# Patient Record
Sex: MALE | Race: WHITE | NOT HISPANIC OR LATINO | Employment: OTHER | URBAN - METROPOLITAN AREA
[De-identification: names, ages, dates, MRNs, and addresses within clinical notes are randomized per-mention and may not be internally consistent; named-entity substitution may affect disease eponyms.]

---

## 2017-01-02 ENCOUNTER — TELEPHONE (OUTPATIENT)
Dept: GASTROENTEROLOGY | Facility: CLINIC | Age: 82
End: 2017-01-02

## 2017-01-02 NOTE — TELEPHONE ENCOUNTER
Free Drug     Medication - Sandostatin  Sponsor - [employer]  Phone # - [phone]  Fax # [phone]  Effective Dates - TBD  Additional Information - TBD    Mailed free drug application to patient, will have Dr. Christopher sign off on MD portion.

## 2017-01-03 NOTE — TELEPHONE ENCOUNTER
Called infusion scheduling at [place] at [phone] - spoke to Benita - I asked her to reach out to patient to get him on the schedule for the Sandostatin injection - she was happy to help.  I will check to see schedule date 1/4/17

## 2017-01-09 ENCOUNTER — MEDICAL CORRESPONDENCE (OUTPATIENT)
Dept: HEALTH INFORMATION MANAGEMENT | Facility: CLINIC | Age: 82
End: 2017-01-09

## 2017-01-09 ENCOUNTER — TELEPHONE (OUTPATIENT)
Dept: GASTROENTEROLOGY | Facility: CLINIC | Age: 82
End: 2017-01-09

## 2017-01-09 NOTE — TELEPHONE ENCOUNTER
Medicare has a strict diagnosis for covering this drug under medical benefits and unfortunately, dumping syndrome is not an approved diagnosis.  Patient will have to try for free drug and I have sent him the application.  I will follow up with patient to ensure that he understands.  I will also go over his other option of getting it thru his pharmacy benefit.  Will update you when a determination is made.  Thanks.

## 2017-01-09 NOTE — TELEPHONE ENCOUNTER
Spoke to patient re: plan for octreotide. Patient received free drug information from Sophy Burt and PA team. Patient plans to fill paperwork out and mail in so we can move forward with medication regimen.     Patient also reports that he has not had loose stools for the past couple weeks but will move forward with paperwork regardless.

## 2017-01-10 ENCOUNTER — OFFICE VISIT (OUTPATIENT)
Dept: FAMILY MEDICINE | Facility: OTHER | Age: 82
End: 2017-01-10
Payer: COMMERCIAL

## 2017-01-10 VITALS
WEIGHT: 163 LBS | OXYGEN SATURATION: 95 % | RESPIRATION RATE: 16 BRPM | DIASTOLIC BLOOD PRESSURE: 70 MMHG | HEART RATE: 64 BPM | BODY MASS INDEX: 21.6 KG/M2 | TEMPERATURE: 97.4 F | HEIGHT: 73 IN | SYSTOLIC BLOOD PRESSURE: 118 MMHG

## 2017-01-10 DIAGNOSIS — Z98.0 S/P TOTAL GASTRECTOMY AND ROUX-EN-Y ESOPHAGOJEJUNAL ANASTOMOSIS: Primary | ICD-10-CM

## 2017-01-10 DIAGNOSIS — Z90.3 S/P TOTAL GASTRECTOMY AND ROUX-EN-Y ESOPHAGOJEJUNAL ANASTOMOSIS: Primary | ICD-10-CM

## 2017-01-10 DIAGNOSIS — K91.1 POSTSURGICAL DUMPING SYNDROME: ICD-10-CM

## 2017-01-10 PROCEDURE — 99214 OFFICE O/P EST MOD 30 MIN: CPT | Performed by: INTERNAL MEDICINE

## 2017-01-10 ASSESSMENT — PAIN SCALES - GENERAL: PAINLEVEL: EXTREME PAIN (8)

## 2017-01-10 NOTE — NURSING NOTE
"Chief Complaint   Patient presents with     Knee Pain     both and he needs to do something about it.       Initial /70 mmHg  Pulse 64  Temp(Src) 97.4  F (36.3  C) (Tympanic)  Resp 16  Ht 6' 0.5\" (1.842 m)  Wt 163 lb (73.936 kg)  BMI 21.79 kg/m2  SpO2 95% Estimated body mass index is 21.79 kg/(m^2) as calculated from the following:    Height as of this encounter: 6' 0.5\" (1.842 m).    Weight as of this encounter: 163 lb (73.936 kg).  BP completed using cuff size: regular    "

## 2017-01-10 NOTE — MR AVS SNAPSHOT
After Visit Summary   1/10/2017    Remigio Holly    MRN: [id number]           Patient Information     Date Of Birth          11/6/1933        Visit Information        Provider Department      1/10/2017 10:20 AM Harrison Tse DO [facility]         Follow-ups after your visit        Your next 10 appointments already scheduled     Apr 24, 2017  1:00 PM   (Arrive by 12:45 PM)   RETURN IRRITABLE BOWEL DISEASE with Jonathan Christopher MD   Gastroenterology and IBD ([facility])    [address] [place] [address] [phone]              Who to contact     If you have questions or need follow up information about today's clinic visit or your schedule please contact [facility] directly at [phone].  Normal or non-critical lab and imaging results will be communicated to you by MyChart, letter or phone within 4 business days after the clinic has received the results. If you do not hear from us within 7 days, please contact the clinic through MyChart or phone. If you have a critical or abnormal lab result, we will notify you by phone as soon as possible.  Submit refill requests through PulseSocks or call your pharmacy and they will forward the refill request to us. Please allow 3 business days for your refill to be completed.          Additional Information About Your Visit        MyChart Information     P[employer]Socks gives you secure access to your electronic health record. If you see a primary care provider, you can also send messages to your care team and make appointments. If you have questions, please call your primary care clinic.  If you do not have a primary care provider, please call [phone] and they will assist you.        Care EveryWhere ID     This is your Care EveryWhere ID. This could be used by other organizations to access your [place] medical records  [id number]        Your Vitals Were      "Pulse Temperature Respirations Height BMI (Body Mass Index) Pulse Oximetry    64 97.4  F (36.3  C) (Tympanic) 16 6' 0.5\" (1.842 m) 21.79 kg/m2 95%       Blood Pressure from Last 3 Encounters:   01/10/17 118/70   12/12/16 138/74   08/23/16 132/74    Weight from Last 3 Encounters:   01/10/17 163 lb (73.936 kg)   12/12/16 164 lb 11.2 oz (74.707 kg)   08/23/16 163 lb (73.936 kg)              Today, you had the following     No orders found for display       Primary Care Provider Office Phone # Fax #    Moy Celis [phone]       [facility] [address] [facility] [address]        Thank you!     Thank you for choosing [facility]  for your care. Our goal is always to provide you with excellent care. Hearing back from our patients is one way we can continue to improve our services. Please take a few minutes to complete the written survey that you may receive in the mail after your visit with us. Thank you!             Your Updated Medication List - Protect others around you: Learn how to safely use, store and throw away your medicines at www.disposemymeds.org.          This list is accurate as of: 1/10/17 10:27 AM.  Always use your most recent med list.                   Brand Name Dispense Instructions for use    ACETAMINOPHEN PO      Take 500 mg by mouth every evening as needed for pain       BENADRYL 25 MG capsule   Generic drug:  diphenhydrAMINE     56 capsule    Take 2 capsules (50 mg) by mouth At Bedtime       blood glucose monitoring lancets     1 Box    Use to test blood sugar 1 time daily or as directed.       blood glucose monitoring test strip    ROBERT CONTOUR NEXT    100 each    Use to test blood sugar 1 times daily or as directed.       cyanocobalamin 1000 MCG/ML injection    VITAMIN B12    1 mL    Inject 1 mL (1,000 mcg) into the muscle every 30 days       etodolac 400 MG tablet    LODINE    30 tablet    Take 1 tablet (400 mg) by mouth daily       * octreotide 10 " MG injection    sandoSTATIN LAR    2 each    Inject 20 mg  intramuscular every 8 weeks.       * octreotide 20 MG injection    sandoSTATIN LAR    1 each    Inject 20 mg into the muscle every 28 days       * Notice:  This list has 2 medication(s) that are the same as other medications prescribed for you. Read the directions carefully, and ask your doctor or other care provider to review them with you.

## 2017-01-10 NOTE — PROGRESS NOTES
SUBJECTIVE:                                                    Remigio Holly is a 83 year old male who presents to clinic today for the following health issues:      Chief Complaint   Patient presents with     Knee Pain     both and he needs to do something about it.           CHIEF COMPLAINT:    The patient is a pleasant 83-year-old gentleman who presents today with 2 primary concerns. His first concern is that he has persistent knee pain. He has had steroid injections in these lasted only about a week or so. He does have significant arthritis and I recommended orthopedic consultation at a time of his convenience. It would seem that possibly Synvisc may be of benefit but certainly surgical intervention would be more beneficial. His bigger and more concerning problem is that he has recently had his 's license revoked. He has a history of short bowel syndrome with dumping syndrome. Subsequently he has frequent hypoglycemia. He notes that he's never had an episode of unconsciousness but does have very little time between the urge to have a low blood sugar and the need to eat. He recently saw a provider in consultation for this who recommended Sandostatin. The patient has not yet obtained her starting the medication. What he wants today is me to fill out the paperwork stating that he can drive. Unfortunately, I completely agree with Dr. Christopher in his assessment that the patient is a potential risk to himself and others. Discussing this with the patient, did not really make him happy. I have explained that he has not even started the therapy and as such, we cannot simply say that the disease no longer exists because it is inconvenient.    He is currently in no acute distress.     Vital Signs:  B/P: 118/70, T: 97.4, P: 64, R: 16, BMI: Body mass index is 21.79 kg/(m^2).   Constitutional: The patient appears to be in no acute distress. The patient appears to be adequately hydrated. No acute  respiratory or hemodynamic distress is noted at this time.   LUNGS: clear bilaterally, airflow is brisk, no intercostal retraction or stridor is noted. No coughing is noted during visit.   HEART:  Irregularly irregular without rubs, clicks, gallops, or murmurs. PMI is nondisplaced. Upstrokes are brisk. S1,S2 are heard.                        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    I have carefully explained the diagnosis and treatment options with the patient. The patient has displayed an understanding of the above, and all subsequent questions were answered.         DO ELAINE Powell    Portions of this note were produced using GRID  Although every attempt at real-time proof reading has been made, occasional grammar/syntax errors may have been missed.

## 2017-01-11 NOTE — TELEPHONE ENCOUNTER
Received a call from [facility] stating that they no longer cover dumping syndrome patients for Sandostatin.  They referred me to a different [facility] program .  I have printed off the application and am awaiting response from nurse to see if the medication in question is still going to be pursued.

## 2017-01-12 NOTE — TELEPHONE ENCOUNTER
Application for [employer] application is not available for [facility].  Only option for patient is to pay his pharmacy copy and then an OP injection copay.  Heydi Martell is connecting with patient about still pursuing medication.

## 2017-01-13 ENCOUNTER — CARE COORDINATION (OUTPATIENT)
Dept: GASTROENTEROLOGY | Facility: CLINIC | Age: 82
End: 2017-01-13

## 2017-01-13 NOTE — TELEPHONE ENCOUNTER
Patient called back and would like to speak to Heydi Martell regarding the next steps. Pt states he filled out some forms that were sent to him.

## 2017-01-13 NOTE — PROGRESS NOTES
Spoke to patient about octreotide. Unable to get medication covered by insurance, will hold off on medication at this time. Pt. Reports that he is not having loose stools any longer.    Pt main concern is that drivers license was revoked. Followed up with his PCP to discuss hypoglycemia and thinks they misunderstood the plan for octreotide. Discussed with Art that he would need to see provider that manages his blood sugars as they will be able to help him regain his license. Patient would like writer to follow up with Dr. Christopher as well.    Will follow up with MD.

## 2017-01-17 ENCOUNTER — ALLIED HEALTH/NURSE VISIT (OUTPATIENT)
Dept: FAMILY MEDICINE | Facility: OTHER | Age: 82
End: 2017-01-17
Payer: COMMERCIAL

## 2017-01-17 DIAGNOSIS — D51.0 PERNICIOUS ANEMIA: Primary | ICD-10-CM

## 2017-01-17 PROCEDURE — 99207 ZZC NO CHARGE NURSE ONLY: CPT

## 2017-01-17 PROCEDURE — 96372 THER/PROPH/DIAG INJ SC/IM: CPT

## 2017-01-17 NOTE — NURSING NOTE
The following medication was given:     MEDICATION: Vitamin B12  1000  See medication note.  Patient instructed to remain in clinic for 20 minutes afterwards, and to report any adverse reaction to me immediately.  Prior to injection verified patient identity using patient's name and date of birth.  Nella Trejo MA     1/17/2017

## 2017-01-31 ENCOUNTER — OFFICE VISIT (OUTPATIENT)
Dept: FAMILY MEDICINE | Facility: OTHER | Age: 82
End: 2017-01-31
Payer: COMMERCIAL

## 2017-01-31 VITALS
DIASTOLIC BLOOD PRESSURE: 76 MMHG | HEIGHT: 77 IN | BODY MASS INDEX: 19.94 KG/M2 | TEMPERATURE: 96.1 F | SYSTOLIC BLOOD PRESSURE: 124 MMHG | RESPIRATION RATE: 14 BRPM | OXYGEN SATURATION: 96 % | WEIGHT: 168.9 LBS | HEART RATE: 64 BPM

## 2017-01-31 DIAGNOSIS — E16.2 HYPOGLYCEMIA: Primary | ICD-10-CM

## 2017-01-31 DIAGNOSIS — K91.1 POSTSURGICAL DUMPING SYNDROME: ICD-10-CM

## 2017-01-31 LAB
FOLATE SERPL-MCNC: 17.4 NG/ML
HBA1C MFR BLD: 6.2 % (ref 4.3–6)

## 2017-01-31 PROCEDURE — 82746 ASSAY OF FOLIC ACID SERUM: CPT | Performed by: FAMILY MEDICINE

## 2017-01-31 PROCEDURE — 36415 COLL VENOUS BLD VENIPUNCTURE: CPT | Performed by: FAMILY MEDICINE

## 2017-01-31 PROCEDURE — 83036 HEMOGLOBIN GLYCOSYLATED A1C: CPT | Performed by: FAMILY MEDICINE

## 2017-01-31 PROCEDURE — 99213 OFFICE O/P EST LOW 20 MIN: CPT | Mod: 25 | Performed by: FAMILY MEDICINE

## 2017-01-31 ASSESSMENT — PAIN SCALES - GENERAL: PAINLEVEL: NO PAIN (0)

## 2017-01-31 NOTE — NURSING NOTE
"Chief Complaint   Patient presents with     Blood Sugar Problem     High readings at home       Initial /76 mmHg  Pulse 64  Temp(Src) 96.1  F (35.6  C) (Oral)  Resp 14  Ht 6' 5\" (1.956 m)  Wt 168 lb 14.4 oz (76.613 kg)  BMI 20.02 kg/m2  SpO2 96% Estimated body mass index is 20.02 kg/(m^2) as calculated from the following:    Height as of this encounter: 6' 5\" (1.956 m).    Weight as of this encounter: 168 lb 14.4 oz (76.613 kg).  BP completed using cuff size: regular  Health Maintenance Due   Topic Date Due     PNEUMOCOCCAL (2 of 2 - PCV13) 05/12/2010     Nella Trejo MA     1/31/2017    "

## 2017-01-31 NOTE — PROGRESS NOTES
"SUBJECTIVE:                                                    Remigio Holly is a 83 year old male who presents to clinic today for the following health issues:    Concern - blood sugars     Onset: 3-4 years    Description:   Home readings    Intensity: NA    Progression of Symptoms:  same    Accompanying Signs & Symptoms:     none       Previous history of similar problem:   Years ago    Precipitating factors:   Worsened by: none    Alleviating factors:  Improved by: none       Therapies Tried and outcome: none      Problem list and histories reviewed & adjusted, as indicated.    C: NEGATIVE for fever, chills, change in weight  E/M: NEGATIVE for ear, mouth and throat problems  R: NEGATIVE for significant cough or SOB  CV: NEGATIVE for chest pain, palpitations or peripheral edema  GI: dumping syndrome  ENDOCRINE: hypoglycemia    OBJECTIVE:                                                    /76 mmHg  Pulse 64  Temp(Src) 96.1  F (35.6  C) (Oral)  Resp 14  Ht 6' 5\" (1.956 m)  Wt 168 lb 14.4 oz (76.613 kg)  BMI 20.02 kg/m2  SpO2 96%  Body mass index is 20.02 kg/(m^2).    See dictated note     ASSESSMENT/PLAN:                                                    Dumping with hypoglycemia    Devan Ayon MD, MD  [facility]          "

## 2017-01-31 NOTE — MR AVS SNAPSHOT
After Visit Summary   1/31/2017    Remigio Holly    MRN: [id number]           Patient Information     Date Of Birth          11/6/1933        Visit Information        Provider Department      1/31/2017 9:00 AM Devan Ayon MD [facility]        Today's Diagnoses     Hypoglycemia    -  1     Postsurgical dumping syndrome            Follow-ups after your visit        Your next 10 appointments already scheduled     Apr 24, 2017  1:00 PM   (Arrive by 12:45 PM)   RETURN IRRITABLE BOWEL DISEASE with Jonathan Christopher MD   [facility] Gastroenterology and IBD ([facility] [place])    [address] [place] [address] [phone]              Who to contact     If you have questions or need follow up information about today's clinic visit or your schedule please contact [facility] directly at [phone].  Normal or non-critical lab and imaging results will be communicated to you by MyChart, letter or phone within 4 business days after the clinic has received the results. If you do not hear from us within 7 days, please contact the clinic through MyChart or phone. If you have a critical or abnormal lab result, we will notify you by phone as soon as possible.  Submit refill requests through PanGenX or call your pharmacy and they will forward the refill request to us. Please allow 3 business days for your refill to be completed.          Additional Information About Your Visit        MyChart Information     [employer] gives you secure access to your electronic health record. If you see a primary care provider, you can also send messages to your care team and make appointments. If you have questions, please call your primary care clinic.  If you do not have a primary care provider, please call [phone] and they will assist you.        Care EveryWhere ID     This is your Care EveryWhere ID. This could be used by other  "organizations to access your [place] medical records  [id number]        Your Vitals Were     Pulse Temperature Respirations Height BMI (Body Mass Index) Pulse Oximetry    64 96.1  F (35.6  C) (Oral) 14 6' 5\" (1.956 m) 20.02 kg/m2 96%       Blood Pressure from Last 3 Encounters:   01/31/17 124/76   01/10/17 118/70   12/12/16 138/74    Weight from Last 3 Encounters:   01/31/17 168 lb 14.4 oz (76.613 kg)   01/10/17 163 lb (73.936 kg)   12/12/16 164 lb 11.2 oz (74.707 kg)              We Performed the Following     Folate     Hemoglobin A1c        Primary Care Provider Office Phone # Fax #    Moy Celis [phone]       [facility] [address] [facility] [address]        Thank you!     Thank you for choosing [facility]  for your care. Our goal is always to provide you with excellent care. Hearing back from our patients is one way we can continue to improve our services. Please take a few minutes to complete the written survey that you may receive in the mail after your visit with us. Thank you!             Your Updated Medication List - Protect others around you: Learn how to safely use, store and throw away your medicines at www.disposemymeds.org.          This list is accurate as of: 1/31/17  9:58 AM.  Always use your most recent med list.                   Brand Name Dispense Instructions for use    ACETAMINOPHEN PO      Take 500 mg by mouth every evening as needed for pain       BENADRYL 25 MG capsule   Generic drug:  diphenhydrAMINE     56 capsule    Take 2 capsules (50 mg) by mouth At Bedtime       blood glucose monitoring lancets     1 Box    Use to test blood sugar 1 time daily or as directed.       blood glucose monitoring test strip    ROBERT CONTOUR NEXT    100 each    Use to test blood sugar 1 times daily or as directed.       cyanocobalamin 1000 MCG/ML injection    VITAMIN B12    1 mL    Inject 1 mL (1,000 mcg) into the muscle every 30 days       etodolac 400 " MG tablet    LODINE    30 tablet    Take 1 tablet (400 mg) by mouth daily       octreotide 10 MG injection    sandoSTATIN LAR    2 each    Inject 20 mg  intramuscular every 8 weeks.

## 2017-02-01 NOTE — PROGRESS NOTES
SUBJECTIVE:  Remigio Kennedy, an 83-year-old man in accompanied by his wife.  I have not seen him in a long while.  He was recently seen by Dr. Tse.  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  Unfortunately, he did develop some dumping syndrome after that, but that seems to be improving.  He is now not having excessive stooling.  In fact will only have some stooling every few days.  The problem is he tends to get hypoglycemic and has been told not to drive an automobile because of this and the hypoglycemia thought to be due to his dumping.  He was advised to try some Sandostatin medication for the dumping, but it was very expensive.  He elected not to do it and indeed if he is not having that much stooling it probably would not have much effect on any hypoglycemia.  On further questioning, he has had episodes after milkshakes or a lot of sugary foods.  I do encourage him to eat more protein and less carbohydrates.  He is a slim man.  I do get an A1c just wondering if he is running chronically low sugars.  I am a bit surprised to find his A1c just slightly over 6.  His hypoglycemia may be preceding eventual diabetic problem and I do not mention that to the patient.  The crux of this seems to be whether he should safely drive an automobile.  He has not had any loss of consciousness.  He always is aware if he is getting low sugar and immediately clears on glucose supplement.  I am going to recommend that he document his blood sugars for the next month or so.  If he has no episodes and the sugars are not that far out of line we will see if we can reinstitute his driving license.         NEYDA HOSKINS M.D.             D: 2017 18:08   T: 2017 09:11   MT: LADY      Name:     REMIGIO KENNEDY   MRN:      [id number]        Account:      [id number]   :      1933           Visit Date:   2017       Document: [id number]

## 2017-02-28 ENCOUNTER — ALLIED HEALTH/NURSE VISIT (OUTPATIENT)
Dept: FAMILY MEDICINE | Facility: OTHER | Age: 82
End: 2017-02-28
Payer: COMMERCIAL

## 2017-02-28 DIAGNOSIS — D51.0 PA (PERNICIOUS ANEMIA): Primary | ICD-10-CM

## 2017-02-28 PROCEDURE — 99207 ZZC NO CHARGE NURSE ONLY: CPT

## 2017-02-28 PROCEDURE — 96372 THER/PROPH/DIAG INJ SC/IM: CPT

## 2017-02-28 NOTE — NURSING NOTE
The following medication was given:     MEDICATION: Vitamin B12  1000mcg  See medication note.  Patient instructed to remain in clinic for 20 minutes afterwards, and to report any adverse reaction to me immediately.  Prior to injection verified patient identity using patient's name and date of birth.  Nella Trejo MA     2/28/2017

## 2017-02-28 NOTE — MR AVS SNAPSHOT
After Visit Summary   2/28/2017    Remigio Holly    MRN: [id number]           Patient Information     Date Of Birth          11/6/1933        Visit Information        Provider Department      2/28/2017 9:00 AM PAULA STEPHENS NURSE, [facility]        Today's Diagnoses     PA (pernicious anemia)    -  1       Follow-ups after your visit        Your next 10 appointments already scheduled     Apr 24, 2017  1:00 PM CDT   (Arrive by 12:45 PM)   RETURN IRRITABLE BOWEL DISEASE with Jonathan Christopher MD   [facility] Gastroenterology and IBD ([facility] [place])    [address] [facility]  [address] [facility] [address] [phone]              Who to contact     If you have questions or need follow up information about today's clinic visit or your schedule please contact [facility] directly at [phone].  Normal or non-critical lab and imaging results will be communicated to you by MyChart, letter or phone within 4 business days after the clinic has received the results. If you do not hear from us within 7 days, please contact the clinic through [employer]hart or phone. If you have a critical or abnormal lab result, we will notify you by phone as soon as possible.  Submit refill requests through LiquidHub or call your pharmacy and they will forward the refill request to us. Please allow 3 business days for your refill to be completed.          Additional Information About Your Visit        MyChart Information     LiquidHub gives you secure access to your electronic health record. If you see a primary care provider, you can also send messages to your care team and make appointments. If you have questions, please call your primary care clinic.  If you do not have a primary care provider, please call [phone] and they will assist you.        Care EveryWhere ID     This is your Care EveryWhere ID. This could be used by other organizations to access your [place]  medical records  [id number]         Blood Pressure from Last 3 Encounters:   01/31/17 124/76   01/10/17 118/70   12/12/16 138/74    Weight from Last 3 Encounters:   01/31/17 168 lb 14.4 oz (76.6 kg)   01/10/17 163 lb (73.9 kg)   12/12/16 164 lb 11.2 oz (74.7 kg)              We Performed the Following     INJECTION INTRAMUSCULAR OR SUB-Q     VITAMIN B12 INJ /1000MCG        Primary Care Provider Office Phone # Fax #    Moy Celis [phone]       [facility] [address] [facility] [address]        Thank you!     Thank you for choosing [facility]  for your care. Our goal is always to provide you with excellent care. Hearing back from our patients is one way we can continue to improve our services. Please take a few minutes to complete the written survey that you may receive in the mail after your visit with us. Thank you!             Your Updated Medication List - Protect others around you: Learn how to safely use, store and throw away your medicines at www.disposemymeds.org.          This list is accurate as of: 2/28/17  9:33 AM.  Always use your most recent med list.                   Brand Name Dispense Instructions for use    ACETAMINOPHEN PO      Take 500 mg by mouth every evening as needed for pain       BENADRYL 25 MG capsule   Generic drug:  diphenhydrAMINE     56 capsule    Take 2 capsules (50 mg) by mouth At Bedtime       blood glucose monitoring lancets     1 Box    Use to test blood sugar 1 time daily or as directed.       blood glucose monitoring test strip    ROBERT CONTOUR NEXT    100 each    Use to test blood sugar 1 times daily or as directed.       cyanocobalamin 1000 MCG/ML injection    VITAMIN B12    1 mL    Inject 1 mL (1,000 mcg) into the muscle every 30 days       etodolac 400 MG tablet    LODINE    30 tablet    Take 1 tablet (400 mg) by mouth daily       octreotide 10 MG injection    sandoSTATIN LAR    2 each    Inject 20 mg  intramuscular every 8  weeks.

## 2017-04-14 ENCOUNTER — TELEPHONE (OUTPATIENT)
Dept: FAMILY MEDICINE | Facility: OTHER | Age: 82
End: 2017-04-14

## 2017-04-14 NOTE — TELEPHONE ENCOUNTER
Reason for Call:  TUES OR WED Day Appointment, Requested Provider:  GARRICK Ayon M.D.    PCP: Moy eClis    Reason for visit: Patient requesting to see dr FRANCISCO to discuss blood sugars on 4.18 or 4.19, Patient is aware that Dr FRANCISCO is not back in clinic until Tues 4.18, please advise    Duration of symptoms:     Have you been treated for this in the past? Yes    Additional comments:     Can we leave a detailed message on this number? YES    Phone number patient can be reached at: Home number on file [phone] (home)    Best Time:     Call taken on 4/14/2017 at 8:51 AM by Shannon Carter

## 2017-04-17 ENCOUNTER — TELEPHONE (OUTPATIENT)
Dept: GASTROENTEROLOGY | Facility: CLINIC | Age: 82
End: 2017-04-17

## 2017-04-19 ENCOUNTER — OFFICE VISIT (OUTPATIENT)
Dept: FAMILY MEDICINE | Facility: OTHER | Age: 82
End: 2017-04-19
Payer: COMMERCIAL

## 2017-04-19 VITALS
TEMPERATURE: 97 F | BODY MASS INDEX: 21.81 KG/M2 | RESPIRATION RATE: 16 BRPM | SYSTOLIC BLOOD PRESSURE: 120 MMHG | HEIGHT: 73 IN | WEIGHT: 164.6 LBS | HEART RATE: 57 BPM | OXYGEN SATURATION: 97 % | DIASTOLIC BLOOD PRESSURE: 71 MMHG

## 2017-04-19 DIAGNOSIS — D51.0 PERNICIOUS ANEMIA: ICD-10-CM

## 2017-04-19 PROCEDURE — 96372 THER/PROPH/DIAG INJ SC/IM: CPT | Performed by: FAMILY MEDICINE

## 2017-04-19 PROCEDURE — 99213 OFFICE O/P EST LOW 20 MIN: CPT | Mod: 25 | Performed by: FAMILY MEDICINE

## 2017-04-19 RX ORDER — CYANOCOBALAMIN 1000 UG/ML
1 INJECTION, SOLUTION INTRAMUSCULAR; SUBCUTANEOUS
Qty: 1 ML | Refills: 11 | COMMUNITY
Start: 2017-04-19 | End: 2018-05-01

## 2017-04-19 RX ORDER — CYANOCOBALAMIN 1000 UG/ML
1 INJECTION, SOLUTION INTRAMUSCULAR; SUBCUTANEOUS
Qty: 1 ML | Refills: 11 | Status: SHIPPED | OUTPATIENT
Start: 2017-04-19 | End: 2017-04-19

## 2017-04-19 ASSESSMENT — PAIN SCALES - GENERAL: PAINLEVEL: NO PAIN (0)

## 2017-04-19 NOTE — MR AVS SNAPSHOT
After Visit Summary   4/19/2017    Remigio Holly    MRN: [id number]           Patient Information     Date Of Birth          11/6/1933        Visit Information        Provider Department      4/19/2017 10:45 AM Devan Ayon MD [facility]        Today's Diagnoses     Pernicious anemia           Follow-ups after your visit        Your next 10 appointments already scheduled     Apr 24, 2017  1:00 PM CDT   (Arrive by 12:45 PM)   RETURN IRRITABLE BOWEL DISEASE with Jonathan Christopher MD   [facility] ([facility])    [address] [place] [address]  4th [facility] [address] [phone]              Who to contact     If you have questions or need follow up information about today's clinic visit or your schedule please contact [facility] directly at [phone].  Normal or non-critical lab and imaging results will be communicated to you by MyChart, letter or phone within 4 business days after the clinic has received the results. If you do not hear from us within 7 days, please contact the clinic through [employer] or phone. If you have a critical or abnormal lab result, we will notify you by phone as soon as possible.  Submit refill requests through [employer] or call your pharmacy and they will forward the refill request to us. Please allow 3 business days for your refill to be completed.          Additional Information About Your Visit        MyChart Information     [employer] gives you secure access to your electronic health record. If you see a primary care provider, you can also send messages to your care team and make appointments. If you have questions, please call your primary care clinic.  If you do not have a primary care provider, please call [phone] and they will assist you.        Care EveryWhere ID     This is your Care EveryWhere ID. This could be used by other organizations to access your [facility]  "medical records  [id number]        Your Vitals Were     Pulse Temperature Respirations Height Pulse Oximetry BMI (Body Mass Index)    57 97  F (36.1  C) (Temporal) 16 6' 0.5\" (1.842 m) 97% 22.02 kg/m2       Blood Pressure from Last 3 Encounters:   04/19/17 120/71   01/31/17 124/76   01/10/17 118/70    Weight from Last 3 Encounters:   04/19/17 164 lb 9.6 oz (74.7 kg)   01/31/17 168 lb 14.4 oz (76.6 kg)   01/10/17 163 lb (73.9 kg)              Today, you had the following     No orders found for display         Today's Medication Changes          These changes are accurate as of: 4/19/17 12:42 PM.  If you have any questions, ask your nurse or doctor.               Start taking these medicines.        Dose/Directions    cyanocobalamin 1000 MCG/ML injection   Commonly known as:  VITAMIN B12   Used for:  Pernicious anemia   Started by:  Devan Ayon MD        Dose:  1 mL   Inject 1 mL (1,000 mcg) into the muscle every 30 days 1 year of injections approved through 4/19/2017 per Dr. Ayon.   Quantity:  1 mL   Refills:  11                Primary Care Provider Office Phone # Fax #    Moy Celis [phone]       [facility] [address] [facility] [address]        Thank you!     Thank you for choosing [facility]  for your care. Our goal is always to provide you with excellent care. Hearing back from our patients is one way we can continue to improve our services. Please take a few minutes to complete the written survey that you may receive in the mail after your visit with us. Thank you!             Your Updated Medication List - Protect others around you: Learn how to safely use, store and throw away your medicines at www.disposemymeds.org.          This list is accurate as of: 4/19/17 12:42 PM.  Always use your most recent med list.                   Brand Name Dispense Instructions for use    ACETAMINOPHEN PO      Take 500 mg by mouth every evening as needed for " pain       BENADRYL 25 MG capsule   Generic drug:  diphenhydrAMINE     56 capsule    Take 2 capsules (50 mg) by mouth At Bedtime       blood glucose monitoring lancets     1 Box    Use to test blood sugar 1 time daily or as directed.       blood glucose monitoring test strip    ROBERT CONTOUR NEXT    100 each    Use to test blood sugar 1 times daily or as directed.       cyanocobalamin 1000 MCG/ML injection    VITAMIN B12    1 mL    Inject 1 mL (1,000 mcg) into the muscle every 30 days 1 year of injections approved through 4/19/2017 per Dr. Ayon.       etodolac 400 MG tablet    LODINE    30 tablet    Take 1 tablet (400 mg) by mouth daily

## 2017-04-19 NOTE — PROGRESS NOTES
SUBJECTIVE:  Mr. Remigio Holly is an 83-year-old man in accompanied by his wife.      Remigio had a past gastric cancer successfully cured with a gastric resection and unfortunately after that he did develop some dumping.  He developed inability to maintain his weight and some intermittent diarrhea problems that seem to have improved over the years, but a while back he was having some loose stools and ended up confused and found to have a low blood sugar.  He was seen in the emergency room and subsequently referred to a nutritionist at the [facility] who decided that he had enough of a confusion and loss of consciousness state that he was not safe to drive and he reported him and revoked his 's license.  The note was that if he was symptom-free for 3 months hopefully would resume the driving.  He is very much interested in regaining his driving privileges.  He brings in the form wondering if I would sign it, but I think it really would be more appropriate to have it signed by the one who had instituted this.  He has an appointment with him next week.  I do not see any reason he would not do so.       Remigio does bring in a long list of all his blood sugars, none of which are low, the lowest I can find in pages is 80.  He has had no further symptoms.  However, both he and his wife say he has paid much more careful attention to his diet and that seems to have improved.  He no longer had any dumping symptoms; he no longer has any diarrhea.  He never did take any specific medications for this and I am in hopes that he can get his driving license back again.  We do discuss diet that he would be better off with a higher protein less carbohydrate diet with his hypoglycemic episodes and he does seem to understand that totally.  He is insistent that he never did have a loss of consciousness and, reviewing the chart, it sounds like he had gotten confused and sweaty and a little at a loss but really did not  totally lose consciousness.  Yet again this certainly would be concerning if he were driving.         NEYDA HOSKINS M.D.             D: 2017 13:46   T: 2017 15:49   MT: [id number]      Name:     SIENNA KENNEDY   MRN:      [id number]        Account:      [id number]   :      1933           Visit Date:   2017      Document: [id number]

## 2017-04-19 NOTE — PROGRESS NOTES
"SUBJECTIVE:                                                    Remigio Holly is a 83 year old male who presents to clinic today for the following health issues:    Chief Complaint   Patient presents with     Results     pt here to discuss blood sugars          Amount of exercise or physical activity: is active    Problems taking medications regularly: No    Medication side effects: none    Diet: regular (no restrictions)      Problem list and histories reviewed & adjusted, as indicated.    C: NEGATIVE for fever, chills, change in weight  E/M: NEGATIVE for ear, mouth and throat problems  R: NEGATIVE for significant cough or SOB  CV: NEGATIVE for chest pain, palpitations or peripheral edema    OBJECTIVE:                                                    /71  Pulse 57  Temp 97  F (36.1  C) (Temporal)  Resp 16  Ht 6' 0.5\" (1.842 m)  Wt 164 lb 9.6 oz (74.7 kg)  SpO2 97%  BMI 22.02 kg/m2  Body mass index is 22.02 kg/(m^2).    See dictated note     ASSESSMENT/PLAN:                                                    No further hypoglycemic episodes.Hopes to resume driving privileges    Devan Ayon MD, MD  [facility]          "

## 2017-04-19 NOTE — NURSING NOTE
The following medication was given:     MEDICATION: Vitamin B12  1000mcg  See medication note.  Patient instructed to remain in clinic for 20 minutes afterwards, and to report any adverse reaction to me immediately.  Prior to injection verified patient identity using patient's name and date of birth.  Nella Trejo MA     4/19/2017

## 2017-04-24 ENCOUNTER — OFFICE VISIT (OUTPATIENT)
Dept: GASTROENTEROLOGY | Facility: CLINIC | Age: 82
End: 2017-04-24

## 2017-04-24 VITALS
HEIGHT: 73 IN | TEMPERATURE: 99.2 F | OXYGEN SATURATION: 96 % | SYSTOLIC BLOOD PRESSURE: 138 MMHG | HEART RATE: 49 BPM | DIASTOLIC BLOOD PRESSURE: 80 MMHG | WEIGHT: 163.8 LBS | BODY MASS INDEX: 21.71 KG/M2

## 2017-04-24 DIAGNOSIS — K91.1 POSTSURGICAL DUMPING SYNDROME: Primary | ICD-10-CM

## 2017-04-24 DIAGNOSIS — Z98.0 S/P TOTAL GASTRECTOMY AND ROUX-EN-Y ESOPHAGOJEJUNAL ANASTOMOSIS: ICD-10-CM

## 2017-04-24 DIAGNOSIS — Z90.3 S/P TOTAL GASTRECTOMY AND ROUX-EN-Y ESOPHAGOJEJUNAL ANASTOMOSIS: ICD-10-CM

## 2017-04-24 ASSESSMENT — PAIN SCALES - GENERAL: PAINLEVEL: NO PAIN (0)

## 2017-04-24 NOTE — PROGRESS NOTES
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 Reports having 2-3 SCBM/wk (new baseline w/ dietary/lifestyle adherence, no further diarrheal episodes). Very disciplined w/ mindful dietary/lifestyle modifications for dumping syndrome; tightly adherent re: avoidance of simple carbohydrate triggers (particularly ice cream and candy) since last visit. Marked reduction in intermittent diarrhea and bloating, no further pre-syncopal/hypoglycemic/LOC episodes since last visit (corroborated explicitly by pt's wife and family in room today). Pt reports that last \"severe episode\" required ED visit was in 6/2016 (though grants less severe episodes may have been occurring, and now have largely disappeared w/ dedication to dietary modifications). Pt is quite interested in re-applying for driving privileges, and has demonstrated good-boris commitment/honesty in the monitoring plan since last visit. Denies any tenesmus or insecurity passing flatus, no fecal incontinence or new perianal/nocturnal sxs noted. Denies any N/V/F/C/HA/NS or other const/syst/cardiopulmonary sxs, no BRBPR/melena/urinary changes, no unintentional wt loss or appetite/satiety changes. No other bowel/bladder habit changes, no dysphagia/odynophagia. No jaundice/icterus/pruritus, no acholic stools/steatorrhea, no new lumps/bumps, no jt pain/oral ulcer/rash/eye sxs noted.    ROS: 10pt ROS performed and otherwise negative.    PERTINENT PAST MEDICAL/SURGICAL " HISTORY:  As noted above.    PERTINENT MEDICATIONS:  Medications reviewed with patient today, see Medication List/Assessment for details.  No other NSAID/anticoagulation reported by patient.  No other OTC/herbal/supplements reported by patient.    SOCIAL HISTORY: Tobacco: none.    PHYSICAL EXAMINATION:  Vitals reviewed, AFVSS  Wt 163.8# today (stable)  Gen: aaox3, cooperative, pleasant, not diaphoretic, nad  HEENT: ncat, neck supple, no clad/sclad, normal op w/o ulcer/exudate, anicteric, mmm  Resp/CV without acute findings, not dyspneic/tachycardic  Abd: +nabs, soft, nt, nd, no peritoneal s/s noted  Ext: no c/c/e  Skin: warm, perfused, no jaundice  Neuro: grossly intact, no asterixis noted    PERTINENT STUDIES:  Reviewed pt's blood FSG record today (taken for ~3 months, different times of day and variable fasting length status while asymptomatic): ranges , no severe symptomatic FSGs <60.    ASSESSMENT/PLAN:    1. Severe dumping syndrome in the complicated total gastrectomy setting with esophagojejunal anastomosis, marked interval clinical improvement in diarrhea and lack of hypoglycemic episodes with more aggressive adherence to dietary/lifestyle modifications (particularly carbohydrate indiscretions) - continue supportive care as ordered for now, will support re-application for driving privileges with adequate Primary Care monitoring plan  1. It was wonderful meeting with you and your family again today to discuss your problematic refractory dumping syndrome in the setting of total gastrectomy + esophagojejunal anastomosis - discussed dietary/lifestyle modifications for dumping syndrome, as well as potential medication (depot octreotide) and monitoring (continuous glucose monitoring) options for management at length today.    - Excellent interval progress since last visit with disciplined mindful avoidance of inciting food/habits, no further severe hypoglycemic episodes per your or your family's report today.  "Keep up the great work!  - Recommend continuing to keep a food/symptom diary to review at next visit, may help identify other dietary/stress/volume triggers for your symptoms.    - Strongly recommend tight adherence to the dietary/lifestyle/nutritional modifications as reviewed again today to help reinforce your current practices for avoiding severe dumping syndrome.   - Smaller-volume but frequent meals spread throughout the day, adjusting consistencies and dietary choices based on if it's a \"good\" or \"bad\" day, avoiding prolonged fasting, etc. Must continue to minimize simple carbohydrate intake (i.e. candy, ice cream, sodas) lifelong, particularly as this may likely have been precipitating factors for prior episodes.  - Urgent glucose checks whenever you have \"an episode\" or a family member is concerned regarding changes in your sensorium/behavior/attention. These need to be reported to your PCP if they recur.  - Tracking when you are eating meals/snacks throughout the day, particularly as this might help your team correlate episodes/glucose changes.     2. Discussed role for daily glucose monitoring (cycling time of day when you check, can be cycled every week) to help continue to provide your Primary Care team with good data regarding your overall glucose trends.  - Discussed options of Continuous Glucose Monitoring (CGM), may want to discuss with your PCP and/or Endocrinology further if symptoms/episodes recur.  - No indication for depot octreotide at this time given that your dumping syndrome does appear to be responsive to dietary modifications when applied consistently. Can readdress role for this if necessary in your ongoing care.     3. Given your remarkable improvement in your dietary control of the dumping syndrome with more consistent mindful dietary modifications, and the ongoing plan for regular follow-up with Primary Care and open/honest self-monitoring with family support, would be willing to " support your application for reinstitution of your driving privileges. This requires:  - A phone call to your PCP to confirm his willingness to provide the monitoring, and to make sure he has our contact information if future questions arise regarding management of your dumping syndrome. Case reviewed with Dr. Ayon via phone this afternoon; in agreement with ongoing monitoring every 6 months and yearly [place] form review/completion through PCP going forward. Discussed options of CGM and depot octreotide today as well.  - Yearly [place] Dept of Public Safety review (requires submitting the approval form every year for now until at least 4 years of stability, can be signed and/or amended by your PCP going forward per his discretion). Will complete form today, but future completions must be completed through PCP as indicated if adherent to monitoring/safety plan.    2. Colorectal Cancer Screening  No known FH CRC, role for CRC screening >80 years of age unclear. Would recommend further discussion with PCP accordingly if desired.    RTC as needed.    Thank you for this consultation. It was a pleasure to participate in the care of this patient; please contact us with any further questions.     Jonathan Christopher MD   of Medicine  [facility] - Department of Medicine  Division of Gastroenterology

## 2017-04-24 NOTE — MR AVS SNAPSHOT
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  Please review this information with your primary care provider.  _______________________________________________________________________      1. It was wonderful meeting with you and your family again today to discuss your problematic refractory dumping syndrome in the setting of total gastrectomy + esophagojejunal anastomosis - discussed dietary/lifestyle modifications for dumping syndrome, as well as potential medication (depot octreotide) and monitoring (continuous glucose monitoring) options for management at length today.    - Excellent interval progress since last visit with disciplined mindful avoidance of inciting food/habits, no further severe hypoglycemic episodes per your or your family's report today. Keep up the great work!  - Recommend continuing to keep a food/symptom diary to review at next visit, may help identify other dietary/stress/volume triggers for your symptoms.    - Strongly recommend tight adherence to the dietary/lifestyle/nutritional modifications as reviewed again today to help reinforce your current practices for avoiding severe dumping syndrome.   - Smaller-volume but frequent meals spread throughout the day, adjusting consistencies and dietary choices based on if it's a \"good\" or \"bad\" day, avoiding prolonged fasting, etc. Needs to minimize simple carbohydrate intake (candy, ice cream, sodas), particularly as this may be precipitating more recent " "episodes.  - Urgent glucose checks whenever you have \"an episode\" or a family member is concerned regarding changes in your sensorium/behavior/attention.  - Tracking when you are eating meals/snacks throughout the day, particularly as this might help your team correlate episodes/glucose changes.    2. Discussed role for daily glucose monitoring (cycling time of day when you check, can be cycled every week) to help continue to provide your Primary Care team regarding your overall glucose trends.  - Discussed options of Continuous Glucose Monitoring, may want to discuss with your PCP and/or Endocrinology further if symptoms/episodes recur.  - No indication for depot octreotide at this time given that your dumping syndrome does appear to be responsive to dietary modifications applied consistently. Can readdress role for this if necessary in your ongoing care.    3. Given your remarkable improvement in your dietary control of the dumping syndrome with more consistent mindful dietary modifications, and the ongoing plan for regular follow-up with Primary Care and open/honest self-monitoring with family support, would be willing to support your application for reinstitution of your driving privileges. This requires:  - A phone call to your PCP to confirm his willingness to provide the monitoring, and to make sure he has our contact information if future questions arise regarding management of your dumping syndrome. I will sign the form once I've spoken with your Primary Care physician (Dr. Ayon, [phone]).  - Yearly [place] Dept of Public Safety review (requires submitting the approval form every year for now until at least 4 years of stability, can be signed and/or amended by your PCP going forward per his discretion).    4. Return to GI Clinic with me as needed.   - If you are unable to schedule this follow-up appointment today, please contact Cristina Vickers at [phone] within the next week to help set up this " necessary appointment.    _______________________________________________________________________    It was a pleasure seeing you in clinic today - please be in touch if there are any further questions that arise following today's visit.  During business hours, you may reach Clinic Nurse Triage Line at [phone].  For urgent/emergent questions after business hours, you may reach the on-call GI Fellow by contacting the [facility]  at [phone].    Any benign/non-urgent test results are usually communicated via letter or Piazzat message within 1-2 weeks after completion.  Urgent results (those that require a change in the previously-discussed care plan) are usually communicated via a phone call once available from our clinic staff to discuss the results and the next steps in your evaluation.    I recommend signing up for [employer] access if you have not already done so and are comfortable with using a computer.  This allows for online access to your lab results and also helps you communicate efficiently with my clinic should any questions arise in your care.    We have Financial Counseling services available through our clinic.  If you have questions about your insurance coverage or payment responsibilities, particularly before you undergo any tests or procedures, please let us know and we can arrange a consultation accordingly to help you make informed decisions about your healthcare.    Sincerely,    Jonathan Christopher MD    [facility] - Department of Medicine  Division of Gastroenterology          Follow-ups after your visit        Follow-up notes from your care team     Return if symptoms worsen or fail to improve.      Who to contact     Please call your clinic at [phone] to:    Ask questions about your health    Make or cancel appointments    Discuss your medicines    Learn about your test results    Speak to your doctor   If you have compliments or  "concerns about an experience at your clinic, or if you wish to file a complaint, please contact [facility] Patient Relations at [phone] or email us at [email or web address]         Additional Information About Your Visit        MyChart Information     TalkLifehart gives you secure access to your electronic health record. If you see a primary care provider, you can also send messages to your care team and make appointments. If you have questions, please call your primary care clinic.  If you do not have a primary care provider, please call [phone] and they will assist you.      Delphi is an electronic gateway that provides easy, online access to your medical records. With Delphi, you can request a clinic appointment, read your test results, renew a prescription or communicate with your care team.     To access your existing account, please contact your [facility] or call [phone] for assistance.        Care EveryWhere ID     This is your Care EveryWhere ID. This could be used by other organizations to access your [place] medical records  [id number]        Your Vitals Were     Pulse Temperature Height Pulse Oximetry BMI (Body Mass Index)       49 99.2  F (37.3  C) 1.854 m (6' 1\") 96% 21.61 kg/m2        Blood Pressure from Last 3 Encounters:   04/24/17 138/80   04/19/17 120/71   01/31/17 124/76    Weight from Last 3 Encounters:   04/24/17 74.3 kg (163 lb 12.8 oz)   04/19/17 74.7 kg (164 lb 9.6 oz)   01/31/17 76.6 kg (168 lb 14.4 oz)              Today, you had the following     No orders found for display       Primary Care Provider Office Phone # Fax #    Moy Celis [phone]       [facility] [address] [facility] [address]        Thank you!     Thank you for choosing [facility] GASTROENTEROLOGY AND IBD  for your care. Our goal is always to provide you with excellent care. Hearing back from our patients " is one way we can continue to improve our services. Please take a few minutes to complete the written survey that you may receive in the mail after your visit with us. Thank you!             Your Updated Medication List - Protect others around you: Learn how to safely use, store and throw away your medicines at www.disposemymeds.org.          This list is accurate as of: 4/24/17  1:21 PM.  Always use your most recent med list.                   Brand Name Dispense Instructions for use    ACETAMINOPHEN PO      Take 500 mg by mouth every evening as needed for pain       BENADRYL 25 MG capsule   Generic drug:  diphenhydrAMINE     56 capsule    Take 2 capsules (50 mg) by mouth At Bedtime       blood glucose monitoring lancets     1 Box    Use to test blood sugar 1 time daily or as directed.       blood glucose monitoring test strip    ROBERT CONTOUR NEXT    100 each    Use to test blood sugar 1 times daily or as directed.       cyanocobalamin 1000 MCG/ML injection    VITAMIN B12    1 mL    Inject 1 mL (1,000 mcg) into the muscle every 30 days 1 year of injections approved through 4/19/2017 per Dr. Ayon.       etodolac 400 MG tablet    LODINE    30 tablet    Take 1 tablet (400 mg) by mouth daily

## 2017-04-24 NOTE — PATIENT INSTRUCTIONS
"I've included a brief summary of our discussion and care plan from today's visit below.  Please review this information with your primary care provider.  _______________________________________________________________________      1. It was wonderful meeting with you and your family again today to discuss your problematic refractory dumping syndrome in the setting of total gastrectomy + esophagojejunal anastomosis - discussed dietary/lifestyle modifications for dumping syndrome, as well as potential medication (depot octreotide) and monitoring (continuous glucose monitoring) options for management at length today.    - Excellent interval progress since last visit with disciplined mindful avoidance of inciting food/habits, no further severe hypoglycemic episodes per your or your family's report today. Keep up the great work!  - Recommend continuing to keep a food/symptom diary to review at next visit, may help identify other dietary/stress/volume triggers for your symptoms.    - Strongly recommend tight adherence to the dietary/lifestyle/nutritional modifications as reviewed again today to help reinforce your current practices for avoiding severe dumping syndrome.   - Smaller-volume but frequent meals spread throughout the day, adjusting consistencies and dietary choices based on if it's a \"good\" or \"bad\" day, avoiding prolonged fasting, etc. Needs to minimize simple carbohydrate intake (candy, ice cream, sodas), particularly as this may be precipitating more recent episodes.  - Urgent glucose checks whenever you have \"an episode\" or a family member is concerned regarding changes in your sensorium/behavior/attention.  - Tracking when you are eating meals/snacks throughout the day, particularly as this might help your team correlate episodes/glucose changes.    2. Discussed role for daily glucose monitoring (cycling time of day when you check, can be cycled every week) to help continue to provide your Primary Care " team regarding your overall glucose trends.  - Discussed options of Continuous Glucose Monitoring, may want to discuss with your PCP and/or Endocrinology further if symptoms/episodes recur.  - No indication for depot octreotide at this time given that your dumping syndrome does appear to be responsive to dietary modifications applied consistently. Can readdress role for this if necessary in your ongoing care.    3. Given your remarkable improvement in your dietary control of the dumping syndrome with more consistent mindful dietary modifications, and the ongoing plan for regular follow-up with Primary Care and open/honest self-monitoring with family support, would be willing to support your application for reinstitution of your driving privileges. This requires:  - A phone call to your PCP to confirm his willingness to provide the monitoring, and to make sure he has our contact information if future questions arise regarding management of your dumping syndrome. I will sign the form once I've spoken with your Primary Care physician (Dr. Ayon, [phone]).  - Yearly [place] Dept of Public Safety review (requires submitting the approval form every year for now until at least 4 years of stability, can be signed and/or amended by your PCP going forward per his discretion).    4. Return to GI Clinic with me as needed.   - If you are unable to schedule this follow-up appointment today, please contact Cristina Vickers at [phone] within the next week to help set up this necessary appointment.    _______________________________________________________________________    It was a pleasure seeing you in clinic today - please be in touch if there are any further questions that arise following today's visit.  During business hours, you may reach Clinic Nurse Triage Line at [phone].  For urgent/emergent questions after business hours, you may reach the on-call GI Fellow by contacting the [facility] at  [phone].    Any benign/non-urgent test results are usually communicated via letter or [employer]hart message within 1-2 weeks after completion.  Urgent results (those that require a change in the previously-discussed care plan) are usually communicated via a phone call once available from our clinic staff to discuss the results and the next steps in your evaluation.    I recommend signing up for [employer]hart access if you have not already done so and are comfortable with using a computer.  This allows for online access to your lab results and also helps you communicate efficiently with my clinic should any questions arise in your care.    We have Financial Counseling services available through our clinic.  If you have questions about your insurance coverage or payment responsibilities, particularly before you undergo any tests or procedures, please let us know and we can arrange a consultation accordingly to help you make informed decisions about your healthcare.    Sincerely,    Jonathan Christopher MD    [facility] - Department of Medicine  Division of Gastroenterology

## 2017-04-24 NOTE — NURSING NOTE
"Chief Complaint   Patient presents with     RECHECK     f/u       Vitals:    04/24/17 1242   BP: 138/80   BP Location: Left arm   Patient Position: Chair   Cuff Size: Adult Regular   Pulse: (!) 49   Temp: 99.2  F (37.3  C)   SpO2: 96%   Weight: 163 lb 12.8 oz   Height: 6' 1\"       Body mass index is 21.61 kg/(m^2).    Amy Young MA                          "

## 2017-04-24 NOTE — LETTER
"4/24/2017       RE: Remigio Holly  [address]  [place] [address]     Dear Colleague,    Thank you for referring your patient, Remigio Holly, to the [facility] GASTROENTEROLOGY AND IBD at [facility]. Please see a copy of my visit note below.    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 Reports having 2-3 SCBM/wk (new baseline w/ dietary/lifestyle adherence, no further diarrheal episodes). Very disciplined w/ mindful dietary/lifestyle modifications for dumping syndrome; tightly adherent re: avoidance of simple carbohydrate triggers (particularly ice cream and candy) since last visit. Marked reduction in intermittent diarrhea and bloating, no further pre-syncopal/hypoglycemic/LOC episodes since last visit (corroborated explicitly by pt's wife and family in room today). Pt reports that last \"severe episode\" required ED visit was in 6/2016 (though grants less severe episodes may have been occurring, and now have largely disappeared w/ dedication to dietary modifications). Pt is quite interested in re-applying for driving privileges, and has demonstrated good-boris commitment/honesty in the monitoring plan since last visit. Denies any tenesmus or insecurity passing flatus, no fecal incontinence or new perianal/nocturnal sxs noted. Denies any N/V/F/C/HA/NS or other const/syst/cardiopulmonary sxs, no BRBPR/melena/urinary changes, no unintentional wt " loss or appetite/satiety changes. No other bowel/bladder habit changes, no dysphagia/odynophagia. No jaundice/icterus/pruritus, no acholic stools/steatorrhea, no new lumps/bumps, no jt pain/oral ulcer/rash/eye sxs noted.    ROS: 10pt ROS performed and otherwise negative.    PERTINENT PAST MEDICAL/SURGICAL HISTORY:  As noted above.    PERTINENT MEDICATIONS:  Medications reviewed with patient today, see Medication List/Assessment for details.  No other NSAID/anticoagulation reported by patient.  No other OTC/herbal/supplements reported by patient.    SOCIAL HISTORY: Tobacco: none.    PHYSICAL EXAMINATION:  Vitals reviewed, AFVSS  Wt 163.8# today (stable)  Gen: aaox3, cooperative, pleasant, not diaphoretic, nad  HEENT: ncat, neck supple, no clad/sclad, normal op w/o ulcer/exudate, anicteric, mmm  Resp/CV without acute findings, not dyspneic/tachycardic  Abd: +nabs, soft, nt, nd, no peritoneal s/s noted  Ext: no c/c/e  Skin: warm, perfused, no jaundice  Neuro: grossly intact, no asterixis noted    PERTINENT STUDIES:  Reviewed pt's blood FSG record today (taken for ~3 months, different times of day and variable fasting length status while asymptomatic): ranges , no severe symptomatic FSGs <60.    ASSESSMENT/PLAN:    1. Severe dumping syndrome in the complicated total gastrectomy setting with esophagojejunal anastomosis, marked interval clinical improvement in diarrhea and lack of hypoglycemic episodes with more aggressive adherence to dietary/lifestyle modifications (particularly carbohydrate indiscretions) - continue supportive care as ordered for now, will support re-application for driving privileges with adequate Primary Care monitoring plan  1. It was wonderful meeting with you and your family again today to discuss your problematic refractory dumping syndrome in the setting of total gastrectomy + esophagojejunal anastomosis - discussed dietary/lifestyle modifications for dumping syndrome, as well as potential  "medication (depot octreotide) and monitoring (continuous glucose monitoring) options for management at length today.    - Excellent interval progress since last visit with disciplined mindful avoidance of inciting food/habits, no further severe hypoglycemic episodes per your or your family's report today. Keep up the great work!  - Recommend continuing to keep a food/symptom diary to review at next visit, may help identify other dietary/stress/volume triggers for your symptoms.    - Strongly recommend tight adherence to the dietary/lifestyle/nutritional modifications as reviewed again today to help reinforce your current practices for avoiding severe dumping syndrome.   - Smaller-volume but frequent meals spread throughout the day, adjusting consistencies and dietary choices based on if it's a \"good\" or \"bad\" day, avoiding prolonged fasting, etc. Must continue to minimize simple carbohydrate intake (i.e. candy, ice cream, sodas) lifelong, particularly as this may likely have been precipitating factors for prior episodes.  - Urgent glucose checks whenever you have \"an episode\" or a family member is concerned regarding changes in your sensorium/behavior/attention. These need to be reported to your PCP if they recur.  - Tracking when you are eating meals/snacks throughout the day, particularly as this might help your team correlate episodes/glucose changes.     2. Discussed role for daily glucose monitoring (cycling time of day when you check, can be cycled every week) to help continue to provide your Primary Care team with good data regarding your overall glucose trends.  - Discussed options of Continuous Glucose Monitoring (CGM), may want to discuss with your PCP and/or Endocrinology further if symptoms/episodes recur.  - No indication for depot octreotide at this time given that your dumping syndrome does appear to be responsive to dietary modifications when applied consistently. Can readdress role for this if " necessary in your ongoing care.     3. Given your remarkable improvement in your dietary control of the dumping syndrome with more consistent mindful dietary modifications, and the ongoing plan for regular follow-up with Primary Care and open/honest self-monitoring with family support, would be willing to support your application for reinstitution of your driving privileges. This requires:  - A phone call to your PCP to confirm his willingness to provide the monitoring, and to make sure he has our contact information if future questions arise regarding management of your dumping syndrome. Case reviewed with Dr. Ayon via phone this afternoon; in agreement with ongoing monitoring every 6 months and yearly [place] form review/completion through PCP going forward. Discussed options of CGM and depot octreotide today as well.  - Yearly [place] Dept of Public Safety review (requires submitting the approval form every year for now until at least 4 years of stability, can be signed and/or amended by your PCP going forward per his discretion). Will complete form today, but future completions must be completed through PCP as indicated if adherent to monitoring/safety plan.    2. Colorectal Cancer Screening  No known FH CRC, role for CRC screening >80 years of age unclear. Would recommend further discussion with PCP accordingly if desired.    RTC as needed.    Thank you for this consultation. It was a pleasure to participate in the care of this patient; please contact us with any further questions.     Jonathan Christopher MD   of Medicine  [facility] - Department of Medicine  Division of Gastroenterology

## 2017-05-22 ENCOUNTER — ALLIED HEALTH/NURSE VISIT (OUTPATIENT)
Dept: FAMILY MEDICINE | Facility: OTHER | Age: 82
End: 2017-05-22
Payer: COMMERCIAL

## 2017-05-22 DIAGNOSIS — D51.0 PERNICIOUS ANEMIA: Primary | ICD-10-CM

## 2017-05-22 PROCEDURE — 99207 ZZC NO CHARGE NURSE ONLY: CPT

## 2017-05-22 PROCEDURE — 96372 THER/PROPH/DIAG INJ SC/IM: CPT

## 2017-05-22 NOTE — MR AVS SNAPSHOT
After Visit Summary   5/22/2017    Remigio Holly    MRN: [id number]           Patient Information     Date Of Birth          11/6/1933        Visit Information        Provider Department      5/22/2017 2:45 PM PAULA STEPHENS NURSE, [facility]        Today's Diagnoses     Pernicious anemia    -  1       Follow-ups after your visit        Who to contact     If you have questions or need follow up information about today's clinic visit or your schedule please contact [facility] directly at [phone].  Normal or non-critical lab and imaging results will be communicated to you by MyChart, letter or phone within 4 business days after the clinic has received the results. If you do not hear from us within 7 days, please contact the clinic through Greenbureau[employer] or phone. If you have a critical or abnormal lab result, we will notify you by phone as soon as possible.  Submit refill requests through [employer] or call your pharmacy and they will forward the refill request to us. Please allow 3 business days for your refill to be completed.          Additional Information About Your Visit        MyChart Information     [employer] gives you secure access to your electronic health record. If you see a primary care provider, you can also send messages to your care team and make appointments. If you have questions, please call your primary care clinic.  If you do not have a primary care provider, please call [phone] and they will assist you.        Care EveryWhere ID     This is your Care EveryWhere ID. This could be used by other organizations to access your [place] medical records  [id number]         Blood Pressure from Last 3 Encounters:   04/24/17 138/80   04/19/17 120/71   01/31/17 124/76    Weight from Last 3 Encounters:   04/24/17 163 lb 12.8 oz (74.3 kg)   04/19/17 164 lb 9.6 oz (74.7 kg)   01/31/17 168 lb 14.4 oz (76.6 kg)              We Performed the Following      INJECTION INTRAMUSCULAR OR SUB-Q     VITAMIN B12 INJ /1000MCG        Primary Care Provider Office Phone # Fax #    Moy Celis [phone]       [facility] [address] [facility] [address]        Thank you!     Thank you for choosing [facility]  for your care. Our goal is always to provide you with excellent care. Hearing back from our patients is one way we can continue to improve our services. Please take a few minutes to complete the written survey that you may receive in the mail after your visit with us. Thank you!             Your Updated Medication List - Protect others around you: Learn how to safely use, store and throw away your medicines at www.disposemymeds.org.          This list is accurate as of: 5/22/17  3:22 PM.  Always use your most recent med list.                   Brand Name Dispense Instructions for use    ACETAMINOPHEN PO      Take 500 mg by mouth every evening as needed for pain       BENADRYL 25 MG capsule   Generic drug:  diphenhydrAMINE     56 capsule    Take 2 capsules (50 mg) by mouth At Bedtime       blood glucose monitoring lancets     1 Box    Use to test blood sugar 1 time daily or as directed.       blood glucose monitoring test strip    ROBERT CONTOUR NEXT    100 each    Use to test blood sugar 1 times daily or as directed.       cyanocobalamin 1000 MCG/ML injection    VITAMIN B12    1 mL    Inject 1 mL (1,000 mcg) into the muscle every 30 days 1 year of injections approved through 4/19/2017 per Dr. Ayon.       etodolac 400 MG tablet    LODINE    30 tablet    Take 1 tablet (400 mg) by mouth daily

## 2017-05-22 NOTE — NURSING NOTE
The following medication was given:     MEDICATION: Vitamin B12  1000mcg  See medication note.  Patient instructed to remain in clinic for 20 minutes afterwards, and to report any adverse reaction to me immediately.  Prior to injection verified patient identity using patient's name and date of birth.  Nella Trejo MA     5/22/2017

## 2017-06-29 ENCOUNTER — ALLIED HEALTH/NURSE VISIT (OUTPATIENT)
Dept: FAMILY MEDICINE | Facility: OTHER | Age: 82
End: 2017-06-29
Payer: COMMERCIAL

## 2017-06-29 DIAGNOSIS — D51.0 PERNICIOUS ANEMIA: Primary | ICD-10-CM

## 2017-06-29 PROCEDURE — 96372 THER/PROPH/DIAG INJ SC/IM: CPT

## 2017-06-29 NOTE — MR AVS SNAPSHOT
After Visit Summary   6/29/2017    Remigio Holly    MRN: [id number]           Patient Information     Date Of Birth          11/6/1933        Visit Information        Provider Department      6/29/2017 9:45 AM PAULA STEPHENS NURSE, [facility]        Today's Diagnoses     Pernicious anemia    -  1       Follow-ups after your visit        Who to contact     If you have questions or need follow up information about today's clinic visit or your schedule please contact [facility] directly at [phone].  Normal or non-critical lab and imaging results will be communicated to you by MyChart, letter or phone within 4 business days after the clinic has received the results. If you do not hear from us within 7 days, please contact the clinic through [employer] or phone. If you have a critical or abnormal lab result, we will notify you by phone as soon as possible.  Submit refill requests through [employer] or call your pharmacy and they will forward the refill request to us. Please allow 3 business days for your refill to be completed.          Additional Information About Your Visit        MyChart Information     [employer] gives you secure access to your electronic health record. If you see a primary care provider, you can also send messages to your care team and make appointments. If you have questions, please call your primary care clinic.  If you do not have a primary care provider, please call [phone] and they will assist you.        Care EveryWhere ID     This is your Care EveryWhere ID. This could be used by other organizations to access your [place] medical records  [id number]         Blood Pressure from Last 3 Encounters:   04/24/17 138/80   04/19/17 120/71   01/31/17 124/76    Weight from Last 3 Encounters:   04/24/17 163 lb 12.8 oz (74.3 kg)   04/19/17 164 lb 9.6 oz (74.7 kg)   01/31/17 168 lb 14.4 oz (76.6 kg)              We Performed the Following      INJECTION INTRAMUSCULAR OR SUB-Q     VITAMIN B12 INJ /1000MCG        Primary Care Provider Office Phone # Fax #    Moy Celis [phone]       [facility] [address] [facility] [address]        Equal Access to Services     ERICA RUSSELL : Hadii aad ku hadmayteosmar Soomaali, waaxda luqadaha, qaybta kaalmada adeshreyasyada, waxluis parker denyscorinna leevaldezmeagan mendez. So [facility] [phone].    ATENCIÓN: Si habla español, tiene a tabares disposición servicios gratuitos de asistencia lingüística. Llame al [phone].    We comply with applicable federal civil rights laws and [place] laws. We do not discriminate on the basis of race, color, national origin, age, disability sex, sexual orientation or gender identity.            Thank you!     Thank you for choosing [facility]  for your care. Our goal is always to provide you with excellent care. Hearing back from our patients is one way we can continue to improve our services. Please take a few minutes to complete the written survey that you may receive in the mail after your visit with us. Thank you!             Your Updated Medication List - Protect others around you: Learn how to safely use, store and throw away your medicines at www.disposemymeds.org.          This list is accurate as of: 6/29/17 10:18 AM.  Always use your most recent med list.                   Brand Name Dispense Instructions for use Diagnosis    ACETAMINOPHEN PO      Take 500 mg by mouth every evening as needed for pain    Gastric cancer (H)       BENADRYL 25 MG capsule   Generic drug:  diphenhydrAMINE     56 capsule    Take 2 capsules (50 mg) by mouth At Bedtime        blood glucose monitoring lancets     1 Box    Use to test blood sugar 1 time daily or as directed.    Hypoglycemia       blood glucose monitoring test strip    ROBERT CONTOUR NEXT    100 each    Use to test blood sugar 1 times daily or as directed.    Hypoglycemia       cyanocobalamin 1000 MCG/ML  injection    VITAMIN B12    1 mL    Inject 1 mL (1,000 mcg) into the muscle every 30 days 1 year of injections approved through 4/19/2017 per Dr. Ayon.    Pernicious anemia       etodolac 400 MG tablet    LODINE    30 tablet    Take 1 tablet (400 mg) by mouth daily    Arthritis of knee

## 2017-06-29 NOTE — NURSING NOTE
The following medication was given:     MEDICATION: Vitamin B12  1000mcg  See medication note.  Patient instructed to remain in clinic for 20 minutes afterwards, and to report any adverse reaction to me immediately.  Prior to injection verified patient identity using patient's name and date of birth.  Nella Trejo MA     6/29/2017

## 2017-07-18 ENCOUNTER — OFFICE VISIT (OUTPATIENT)
Dept: FAMILY MEDICINE | Facility: OTHER | Age: 82
End: 2017-07-18
Payer: COMMERCIAL

## 2017-07-18 ENCOUNTER — RADIANT APPOINTMENT (OUTPATIENT)
Dept: GENERAL RADIOLOGY | Facility: OTHER | Age: 82
End: 2017-07-18
Attending: FAMILY MEDICINE
Payer: COMMERCIAL

## 2017-07-18 VITALS
BODY MASS INDEX: 21.66 KG/M2 | TEMPERATURE: 97.8 F | RESPIRATION RATE: 18 BRPM | SYSTOLIC BLOOD PRESSURE: 132 MMHG | HEIGHT: 73 IN | WEIGHT: 163.4 LBS | DIASTOLIC BLOOD PRESSURE: 76 MMHG | HEART RATE: 62 BPM | OXYGEN SATURATION: 97 %

## 2017-07-18 DIAGNOSIS — M25.562 LEFT KNEE PAIN, UNSPECIFIED CHRONICITY: ICD-10-CM

## 2017-07-18 DIAGNOSIS — G89.29 CHRONIC PAIN OF RIGHT KNEE: ICD-10-CM

## 2017-07-18 DIAGNOSIS — M25.561 CHRONIC PAIN OF RIGHT KNEE: ICD-10-CM

## 2017-07-18 DIAGNOSIS — G89.29 CHRONIC PAIN OF RIGHT KNEE: Primary | ICD-10-CM

## 2017-07-18 DIAGNOSIS — M25.561 CHRONIC PAIN OF RIGHT KNEE: Primary | ICD-10-CM

## 2017-07-18 PROCEDURE — 99213 OFFICE O/P EST LOW 20 MIN: CPT | Performed by: FAMILY MEDICINE

## 2017-07-18 PROCEDURE — 73565 X-RAY EXAM OF KNEES: CPT

## 2017-07-18 ASSESSMENT — PAIN SCALES - GENERAL: PAINLEVEL: MODERATE PAIN (4)

## 2017-07-18 NOTE — MR AVS SNAPSHOT
After Visit Summary   7/18/2017    Remigio Holly    MRN: [id number]           Patient Information     Date Of Birth          11/6/1933        Visit Information        Provider Department      7/18/2017 8:15 AM Devan Ayon MD [facility]        Today's Diagnoses     Chronic pain of right knee    -  1    Left knee pain, unspecified chronicity           Follow-ups after your visit        Additional Services     ORTHOPEDICS ADULT REFERRAL       Your provider has referred you to: [facility]: [facility] [phone]   [email or web address]    Please be aware that coverage of these services is subject to the terms and limitations of your health insurance plan.  Call member services at your health plan with any benefit or coverage questions.      Please bring the following to your appointment:    >>   Any x-rays, CTs or MRIs which have been performed.  Contact the facility where they were done to arrange for  prior to your scheduled appointment.  Any new CT, MRI or other procedures ordered by your specialist must be performed at a [place] facility or coordinated by your clinic's referral office.    >>   List of current medications   >>   This referral request   >>   Any documents/labs given to you for this referral                  Your next 10 appointments already scheduled     Jul 25, 2017  9:30 AM CDT   MR KNEE LEFT W/O CONTRAST with PHMR1   [facility] ([facility])    [address] [place] [address] [phone]           Take your medicines as usual, unless your doctor tells you not to. Bring a list of your current medicines to your exam (including vitamins, minerals and over-the-counter drugs). Also bring the results of similar scans you may have had.  Please remove any body piercings and hair extensions before you arrive.  Follow your doctor s orders. If you do not,  we may have to postpone your exam.  You will not have contrast for this exam. You do not need to do anything special to prepare.  The MRI machine uses a strong magnet. Please wear clothes without metal (snaps, zippers). A sweatsuit works well, or we may give you a hospital gown.   **IMPORTANT** THE INSTRUCTIONS BELOW ARE ONLY FOR THOSE PATIENTS WHO HAVE BEEN TOLD THEY WILL RECEIVE SEDATION OR GENERAL ANESTHESIA DURING THEIR MRI PROCEDURE:  IF YOU WILL RECEIVE SEDATION (take medicine to help you relax during your exam):   You must get the medicine from your doctor before you arrive. Bring the medicine to the exam. Do not take it at home.   Arrive one hour early. Bring someone who can take you home after the test. Your medicine will make you sleepy. After the exam, you may not drive, take a bus or take a taxi by yourself.   No eating 8 hours before your exam. You may have clear liquids up until 4 hours before your exam. (Clear liquids include water, clear tea, black coffee and fruit juice without pulp.)  IF YOU WILL RECEIVE ANESTHESIA (be asleep for your exam):   Arrive 1 1/2 hours early. Bring someone who can take you home after the test. You may not drive, take a bus or take a taxi by yourself.   No eating 8 hours before your exam. You may have clear liquids up until 4 hours before your exam. (Clear liquids include water, clear tea, black coffee and fruit juice without pulp.)   You will spend four to five hours in the recovery room.  Please call the Imaging Department at your exam site with any questions.            Jul 26, 2017  3:40 PM CDT   New Visit with Jonathan Cardona,    [facility]    [address] [place] [address] [phone]              Future tests that were ordered for you today     Open Future Orders        Priority Expected Expires Ordered    MR Knee Left w/o Contrast Routine  7/18/2018 7/18/2017            Who to contact     If  "you have questions or need follow up information about today's clinic visit or your schedule please contact [facility] directly at [phone].  Normal or non-critical lab and imaging results will be communicated to you by MyChart, letter or phone within 4 business days after the clinic has received the results. If you do not hear from us within 7 days, please contact the clinic through [employer] or phone. If you have a critical or abnormal lab result, we will notify you by phone as soon as possible.  Submit refill requests through [employer] or call your pharmacy and they will forward the refill request to us. Please allow 3 business days for your refill to be completed.          Additional Information About Your Visit        WorkCastharDigiMeld Information     [employer] gives you secure access to your electronic health record. If you see a primary care provider, you can also send messages to your care team and make appointments. If you have questions, please call your primary care clinic.  If you do not have a primary care provider, please call [phone] and they will assist you.        Care EveryWhere ID     This is your Care EveryWhere ID. This could be used by other organizations to access your [place] medical records  [id number]        Your Vitals Were     Pulse Temperature Respirations Height Pulse Oximetry BMI (Body Mass Index)    62 97.8  F (36.6  C) (Temporal) 18 6' 1\" (1.854 m) 97% 21.56 kg/m2       Blood Pressure from Last 3 Encounters:   07/18/17 132/76   04/24/17 138/80   04/19/17 120/71    Weight from Last 3 Encounters:   07/18/17 163 lb 6.4 oz (74.1 kg)   04/24/17 163 lb 12.8 oz (74.3 kg)   04/19/17 164 lb 9.6 oz (74.7 kg)              We Performed the Following     ORTHOPEDICS ADULT REFERRAL        Primary Care Provider Office Phone # Fax #    Moy Celis [phone]       [facility] [address] [facility] [address]        Equal Access to Services     ERICA RUSSELL AH: " Hadii aad ku hadmayteo Somaryali, waaxda luqadaha, qaybta kaalmada vidhi, tray pamaria luisa mendez. So [facility] [phone].    ATENCIÓN: Si habla melody, tiene a tabares disposición servicios gratuitos de asistencia lingüística. Llame al [phone].    We comply with applicable federal civil rights laws and [place] laws. We do not discriminate on the basis of race, color, national origin, age, disability sex, sexual orientation or gender identity.            Thank you!     Thank you for choosing [facility]  for your care. Our goal is always to provide you with excellent care. Hearing back from our patients is one way we can continue to improve our services. Please take a few minutes to complete the written survey that you may receive in the mail after your visit with us. Thank you!             Your Updated Medication List - Protect others around you: Learn how to safely use, store and throw away your medicines at www.disposemymeds.org.          This list is accurate as of: 7/18/17  9:55 AM.  Always use your most recent med list.                   Brand Name Dispense Instructions for use Diagnosis    BENADRYL 25 MG capsule   Generic drug:  diphenhydrAMINE     56 capsule    Take 2 capsules (50 mg) by mouth At Bedtime        blood glucose monitoring lancets     1 Box    Use to test blood sugar 1 time daily or as directed.    Hypoglycemia       blood glucose monitoring test strip    ROBERT CONTOUR NEXT    100 each    Use to test blood sugar 1 times daily or as directed.    Hypoglycemia       cyanocobalamin 1000 MCG/ML injection    VITAMIN B12    1 mL    Inject 1 mL (1,000 mcg) into the muscle every 30 days 1 year of injections approved through 4/19/2017 per Dr. Ayon.    Pernicious anemia       MELATONIN PO

## 2017-07-18 NOTE — PROGRESS NOTES
"SUBJECTIVE:                                                    Remigio Holly is a 83 year old male who presents to clinic today for the following health issues:    Chief Complaint   Patient presents with     Knee Pain     recheck states has had both knees injected in the past, was told may need surgery          Problem list and histories reviewed & adjusted, as indicated.    C: NEGATIVE for fever, chills, change in weight  E/M: NEGATIVE for ear, mouth and throat problems  R: NEGATIVE for significant cough or SOB  CV: NEGATIVE for chest pain, palpitations or peripheral edema  MUSCULOSKELETAL: pain both knees,left worse with sitting.    OBJECTIVE:                                                    /76 (BP Location: Right arm)  Pulse 62  Temp 97.8  F (36.6  C) (Temporal)  Resp 18  Ht 6' 1\" (1.854 m)  Wt 163 lb 6.4 oz (74.1 kg)  SpO2 97%  BMI 21.56 kg/m2  Body mass index is 21.56 kg/(m^2).       ASSESSMENT/PLAN:                                                    bilateral knee pain,right consistent with degenrative knee symptoms  But left knee is his pain complaint and come on severely when sitting and iimproves when up and about, possible meniscus or soft tissue cause. Will get MRI left knee and refer to ortho.    Devan Ayon MD, MD  [facility]          "

## 2017-07-25 ENCOUNTER — HOSPITAL ENCOUNTER (OUTPATIENT)
Dept: MRI IMAGING | Facility: CLINIC | Age: 82
Discharge: HOME | End: 2017-07-25
Attending: FAMILY MEDICINE | Admitting: FAMILY MEDICINE
Payer: MEDICARE

## 2017-07-25 DIAGNOSIS — M25.562 LEFT KNEE PAIN, UNSPECIFIED CHRONICITY: ICD-10-CM

## 2017-07-25 PROCEDURE — 73721 MRI JNT OF LWR EXTRE W/O DYE: CPT | Mod: LT

## 2017-07-25 NOTE — PROGRESS NOTES
Please contact the patient and notify him of the following:  The patient has a meniscal tear. Please assist him in setting up an orthopedic consultation.    Thank you.  DO ELAINE Powell

## 2017-07-26 ENCOUNTER — OFFICE VISIT (OUTPATIENT)
Dept: ORTHOPEDICS | Facility: CLINIC | Age: 82
End: 2017-07-26
Attending: FAMILY MEDICINE
Payer: COMMERCIAL

## 2017-07-26 ENCOUNTER — TELEPHONE (OUTPATIENT)
Dept: FAMILY MEDICINE | Facility: OTHER | Age: 82
End: 2017-07-26

## 2017-07-26 ENCOUNTER — RADIANT APPOINTMENT (OUTPATIENT)
Dept: GENERAL RADIOLOGY | Facility: CLINIC | Age: 82
End: 2017-07-26
Attending: ORTHOPAEDIC SURGERY
Payer: COMMERCIAL

## 2017-07-26 VITALS — HEIGHT: 73 IN | TEMPERATURE: 98 F | BODY MASS INDEX: 20.94 KG/M2 | WEIGHT: 158 LBS

## 2017-07-26 DIAGNOSIS — M25.562 BILATERAL KNEE PAIN: ICD-10-CM

## 2017-07-26 DIAGNOSIS — G89.29 CHRONIC PAIN OF BOTH KNEES: ICD-10-CM

## 2017-07-26 DIAGNOSIS — M25.562 CHRONIC PAIN OF BOTH KNEES: ICD-10-CM

## 2017-07-26 DIAGNOSIS — M25.561 BILATERAL KNEE PAIN: ICD-10-CM

## 2017-07-26 DIAGNOSIS — M17.0 PRIMARY OSTEOARTHRITIS OF BOTH KNEES: Primary | ICD-10-CM

## 2017-07-26 DIAGNOSIS — M25.561 CHRONIC PAIN OF BOTH KNEES: ICD-10-CM

## 2017-07-26 PROCEDURE — 99203 OFFICE O/P NEW LOW 30 MIN: CPT | Performed by: ORTHOPAEDIC SURGERY

## 2017-07-26 PROCEDURE — 73564 X-RAY EXAM KNEE 4 OR MORE: CPT | Mod: TC

## 2017-07-26 ASSESSMENT — PAIN SCALES - GENERAL: PAINLEVEL: NO PAIN (0)

## 2017-07-26 NOTE — NURSING NOTE
"Chief Complaint   Patient presents with     RECHECK     right Knee osteoarthritis- LEFT KNEE PAIN       Initial Temp 98  F (36.7  C) (Temporal)  Ht 1.854 m (6' 1\")  Wt 71.7 kg (158 lb)  BMI 20.85 kg/m2 Estimated body mass index is 20.85 kg/(m^2) as calculated from the following:    Height as of this encounter: 1.854 m (6' 1\").    Weight as of this encounter: 71.7 kg (158 lb).  Medication Reconciliation: complete   Christine/GM     "

## 2017-07-26 NOTE — MR AVS SNAPSHOT
After Visit Summary   7/26/2017    Remigio Holly    MRN: [id number]           Patient Information     Date Of Birth          11/6/1933        Visit Information        Provider Department      7/26/2017 3:40 PM Jonathan Cardona DO [facility]        Today's Diagnoses     Primary osteoarthritis of both knees    -  1    Chronic pain of both knees           Follow-ups after your visit        Your next 10 appointments already scheduled     Aug 01, 2017  2:00 PM CDT   Pre-Op physical with Devan Ayon MD   [facility] ([facility])    [address] [facility] [address]   [phone]            Aug 09, 2017  8:50 AM CDT   Return Visit with Jonathan Cardona DO   [facility] ([address] [address] [phone]            Aug 31, 2017  8:50 AM CDT   Return Visit with Jonathan Cardona DO   [facility] ([address] [address] [phone]              Who to contact     If you have questions or need follow up information about today's clinic visit or your schedule please contact [facility] directly at [phone].  Normal or non-critical lab and imaging results will be communicated to you by MyChart, letter or phone within 4 business days after the clinic has received the results. If you do not hear from us within 7 days, please contact the clinic through MyChart or phone. If you have a critical or abnormal lab result, we will notify you by phone as soon as possible.  Submit refill requests through [employer] or call your pharmacy and they will forward the refill request to us. Please allow 3 business days for your refill to be completed.          Additional Information About Your Visit        PasswordBankharWeimi Information     [employer] gives you secure access to your electronic  "health record. If you see a primary care provider, you can also send messages to your care team and make appointments. If you have questions, please call your primary care clinic.  If you do not have a primary care provider, please call [phone] and they will assist you.        Care EveryWhere ID     This is your Care EveryWhere ID. This could be used by other organizations to access your [place] medical records  [id number]        Your Vitals Were     Temperature Height BMI (Body Mass Index)             98  F (36.7  C) (Temporal) 6' 1\" (1.854 m) 20.85 kg/m2          Blood Pressure from Last 3 Encounters:   07/18/17 132/76   04/24/17 138/80   04/19/17 120/71    Weight from Last 3 Encounters:   07/26/17 158 lb (71.7 kg)   07/18/17 163 lb 6.4 oz (74.1 kg)   04/24/17 163 lb 12.8 oz (74.3 kg)               Primary Care Provider Office Phone # Fax #    Moy Celis [phone]       [facility] [address] [facility] [address]        Equal Access to Services     ERICA RUSSELL AH: Hadii aad ku hadasho Somaryali, waaxda luqadaha, qaybta kaalmada adeegyada, tray mcfarlanen ivon haider . So [facility] [phone].    ATENCIÓN: Si habla español, tiene a tabares disposición servicios gratuitos de asistencia lingüística. Llame al [phone].    We comply with applicable federal civil rights laws and [place] laws. We do not discriminate on the basis of race, color, national origin, age, disability sex, sexual orientation or gender identity.            Thank you!     Thank you for choosing [facility]  for your care. Our goal is always to provide you with excellent care. Hearing back from our patients is one way we can continue to improve our services. Please take a few minutes to complete the written survey that you may receive in the mail after your visit with us. Thank you!             Your Updated Medication List - Protect others around you: Learn how to safely use, store " and throw away your medicines at www.disposemymeds.org.          This list is accurate as of: 7/26/17  5:06 PM.  Always use your most recent med list.                   Brand Name Dispense Instructions for use Diagnosis    BENADRYL 25 MG capsule   Generic drug:  diphenhydrAMINE     56 capsule    Take 2 capsules (50 mg) by mouth At Bedtime        blood glucose monitoring lancets     1 Box    Use to test blood sugar 1 time daily or as directed.    Hypoglycemia       blood glucose monitoring test strip    ROBERT CONTOUR NEXT    100 each    Use to test blood sugar 1 times daily or as directed.    Hypoglycemia       cyanocobalamin 1000 MCG/ML injection    VITAMIN B12    1 mL    Inject 1 mL (1,000 mcg) into the muscle every 30 days 1 year of injections approved through 4/19/2017 per Dr. Ayon.    Pernicious anemia       MELATONIN PO

## 2017-07-26 NOTE — LETTER
"    7/26/2017         RE: Remigio Holly  [address] [facility] [address]        Dear Colleague,    Thank you for referring your patient, Remigio Holly, to the [facility]. Please see a copy of my visit note below.    ORTHOPEDIC CONSULT      Chief Complaint: Remigio Holly is a 83 year old male who is being seen for Chief Complaint   Patient presents with     RECHECK     right Knee osteoarthritis- LEFT KNEE PAIN     Remigio Holly is a 83 year old male who is seen in consultation at the request of Dr. Ayon for evaluation of bilateral knee pain.      Patient was seen in orthopedic clinic in 2014.  At that time he was seen, he was found to have osteoarthritis, received an injection on right knee with good relief.  At some point after this his pain started to return and started to have pain in his left leg.  He was seen in a local clinic summer of 2016 and received bilateral knee injections, this offered no benefit per patient.      Mechanism of Injury: No trauma or inciting event. Knees have been getting progressively worse.  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   Location: bilateral knee medial, left worse than right overall, right worse with ambulation, left worse with sitting   Duration of Pain:  \"many years\"  Rating of Pain:  Severe on left when no active, severe on right with activity, overall left more bothersome.    Pain Quality: aching, cramping and sharp pains on left with sitting, sharp on right with activity  Pain is not better with any modalities except \"Australian Dream\" lotion that patient states does help some but not much.    Pain is worse with:  activity on right, sitting on left  Treatment so far consists of:  Local OTC analgesic cream, injections in 2014 and 2016.  No recent PT, no recent OTC oral analgesics,  Rest, activity modification  Associated Features: denies swelling, denies instability, denies falls, denies " "\"locking up\"  Prior history of related problems:   No previous problem in the affected area.  Pain is limiting normal activities of daily living. Pain is limiting social events with family and friends. Pain is waking at night.  Here for Orthopedic consultation.  The pain is getting worse.        Patient's past medical, surgical, social and family histories reviewed.  Denies significant cardiac, lung, kidney disease.  Does not currently take blood thinners per report.     Past Medical History:   Diagnosis Date     Blood transfusion      Chronic gastric ulcer without mention of hemorrhage, perforation, without mention of obstruction     Ulcer, Gastric     Gastric cancer (H)      Hemorrhage of gastrointestinal tract, unspecified 01/13/10    [facility]     Hypoglycemia, unspecified 1/26/2015     Measles without mention of complication     Measles     Mixed hyperlipidemia     Hyperlipidemia     Mumps without mention of complication     Mumps     Pulmonary embolism (H) Sept 4, 2010     Urinary calculus, unspecified     Renal stones     Varicella without mention of complication     Chickenpox       Past Surgical History:   Procedure Laterality Date     ABDOMEN SURGERY       C EXCIS STOMACH ULCER,LESN;LOCAL       CHOLECYSTECTOMY, LAPOROSCOPIC  10/6/2008    Cholecystectomy, Laparoscopic     COLONOSCOPY  1/14/10    [facility]     COLONOSCOPY  05/25/10     CYSTOSCOPY, RETROGRADES, EXTRACT STONE, INSERT STENT, COMBINED  12/25/2012    Procedure: COMBINED CYSTOSCOPY, RETROGRADES, EXTRACT STONE, INSERT STENT;  Cystoscopy, Left Stent Placement, left retrograde pylogram, uc;  Surgeon: Amador Sanchez MD;  Location:  OR     ESOPHAGOSCOPY, GASTROSCOPY, DUODENOSCOPY (EGD), COMBINED  8/16/2011    Procedure:COMBINED ESOPHAGOSCOPY, GASTROSCOPY, DUODENOSCOPY (EGD), BIOPSY SINGLE OR MULTIPLE; Surgeon:TONY GARCIA; Location:[facility]     GENERAL SURGERY                           DATE:  07/07/10    Gastrectomy     " HC COLONOSCOPY W/WO BRUSH/WASH  01/31/2006     HC REPAIR ROTATOR CUFF,ACUTE  3/21/2005    Right     HC UGI ENDOSCOPY, SIMPLE EXAM  1/13/10    [facility]     HC UGI ENDOSCOPY, SIMPLE EXAM  05/25/10     LASER HOLMIUM LITHOTRIPSY URETER(S), INSERT STENT, COMBINED  1/5/2013    Procedure: COMBINED CYSTOSCOPY, URETEROSCOPY, LASER HOLMIUM LITHOTRIPSY URETER(S), INSERT STENT;  Bilateral  Cystoscopy, Bilateral Ureteroscopy, Bilateral retrogrades and  placement of ureteral stent right side. Laser Holmium Lithotripsy  and Exchange  of stent left side;  Surgeon: Tone Morejon MD;  Location: [facility] OR     LASER HOLMIUM LITHOTRIPSY URETER(S), INSERT STENT, COMBINED  1/30/2013    Procedure: COMBINED CYSTOSCOPY, URETEROSCOPY, LASER HOLMIUM LITHOTRIPSY URETER(S), INSERT STENT;  Right Ureteroscopy; Stone basketing; Right Stent exchange and  removal of left stent.;  Surgeon: Tone Morejon MD;  Location: [facility] OR     VIDEO CAPSULE ENDOSCOPY  01/15/10    [facility]       Medications:    Current Outpatient Prescriptions on File Prior to Visit:  MELATONIN PO    cyanocobalamin (VITAMIN B12) 1000 MCG/ML injection Inject 1 mL (1,000 mcg) into the muscle every 30 days 1 year of injections approved through 4/19/2017 per Dr. Ayon.   blood glucose monitoring (ROBERT CONTOUR NEXT) test strip Use to test blood sugar 1 times daily or as directed.   blood glucose monitoring (ROBERT MICROLET) lancets Use to test blood sugar 1 time daily or as directed.   diphenhydrAMINE (BENADRYL) 25 MG capsule Take 2 capsules (50 mg) by mouth At Bedtime     No current facility-administered medications on file prior to visit.     Allergies   Allergen Reactions     Mobic [Meloxicam] Nausea and Diarrhea     Diarrhea, nausea, flu like symptoms since start of use       Social History     Occupational History     Construction      Retired     Social History Main Topics     Smoking status: Current Some Day Smoker     Packs/day: 0.00     Years:  "20.00     Types: Pipe, Cigars     Last attempt to quit: 1/1/1998     Smokeless tobacco: Never Used      Comment: Rare pipe and cigar smoking     Alcohol use No     Drug use: No     Sexual activity: Not Currently       Family History   Problem Relation Age of Onset     Alzheimer Disease Father      Cardiovascular Mother      C.A.D. Mother      HEART DISEASE Mother      Cardiovascular Brother      HEART DISEASE Brother      DENISE.A.D. Brother        REVIEW OF SYSTEMS  10 point review systems performed otherwise negative as noted as per history of present illness.  Specifically Patient denies any recent chest pain, syncope    Physical Exam:  Vitals: Temp 98  F (36.7  C) (Temporal)  Ht 1.854 m (6' 1\")  Wt 71.7 kg (158 lb)  BMI 20.85 kg/m2  BMI= Body mass index is 20.85 kg/(m^2).  Constitutional: healthy, alert and no acute distress   Psychiatric: mentation appears normal and affect normal/bright  NEURO: no focal deficits  RESP: Normal with easy respirations and no use of accessory muscles to breathe, no audible wheezing or retractions  CV: RLE: knee and down-  Non-edema, distal pulse 2+. Neurovascular intact, LLE:  No-edema, distal pulse 2+, neurovascular intact         Regular rate and rhythm by palpation  SKIN: No erythema, rashes, excoriation, or breakdown. No evidence of infection.   JOINT/EXTREMITIES:bilateral Knee Exam: Inspection: varum deformity present bilateral.    Tender: Bilateral medial joint line, medial tibial plateau  Non-tender: negative for rest of knee  Active Range of Motion: with initial testing 5-100, partially limited to tightness of quad and hamstrings.  With re-test 0-115 and less tightness  Strength: 5/5 bilateral to lower extremities  Special tests: normal Valgus stress test, normal Varus  GAIT: not tested     Diagnostic Modalities:  bilateral knee X-ray: varus alignment medial compartment:  with moderate joint space narrowing on left and with bone on bone wear on right. More pronounced on " weight bearing views.   lateral compartment:  with moderate joint space narrowing bilaterally  Independent visualization of the images was performed.      Impression: bilateral Knee pain  Bilateral primary Knee osteoarthritis    Plan:  All of the above pertinent physical exam and imaging modalities findings was reviewed with Remigio and his spouse.  Given findings on xray and report, the level of knee pain is to the point where he can neither sit or rest due to left sided knee pain nor be active due to right sided knee pain and this is interfering with his life.  He would likely not be amendable to further injections and the patient declines this as did not get relief 1 year ago.     Patient would like his left knee replaced first as this has been interfering with his ability to rest, sleep, and to travel and to partake in other social and daily life activities, pain is worse on left.     The patient has attempted conservative treatments that include injections, rest, activity modification and OTC cream yet still continues to have significant issues. These issues include pain at rest, increased pain with activity, interference with normal activities of daily living such as putting on shoes and socks or getting out of a chair, pain and lack of mobility causes the patient to cancel social activity with family and friends. Secondary to these reasons I have offered surgery in the form of left total knee arthroplasty. Anticipate staged procedure as he clinically improved    Risks, benefits, complications, alternatives, limitations, and post operative course were discussed. Risks including infection (requiring long-term antibiotics and repeat surgeries), implant loosening (requiring revision surgery), heart attacks, strokes, bleeding (possibly requiring blood transfusion), scars, instability, numbness around the knee, stiffness (requiring manipulations or repeat surgeries), instability and dislocations, fractures, blood  clots the legs and lungs, nerve injury (possibly leading to foot drop), and or death.  Postoperative course was discussed as far as limitations and expected recovery times. It was also discussed that after surgery there is a need for blood thinners to prevent blood clots, but even when being treated it is possible to develop a blood clot. Anticipate being hospitalized 1-3 days and subsequently either discharged home or to a rehabilitation facility. Determinations of this will be based on progress with physical therapy while in the hospital. The importance of post-operative physical therapy was discussed. If discharge home from the hospital expect [facility] therapy for about 2 weeks and then transition to going to a physical therapy location for more aggressive therapy. Without physical therapy, outcomes may not be optimal. All questions were answered. The patient agrees to proceed with surgery.  Past medical and surgical history was reviewed.Patient denies previous MI, stroke, kidney disease or significant lung disease. Mr. Holly will need a pre-operative medical evaluation and clearance by a primary care provider. We will obtain a CBC, UA, nasal swab for MRSA as well as the appropriate radiographs prior to surgery. I will leave it to the discretion of the primary care provider for additional pre-operative testing.       Restrictions: activity as tolerated      Return to clinic 1, week(s) prior to surgery, or sooner as needed for changes.  Re-x-ray on return: Yes with postoperative visit, no with 1 week prior visit    Scribed by:  DISHA Kearney, CNP, DNP  4:53 PM  2017     Yrn Cardona D.O.    Please schedule for surgery, pre op H&P, and post ops.      Patient Name:  Remigio Holly ([id number]).  :  1933  Gender:  male  Patient Type:  Inpatient  Surgeon:  Jonathan Cardona DO  Physician requests assist from:  PA    Procedures:    Left total knee replacement    Approach:  NA  Diagnosis:     Chronic pain of both knees  Primary osteoarthritis of both knees  C-arm:  No  Mini C-arm:   No  Pathology Scheduled:  No  Special instruments/supplies:  [employer] ([place]  Vendor Rep:  yes  Anesthesia:  spinal  Block:  No   Block type:   Time needed:  120 minutes    [facility] Discussed:  YES    Post op 1:              Again, thank you for allowing me to participate in the care of your patient.        Sincerely,        Jonathan Cardona, DO

## 2017-07-26 NOTE — TELEPHONE ENCOUNTER
----- Message from Harrison Tse DO sent at 7/25/2017 11:50 AM CDT -----    Please contact the patient and notify him of the following:  The patient has a meniscal tear. Please assist him in setting up an orthopedic consultation.    Thank you.  Harrison Tse DO [facility]

## 2017-07-26 NOTE — PROGRESS NOTES
"ORTHOPEDIC CONSULT      Chief Complaint: Remigio Holly is a 83 year old male who is being seen for Chief Complaint   Patient presents with     RECHECK     right Knee osteoarthritis- LEFT KNEE PAIN     Remigio Holly is a 83 year old male who is seen in consultation at the request of Dr. Ayon for evaluation of bilateral knee pain.      Patient was seen in orthopedic clinic in 2014.  At that time he was seen, he was found to have osteoarthritis, received an injection on right knee with good relief.  At some point after this his pain started to return and started to have pain in his left leg.  He was seen in a local clinic summer of 2016 and received bilateral knee injections, this offered no benefit per patient.      Mechanism of Injury: No trauma or inciting event. Knees have been getting progressively worse.  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   Location: bilateral knee medial, left worse than right overall, right worse with ambulation, left worse with sitting   Duration of Pain:  \"many years\"  Rating of Pain:  Severe on left when no active, severe on right with activity, overall left more bothersome.    Pain Quality: aching, cramping and sharp pains on left with sitting, sharp on right with activity  Pain is not better with any modalities except \"Australian Dream\" lotion that patient states does help some but not much.    Pain is worse with:  activity on right, sitting on left  Treatment so far consists of:  Local OTC analgesic cream, injections in 2014 and 2016.  No recent PT, no recent OTC oral analgesics,  Rest, activity modification  Associated Features: denies swelling, denies instability, denies falls, denies \"locking up\"  Prior history of related problems:   No previous problem in the affected area.  Pain is limiting normal activities of daily living. Pain is limiting social events with family and friends. Pain is waking at night.  Here for Orthopedic consultation.  The pain " is getting worse.        Patient's past medical, surgical, social and family histories reviewed.  Denies significant cardiac, lung, kidney disease.  Does not currently take blood thinners per report.     Past Medical History:   Diagnosis Date     Blood transfusion      Chronic gastric ulcer without mention of hemorrhage, perforation, without mention of obstruction     Ulcer, Gastric     Gastric cancer (H)      Hemorrhage of gastrointestinal tract, unspecified 01/13/10    [facility]     Hypoglycemia, unspecified 1/26/2015     Measles without mention of complication     Measles     Mixed hyperlipidemia     Hyperlipidemia     Mumps without mention of complication     Mumps     Pulmonary embolism (H) Sept 4, 2010     Urinary calculus, unspecified     Renal stones     Varicella without mention of complication     Chickenpox       Past Surgical History:   Procedure Laterality Date     ABDOMEN SURGERY       C EXCIS STOMACH ULCER,LESN;LOCAL       CHOLECYSTECTOMY, LAPOROSCOPIC  10/6/2008    Cholecystectomy, Laparoscopic     COLONOSCOPY  1/14/10    [facility]     COLONOSCOPY  05/25/10     CYSTOSCOPY, RETROGRADES, EXTRACT STONE, INSERT STENT, COMBINED  12/25/2012    Procedure: COMBINED CYSTOSCOPY, RETROGRADES, EXTRACT STONE, INSERT STENT;  Cystoscopy, Left Stent Placement, left retrograde pylogram, uc;  Surgeon: Amador Sanchez MD;  Location: [facility]     ESOPHAGOSCOPY, GASTROSCOPY, DUODENOSCOPY (EGD), COMBINED  8/16/2011    Procedure:COMBINED ESOPHAGOSCOPY, GASTROSCOPY, DUODENOSCOPY (EGD), BIOPSY SINGLE OR MULTIPLE; Surgeon:TONY GARCIA; Location:[facility]     GENERAL SURGERY                           DATE:  07/07/10    Gastrectomy      COLONOSCOPY W/WO BRUSH/WASH  01/31/2006     HC REPAIR ROTATOR CUFF,ACUTE  3/21/2005    Right      UGI ENDOSCOPY, SIMPLE EXAM  1/13/10    [facility] UGI ENDOSCOPY, SIMPLE EXAM  05/25/10     LASER HOLMIUM LITHOTRIPSY URETER(S), INSERT STENT, COMBINED   1/5/2013    Procedure: COMBINED CYSTOSCOPY, URETEROSCOPY, LASER HOLMIUM LITHOTRIPSY URETER(S), INSERT STENT;  Bilateral  Cystoscopy, Bilateral Ureteroscopy, Bilateral retrogrades and  placement of ureteral stent right side. Laser Holmium Lithotripsy  and Exchange  of stent left side;  Surgeon: Tone Morejon MD;  Location: [facility]     LASER HOLMIUM LITHOTRIPSY URETER(S), INSERT STENT, COMBINED  1/30/2013    Procedure: COMBINED CYSTOSCOPY, URETEROSCOPY, LASER HOLMIUM LITHOTRIPSY URETER(S), INSERT STENT;  Right Ureteroscopy; Stone basketing; Right Stent exchange and  removal of left stent.;  Surgeon: Tone Morejon MD;  Location: [facility] OR     VIDEO CAPSULE ENDOSCOPY  01/15/10    [facility]       Medications:    Current Outpatient Prescriptions on File Prior to Visit:  MELATONIN PO    cyanocobalamin (VITAMIN B12) 1000 MCG/ML injection Inject 1 mL (1,000 mcg) into the muscle every 30 days 1 year of injections approved through 4/19/2017 per Dr. Ayon.   blood glucose monitoring ([employer] CONTOUR NEXT) test strip Use to test blood sugar 1 times daily or as directed.   blood glucose monitoring (ROBERT MICROLET) lancets Use to test blood sugar 1 time daily or as directed.   diphenhydrAMINE (BENADRYL) 25 MG capsule Take 2 capsules (50 mg) by mouth At Bedtime     No current facility-administered medications on file prior to visit.     Allergies   Allergen Reactions     Mobic [Meloxicam] Nausea and Diarrhea     Diarrhea, nausea, flu like symptoms since start of use       Social History     Occupational History     Construction      Retired     Social History Main Topics     Smoking status: Current Some Day Smoker     Packs/day: 0.00     Years: 20.00     Types: Pipe, Cigars     Last attempt to quit: 1/1/1998     Smokeless tobacco: Never Used      Comment: Rare pipe and cigar smoking     Alcohol use No     Drug use: No     Sexual activity: Not Currently       Family History   Problem Relation Age of Onset      "Alzheimer Disease Father      Cardiovascular Mother      C.A.D. Mother      HEART DISEASE Mother      Cardiovascular Brother      HEART DISEASE Brother      C.A.D. Brother        REVIEW OF SYSTEMS  10 point review systems performed otherwise negative as noted as per history of present illness.  Specifically Patient denies any recent chest pain, syncope    Physical Exam:  Vitals: Temp 98  F (36.7  C) (Temporal)  Ht 1.854 m (6' 1\")  Wt 71.7 kg (158 lb)  BMI 20.85 kg/m2  BMI= Body mass index is 20.85 kg/(m^2).  Constitutional: healthy, alert and no acute distress   Psychiatric: mentation appears normal and affect normal/bright  NEURO: no focal deficits  RESP: Normal with easy respirations and no use of accessory muscles to breathe, no audible wheezing or retractions  CV: RLE: knee and down-  Non-edema, distal pulse 2+. Neurovascular intact, LLE:  No-edema, distal pulse 2+, neurovascular intact         Regular rate and rhythm by palpation  SKIN: No erythema, rashes, excoriation, or breakdown. No evidence of infection.   JOINT/EXTREMITIES:bilateral Knee Exam: Inspection: varum deformity present bilateral.    Tender: Bilateral medial joint line, medial tibial plateau  Non-tender: negative for rest of knee  Active Range of Motion: with initial testing 5-100, partially limited to tightness of quad and hamstrings.  With re-test 0-115 and less tightness  Strength: 5/5 bilateral to lower extremities  Special tests: normal Valgus stress test, normal Varus  GAIT: not tested     Diagnostic Modalities:  bilateral knee X-ray: varus alignment medial compartment:  with moderate joint space narrowing on left and with bone on bone wear on right. More pronounced on weight bearing views.   lateral compartment:  with moderate joint space narrowing bilaterally  Independent visualization of the images was performed.      Impression: bilateral Knee pain  Bilateral primary Knee osteoarthritis    Plan:  All of the above pertinent physical " exam and imaging modalities findings was reviewed with Remigio and his spouse.  Given findings on xray and report, the level of knee pain is to the point where he can neither sit or rest due to left sided knee pain nor be active due to right sided knee pain and this is interfering with his life.  He would likely not be amendable to further injections and the patient declines this as did not get relief 1 year ago.     Patient would like his left knee replaced first as this has been interfering with his ability to rest, sleep, and to travel and to partake in other social and daily life activities, pain is worse on left.     The patient has attempted conservative treatments that include injections, rest, activity modification and OTC cream yet still continues to have significant issues. These issues include pain at rest, increased pain with activity, interference with normal activities of daily living such as putting on shoes and socks or getting out of a chair, pain and lack of mobility causes the patient to cancel social activity with family and friends. Secondary to these reasons I have offered surgery in the form of left total knee arthroplasty. Anticipate staged procedure as he clinically improved    Risks, benefits, complications, alternatives, limitations, and post operative course were discussed. Risks including infection (requiring long-term antibiotics and repeat surgeries), implant loosening (requiring revision surgery), heart attacks, strokes, bleeding (possibly requiring blood transfusion), scars, instability, numbness around the knee, stiffness (requiring manipulations or repeat surgeries), instability and dislocations, fractures, blood clots the legs and lungs, nerve injury (possibly leading to foot drop), and or death.  Postoperative course was discussed as far as limitations and expected recovery times. It was also discussed that after surgery there is a need for blood thinners to prevent blood clots,  but even when being treated it is possible to develop a blood clot. Anticipate being hospitalized 1-3 days and subsequently either discharged home or to a rehabilitation facility. Determinations of this will be based on progress with physical therapy while in the hospital. The importance of post-operative physical therapy was discussed. If discharge home from the hospital expect [facility] physical therapy for about 2 weeks and then transition to going to a physical therapy location for more aggressive therapy. Without physical therapy, outcomes may not be optimal. All questions were answered. The patient agrees to proceed with surgery.  Past medical and surgical history was reviewed.Patient denies previous MI, stroke, kidney disease or significant lung disease. Mr. Holly will need a pre-operative medical evaluation and clearance by a primary care provider. We will obtain a CBC, UA, nasal swab for MRSA as well as the appropriate radiographs prior to surgery. I will leave it to the discretion of the primary care provider for additional pre-operative testing.       Restrictions: activity as tolerated      Return to clinic 1, week(s) prior to surgery, or sooner as needed for changes.  Re-x-ray on return: Yes with postoperative visit, no with 1 week prior visit    Scribed by:  DISHA Kearney, CNP, DNP  4:53 PM  2017     Yrn Cardona D.O.    Please schedule for surgery, pre op H&P, and post ops.      Patient Name:  Remigio Holly ([id number]).  :  1933  Gender:  male  Patient Type:  Inpatient  Surgeon:  Jonathan Cardona DO  Physician requests assist from:  PA    Procedures:    Left total knee replacement   Approach:  NA  Diagnosis:     Chronic pain of both knees  Primary osteoarthritis of both knees  C-arm:  No  Mini C-arm:   No  Pathology Scheduled:  No  Special instruments/supplies:  [employer]  Vendor Rep:  yes  Anesthesia:  spinal  Block:  No   Block type:   Time needed:   120 minutes    [facility] Discussed:  YES    Post op 1:

## 2017-07-27 ENCOUNTER — TELEPHONE (OUTPATIENT)
Dept: ORTHOPEDICS | Facility: CLINIC | Age: 82
End: 2017-07-27

## 2017-07-27 DIAGNOSIS — Z01.818 PREOPERATIVE EXAMINATION: Primary | ICD-10-CM

## 2017-07-27 NOTE — TELEPHONE ENCOUNTER
Surgery Scheduled    Date of Surgery 8/15/17 Time of Surgery   Procedure: Left total knee replacement  Hospital/Surgical Facility: [facility]  Surgeon: Dr. Cardona  Type of Anesthesia : Spinal  Pre-op 8/1/17 with Dr. Ayon  Post op:8/31/17 with Dr. Cardona  Labs:  Ordered  Joint Class: Yes:  Date 8/3/17.  Hibiclens: Yes      Surgery packet mailed to patient's home address. Patients instructed to arrive 1 hours prior to surgery. Patient understood and agrees to plan.    Veronica Cotter  Surgery Scheduler

## 2017-08-01 ENCOUNTER — OFFICE VISIT (OUTPATIENT)
Dept: FAMILY MEDICINE | Facility: OTHER | Age: 82
End: 2017-08-01
Payer: COMMERCIAL

## 2017-08-01 ENCOUNTER — ALLIED HEALTH/NURSE VISIT (OUTPATIENT)
Dept: FAMILY MEDICINE | Facility: OTHER | Age: 82
End: 2017-08-01
Payer: COMMERCIAL

## 2017-08-01 VITALS
HEART RATE: 62 BPM | TEMPERATURE: 97.3 F | OXYGEN SATURATION: 93 % | HEIGHT: 73 IN | DIASTOLIC BLOOD PRESSURE: 68 MMHG | WEIGHT: 163.3 LBS | BODY MASS INDEX: 21.64 KG/M2 | SYSTOLIC BLOOD PRESSURE: 121 MMHG | RESPIRATION RATE: 20 BRPM

## 2017-08-01 DIAGNOSIS — R73.9 ELEVATED BLOOD SUGAR: Primary | ICD-10-CM

## 2017-08-01 DIAGNOSIS — R73.09 ABNORMAL GLUCOSE: ICD-10-CM

## 2017-08-01 DIAGNOSIS — Z01.818 PREOPERATIVE EXAMINATION: Primary | ICD-10-CM

## 2017-08-01 DIAGNOSIS — D51.0 PERNICIOUS ANEMIA: Primary | ICD-10-CM

## 2017-08-01 LAB
ALBUMIN UR-MCNC: NEGATIVE MG/DL
APPEARANCE UR: CLEAR
BASOPHILS # BLD AUTO: 0 10E9/L (ref 0–0.2)
BASOPHILS NFR BLD AUTO: 0 %
BILIRUB UR QL STRIP: NEGATIVE
COLOR UR AUTO: YELLOW
DIFFERENTIAL METHOD BLD: NORMAL
EOSINOPHIL # BLD AUTO: 0.1 10E9/L (ref 0–0.7)
EOSINOPHIL NFR BLD AUTO: 1.2 %
ERYTHROCYTE [DISTWIDTH] IN BLOOD BY AUTOMATED COUNT: 13.7 % (ref 10–15)
GLUCOSE UR STRIP-MCNC: 100 MG/DL
HBA1C MFR BLD: 6.1 % (ref 4.3–6)
HCT VFR BLD AUTO: 40.9 % (ref 40–53)
HGB BLD-MCNC: 13.8 G/DL (ref 13.3–17.7)
HGB UR QL STRIP: NEGATIVE
KETONES UR STRIP-MCNC: NEGATIVE MG/DL
LEUKOCYTE ESTERASE UR QL STRIP: NEGATIVE
LYMPHOCYTES # BLD AUTO: 1.2 10E9/L (ref 0.8–5.3)
LYMPHOCYTES NFR BLD AUTO: 24.1 %
MCH RBC QN AUTO: 31 PG (ref 26.5–33)
MCHC RBC AUTO-ENTMCNC: 33.7 G/DL (ref 31.5–36.5)
MCV RBC AUTO: 92 FL (ref 78–100)
MONOCYTES # BLD AUTO: 0.6 10E9/L (ref 0–1.3)
MONOCYTES NFR BLD AUTO: 12.6 %
NEUTROPHILS # BLD AUTO: 3.1 10E9/L (ref 1.6–8.3)
NEUTROPHILS NFR BLD AUTO: 62.1 %
NITRATE UR QL: NEGATIVE
PH UR STRIP: 5.5 PH (ref 5–7)
PLATELET # BLD AUTO: 189 10E9/L (ref 150–450)
RBC # BLD AUTO: 4.45 10E12/L (ref 4.4–5.9)
SP GR UR STRIP: 1.02 (ref 1–1.03)
URN SPEC COLLECT METH UR: ABNORMAL
UROBILINOGEN UR STRIP-ACNC: 0.2 EU/DL (ref 0.2–1)
WBC # BLD AUTO: 5.1 10E9/L (ref 4–11)

## 2017-08-01 PROCEDURE — 36415 COLL VENOUS BLD VENIPUNCTURE: CPT | Performed by: ORTHOPAEDIC SURGERY

## 2017-08-01 PROCEDURE — 99214 OFFICE O/P EST MOD 30 MIN: CPT | Mod: 25 | Performed by: FAMILY MEDICINE

## 2017-08-01 PROCEDURE — 81003 URINALYSIS AUTO W/O SCOPE: CPT | Performed by: ORTHOPAEDIC SURGERY

## 2017-08-01 PROCEDURE — 83036 HEMOGLOBIN GLYCOSYLATED A1C: CPT | Performed by: FAMILY MEDICINE

## 2017-08-01 PROCEDURE — 40000869 ZZHCL STATISTIC MRSA/MSSA PRESURGICAL SCREEN CULTURE: Performed by: ORTHOPAEDIC SURGERY

## 2017-08-01 PROCEDURE — 96372 THER/PROPH/DIAG INJ SC/IM: CPT

## 2017-08-01 PROCEDURE — 85025 COMPLETE CBC W/AUTO DIFF WBC: CPT | Performed by: ORTHOPAEDIC SURGERY

## 2017-08-01 PROCEDURE — 40000868 ZZHCL STATISTIC MRSA/MSSA PRESURGICAL SCREEN ID: Performed by: ORTHOPAEDIC SURGERY

## 2017-08-01 ASSESSMENT — PAIN SCALES - GENERAL: PAINLEVEL: NO PAIN (0)

## 2017-08-01 NOTE — LETTER
Remigio [address]  [id number] [id number]        August 2, 2017          Dear ,    We are writing to inform you of your test results.    Your test results fall within the expected range(s) or remain unchanged from previous results.  Please continue with current treatment plan.    Resulted Orders   Hemoglobin A1c   Result Value Ref Range    Hemoglobin A1C 6.1 (H) 4.3 - 6.0 %       If you have any questions or concerns, please call the clinic at the number listed above.       Sincerely,        Devan Ayon MD, MD

## 2017-08-01 NOTE — NURSING NOTE
The following medication was given:     MEDICATION: Vitamin B12  1000mcg  See medication note.  Patient instructed to remain in clinic for 20 minutes afterwards, and to report any adverse reaction to me immediately.  Prior to injection verified patient identity using patient's name and date of birth.  Nella Trejo MA     8/1/2017

## 2017-08-01 NOTE — PROGRESS NOTES
[facility]  [address] [facility] [address]  [phone]  Dept: [phone]    PRE-OP EVALUATION:  Today's date: 2017    Remigio Holly (: 1933) presents for pre-operative evaluation assessment as requested by Dr. Cardona.  He requires evaluation and anesthesia risk assessment prior to undergoing surgery/procedure for treatment of knee pain .  Proposed procedure: Left total knee replacement    Date of Surgery/ Procedure: 8/15/17  Time of Surgery/ Procedure: 730  Hospital/Surgical Facility: [place] [address]  Tel. [phone] / Fax [phone]    Primary Physician: Moy Celis  Type of Anesthesia Anticipated: Spinal    Patient has a Health Care Directive or Living Will:  YES     1. NO - Do you have a history of heart attack, stroke, stent, bypass or surgery on an artery in the head, neck, heart or legs?  2. NO - Do you ever have any pain or discomfort in your chest?  3. NO - Do you have a history of  Heart Failure?  4. NO - Are you troubled by shortness of breath when: walking on the level, up a slight hill or at night?  5. NO - Do you currently have a cold, bronchitis or other respiratory infection?  6. NO - Do you have a cough, shortness of breath or wheezing?  7. yes - Do you sometimes get pains in the calves of your legs when you walk?  8. NO - Do you or anyone in your family have previous history of blood clots?  9. NO - Do you or does anyone in your family have a serious bleeding problem such as prolonged bleeding following surgeries or cuts?  10. NO - Have you ever had problems with anemia or been told to take iron pills?  11. yes - Have you had any abnormal blood loss such as black, tarry or bloody stools, or abnormal vaginal bleeding?  12. yes - Have you ever had a blood transfusion?  13. NO - Have you or any of your relatives ever had problems with anesthesia?  14. NO - Do you have sleep apnea,  excessive snoring or daytime drowsiness?  15. NO - Do you have any prosthetic heart valves?  16. NO - Do you have prosthetic joints?  17. NO - Is there any chance that you may be pregnant?        HPI:                                                      Brief HPI related to upcoming procedure: knee pain      See problem list for active medical problems.  Problems all longstanding and stable, except as noted/documented.  See ROS for pertinent symptoms related to these conditions.                                                                                                  .    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 Provider to review       CKD (chronic kidney disease) stage 3, GFR 30-59 ml/min 05/05/2013     Priority: Medium     Pain 12/25/2012     Priority: Medium     Advance Care Planning 08/16/2012     Priority: Medium     Advance Care Planning: Receipt of ACP document:  Received: Health Care Directive which was witnessed or notarized on 3-12-13.  Document previously scanned on 3-13-13.  Validation form completed and  scanned.  Code Status reflects choices in most recent ACP document.  Confirmed/documented designated decision maker(s). See permanent comments section of demographics in clinical tab. View document(s) and details by clicking on code status. Added by Sayra Maguire on 8/25/2014.  .Patient states has Advance Directive and will  bring in a copy to clinic. 8/16/2012          HYPERLIPIDEMIA LDL GOAL <100 10/31/2010     Priority: Medium     GERD (gastroesophageal reflux disease) 03/26/2010     Priority: Medium     Mixed hyperlipidemia 03/24/2010     Priority: Medium     Closed fracture of calcaneus 01/06/2007     Priority: Medium     Calculus of gallbladder 01/09/2003     Priority: Medium     Problem list name updated by automated process. Provider to review       Abdominal aortic aneurysm (H) 10/22/2002     Priority: Medium     Problem list name updated by automated process. Provider to review       Chest pain      Priority: Medium     Problem list name updated by automated process. Provider to review       Pain in joint, shoulder region      Priority: Medium     Atrial Fibrillation, converted to normal sinus rhythm with b-blocker 08/26/2010     Priority: Low     Gastric cancer (H) 08/05/2010     Priority: Low     (Problem list name updated by automated process. Provider to review and confirm.)        Past Medical History:   Diagnosis Date     Blood transfusion      Chronic gastric ulcer without mention of hemorrhage, perforation, without mention of obstruction     Ulcer, Gastric     Gastric cancer (H)      Hemorrhage of gastrointestinal tract, unspecified 01/13/10    [facility]     Hypoglycemia, unspecified 1/26/2015     Measles without mention of complication     Measles     Mixed hyperlipidemia     Hyperlipidemia     Mumps without mention of complication     Mumps     Pulmonary embolism (H) Sept 4, 2010     Urinary calculus, unspecified     Renal stones     Varicella without mention of complication     Chickenpox     Past Surgical History:   Procedure Laterality Date     ABDOMEN SURGERY       C EXCIS STOMACH ULCER,LESN;LOCAL       CHOLECYSTECTOMY, LAPOROSCOPIC  10/6/2008    Cholecystectomy, Laparoscopic     COLONOSCOPY  1/14/10    [facility]     COLONOSCOPY  05/25/10     CYSTOSCOPY, RETROGRADES, EXTRACT STONE, INSERT STENT,  COMBINED  12/25/2012    Procedure: COMBINED CYSTOSCOPY, RETROGRADES, EXTRACT STONE, INSERT STENT;  Cystoscopy, Left Stent Placement, left retrograde pylogram, uc;  Surgeon: Amador Sanchez MD;  Location: [facility]     ESOPHAGOSCOPY, GASTROSCOPY, DUODENOSCOPY (EGD), COMBINED  8/16/2011    Procedure:COMBINED ESOPHAGOSCOPY, GASTROSCOPY, DUODENOSCOPY (EGD), BIOPSY SINGLE OR MULTIPLE; Surgeon:TONY GARCIA; Location:[facility]     GENERAL SURGERY                           DATE:  07/07/10    Gastrectomy     HC COLONOSCOPY W/WO BRUSH/WASH  01/31/2006      REPAIR ROTATOR CUFF,ACUTE  3/21/2005    Right     HC UGI ENDOSCOPY, SIMPLE EXAM  1/13/10    [facility] UGI ENDOSCOPY, SIMPLE EXAM  05/25/10     LASER HOLMIUM LITHOTRIPSY URETER(S), INSERT STENT, COMBINED  1/5/2013    Procedure: COMBINED CYSTOSCOPY, URETEROSCOPY, LASER HOLMIUM LITHOTRIPSY URETER(S), INSERT STENT;  Bilateral  Cystoscopy, Bilateral Ureteroscopy, Bilateral retrogrades and  placement of ureteral stent right side. Laser Holmium Lithotripsy  and Exchange  of stent left side;  Surgeon: Tone Morejon MD;  Location: [facility]     LASER HOLMIUM LITHOTRIPSY URETER(S), INSERT STENT, COMBINED  1/30/2013    Procedure: COMBINED CYSTOSCOPY, URETEROSCOPY, LASER HOLMIUM LITHOTRIPSY URETER(S), INSERT STENT;  Right Ureteroscopy; Stone basketing; Right Stent exchange and  removal of left stent.;  Surgeon: Tone Morejon MD;  Location: [facility] OR     VIDEO CAPSULE ENDOSCOPY  01/15/10    [facility]     Current Outpatient Prescriptions   Medication Sig Dispense Refill     MELATONIN PO        cyanocobalamin (VITAMIN B12) 1000 MCG/ML injection Inject 1 mL (1,000 mcg) into the muscle every 30 days 1 year of injections approved through 4/19/2017 per Dr. Ayon. 1 mL 11     blood glucose monitoring (ROBERT CONTOUR NEXT) test strip Use to test blood sugar 1 times daily or as directed. 100 each 3     blood glucose monitoring (ROBERT MICROLET)  "lancets Use to test blood sugar 1 time daily or as directed. 1 Box 2     diphenhydrAMINE (BENADRYL) 25 MG capsule Take 2 capsules (50 mg) by mouth At Bedtime 56 capsule      OTC products: None, except as noted above    Allergies   Allergen Reactions     Mobic [Meloxicam] Nausea and Diarrhea     Diarrhea, nausea, flu like symptoms since start of use      Latex Allergy: NO    Social History   Substance Use Topics     Smoking status: Current Some Day Smoker     Packs/day: 0.00     Years: 20.00     Types: Pipe, Cigars     Last attempt to quit: 1/1/1998     Smokeless tobacco: Never Used      Comment: Rare pipe and cigar smoking     Alcohol use No     History   Drug Use No       REVIEW OF SYSTEMS:                                                    Constitutional, HEENT, cardiovascular, pulmonary, gi and gu systems are negative, except as otherwise noted.      EXAM:                                                    /68  Pulse 62  Temp 97.3  F (36.3  C) (Temporal)  Resp 20  Ht 6' 1\" (1.854 m)  Wt 163 lb 4.8 oz (74.1 kg)  SpO2 93%  BMI 21.54 kg/m2    GENERAL APPEARANCE: healthy, alert and no distress     EYES: EOMI,  PERRL     HENT: ear canals and TM's normal and nose and mouth without ulcers or lesions     NECK: no adenopathy, no asymmetry, masses, or scars and thyroid normal to palpation     RESP: lungs clear to auscultation - no rales, rhonchi or wheezes     CV: regular rates and rhythm, normal S1 S2, no S3 or S4 and no murmur, click or rub     ABDOMEN:  soft, nontender, no HSM or masses and bowel sounds normal     MS: extremities normal- no gross deformities noted, no evidence of inflammation in joints, FROM in all extremities.     SKIN: no suspicious lesions or rashes     NEURO: Normal strength and tone, sensory exam grossly normal, mentation intact and speech normal     PSYCH: mentation appears normal. and affect normal/bright     LYMPHATICS: No axillary, cervical, or supraclavicular nodes    DIAGNOSTICS: "                                                      Labs Drawn and in Process:   Unresulted Labs Ordered in the Past 30 Days of this Admission     Date and Time Order Name Status Description    8/1/2017 1407 MRSA MSSA PRESURGICAL SCREEN In process           Recent Labs   Lab Test  01/31/17   0956  07/29/16   1053  06/12/16   1211   08/16/11   1244  03/17/11   HGB   --   14.3  14.8   < >  13.7   < >   --    PLT   --   194  199   < >  128*   < >   --    INR   --    --    --    --   1.08   --   2.2*   NA   --   137  139   < >   --    < >   --    POTASSIUM   --   4.8  4.5   < >   --    < >   --    CR   --   1.08  1.14   < >   --    < >   --    A1C  6.2*   --    --    --    --    --    --     < > = values in this interval not displayed.        IMPRESSION:                                                    Reason for surgery/procedure: left total knee replacement  Diagnosis/reason for consult: clearance for surgery/nesthesia    The proposed surgical procedure is considered INTERMEDIATE risk.    REVISED CARDIAC RISK INDEX  The patient has the following serious cardiovascular risks for perioperative complications such as (MI, PE, VFib and 3  AV Block):  No serious cardiac risks  INTERPRETATION: 0 risks: Class I (very low risk - 0.4% complication rate)    The patient has the following additional risks for perioperative complications:  No identified additional risks    No diagnosis found.    RECOMMENDATIONS:                                                          --Patient is to take all scheduled medications on the day of surgery EXCEPT for modifications listed below.    APPROVAL GIVEN to proceed with proposed procedure, without further diagnostic evaluation       Signed Electronically by: Devan Ayon MD, MD    Copy of this evaluation report is provided to requesting physician.    [facility] Preop Guidelines

## 2017-08-01 NOTE — MR AVS SNAPSHOT
After Visit Summary   8/1/2017    Remigio Holly    MRN: [id number]           Patient Information     Date Of Birth          11/6/1933        Visit Information        Provider Department      8/1/2017 2:45 PM NL FLOAT NURSE, [facility]        Today's Diagnoses     Pernicious anemia    -  1       Follow-ups after your visit        Your next 10 appointments already scheduled     Aug 01, 2017  2:45 PM CDT   Nurse Only with PAULA STEPHENS NURSE, [facility] ([facility])    [address] [facility] [id number] [phone]            Aug 09, 2017  8:50 AM CDT   Return Visit with Jonathan Cardona DO   [facility] ([facility]    [address] [place] [address] [id number] [phone]            Aug 15, 2017   Procedure with Jonathan Cardona DO   [facility] ([facility])    [address] [place] [id number] [phone]           From [address]: Exit at [address] on south side of [place]. Turn right on [address]. Turn left at stoplight on [address]. [facility] will be in view two blocks ahead            Aug 31, 2017  8:50 AM CDT   Return Visit with Jonathan Cardona DO   [facility] ([facility])    [address] [place] [id number] [phone]              Who to contact     If you have questions or need follow up information about today's clinic visit or your schedule please contact [facility] directly at [phone].  Normal or non-critical lab and imaging results will be communicated to you by MyChart, letter or phone within 4 business days after the clinic has received the results. If you do not hear from us within 7 days, please contact the clinic through MyChart or phone. If you have a critical or abnormal lab result, we will notify you by phone as soon  as possible.  Submit refill requests through [email or web address] or call your pharmacy and they will forward the refill request to us. Please allow 3 business days for your refill to be completed.          Additional Information About Your Visit        Qifanghart Information     [email or web address] gives you secure access to your electronic health record. If you see a primary care provider, you can also send messages to your care team and make appointments. If you have questions, please call your primary care clinic.  If you do not have a primary care provider, please call [phone] and they will assist you.        Care EveryWhere ID     This is your Care EveryWhere ID. This could be used by other organizations to access your [facility] medical records  [id number]         Blood Pressure from Last 3 Encounters:   08/01/17 121/68   07/18/17 132/76   04/24/17 138/80    Weight from Last 3 Encounters:   08/01/17 163 lb 4.8 oz (74.1 kg)   07/26/17 158 lb (71.7 kg)   07/18/17 163 lb 6.4 oz (74.1 kg)              We Performed the Following     INJECTION INTRAMUSCULAR OR SUB-Q     VITAMIN B12 INJ /1000MCG        Primary Care Provider Office Phone # Fax #    Moy Celis [phone]       [facility] [address] [facility] [address]        Equal Access to Services     ERICA RUSSELL : Hadii suzy ku hadasho Soomaali, waaxda luqadaha, qaybta kaalmada adeegyada, tray mendez. So [facility] [phone].    ATENCIÓN: Si habla español, tiene a tabares disposición servicios gratKonteraos de asistencia lingüística. Llame al [phone].    We comply with applicable federal civil rights laws and [place] laws. We do not discriminate on the basis of race, color, national origin, age, disability sex, sexual orientation or gender identity.            Thank you!     Thank you for choosing [facility]  for your care. Our goal is always to provide you with excellent care. Hearing back from our patients is one  way we can continue to improve our services. Please take a few minutes to complete the written survey that you may receive in the mail after your visit with us. Thank you!             Your Updated Medication List - Protect others around you: Learn how to safely use, store and throw away your medicines at www.disposemymeds.org.          This list is accurate as of: 8/1/17  2:27 PM.  Always use your most recent med list.                   Brand Name Dispense Instructions for use Diagnosis    BENADRYL 25 MG capsule   Generic drug:  diphenhydrAMINE     56 capsule    Take 2 capsules (50 mg) by mouth At Bedtime        blood glucose monitoring lancets     1 Box    Use to test blood sugar 1 time daily or as directed.    Hypoglycemia       blood glucose monitoring test strip    ROBERT CONTOUR NEXT    100 each    Use to test blood sugar 1 times daily or as directed.    Hypoglycemia       cyanocobalamin 1000 MCG/ML injection    VITAMIN B12    1 mL    Inject 1 mL (1,000 mcg) into the muscle every 30 days 1 year of injections approved through 4/19/2017 per Dr. Ayon.    Pernicious anemia       MELATONIN PO

## 2017-08-01 NOTE — PROGRESS NOTES
Last A1c was January of 2017.    Asked nursing to contact patient and have A1c drawn prior to pre-op visit with Dr. Cardona.    Amador Cruz, APRN, CNP, DNP  Orthopedic Surgery

## 2017-08-01 NOTE — MR AVS SNAPSHOT
After Visit Summary   8/1/2017    Remigio Holly    MRN: [id number]           Patient Information     Date Of Birth          11/6/1933        Visit Information        Provider Department      8/1/2017 2:00 PM Devan Ayon MD [facility]        Today's Diagnoses     Preoperative examination    -  1    Abnormal glucose           Follow-ups after your visit        Your next 10 appointments already scheduled     Aug 09, 2017  8:50 AM CDT   Return Visit with Jonathan Cardona DO   [facility] ([facility])    [address] [place] [phone]   [phone]            Aug 15, 2017   Procedure with Jonathan Cardona DO   [facility] ([facility])    [address] [place] [phone]   [phone]           From [address]: Exit at [address] on south side of [place]. Turn right on [address]. Turn left at stoplight on [address]. [facility] will be in view two blocks ahead            Aug 31, 2017  8:50 AM CDT   Return Visit with Jonathan Cardona DO   [facility] ([facility])    [address] [place] [phone]   [phone]              Future tests that were ordered for you today     Open Future Orders        Priority Expected Expires Ordered    Hemoglobin A1c Routine 8/1/2017 8/1/2018 8/1/2017            Who to contact     If you have questions or need follow up information about today's clinic visit or your schedule please contact [facility] directly at [phone].  Normal or non-critical lab and imaging results will be communicated to you by MyChart, letter or phone within 4 business days after the clinic has received the results. If you do not hear from us within 7 days, please contact the clinic through MyChart or phone. If you have a critical or abnormal lab result, we will  "notify you by phone as soon as possible.  Submit refill requests through NewsBreak or call your pharmacy and they will forward the refill request to us. Please allow 3 business days for your refill to be completed.          Additional Information About Your Visit        Jipiohart Information     NewsBreak gives you secure access to your electronic health record. If you see a primary care provider, you can also send messages to your care team and make appointments. If you have questions, please call your primary care clinic.  If you do not have a primary care provider, please call [phone] and they will assist you.        Care EveryWhere ID     This is your Care EveryWhere ID. This could be used by other organizations to access your [facility] medical records  [id number]        Your Vitals Were     Pulse Temperature Respirations Height Pulse Oximetry BMI (Body Mass Index)    62 97.3  F (36.3  C) (Temporal) 20 6' 1\" (1.854 m) 93% 21.54 kg/m2       Blood Pressure from Last 3 Encounters:   08/01/17 121/68   07/18/17 132/76   04/24/17 138/80    Weight from Last 3 Encounters:   08/01/17 163 lb 4.8 oz (74.1 kg)   07/26/17 158 lb (71.7 kg)   07/18/17 163 lb 6.4 oz (74.1 kg)              We Performed the Following     CBC with platelets differential     Hemoglobin A1c     MRSA MSSA Presurgical Screen     UA reflex to Microscopic and Culture        Primary Care Provider Office Phone # Fax #    Moy Celis [phone]       [facility] [address] [facility] [address]        Equal Access to Services     ERICA RUSSELL : Hadii suzy ku hadmayteo Soelpidio, waaxda luqadaha, qaybta kaalmada vidhi, tray mendez. So [facility] [phone].    ATENCIÓN: Si habla español, tiene a tabares disposición servicios gratuitos de asistencia lingüística. Llame al [phone].    We comply with applicable federal civil rights laws and [place] laws. We do not discriminate on the basis of race, " color, national origin, age, disability sex, sexual orientation or gender identity.            Thank you!     Thank you for choosing [facility]  for your care. Our goal is always to provide you with excellent care. Hearing back from our patients is one way we can continue to improve our services. Please take a few minutes to complete the written survey that you may receive in the mail after your visit with us. Thank you!             Your Updated Medication List - Protect others around you: Learn how to safely use, store and throw away your medicines at www.disposemymeds.org.          This list is accurate as of: 8/1/17  3:02 PM.  Always use your most recent med list.                   Brand Name Dispense Instructions for use Diagnosis    BENADRYL 25 MG capsule   Generic drug:  diphenhydrAMINE     56 capsule    Take 2 capsules (50 mg) by mouth At Bedtime        blood glucose monitoring lancets     1 Box    Use to test blood sugar 1 time daily or as directed.    Hypoglycemia       blood glucose monitoring test strip    ROBERT CONTOUR NEXT    100 each    Use to test blood sugar 1 times daily or as directed.    Hypoglycemia       cyanocobalamin 1000 MCG/ML injection    VITAMIN B12    1 mL    Inject 1 mL (1,000 mcg) into the muscle every 30 days 1 year of injections approved through 4/19/2017 per Dr. Ayon.    Pernicious anemia       MELATONIN PO

## 2017-08-02 LAB
BACTERIA SPEC CULT: NORMAL
MICRO REPORT STATUS: NORMAL
SPECIMEN SOURCE: NORMAL

## 2017-08-03 ENCOUNTER — TRANSFERRED RECORDS (OUTPATIENT)
Dept: HEALTH INFORMATION MANAGEMENT | Facility: CLINIC | Age: 82
End: 2017-08-03

## 2017-08-09 ENCOUNTER — OFFICE VISIT (OUTPATIENT)
Dept: ORTHOPEDICS | Facility: CLINIC | Age: 82
End: 2017-08-09
Payer: COMMERCIAL

## 2017-08-09 VITALS — TEMPERATURE: 97.4 F | BODY MASS INDEX: 22.13 KG/M2 | HEIGHT: 73 IN | WEIGHT: 167 LBS

## 2017-08-09 DIAGNOSIS — M17.0 PRIMARY OSTEOARTHRITIS OF BOTH KNEES: Primary | ICD-10-CM

## 2017-08-09 PROCEDURE — 99207 ZZC NO BILLABLE SERVICE THIS VISIT: CPT | Performed by: ORTHOPAEDIC SURGERY

## 2017-08-09 NOTE — PROGRESS NOTES
1 week prior to left total knee replacement. History and physical reviewed and unremarkable. Labs reviewed and unremarkable. All questions answered. Anticipate discharge to home 1-2 days postoperatively. Anticipate Xarelto for DVT prophylaxis given his history of pulmonary embolism.

## 2017-08-09 NOTE — MR AVS SNAPSHOT
After Visit Summary   8/9/2017    Remigio Holly    MRN: [id number]           Patient Information     Date Of Birth          11/6/1933        Visit Information        Provider Department      8/9/2017 8:50 AM Jonathan Cardona DO [facility]         Follow-ups after your visit        Your next 10 appointments already scheduled     Aug 15, 2017   Procedure with Jonathan Cardona DO   [facility] ([facility])    [address] [id number] [phone]           From [address]: Exit at [employer] on south side of [place]. Turn right on [address]. Turn left at stoplight on [address]. [facility] will be in view two blocks ahead            Aug 31, 2017  8:50 AM CDT   Return Visit with Jonathan Cardona DO   [facility]    [address] [id number] [phone]              Who to contact     If you have questions or need follow up information about today's clinic visit or your schedule please contact [facility] directly at [phone].  Normal or non-critical lab and imaging results will be communicated to you by MyChart, letter or phone within 4 business days after the clinic has received the results. If you do not hear from us within 7 days, please contact the clinic through [employer] or phone. If you have a critical or abnormal lab result, we will notify you by phone as soon as possible.  Submit refill requests through [employer] or call your pharmacy and they will forward the refill request to us. Please allow 3 business days for your refill to be completed.          Additional Information About Your Visit        MyChart Information     [employer] gives you secure access to your electronic health record. If you see a primary care provider, you can also send messages to your care team and make  "appointments. If you have questions, please call your primary care clinic.  If you do not have a primary care provider, please call [phone] and they will assist you.        Care EveryWhere ID     This is your Care EveryWhere ID. This could be used by other organizations to access your [place] medical records  [id number]        Your Vitals Were     Temperature Height BMI (Body Mass Index)             97.4  F (36.3  C) (Temporal) 1.854 m (6' 1\") 22.03 kg/m2          Blood Pressure from Last 3 Encounters:   08/01/17 121/68   07/18/17 132/76   04/24/17 138/80    Weight from Last 3 Encounters:   08/09/17 75.8 kg (167 lb)   08/01/17 74.1 kg (163 lb 4.8 oz)   07/26/17 71.7 kg (158 lb)              Today, you had the following     No orders found for display       Primary Care Provider Office Phone # Fax #    Moy Celis [phone]       [address] [place] [address]        Equal Access to Services     [facility]: Hadii suzy cliftono Soelpidio, waaxda luqadaha, qaybta kaalmamel mosher, tray haider . So [facility] [phone].    ATENCIÓN: Si habla español, tiene a tabares disposición servicios gratuitos de asistencia lingüística. Llame al [phone].    We comply with applicable federal civil rights laws and [place] laws. We do not discriminate on the basis of race, color, national origin, age, disability sex, sexual orientation or gender identity.            Thank you!     Thank you for choosing [facility]  for your care. Our goal is always to provide you with excellent care. Hearing back from our patients is one way we can continue to improve our services. Please take a few minutes to complete the written survey that you may receive in the mail after your visit with us. Thank you!             Your Updated Medication List - Protect others around you: Learn how to safely use, store and throw away your medicines at www.disposemymeds.org.          This " list is accurate as of: 8/9/17  9:05 AM.  Always use your most recent med list.                   Brand Name Dispense Instructions for use Diagnosis    BENADRYL 25 MG capsule   Generic drug:  diphenhydrAMINE     56 capsule    Take 2 capsules (50 mg) by mouth At Bedtime        blood glucose monitoring lancets     1 Box    Use to test blood sugar 1 time daily or as directed.    Hypoglycemia       blood glucose monitoring test strip    ROBERT CONTOUR NEXT    100 each    Use to test blood sugar 1 times daily or as directed.    Hypoglycemia       cyanocobalamin 1000 MCG/ML injection    VITAMIN B12    1 mL    Inject 1 mL (1,000 mcg) into the muscle every 30 days 1 year of injections approved through 4/19/2017 per Dr. Ayon.    Pernicious anemia       MELATONIN PO

## 2017-08-09 NOTE — LETTER
8/9/2017         RE: Remigio Holly  [address] [facility] [address]        Dear Colleague,    Thank you for referring your patient, Remigio Holly, to the [facility]. Please see a copy of my visit note below.    1 week prior to left total knee replacement. History and physical reviewed and unremarkable. Labs reviewed and unremarkable. All questions answered. Anticipate discharge to home 1-2 days postoperatively. Anticipate Xarelto for DVT prophylaxis given his history of pulmonary embolism.    Again, thank you for allowing me to participate in the care of your patient.        Sincerely,        Jonathan Cardona, DO

## 2017-08-09 NOTE — NURSING NOTE
"Chief Complaint   Patient presents with     RECHECK     1 week prior to Left total knee       Initial Temp 97.4  F (36.3  C) (Temporal)  Ht 1.854 m (6' 1\")  Wt 75.8 kg (167 lb)  BMI 22.03 kg/m2 Estimated body mass index is 22.03 kg/(m^2) as calculated from the following:    Height as of this encounter: 1.854 m (6' 1\").    Weight as of this encounter: 75.8 kg (167 lb).  Medication Reconciliation: complete    "

## 2017-08-15 ENCOUNTER — HOSPITAL ENCOUNTER (INPATIENT)
Facility: CLINIC | Age: 82
LOS: 7 days | Discharge: NURSING, REHAB OR LONG-TERM CARE | DRG: 470 | End: 2017-08-22
Attending: ORTHOPAEDIC SURGERY | Admitting: ORTHOPAEDIC SURGERY
Payer: MEDICARE

## 2017-08-15 ENCOUNTER — APPOINTMENT (OUTPATIENT)
Dept: PHYSICAL THERAPY | Facility: CLINIC | Age: 82
DRG: 470 | End: 2017-08-15
Attending: ORTHOPAEDIC SURGERY
Payer: MEDICARE

## 2017-08-15 ENCOUNTER — ANESTHESIA EVENT (OUTPATIENT)
Dept: SURGERY | Facility: CLINIC | Age: 82
DRG: 470 | End: 2017-08-15
Payer: MEDICARE

## 2017-08-15 ENCOUNTER — SURGERY (OUTPATIENT)
Age: 82
End: 2017-08-15

## 2017-08-15 ENCOUNTER — APPOINTMENT (OUTPATIENT)
Dept: GENERAL RADIOLOGY | Facility: CLINIC | Age: 82
DRG: 470 | End: 2017-08-15
Attending: ORTHOPAEDIC SURGERY
Payer: MEDICARE

## 2017-08-15 ENCOUNTER — ANESTHESIA (OUTPATIENT)
Dept: SURGERY | Facility: CLINIC | Age: 82
DRG: 470 | End: 2017-08-15
Payer: MEDICARE

## 2017-08-15 DIAGNOSIS — G47.09 OTHER INSOMNIA: ICD-10-CM

## 2017-08-15 DIAGNOSIS — I48.0 PAROXYSMAL ATRIAL FIBRILLATION (H): ICD-10-CM

## 2017-08-15 DIAGNOSIS — R33.9 URINARY RETENTION WITH INCOMPLETE BLADDER EMPTYING: ICD-10-CM

## 2017-08-15 DIAGNOSIS — Z96.652 STATUS POST TOTAL LEFT KNEE REPLACEMENT USING CEMENT: Primary | ICD-10-CM

## 2017-08-15 LAB — GLUCOSE BLDC GLUCOMTR-MCNC: 233 MG/DL (ref 70–99)

## 2017-08-15 PROCEDURE — 25000128 H RX IP 250 OP 636: Performed by: NURSE ANESTHETIST, CERTIFIED REGISTERED

## 2017-08-15 PROCEDURE — 25000125 ZZHC RX 250

## 2017-08-15 PROCEDURE — 97530 THERAPEUTIC ACTIVITIES: CPT | Mod: GP | Performed by: PHYSICAL THERAPIST

## 2017-08-15 PROCEDURE — 25000566 ZZH SEVOFLURANE, EA 15 MIN: Performed by: ORTHOPAEDIC SURGERY

## 2017-08-15 PROCEDURE — 27810169 ZZH OR IMPLANT GENERAL: Performed by: ORTHOPAEDIC SURGERY

## 2017-08-15 PROCEDURE — A9270 NON-COVERED ITEM OR SERVICE: HCPCS | Mod: GY | Performed by: ORTHOPAEDIC SURGERY

## 2017-08-15 PROCEDURE — C1776 JOINT DEVICE (IMPLANTABLE): HCPCS | Performed by: ORTHOPAEDIC SURGERY

## 2017-08-15 PROCEDURE — 40000986 XR KNEE PORT LT 1/2 VW: Mod: LT

## 2017-08-15 PROCEDURE — 37000009 ZZH ANESTHESIA TECHNICAL FEE, EACH ADDTL 15 MIN: Performed by: ORTHOPAEDIC SURGERY

## 2017-08-15 PROCEDURE — 97110 THERAPEUTIC EXERCISES: CPT | Mod: GP | Performed by: PHYSICAL THERAPIST

## 2017-08-15 PROCEDURE — 40000306 ZZH STATISTIC PRE PROC ASSESS II: Performed by: ORTHOPAEDIC SURGERY

## 2017-08-15 PROCEDURE — 25000125 ZZHC RX 250: Performed by: ORTHOPAEDIC SURGERY

## 2017-08-15 PROCEDURE — 27447 TOTAL KNEE ARTHROPLASTY: CPT | Mod: AS | Performed by: NURSE PRACTITIONER

## 2017-08-15 PROCEDURE — 00000146 ZZHCL STATISTIC GLUCOSE BY METER IP

## 2017-08-15 PROCEDURE — 40000193 ZZH STATISTIC PT WARD VISIT: Performed by: PHYSICAL THERAPIST

## 2017-08-15 PROCEDURE — 0SRD0J9 REPLACEMENT OF LEFT KNEE JOINT WITH SYNTHETIC SUBSTITUTE, CEMENTED, OPEN APPROACH: ICD-10-PCS | Performed by: ORTHOPAEDIC SURGERY

## 2017-08-15 PROCEDURE — 25000132 ZZH RX MED GY IP 250 OP 250 PS 637: Mod: GY | Performed by: ORTHOPAEDIC SURGERY

## 2017-08-15 PROCEDURE — 97162 PT EVAL MOD COMPLEX 30 MIN: CPT | Mod: GP | Performed by: PHYSICAL THERAPIST

## 2017-08-15 PROCEDURE — 27210794 ZZH OR GENERAL SUPPLY STERILE: Performed by: ORTHOPAEDIC SURGERY

## 2017-08-15 PROCEDURE — 36000093 ZZH SURGERY LEVEL 4 1ST 30 MIN: Performed by: ORTHOPAEDIC SURGERY

## 2017-08-15 PROCEDURE — 27447 TOTAL KNEE ARTHROPLASTY: CPT | Mod: LT | Performed by: ORTHOPAEDIC SURGERY

## 2017-08-15 PROCEDURE — 25000128 H RX IP 250 OP 636

## 2017-08-15 PROCEDURE — 12000007 ZZH R&B INTERMEDIATE

## 2017-08-15 PROCEDURE — 25000128 H RX IP 250 OP 636: Performed by: ORTHOPAEDIC SURGERY

## 2017-08-15 PROCEDURE — 25000125 ZZHC RX 250: Performed by: NURSE ANESTHETIST, CERTIFIED REGISTERED

## 2017-08-15 PROCEDURE — 36000063 ZZH SURGERY LEVEL 4 EA 15 ADDTL MIN: Performed by: ORTHOPAEDIC SURGERY

## 2017-08-15 PROCEDURE — 71000014 ZZH RECOVERY PHASE 1 LEVEL 2 FIRST HR: Performed by: ORTHOPAEDIC SURGERY

## 2017-08-15 PROCEDURE — 27210995 ZZH RX 272: Performed by: ORTHOPAEDIC SURGERY

## 2017-08-15 PROCEDURE — 27110028 ZZH OR GENERAL SUPPLY NON-STERILE: Performed by: ORTHOPAEDIC SURGERY

## 2017-08-15 PROCEDURE — 37000008 ZZH ANESTHESIA TECHNICAL FEE, 1ST 30 MIN: Performed by: ORTHOPAEDIC SURGERY

## 2017-08-15 DEVICE — BONE CEMENT GENTAMYCIN SMART SET GHV 5450-35-500: Type: IMPLANTABLE DEVICE | Site: KNEE | Status: FUNCTIONAL

## 2017-08-15 RX ORDER — ONDANSETRON 2 MG/ML
INJECTION INTRAMUSCULAR; INTRAVENOUS PRN
Status: DISCONTINUED | OUTPATIENT
Start: 2017-08-15 | End: 2017-08-15

## 2017-08-15 RX ORDER — SODIUM CHLORIDE, SODIUM LACTATE, POTASSIUM CHLORIDE, CALCIUM CHLORIDE 600; 310; 30; 20 MG/100ML; MG/100ML; MG/100ML; MG/100ML
INJECTION, SOLUTION INTRAVENOUS CONTINUOUS
Status: DISCONTINUED | OUTPATIENT
Start: 2017-08-15 | End: 2017-08-15 | Stop reason: HOSPADM

## 2017-08-15 RX ORDER — OXYCODONE HYDROCHLORIDE 5 MG/1
5-10 TABLET ORAL EVERY 4 HOURS PRN
Status: DISCONTINUED | OUTPATIENT
Start: 2017-08-15 | End: 2017-08-18

## 2017-08-15 RX ORDER — EPHEDRINE SULFATE 50 MG/ML
INJECTION, SOLUTION INTRAMUSCULAR; INTRAVENOUS; SUBCUTANEOUS PRN
Status: DISCONTINUED | OUTPATIENT
Start: 2017-08-15 | End: 2017-08-15

## 2017-08-15 RX ORDER — MORPHINE SULFATE 0.5 MG/ML
INJECTION, SOLUTION EPIDURAL; INTRATHECAL; INTRAVENOUS PRN
Status: DISCONTINUED | OUTPATIENT
Start: 2017-08-15 | End: 2017-08-15 | Stop reason: HOSPADM

## 2017-08-15 RX ORDER — ONDANSETRON 2 MG/ML
4 INJECTION INTRAMUSCULAR; INTRAVENOUS EVERY 6 HOURS PRN
Status: DISCONTINUED | OUTPATIENT
Start: 2017-08-15 | End: 2017-08-22 | Stop reason: HOSPADM

## 2017-08-15 RX ORDER — BUPIVACAINE HYDROCHLORIDE 7.5 MG/ML
INJECTION, SOLUTION INTRASPINAL PRN
Status: DISCONTINUED | OUTPATIENT
Start: 2017-08-15 | End: 2017-08-15

## 2017-08-15 RX ORDER — CEFAZOLIN SODIUM 1 G/3ML
1 INJECTION, POWDER, FOR SOLUTION INTRAMUSCULAR; INTRAVENOUS SEE ADMIN INSTRUCTIONS
Status: DISCONTINUED | OUTPATIENT
Start: 2017-08-15 | End: 2017-08-15 | Stop reason: HOSPADM

## 2017-08-15 RX ORDER — FENTANYL CITRATE 50 UG/ML
INJECTION, SOLUTION INTRAMUSCULAR; INTRAVENOUS PRN
Status: DISCONTINUED | OUTPATIENT
Start: 2017-08-15 | End: 2017-08-15

## 2017-08-15 RX ORDER — CEFAZOLIN SODIUM 2 G/100ML
2 INJECTION, SOLUTION INTRAVENOUS
Status: COMPLETED | OUTPATIENT
Start: 2017-08-15 | End: 2017-08-15

## 2017-08-15 RX ORDER — ACETAMINOPHEN 325 MG/1
975 TABLET ORAL EVERY 8 HOURS
Status: COMPLETED | OUTPATIENT
Start: 2017-08-16 | End: 2017-08-19

## 2017-08-15 RX ORDER — ACETAMINOPHEN 10 MG/ML
1000 INJECTION, SOLUTION INTRAVENOUS EVERY 8 HOURS
Status: COMPLETED | OUTPATIENT
Start: 2017-08-15 | End: 2017-08-16

## 2017-08-15 RX ORDER — MEPERIDINE HYDROCHLORIDE 25 MG/ML
12.5 INJECTION INTRAMUSCULAR; INTRAVENOUS; SUBCUTANEOUS
Status: DISCONTINUED | OUTPATIENT
Start: 2017-08-15 | End: 2017-08-15 | Stop reason: HOSPADM

## 2017-08-15 RX ORDER — HYDRALAZINE HYDROCHLORIDE 20 MG/ML
2.5-5 INJECTION INTRAMUSCULAR; INTRAVENOUS EVERY 10 MIN PRN
Status: DISCONTINUED | OUTPATIENT
Start: 2017-08-15 | End: 2017-08-15 | Stop reason: HOSPADM

## 2017-08-15 RX ORDER — HYDROMORPHONE HYDROCHLORIDE 1 MG/ML
.3-.5 INJECTION, SOLUTION INTRAMUSCULAR; INTRAVENOUS; SUBCUTANEOUS EVERY 10 MIN PRN
Status: DISCONTINUED | OUTPATIENT
Start: 2017-08-15 | End: 2017-08-15 | Stop reason: HOSPADM

## 2017-08-15 RX ORDER — CYCLOBENZAPRINE HCL 5 MG
5 TABLET ORAL 3 TIMES DAILY PRN
Status: DISCONTINUED | OUTPATIENT
Start: 2017-08-15 | End: 2017-08-22 | Stop reason: HOSPADM

## 2017-08-15 RX ORDER — PROCHLORPERAZINE MALEATE 5 MG
5 TABLET ORAL EVERY 6 HOURS PRN
Status: DISCONTINUED | OUTPATIENT
Start: 2017-08-15 | End: 2017-08-22 | Stop reason: HOSPADM

## 2017-08-15 RX ORDER — NALOXONE HYDROCHLORIDE 0.4 MG/ML
.1-.4 INJECTION, SOLUTION INTRAMUSCULAR; INTRAVENOUS; SUBCUTANEOUS
Status: DISCONTINUED | OUTPATIENT
Start: 2017-08-15 | End: 2017-08-22 | Stop reason: HOSPADM

## 2017-08-15 RX ORDER — PROPOFOL 10 MG/ML
INJECTION, EMULSION INTRAVENOUS CONTINUOUS PRN
Status: DISCONTINUED | OUTPATIENT
Start: 2017-08-15 | End: 2017-08-15

## 2017-08-15 RX ORDER — HYDROXYZINE HYDROCHLORIDE 10 MG/1
10 TABLET, FILM COATED ORAL EVERY 6 HOURS PRN
Status: DISCONTINUED | OUTPATIENT
Start: 2017-08-15 | End: 2017-08-17 | Stop reason: SINTOL

## 2017-08-15 RX ORDER — FENTANYL CITRATE 50 UG/ML
25-50 INJECTION, SOLUTION INTRAMUSCULAR; INTRAVENOUS
Status: DISCONTINUED | OUTPATIENT
Start: 2017-08-15 | End: 2017-08-15 | Stop reason: HOSPADM

## 2017-08-15 RX ORDER — KETOROLAC TROMETHAMINE 30 MG/ML
INJECTION, SOLUTION INTRAMUSCULAR; INTRAVENOUS PRN
Status: DISCONTINUED | OUTPATIENT
Start: 2017-08-15 | End: 2017-08-15 | Stop reason: HOSPADM

## 2017-08-15 RX ORDER — ONDANSETRON 2 MG/ML
4 INJECTION INTRAMUSCULAR; INTRAVENOUS EVERY 30 MIN PRN
Status: DISCONTINUED | OUTPATIENT
Start: 2017-08-15 | End: 2017-08-15 | Stop reason: HOSPADM

## 2017-08-15 RX ORDER — PROPOFOL 10 MG/ML
INJECTION, EMULSION INTRAVENOUS PRN
Status: DISCONTINUED | OUTPATIENT
Start: 2017-08-15 | End: 2017-08-15

## 2017-08-15 RX ORDER — CEFAZOLIN SODIUM 1 G/3ML
1 INJECTION, POWDER, FOR SOLUTION INTRAMUSCULAR; INTRAVENOUS EVERY 8 HOURS
Status: COMPLETED | OUTPATIENT
Start: 2017-08-15 | End: 2017-08-16

## 2017-08-15 RX ORDER — DOCUSATE SODIUM 100 MG/1
100 CAPSULE, LIQUID FILLED ORAL 2 TIMES DAILY
Status: DISCONTINUED | OUTPATIENT
Start: 2017-08-15 | End: 2017-08-21

## 2017-08-15 RX ORDER — HYDROMORPHONE HYDROCHLORIDE 1 MG/ML
.3-.5 INJECTION, SOLUTION INTRAMUSCULAR; INTRAVENOUS; SUBCUTANEOUS
Status: DISCONTINUED | OUTPATIENT
Start: 2017-08-15 | End: 2017-08-18

## 2017-08-15 RX ORDER — BUPIVACAINE HYDROCHLORIDE AND EPINEPHRINE 5; 5 MG/ML; UG/ML
INJECTION, SOLUTION PERINEURAL PRN
Status: DISCONTINUED | OUTPATIENT
Start: 2017-08-15 | End: 2017-08-15 | Stop reason: HOSPADM

## 2017-08-15 RX ORDER — ONDANSETRON 4 MG/1
4 TABLET, ORALLY DISINTEGRATING ORAL EVERY 6 HOURS PRN
Status: DISCONTINUED | OUTPATIENT
Start: 2017-08-15 | End: 2017-08-22 | Stop reason: HOSPADM

## 2017-08-15 RX ORDER — ONDANSETRON 4 MG/1
4 TABLET, ORALLY DISINTEGRATING ORAL EVERY 30 MIN PRN
Status: DISCONTINUED | OUTPATIENT
Start: 2017-08-15 | End: 2017-08-15 | Stop reason: HOSPADM

## 2017-08-15 RX ORDER — NALOXONE HYDROCHLORIDE 0.4 MG/ML
.1-.4 INJECTION, SOLUTION INTRAMUSCULAR; INTRAVENOUS; SUBCUTANEOUS
Status: DISCONTINUED | OUTPATIENT
Start: 2017-08-15 | End: 2017-08-15 | Stop reason: HOSPADM

## 2017-08-15 RX ORDER — LIDOCAINE 40 MG/G
CREAM TOPICAL
Status: DISCONTINUED | OUTPATIENT
Start: 2017-08-15 | End: 2017-08-22 | Stop reason: HOSPADM

## 2017-08-15 RX ORDER — SODIUM CHLORIDE, SODIUM LACTATE, POTASSIUM CHLORIDE, CALCIUM CHLORIDE 600; 310; 30; 20 MG/100ML; MG/100ML; MG/100ML; MG/100ML
INJECTION, SOLUTION INTRAVENOUS CONTINUOUS
Status: DISCONTINUED | OUTPATIENT
Start: 2017-08-15 | End: 2017-08-22 | Stop reason: HOSPADM

## 2017-08-15 RX ADMIN — CEFAZOLIN SODIUM 1 G: 1 INJECTION, POWDER, FOR SOLUTION INTRAMUSCULAR; INTRAVENOUS at 17:50

## 2017-08-15 RX ADMIN — FENTANYL CITRATE 25 MCG: 50 INJECTION, SOLUTION INTRAMUSCULAR; INTRAVENOUS at 07:30

## 2017-08-15 RX ADMIN — Medication 20 ML: at 09:10

## 2017-08-15 RX ADMIN — SODIUM CHLORIDE, POTASSIUM CHLORIDE, SODIUM LACTATE AND CALCIUM CHLORIDE: 600; 310; 30; 20 INJECTION, SOLUTION INTRAVENOUS at 11:35

## 2017-08-15 RX ADMIN — KETOROLAC TROMETHAMINE 30 MG: 30 INJECTION, SOLUTION INTRAMUSCULAR at 09:10

## 2017-08-15 RX ADMIN — DOCUSATE SODIUM 100 MG: 100 CAPSULE, LIQUID FILLED ORAL at 11:35

## 2017-08-15 RX ADMIN — CEFAZOLIN SODIUM 1 G: 2 INJECTION, SOLUTION INTRAVENOUS at 09:46

## 2017-08-15 RX ADMIN — MIDAZOLAM HYDROCHLORIDE 1 MG: 1 INJECTION, SOLUTION INTRAMUSCULAR; INTRAVENOUS at 07:23

## 2017-08-15 RX ADMIN — MORPHINE SULFATE 10 MG: 0.5 INJECTION, SOLUTION EPIDURAL; INTRATHECAL; INTRAVENOUS at 09:10

## 2017-08-15 RX ADMIN — ACETAMINOPHEN 1000 MG: 10 INJECTION, SOLUTION INTRAVENOUS at 11:34

## 2017-08-15 RX ADMIN — Medication 80 MG: at 07:51

## 2017-08-15 RX ADMIN — LIDOCAINE HYDROCHLORIDE 0.1 ML: 10 INJECTION, SOLUTION EPIDURAL; INFILTRATION; INTRACAUDAL; PERINEURAL at 07:22

## 2017-08-15 RX ADMIN — CEFAZOLIN SODIUM 2 G: 2 INJECTION, SOLUTION INTRAVENOUS at 07:27

## 2017-08-15 RX ADMIN — Medication 5 MG: at 08:30

## 2017-08-15 RX ADMIN — DOCUSATE SODIUM 100 MG: 100 CAPSULE, LIQUID FILLED ORAL at 21:15

## 2017-08-15 RX ADMIN — ONDANSETRON 4 MG: 2 INJECTION INTRAMUSCULAR; INTRAVENOUS at 09:12

## 2017-08-15 RX ADMIN — Medication 5 MG: at 08:19

## 2017-08-15 RX ADMIN — PROPOFOL 150 MCG/KG/MIN: 10 INJECTION, EMULSION INTRAVENOUS at 07:44

## 2017-08-15 RX ADMIN — SODIUM CHLORIDE, POTASSIUM CHLORIDE, SODIUM LACTATE AND CALCIUM CHLORIDE: 600; 310; 30; 20 INJECTION, SOLUTION INTRAVENOUS at 07:22

## 2017-08-15 RX ADMIN — PROPOFOL 100 MG: 10 INJECTION, EMULSION INTRAVENOUS at 07:50

## 2017-08-15 RX ADMIN — SODIUM CHLORIDE 2000 ML: 900 IRRIGANT IRRIGATION at 09:09

## 2017-08-15 RX ADMIN — OXYCODONE HYDROCHLORIDE 5 MG: 5 TABLET ORAL at 14:56

## 2017-08-15 RX ADMIN — OXYCODONE HYDROCHLORIDE 5 MG: 5 TABLET ORAL at 19:28

## 2017-08-15 RX ADMIN — FENTANYL CITRATE 50 MCG: 50 INJECTION, SOLUTION INTRAMUSCULAR; INTRAVENOUS at 07:50

## 2017-08-15 RX ADMIN — SODIUM CHLORIDE, POTASSIUM CHLORIDE, SODIUM LACTATE AND CALCIUM CHLORIDE: 600; 310; 30; 20 INJECTION, SOLUTION INTRAVENOUS at 08:31

## 2017-08-15 RX ADMIN — BUPIVACAINE HYDROCHLORIDE IN DEXTROSE 1.6 ML: 7.5 INJECTION, SOLUTION SUBARACHNOID at 07:42

## 2017-08-15 RX ADMIN — FENTANYL CITRATE 25 MCG: 50 INJECTION, SOLUTION INTRAMUSCULAR; INTRAVENOUS at 07:42

## 2017-08-15 RX ADMIN — ACETAMINOPHEN 1000 MG: 10 INJECTION, SOLUTION INTRAVENOUS at 18:29

## 2017-08-15 RX ADMIN — Medication 10 MG: at 08:05

## 2017-08-15 ASSESSMENT — LIFESTYLE VARIABLES: TOBACCO_USE: 1

## 2017-08-15 ASSESSMENT — ENCOUNTER SYMPTOMS: SEIZURES: 0

## 2017-08-15 NOTE — PROGRESS NOTES
Transfer from pacu to Room med/surg  Transferred to bed via Glyder mat   S: 84 y/o M S/P Left TKA  Anesthesia Type: Spinal converted to General  Surgeon: Dr. Cardona  Allergies: See Medication Reconciliation Record  DNR: No  B: Pertinent Medical History:   Past Medical History:   Diagnosis Date     Blood transfusion      Chronic gastric ulcer without mention of hemorrhage, perforation, without mention of obstruction     Ulcer, Gastric     Gastric cancer (H)      Hemorrhage of gastrointestinal tract, unspecified 01/13/10    [facility]     Hypoglycemia, unspecified 1/26/2015     Measles without mention of complication     Measles     Mixed hyperlipidemia     Hyperlipidemia     Mumps without mention of complication     Mumps     Pulmonary embolism (H) Sept 4, 2010     Urinary calculus, unspecified     Renal stones     Varicella without mention of complication     Chickenpox     Surgical History:   Past Surgical History:   Procedure Laterality Date     ABDOMEN SURGERY       C EXCIS STOMACH ULCER,LESN;LOCAL       CHOLECYSTECTOMY, LAPOROSCOPIC  10/6/2008    Cholecystectomy, Laparoscopic     COLONOSCOPY  1/14/10    [facility]     COLONOSCOPY  05/25/10     CYSTOSCOPY, RETROGRADES, EXTRACT STONE, INSERT STENT, COMBINED  12/25/2012    Procedure: COMBINED CYSTOSCOPY, RETROGRADES, EXTRACT STONE, INSERT STENT;  Cystoscopy, Left Stent Placement, left retrograde pylogram, uc;  Surgeon: Amador Sanchez MD;  Location: [facility]     ESOPHAGOSCOPY, GASTROSCOPY, DUODENOSCOPY (EGD), COMBINED  8/16/2011    Procedure:COMBINED ESOPHAGOSCOPY, GASTROSCOPY, DUODENOSCOPY (EGD), BIOPSY SINGLE OR MULTIPLE; Surgeon:TONY GARCIA; Location:[facility]     GENERAL SURGERY                           DATE:  07/07/10    Gastrectomy     HC COLONOSCOPY W/WO BRUSH/WASH  01/31/2006     HC REPAIR ROTATOR CUFF,ACUTE  3/21/2005    Right      UGI ENDOSCOPY, SIMPLE EXAM  1/13/10    [facility]     HC UGI ENDOSCOPY, SIMPLE EXAM  05/25/10      LASER HOLMIUM LITHOTRIPSY URETER(S), INSERT STENT, COMBINED  1/5/2013    Procedure: COMBINED CYSTOSCOPY, URETEROSCOPY, LASER HOLMIUM LITHOTRIPSY URETER(S), INSERT STENT;  Bilateral  Cystoscopy, Bilateral Ureteroscopy, Bilateral retrogrades and  placement of ureteral stent right side. Laser Holmium Lithotripsy  and Exchange  of stent left side;  Surgeon: Tone Morejon MD;  Location[facility]     LASER HOLMIUM LITHOTRIPSY URETER(S), INSERT STENT, COMBINED  1/30/2013    Procedure: COMBINED CYSTOSCOPY, URETEROSCOPY, LASER HOLMIUM LITHOTRIPSY URETER(S), INSERT STENT;  Right Ureteroscopy; Stone basketing; Right Stent exchange and  removal of left stent.;  Surgeon: Tone Morejon MD;  Location: [facility] OR     VIDEO CAPSULE ENDOSCOPY  01/15/10    [facility]   A: EBL: <10ml  IVF: 1700ml  UOP: 350ml  Pain: Denies  NPO: ___Yes _X__No   Vomiting: ___Yes _X__No   Drainage: None  Skin Integrity: Intact except left knee incisions  RFO: _X__Yes___No urethral catheter  SSI Patient? __X_Yes___No   Brace/sling/equipment: _X__Yes___No immobilizer  See PACU record for ongoing assessment, vital signs and pain assessment.  R: Post-Op vitals and assessments as ordered/indicated per patient's condition.  Follow Post-Op orders and notify Physician prn.  Continue to involve patient/family in plan of care and discharge planning.  Reinforce Pre-Operative education.  Implement skin safety interventions as appropriate.  Name: Yolie GEORGE RN, report given to YOUSIF Aguilar

## 2017-08-15 NOTE — IP AVS SNAPSHOT
` `     [address] MEDICAL SURGICAL: [phone]            Medication Administration Report for Remigio Holly as of 08/22/17 0849   Legend:    Given Hold Not Given Due Canceled Entry Other Actions    Time Time (Time) Time  Time-Action       Inactive    Active    Linked        Medications 08/16/17 08/17/17 08/18/17 08/19/17 08/20/17 08/21/17 08/22/17    acetaminophen (TYLENOL) tablet 1,000 mg  Dose: 1,000 mg Freq: EVERY 8 HOURS Route: PO  Start: 08/21/17 1000   Admin Instructions: Maximum acetaminophen dose from all sources = 75 mg/kg/day not to exceed 4 gram          1043 (1,000 mg)-Given       1726 (1,000 mg)-Given        0214 (1,000 mg)-Given       [ ] 1000       [ ] 1800           benzocaine-menthol (CHLORASEPTIC) 6-10 MG lozenge 1-2 lozenge  Dose: 1-2 lozenge Freq: EVERY 1 HOUR PRN Route: BU  PRN Reason: sore throat  PRN Comment: sore throat without fever  Start: 08/15/17 1049              cyclobenzaprine (FLEXERIL) tablet 5 mg  Dose: 5 mg Freq: 3 TIMES DAILY PRN Route: PO  PRN Reason: muscle spasms  Start: 08/15/17 1049   Admin Instructions: Caution to be used when administering multiple CNS depressing meds within a short time frame.     1658 (5 mg)-Given       2240 (5 mg)-Given        0828 (5 mg)-Given       1438 (5 mg)-Given        0827 (5 mg)-Given       2209 (5 mg)-Given        0720 (5 mg)-Given       1616 (5 mg)-Given        0106 (5 mg)-Given       1441 (5 mg)-Given       2056 (5 mg)-Given             diphenhydrAMINE (BENADRYL) capsule 25 mg  Dose: 25 mg Freq: AT BEDTIME PRN Route: PO  PRN Reason: sleep  Start: 08/16/17 0018    0029 (25 mg)-Given         0005 (25 mg)-Given       2209 (25 mg)-Given          0001 (25 mg)-Given        0006 (25 mg)-Given           glucose 40 % gel 15-30 g  Dose: 15-30 g Freq: EVERY 15 MIN PRN Route: PO  PRN Reason: low blood sugar  Start: 08/16/17 1148   Admin Instructions: Give 15 g for BG 51 to 69 mg/dL IF patient is conscious and able to swallow.  "Give 30 g for BG less than or equal to 50 mg/dL IF patient is conscious and able to swallow. Do NOT give glucose gel via enteral tube.  IF patient has enteral tube: give apple juice 120 mL (4 oz or 15 g of CHO) via enteral tube for BG 51 to 69 mg/dL.  Give apple juice 240 mL (8 oz or 30 g of CHO) via enteral tube for BG less than or equal to 50 mg/dL.    ~Oral gel is preferable for conscious and able to swallow patient.   ~IF gel unavailable or patient refuses may provide apple juice 120 mL (4 oz or 15 g of CHO). Document juice on I and O flowsheet.              Or  dextrose 50 % injection 25-50 mL  Dose: 25-50 mL Freq: EVERY 15 MIN PRN Route: IV  PRN Reason: low blood sugar  Start: 08/16/17 1148   Admin Instructions: Use if have IV access, BG less than 70 mg/dL and meet dose criteria below:  Dose if conscious and alert (or disorientated) and NPO = 25 mL  Dose if unconscious / not alert = 50 mL  Vesicant.              Or  glucagon injection 1 mg  Dose: 1 mg Freq: EVERY 15 MIN PRN Route: SC  PRN Reason: low blood sugar  PRN Comment: May repeat x 1 only  Start: 08/16/17 1148   Admin Instructions: May give SQ or IM. ONLY use glucagon IF patient has NO IV access AND is UNABLE to swallow AND blood glucose is LESS than or EQUAL to 50 mg/dL.               lactated ringers infusion  Rate: 125 mL/hr Freq: CONTINUOUS Route: IV  Last Dose: Stopped (08/15/17 1545)  Start: 08/15/17 1100   Admin Instructions: Change to saline lock when PO well tolerated.               lidocaine (LMX4) kit  Freq: EVERY 1 HOUR PRN Route: Top  PRN Reason: pain  PRN Comment: with VAD insertion or accessing implanted port.  Start: 08/15/17 1049   Admin Instructions: Do NOT give if patient has a history of allergy to any local anesthetic or any \"clifford\" product.   Apply 30 minutes prior to VAD insertion or port access.  MAX Dose:  2.5 g (  of 5 g tube)               lidocaine 1 % 1 mL  Dose: 1 mL Freq: EVERY 1 HOUR PRN Route: OTHER  PRN Comment: mild " "pain with VAD insertion or accessing implanted port  Start: 08/15/17 1049   Admin Instructions: Do NOT give if patient has a history of allergy to any local anesthetic or any \"clifford\" product. MAX dose 1 mL subcutaneous OR intradermal in divided doses.               magnesium hydroxide (MILK OF MAGNESIA) suspension 30 mL  Dose: 30 mL Freq: DAILY PRN Route: PO  PRN Reason: constipation  Start: 08/17/17 1244   Admin Instructions: Shake well.      1439 (30 mL)-Given          2053 (30 mL)-Given        0742 (30 mL)-Given            melatonin tablet 1 mg  Dose: 1 mg Freq: AT BEDTIME PRN Route: PO  PRN Reason: sleep  Start: 08/16/17 2000   Admin Instructions: POD 1.  Do not give unless at least 6 hours of uninterrupted sleep is expected.               naloxone (NARCAN) injection 0.1-0.4 mg  Dose: 0.1-0.4 mg Freq: EVERY 2 MIN PRN Route: IV  PRN Reason: opioid reversal  Start: 08/15/17 1049   Admin Instructions: For respiratory rate LESS than or EQUAL to 8.  Partial reversal dose:  0.1 mg titrated q 2 minutes for Analgesia Side Effects Monitoring Sedation Level of 3 (frequently drowsy, arousable, drifts to sleep during conversation).Full reversal dose:  0.4 mg bolus for Analgesia Side Effects Monitoring Sedation Level of 4 (somnolent, minimal or no response to stimulation).               ondansetron (ZOFRAN-ODT) ODT tab 4 mg  Dose: 4 mg Freq: EVERY 6 HOURS PRN Route: PO  PRN Reasons: nausea,vomiting  Start: 08/15/17 1049   Admin Instructions: This is Step 1 of nausea and vomiting management.  If nausea not resolved in 15 minutes, go to Step 2 prochlorperazine (COMPAZINE). Do not push through foil backing. Peel back foil and gently remove. Place on tongue immediately. Administration with liquid unnecessary              Or  ondansetron (ZOFRAN) injection 4 mg  Dose: 4 mg Freq: EVERY 6 HOURS PRN Route: IV  PRN Reasons: nausea,vomiting  Start: 08/15/17 1049   Admin Instructions: This is Step 1 of nausea and vomiting management.  " If nausea not resolved in 15 minutes, go to Step 2 prochlorperazine (COMPAZINE).  Irritant.               oxyCODONE (ROXICODONE) IR half-tab 2.5 mg  Dose: 2.5 mg Freq: EVERY 4 HOURS PRN Route: PO  PRN Reason: moderate to severe pain  Start: 08/21/17 0910   Admin Instructions: Hold while on PCA or with regular IV opioid dosing.  IF CrCl UNKNOWN start at lowest end of dosing range.          1334 (2.5 mg)-Given        0220 (2.5 mg)-Given           polyethylene glycol (MIRALAX/GLYCOLAX) Packet 17 g  Dose: 17 g Freq: DAILY Route: PO  Start: 08/21/17 0930   Admin Instructions: 1 Packet = 17 grams. Mixed prescribed dose in 8 ounces of water. Follow with 8 oz. of water.          1043 (17 g)-Given        0836 (17 g)-Given           prochlorperazine (COMPAZINE) injection 5 mg  Dose: 5 mg Freq: EVERY 6 HOURS PRN Route: IV  PRN Reasons: nausea,vomiting  Start: 08/15/17 1049   Admin Instructions: This is Step 2 of nausea and vomiting management.   If nausea not resolved in 15 minutes, give metoclopramide (REGLAN) if ordered (step 3 of nausea and vomiting management)              Or  prochlorperazine (COMPAZINE) tablet 5 mg  Dose: 5 mg Freq: EVERY 6 HOURS PRN Route: PO  PRN Reasons: nausea,vomiting  Start: 08/15/17 1049   Admin Instructions: This is Step 2 of nausea and vomiting management.   If nausea not resolved in 15 minutes, give metoclopramide (REGLAN) if ordered (step 3 of nausea and vomiting management)               rivaroxaban ANTICOAGULANT (XARELTO) tablet 15 mg  Dose: 15 mg Freq: DAILY Route: PO  Start: 08/18/17 0900   Admin Instructions: Not recommended if CrCl less than 30 mL/min       1013 (15 mg)-Given        0930 (15 mg)-Given        0913 (15 mg)-Given        0738 (15 mg)-Given               0836 (15 mg)-Given           senna-docusate (SENOKOT-S;PERICOLACE) 8.6-50 MG per tablet 2 tablet  Dose: 2 tablet Freq: 2 TIMES DAILY Route: PO  Start: 08/21/17 2100 2028 (2 tablet)-Given        0836 (2  tablet)-Given       [ ] 2100           sodium chloride (PF) 0.9% PF flush 3 mL  Dose: 3 mL Freq: EVERY 8 HOURS Route: IK  Start: 08/15/17 1100   Admin Instructions: And Q1H PRN, to lock peripheral IV dormant line.            1144 (3 mL)-Given       2242 (3 mL)-Given        (0829)-Not Given       (1602)-Not Given       (1839)-Not Given               1014 (3 mL)-Given       (1843)-Not Given        (0254)-Not Given       1101 (3 mL)-Given       (2252)-Not Given        (0625)-Not Given       1429 (3 mL)-Given       1955 (3 mL)-Given               (0618)-Not Given       (1610)-Not Given [C]       2028 (3 mL)-Given        0631 (3 mL)-Given       [ ] 1300       [ ] 2100           sodium chloride (PF) 0.9% PF flush 3 mL  Dose: 3 mL Freq: EVERY 1 HOUR PRN Route: IK  PRN Reason: line flush  PRN Comment: for peripheral IV flush post IV meds  Start: 08/15/17 1049             Completed Medications  Medications 08/16/17 08/17/17 08/18/17 08/19/17 08/20/17 08/21/17 08/22/17         Dose: 975 mg Freq: EVERY 8 HOURS Route: PO  Start: 08/16/17 1900   End: 08/19/17 1100   Admin Instructions: Do not use if patient has an active opioid/acetaminophen analgesic order for pain  Hold if getting IV tylenol  Maximum acetaminophen dose from all sources = 75 mg/kg/day not to exceed 4 grams/day.     1812 (975 mg)-Given        0304 (975 mg)-Given       1156 (975 mg)-Given       1834 (975 mg)-Given        0235 (975 mg)-Given       1013 (975 mg)-Given       2016 (975 mg)-Given        0253 (975 mg)-Given       1100 (975 mg)-Given                Dose: 1 tablet Freq: ONCE Route: PO  Start: 08/21/17 0930   End: 08/21/17 1043         1043 (1 tablet)-Given           Discontinued Medications  Medications 08/16/17 08/17/17 08/18/17 08/19/17 08/20/17 08/21/17 08/22/17         Rate: 125 mL/hr Freq: CONTINUOUS Route: IV  Last Dose: Stopped (08/19/17 1300)  Start: 08/18/17 1345   End: 08/19/17 1800      1546 ( )-New Bag       2313 ( )-New Bag        0715 (  )-New Bag       1300-Stopped       1800-Med Discontinued            Dose: 975 mg Freq: EVERY 8 HOURS PRN Route: PO  PRN Reasons: mild pain,fever  Start: 08/19/17 1839   End: 08/21/17 0915   Admin Instructions: Maximum acetaminophen dose from all sources = 75 mg/kg/day not to exceed 4 grams/day.         1718 (975 mg)-Given        0337 (975 mg)-Given       0915-Med Discontinued          Dose: 100 mg Freq: 2 TIMES DAILY Route: PO  Start: 08/15/17 1100   End: 08/21/17 0915   Admin Instructions: To prevent constipation.  Hold for loose stools.     0841 (100 mg)-Given       2241 (100 mg)-Given        0828 (100 mg)-Given       1838 (100 mg)-Given        0827 (100 mg)-Given       2016 (100 mg)-Given        0930 (100 mg)-Given       2137 (100 mg)-Given        0911 (100 mg)-Given       2053 (100 mg)-Given        0738 (100 mg)-Given              0915-Med Discontinued          Dose: 200 mg Freq: 2 TIMES DAILY Route: PO  Start: 08/21/17 2100   End: 08/21/17 0924   Admin Instructions: To prevent constipation.  Hold for loose stools.          0924-Med Discontinued          Dose: 12.5 mg Freq: 2 TIMES DAILY Route: PO  Start: 08/18/17 2100   End: 08/21/17 0829      2017 (12.5 mg)-Given        0930 (12.5 mg)-Given       2137 (12.5 mg)-Given        0911 (12.5 mg)-Given       2052 (12.5 mg)-Given        0746 (12.5 mg)-Given       0829-Med Discontinued                Dose: 5 mg Freq: EVERY 4 HOURS PRN Route: PO  PRN Reason: moderate to severe pain  Start: 08/18/17 1332   End: 08/21/17 0915   Admin Instructions: Hold while on PCA or with regular IV opioid dosing.  IF CrCl UNKNOWN start at lowest end of dosing range.       1657 (5 mg)-Given       2031 (5 mg)-Given        0253 (5 mg)-Given       0721 (5 mg)-Given       1100 (5 mg)-Given       1616 (5 mg)-Given       2137 (5 mg)-Given        0106 (5 mg)-Given       0502 (5 mg)-Given       0911 (5 mg)-Given       1441 (5 mg)-Given       2056 (5 mg)-Given        0001 (5 mg)-Given       0337  (5 mg)-Given       0738 (5 mg)-Given       0915-Med Discontinued     Medications 08/16/17 08/17/17 08/18/17 08/19/17 08/20/17 08/21/17 08/22/17

## 2017-08-15 NOTE — IP AVS SNAPSHOT
` `           [address] SURGICAL: [phone]                 INTERAGENCY TRANSFER FORM - NOTES (H&P, Discharge Summary, Consults, Procedures, Therapies)   8/15/2017                    Hospital Admission Date: 8/15/2017  SIENNA MURRAY HOLLY   : 1933  Sex: Male        Patient PCP Information     Provider PCP Type    Moy Celis MD General         History & Physicals      Interval H&P Note by Jonathan Cardona DO at 8/15/2017  7:05 AM     Author:  Jonathan Cardona DO Service:  Orthopedics Author Type:  Physician    Filed:  8/15/2017  7:06 AM Date of Service:  8/15/2017  7:05 AM Creation Time:  8/15/2017  7:05 AM    Status:  Signed :  Jonathan Cardona DO (Physician)         This H&P has been reviewed and there are no clinically significant changes in the patient s condition.  The patient was evaluated by myself as well as Dr. Ayon prior to surgery. The Patient is approved for surgery.[JL1.1]           Revision History        User Key Date/Time User Provider Type Action    > JL1.1 8/15/2017  7:06 AM Jonathan Cardona DO Physician Sign          Source Note     Author:  Devan Ayon MD Service:  (none) Author Type:  Physician    Filed:  2017  2:59 PM Date of Service:  2017  2:00 PM Creation Time:  2017 10:38 AM    Status:  Signed :  Devan Ayon MD (Physician)            [address] [address]  [phone]  Dept: [phone]    PRE-OP EVALUATION:  Today's date: 2017    Sienna Ballensen (: 1933) presents for pre-operative evaluation assessment as requested by Dr. Cardona.  He requires evaluation and anesthesia risk assessment prior to undergoing surgery/procedure for treatment of knee pain .  Proposed procedure: Left total knee replacement    Date of Surgery/ Procedure: 8/15/17  Time of Surgery/ Procedure: 730  Hospital/Surgical  Facility: [place] [address]  Tel. [phone] / Fax [phone]    Primary Physician: Moy Celis  Type of Anesthesia Anticipated: Spinal    Patient has a Health Care Directive or Living Will:  YES     1. NO - Do you have a history of heart attack, stroke, stent, bypass or surgery on an artery in the head, neck, heart or legs?  2. NO - Do you ever have any pain or discomfort in your chest?  3. NO - Do you have a history of  Heart Failure?  4. NO - Are you troubled by shortness of breath when: walking on the level, up a slight hill or at night?  5. NO - Do you currently have a cold, bronchitis or other respiratory infection?  6. NO - Do you have a cough, shortness of breath or wheezing?  7. yes - Do you sometimes get pains in the calves of your legs when you walk?  8. NO - Do you or anyone in your family have previous history of blood clots?  9. NO - Do you or does anyone in your family have a serious bleeding problem such as prolonged bleeding following surgeries or cuts?  10. NO - Have you ever had problems with anemia or been told to take iron pills?  11. yes - Have you had any abnormal blood loss such as black, tarry or bloody stools, or abnormal vaginal bleeding?  12. yes - Have you ever had a blood transfusion?  13. NO - Have you or any of your relatives ever had problems with anesthesia?  14. NO - Do you have sleep apnea, excessive snoring or daytime drowsiness?  15. NO - Do you have any prosthetic heart valves?  16. NO - Do you have prosthetic joints?  17. NO - Is there any chance that you may be pregnant?        HPI:                                                      Brief HPI related to upcoming procedure:[LB1.1] knee pain      See problem list for active medical problems.  Problems all longstanding and stable, except as noted/documented.  See ROS for pertinent symptoms related to these conditions.                                                                                                   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 Provider to review       CKD (chronic kidney disease) stage 3, GFR 30-59 ml/min 05/05/2013     Priority: Medium     Pain 12/25/2012     Priority: Medium     Advance Care Planning 08/16/2012     Priority: Medium     Advance Care Planning: Receipt of ACP document:  Received: Health Care Directive which was witnessed or notarized on 3-12-13.  Document previously scanned on 3-13-13.  Validation form completed and  scanned.  Code Status reflects choices in most recent ACP document.  Confirmed/documented designated decision maker(s). See permanent comments section of demographics in clinical tab. View document(s) and details by clicking on code status. Added by Sayra Maguire on 8/25/2014.  .Patient states has Advance Directive and will bring in a copy to clinic. 8/16/2012          HYPERLIPIDEMIA LDL GOAL <100 10/31/2010     Priority: Medium     GERD (gastroesophageal reflux disease) 03/26/2010     Priority: Medium     Mixed hyperlipidemia 03/24/2010     Priority: Medium     Closed fracture of calcaneus 01/06/2007     Priority: Medium     Calculus of gallbladder 01/09/2003     Priority: Medium     Problem list name updated by automated process. Provider to review       Abdominal aortic aneurysm (H) 10/22/2002     Priority: Medium     Problem list name  updated by automated process. Provider to review       Chest pain      Priority: Medium     Problem list name updated by automated process. Provider to review       Pain in joint, shoulder region      Priority: Medium     Atrial Fibrillation, converted to normal sinus rhythm with b-blocker 08/26/2010     Priority: Low     Gastric cancer (H) 08/05/2010     Priority: Low     (Problem list name updated by automated process. Provider to review and confirm.)        Past Medical History:   Diagnosis Date     Blood transfusion      Chronic gastric ulcer without mention of hemorrhage, perforation, without mention of obstruction     Ulcer, Gastric     Gastric cancer (H)      Hemorrhage of gastrointestinal tract, unspecified 01/13/10    [facility]     Hypoglycemia, unspecified 1/26/2015     Measles without mention of complication     Measles     Mixed hyperlipidemia     Hyperlipidemia     Mumps without mention of complication     Mumps     Pulmonary embolism (H) Sept 4, 2010     Urinary calculus, unspecified     Renal stones     Varicella without mention of complication     Chickenpox     Past Surgical History:   Procedure Laterality Date     ABDOMEN SURGERY       C EXCIS STOMACH ULCER,LESN;LOCAL       CHOLECYSTECTOMY, LAPOROSCOPIC  10/6/2008    Cholecystectomy, Laparoscopic     COLONOSCOPY  1/14/10    [facility]     COLONOSCOPY  05/25/10     CYSTOSCOPY, RETROGRADES, EXTRACT STONE, INSERT STENT, COMBINED  12/25/2012    Procedure: COMBINED CYSTOSCOPY, RETROGRADES, EXTRACT STONE, INSERT STENT;  Cystoscopy, Left Stent Placement, left retrograde pylogram, uc;  Surgeon: Amador Sanchez MD;  Location:  OR     ESOPHAGOSCOPY, GASTROSCOPY, DUODENOSCOPY (EGD), COMBINED  8/16/2011    Procedure:COMBINED ESOPHAGOSCOPY, GASTROSCOPY, DUODENOSCOPY (EGD), BIOPSY SINGLE OR MULTIPLE; Surgeon:TONY GARCIA; Location:[facility]     GENERAL SURGERY                           DATE:  07/07/10    Gastrectomy      COLONOSCOPY  W/WO BRUSH/WASH  01/31/2006      REPAIR ROTATOR CUFF,ACUTE  3/21/2005    Right     [facility] UGI ENDOSCOPY, SIMPLE EXAM  1/13/10    [facility] UGI ENDOSCOPY, SIMPLE EXAM  05/25/10     LASER HOLMIUM LITHOTRIPSY URETER(S), INSERT STENT, COMBINED  1/5/2013    Procedure: COMBINED CYSTOSCOPY, URETEROSCOPY, LASER HOLMIUM LITHOTRIPSY URETER(S), INSERT STENT;  Bilateral  Cystoscopy, Bilateral Ureteroscopy, Bilateral retrogrades and  placement of ureteral stent right side. Laser Holmium Lithotripsy  and Exchange  of stent left side;  Surgeon: Tone Morejon MD;  Location: [facility]     LASER HOLMIUM LITHOTRIPSY URETER(S), INSERT STENT, COMBINED  1/30/2013    Procedure: COMBINED CYSTOSCOPY, URETEROSCOPY, LASER HOLMIUM LITHOTRIPSY URETER(S), INSERT STENT;  Right Ureteroscopy; Stone basketing; Right Stent exchange and  removal of left stent.;  Surgeon: Tone Morejon MD;  Location: [facility] OR     VIDEO CAPSULE ENDOSCOPY  01/15/10    [facility]     Current Outpatient Prescriptions   Medication Sig Dispense Refill     MELATONIN PO        cyanocobalamin (VITAMIN B12) 1000 MCG/ML injection Inject 1 mL (1,000 mcg) into the muscle every 30 days 1 year of injections approved through 4/19/2017 per Dr. Ayon. 1 mL 11     blood glucose monitoring (ROBERT CONTOUR NEXT) test strip Use to test blood sugar 1 times daily or as directed. 100 each 3     blood glucose monitoring (ROBERT MICROLET) lancets Use to test blood sugar 1 time daily or as directed. 1 Box 2     diphenhydrAMINE (BENADRYL) 25 MG capsule Take 2 capsules (50 mg) by mouth At Bedtime 56 capsule      OTC products: None, except as noted above    Allergies   Allergen Reactions     Mobic [Meloxicam] Nausea and Diarrhea     Diarrhea, nausea, flu like symptoms since start of use      Latex Allergy: NO    Social History   Substance Use Topics     Smoking status: Current Some Day Smoker     Packs/day: 0.00     Years: 20.00     Types: Pipe, Cigars     Last  "attempt to quit: 1/1/1998     Smokeless tobacco: Never Used      Comment: Rare pipe and cigar smoking     Alcohol use No     History   Drug Use No       REVIEW OF SYSTEMS:[LB1.1]                                                    Constitutional, HEENT, cardiovascular, pulmonary, gi and gu systems are negative, except as otherwise noted.[GG1.1]      EXAM:                                                    /68  Pulse 62  Temp 97.3  F (36.3  C) (Temporal)  Resp 20  Ht 6' 1\" (1.854 m)  Wt 163 lb 4.8 oz (74.1 kg)  SpO2 93%  BMI 21.54 kg/m2[LB1.1]    GENERAL APPEARANCE: healthy, alert and no distress     EYES: EOMI,  PERRL     HENT: ear canals and TM's normal and nose and mouth without ulcers or lesions     NECK: no adenopathy, no asymmetry, masses, or scars and thyroid normal to palpation     RESP: lungs clear to auscultation - no rales, rhonchi or wheezes     CV: regular rates and rhythm, normal S1 S2, no S3 or S4 and no murmur, click or rub     ABDOMEN:  soft, nontender, no HSM or masses and bowel sounds normal     MS: extremities normal- no gross deformities noted, no evidence of inflammation in joints, FROM in all extremities.     SKIN: no suspicious lesions or rashes     NEURO: Normal strength and tone, sensory exam grossly normal, mentation intact and speech normal     PSYCH: mentation appears normal. and affect normal/bright     LYMPHATICS: No axillary, cervical, or supraclavicular nodes    DIAGNOSTI[GG1.1]CS:[LB1.1]                                                      Labs Drawn and in Process:[GG1.1]   Unresulted Labs Ordered in the Past 30 Days of this Admission     Date and Time Order Name Status Description    8/1/2017 1407 MRSA MSSA PRESURGICAL SCREEN In process[GG1.2]           Recent Labs   Lab Test  01/31/17   0956  07/29/16   1053  06/12/16   1211   08/16/11   1244  03/17/11   HGB   --   14.3  14.8   < >  13.7   < >   --    PLT   --   194  199   < >  128*   < >   --    INR   --    --    --   "  --   1.08   --   2.2*   NA   --   137  139   < >   --    < >   --    POTASSIUM   --   4.8  4.5   < >   --    < >   --    CR   --   1.08  1.14   < >   --    < >   --    A1C  6.2*   --    --    --    --    --    --     < > = values in this interval not displayed.        IMPRESSION:[LB1.1]                                                    Reason for surgery/procedure: left total knee replacement  Diagnosis/reason for consult: clearance for surgery/nesthesia[GG1.1]    The proposed surgical procedure is considered[LB1.1] INTERMEDIATE[GG1.1] risk.    REVISED CARDIAC RISK INDEX  The patient has the following serious cardiovascular risks for perioperative complications such as (MI, PE, VFib and 3  AV Block):[LB1.1]  No serious cardiac risks[GG1.1]  INTERPRETATION:[LB1.1] 0 risks: Class I (very low risk - 0.4% complication rate)[GG1.1]    The patient has the following additional risks for perioperative complications:[LB1.1]  No identified additional risks[GG1.1]    No diagnosis found.    RECOMMENDATIONS:[LB1.1]                                                          --Patient is to take all scheduled medications on the day of surgery EXCEPT for modifications listed below.    APPROVAL GIVEN to proceed with proposed procedure, without further diagnostic evaluation[GG1.1]       Signed Electronically by: Devan Ayon MD, MD    Copy of this evaluation report is provided to requesting physician.    [facility][LB1.1]      Revision History        User Key Date/Time User Provider Type Action    > GG1.2 8/14/2017 10:38 AM Devan Ayon MD Physician Sign     GG1.1 8/1/2017  2:39 PM Devan Ayon MD Physician      LB1.1 8/1/2017  2:03 PM Sunita Lou LPN (none)                   Interval H&P Note by Bertha Zamudio CNA at 8/14/2017 10:38 AM     Author:  Bertha Zamudio CNA Service:  (none) Author Type:  Nurse Aide/Asst    Filed:  8/14/2017 10:38 AM Date of Service:  8/14/2017 10:38 AM  Creation Time:  2017 10:38 AM    Status:  Signed :  Bertha Zamudio CNA (Nurse Aide/Asst)         This note is for the purpose of making the H&P performed in clinic within the last 30 days available in the hospital surgical encounter.[AW1.1]       Revision History        User Key Date/Time User Provider Type Action    > AW1.1 2017 10:38 AM Bertha Zamudio CNA Nurse Aide/Asst Sign          Source Note     Author:  Devan Ayon MD Service:  (none) Author Type:  Physician    Filed:  2017  2:59 PM Date of Service:  2017  2:00 PM Creation Time:  2017 10:38 AM    Status:  Signed :  Devan Ayon MD (Physician)            [address] [id number] [phone]  Dept: [phone]    PRE-OP EVALUATION:  Today's date: 2017    Remigio Holly (: 1933) presents for pre-operative evaluation assessment as requested by Dr. Cardona.  He requires evaluation and anesthesia risk assessment prior to undergoing surgery/procedure for treatment of knee pain .  Proposed procedure: Left total knee replacement    Date of Surgery/ Procedure: 8/15/17  Time of Surgery/ Procedure: 730  Hospital/Surgical Facility: [place] [address]  Tel. [phone] / Fax [phone]    Primary Physician: Moy Celis  Type of Anesthesia Anticipated: Spinal    Patient has a Health Care Directive or Living Will:  YES     1. NO - Do you have a history of heart attack, stroke, stent, bypass or surgery on an artery in the head, neck, heart or legs?  2. NO - Do you ever have any pain or discomfort in your chest?  3. NO - Do you have a history of  Heart Failure?  4. NO - Are you troubled by shortness of breath when: walking on the level, up a slight hill or at night?  5. NO - Do you currently have a cold, bronchitis or other respiratory infection?  6. NO - Do you have a cough, shortness of  breath or wheezing?  7. yes - Do you sometimes get pains in the calves of your legs when you walk?  8. NO - Do you or anyone in your family have previous history of blood clots?  9. NO - Do you or does anyone in your family have a serious bleeding problem such as prolonged bleeding following surgeries or cuts?  10. NO - Have you ever had problems with anemia or been told to take iron pills?  11. yes - Have you had any abnormal blood loss such as black, tarry or bloody stools, or abnormal vaginal bleeding?  12. yes - Have you ever had a blood transfusion?  13. NO - Have you or any of your relatives ever had problems with anesthesia?  14. NO - Do you have sleep apnea, excessive snoring or daytime drowsiness?  15. NO - Do you have any prosthetic heart valves?  16. NO - Do you have prosthetic joints?  17. NO - Is there any chance that you may be pregnant?        HPI:                                                      Brief HPI related to upcoming procedure:[LB1.1] knee pain      See problem list for active medical problems.  Problems all longstanding and stable, except as noted/documented.  See ROS for pertinent symptoms related to these conditions.                                                                                                  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 Provider to review       CKD (chronic kidney disease)  stage 3, GFR 30-59 ml/min 05/05/2013     Priority: Medium     Pain 12/25/2012     Priority: Medium     Advance Care Planning 08/16/2012     Priority: Medium     Advance Care Planning: Receipt of ACP document:  Received: Health Care Directive which was witnessed or notarized on 3-12-13.  Document previously scanned on 3-13-13.  Validation form completed and  scanned.  Code Status reflects choices in most recent ACP document.  Confirmed/documented designated decision maker(s). See permanent comments section of demographics in clinical tab. View document(s) and details by clicking on code status. Added by Sayra Maguire on 8/25/2014.  .Patient states has Advance Directive and will bring in a copy to clinic. 8/16/2012          HYPERLIPIDEMIA LDL GOAL <100 10/31/2010     Priority: Medium     GERD (gastroesophageal reflux disease) 03/26/2010     Priority: Medium     Mixed hyperlipidemia 03/24/2010     Priority: Medium     Closed fracture of calcaneus 01/06/2007     Priority: Medium     Calculus of gallbladder 01/09/2003     Priority: Medium     Problem list name updated by automated process. Provider to review       Abdominal aortic aneurysm (H) 10/22/2002     Priority: Medium     Problem list name updated by automated process. Provider to review       Chest pain      Priority: Medium     Problem list name updated by automated process. Provider to review       Pain in joint, shoulder region      Priority: Medium     Atrial Fibrillation, converted to normal sinus rhythm with b-blocker 08/26/2010     Priority: Low     Gastric cancer (H) 08/05/2010     Priority: Low     (Problem list name updated by automated process. Provider to review and confirm.)        Past Medical History:   Diagnosis Date     Blood transfusion      Chronic gastric ulcer without mention of hemorrhage, perforation, without mention of obstruction     Ulcer, Gastric     Gastric cancer (H)      Hemorrhage of gastrointestinal tract, unspecified 01/13/10     [facility]     Hypoglycemia, unspecified 1/26/2015     Measles without mention of complication     Measles     Mixed hyperlipidemia     Hyperlipidemia     Mumps without mention of complication     Mumps     Pulmonary embolism (H) Sept 4, 2010     Urinary calculus, unspecified     Renal stones     Varicella without mention of complication     Chickenpox     Past Surgical History:   Procedure Laterality Date     ABDOMEN SURGERY       C EXCIS STOMACH ULCER,LESN;LOCAL       CHOLECYSTECTOMY, LAPOROSCOPIC  10/6/2008    Cholecystectomy, Laparoscopic     COLONOSCOPY  1/14/10    [facility]     COLONOSCOPY  05/25/10     CYSTOSCOPY, RETROGRADES, EXTRACT STONE, INSERT STENT, COMBINED  12/25/2012    Procedure: COMBINED CYSTOSCOPY, RETROGRADES, EXTRACT STONE, INSERT STENT;  Cystoscopy, Left Stent Placement, left retrograde pylogram, uc;  Surgeon: Amador Sanchez MD;  [facility]     ESOPHAGOSCOPY, GASTROSCOPY, DUODENOSCOPY (EGD), COMBINED  8/16/2011    Procedure:COMBINED ESOPHAGOSCOPY, GASTROSCOPY, DUODENOSCOPY (EGD), BIOPSY SINGLE OR MULTIPLE; Surgeon:TONY GARCIA; Location:[facility]     GENERAL SURGERY                           DATE:  07/07/10    Gastrectomy      COLONOSCOPY W/WO BRUSH/WASH  01/31/2006      REPAIR ROTATOR CUFF,ACUTE  3/21/2005    Right      UGI ENDOSCOPY, SIMPLE EXAM  1/13/10    [facility] UGI ENDOSCOPY, SIMPLE EXAM  05/25/10     LASER HOLMIUM LITHOTRIPSY URETER(S), INSERT STENT, COMBINED  1/5/2013    Procedure: COMBINED CYSTOSCOPY, URETEROSCOPY, LASER HOLMIUM LITHOTRIPSY URETER(S), INSERT STENT;  Bilateral  Cystoscopy, Bilateral Ureteroscopy, Bilateral retrogrades and  placement of ureteral stent right side. Laser Holmium Lithotripsy  and Exchange  of stent left side;  Surgeon: Tone Morejon MD;  [facility]     LASER HOLMIUM LITHOTRIPSY URETER(S), INSERT STENT, COMBINED  1/30/2013    Procedure: COMBINED CYSTOSCOPY, URETEROSCOPY, LASER HOLMIUM  "LITHOTRIPSY URETER(S), INSERT STENT;  Right Ureteroscopy; Stone basketing; Right Stent exchange and  removal of left stent.;  Surgeon: Tone Morejon MD;  Location: [facility]     VIDEO CAPSULE ENDOSCOPY  01/15/10    [facility]     Current Outpatient Prescriptions   Medication Sig Dispense Refill     MELATONIN PO        cyanocobalamin (VITAMIN B12) 1000 MCG/ML injection Inject 1 mL (1,000 mcg) into the muscle every 30 days 1 year of injections approved through 4/19/2017 per Dr. Ayon. 1 mL 11     blood glucose monitoring (ROBERT CONTOUR NEXT) test strip Use to test blood sugar 1 times daily or as directed. 100 each 3     blood glucose monitoring (ROBERT MICROLET) lancets Use to test blood sugar 1 time daily or as directed. 1 Box 2     diphenhydrAMINE (BENADRYL) 25 MG capsule Take 2 capsules (50 mg) by mouth At Bedtime 56 capsule      OTC products: None, except as noted above    Allergies   Allergen Reactions     Mobic [Meloxicam] Nausea and Diarrhea     Diarrhea, nausea, flu like symptoms since start of use      Latex Allergy: NO    Social History   Substance Use Topics     Smoking status: Current Some Day Smoker     Packs/day: 0.00     Years: 20.00     Types: Pipe, Cigars     Last attempt to quit: 1/1/1998     Smokeless tobacco: Never Used      Comment: Rare pipe and cigar smoking     Alcohol use No     History   Drug Use No       REVIEW OF SYSTEMS:[LB1.1]                                                    Constitutional, HEENT, cardiovascular, pulmonary, gi and gu systems are negative, except as otherwise noted.[GG1.1]      EXAM:                                                    /68  Pulse 62  Temp 97.3  F (36.3  C) (Temporal)  Resp 20  Ht 6' 1\" (1.854 m)  Wt 163 lb 4.8 oz (74.1 kg)  SpO2 93%  BMI 21.54 kg/m2[LB1.1]    GENERAL APPEARANCE: healthy, alert and no distress     EYES: EOMI,  PERRL     HENT: ear canals and TM's normal and nose and mouth without ulcers or lesions     NECK: no " adenopathy, no asymmetry, masses, or scars and thyroid normal to palpation     RESP: lungs clear to auscultation - no rales, rhonchi or wheezes     CV: regular rates and rhythm, normal S1 S2, no S3 or S4 and no murmur, click or rub     ABDOMEN:  soft, nontender, no HSM or masses and bowel sounds normal     MS: extremities normal- no gross deformities noted, no evidence of inflammation in joints, FROM in all extremities.     SKIN: no suspicious lesions or rashes     NEURO: Normal strength and tone, sensory exam grossly normal, mentation intact and speech normal     PSYCH: mentation appears normal. and affect normal/bright     LYMPHATICS: No axillary, cervical, or supraclavicular nodes    DIAGNOSTI[GG1.1]CS:[LB1.1]                                                      Labs Drawn and in Process:[GG1.1]   Unresulted Labs Ordered in the Past 30 Days of this Admission     Date and Time Order Name Status Description    8/1/2017 1407 MRSA MSSA PRESURGICAL SCREEN In process[GG1.2]           Recent Labs   Lab Test  01/31/17   0956  07/29/16   1053  06/12/16   1211   08/16/11   1244  03/17/11   HGB   --   14.3  14.8   < >  13.7   < >   --    PLT   --   194  199   < >  128*   < >   --    INR   --    --    --    --   1.08   --   2.2*   NA   --   137  139   < >   --    < >   --    POTASSIUM   --   4.8  4.5   < >   --    < >   --    CR   --   1.08  1.14   < >   --    < >   --    A1C  6.2*   --    --    --    --    --    --     < > = values in this interval not displayed.        IMPRESSION:[LB1.1]                                                    Reason for surgery/procedure: left total knee replacement  Diagnosis/reason for consult: clearance for surgery/nesthesia[GG1.1]    The proposed surgical procedure is considered[LB1.1] INTERMEDIATE[GG1.1] risk.    REVISED CARDIAC RISK INDEX  The patient has the following serious cardiovascular risks for perioperative complications such as (MI, PE, VFib and 3  AV Block):[LB1.1]  No serious  cardiac risks[GG1.1]  INTERPRETATION:[LB1.1] 0 risks: Class I (very low risk - 0.4% complication rate)[GG1.1]    The patient has the following additional risks for perioperative complications:[LB1.1]  No identified additional risks[GG1.1]    No diagnosis found.    RECOMMENDATIONS:[LB1.1]                                                          --Patient is to take all scheduled medications on the day of surgery EXCEPT for modifications listed below.    APPROVAL GIVEN to proceed with proposed procedure, without further diagnostic evaluation[GG1.1]       Signed Electronically by: Devan Ayon MD, MD    Copy of this evaluation report is provided to requesting physician.    [place] Preop Guidelines[LB1.1]      Revision History        User Key Date/Time User Provider Type Action    > GG1.2 2017 10:38 AM Devan Ayon MD Physician Sign     GG1.1 2017  2:39 PM Devan Ayon MD Physician      LB1.1 2017  2:03 PM Sunita Lou LPN (none)                   H&P (View-Only) by Devan Ayon MD at 2017  2:00 PM     Author:  Devan Ayon MD Service:  (none) Author Type:  Physician    Filed:  2017  2:59 PM Date of Service:  2017  2:00 PM Creation Time:  2017 10:38 AM    Status:  Signed :  Devan Ayon MD (Physician)         [address] [address]  [phone]  Dept: [phone]    PRE-OP EVALUATION:  Today's date: 2017    Remigio Ballensen (: 1933) presents for pre-operative evaluation assessment as requested by Dr. Cardona.  He requires evaluation and anesthesia risk assessment prior to undergoing surgery/procedure for treatment of knee pain .  Proposed procedure: Left total knee replacement    Date of Surgery/ Procedure: 8/15/17  Time of Surgery/ Procedure: 730  Hospital/Surgical Facility: [address]   [address]  Tel.  [phone] / Fax [phone]    Primary Physician: Moy Celis  Type of Anesthesia Anticipated: Spinal    Patient has a Health Care Directive or Living Will:  YES     1. NO - Do you have a history of heart attack, stroke, stent, bypass or surgery on an artery in the head, neck, heart or legs?  2. NO - Do you ever have any pain or discomfort in your chest?  3. NO - Do you have a history of  Heart Failure?  4. NO - Are you troubled by shortness of breath when: walking on the level, up a slight hill or at night?  5. NO - Do you currently have a cold, bronchitis or other respiratory infection?  6. NO - Do you have a cough, shortness of breath or wheezing?  7. yes - Do you sometimes get pains in the calves of your legs when you walk?  8. NO - Do you or anyone in your family have previous history of blood clots?  9. NO - Do you or does anyone in your family have a serious bleeding problem such as prolonged bleeding following surgeries or cuts?  10. NO - Have you ever had problems with anemia or been told to take iron pills?  11. yes - Have you had any abnormal blood loss such as black, tarry or bloody stools, or abnormal vaginal bleeding?  12. yes - Have you ever had a blood transfusion?  13. NO - Have you or any of your relatives ever had problems with anesthesia?  14. NO - Do you have sleep apnea, excessive snoring or daytime drowsiness?  15. NO - Do you have any prosthetic heart valves?  16. NO - Do you have prosthetic joints?  17. NO - Is there any chance that you may be pregnant?        HPI:                                                      Brief HPI related to upcoming procedure:[LB1.1] knee pain      See problem list for active medical problems.  Problems all longstanding and stable, except as noted/documented.  See ROS for pertinent symptoms related to these conditions.                                                                                                  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 Provider to review       CKD (chronic kidney disease) stage 3, GFR 30-59 ml/min 05/05/2013     Priority: Medium     Pain 12/25/2012     Priority: Medium     Advance Care Planning 08/16/2012     Priority: Medium     Advance Care Planning: Receipt of ACP document:  Received: Health Care Directive which was witnessed or notarized on 3-12-13.  Document previously scanned on 3-13-13.  Validation form completed and  scanned.  Code Status reflects choices in most recent ACP document.  Confirmed/documented designated decision maker(s). See permanent comments section of demographics in clinical tab. View document(s) and details by clicking on code status. Added by Sayra Maguire on 8/25/2014.  .Patient states has Advance Directive and will bring in a copy to clinic. 8/16/2012          HYPERLIPIDEMIA LDL GOAL <100 10/31/2010     Priority: Medium     GERD (gastroesophageal reflux disease) 03/26/2010     Priority: Medium     Mixed hyperlipidemia 03/24/2010     Priority: Medium     Closed fracture of calcaneus 01/06/2007     Priority: Medium     Calculus of gallbladder 01/09/2003     Priority: Medium     Problem list name updated by automated process. Provider to review       Abdominal aortic aneurysm (H) 10/22/2002     Priority: Medium     Problem list name updated by automated process. Provider to review       Chest pain      Priority: Medium      Problem list name updated by automated process. Provider to review       Pain in joint, shoulder region      Priority: Medium     Atrial Fibrillation, converted to normal sinus rhythm with b-blocker 08/26/2010     Priority: Low     Gastric cancer (H) 08/05/2010     Priority: Low     (Problem list name updated by automated process. Provider to review and confirm.)        Past Medical History:   Diagnosis Date     Blood transfusion      Chronic gastric ulcer without mention of hemorrhage, perforation, without mention of obstruction     Ulcer, Gastric     Gastric cancer (H)      Hemorrhage of gastrointestinal tract, unspecified 01/13/10    [facility]     Hypoglycemia, unspecified 1/26/2015     Measles without mention of complication     Measles     Mixed hyperlipidemia     Hyperlipidemia     Mumps without mention of complication     Mumps     Pulmonary embolism (H) Sept 4, 2010     Urinary calculus, unspecified     Renal stones     Varicella without mention of complication     Chickenpox     Past Surgical History:   Procedure Laterality Date     ABDOMEN SURGERY       C EXCIS STOMACH ULCER,LESN;LOCAL       CHOLECYSTECTOMY, LAPOROSCOPIC  10/6/2008    Cholecystectomy, Laparoscopic     COLONOSCOPY  1/14/10    [facility]     COLONOSCOPY  05/25/10     CYSTOSCOPY, RETROGRADES, EXTRACT STONE, INSERT STENT, COMBINED  12/25/2012    Procedure: COMBINED CYSTOSCOPY, RETROGRADES, EXTRACT STONE, INSERT STENT;  Cystoscopy, Left Stent Placement, left retrograde pylogram, uc;  Surgeon: Amador Sanchez MD;  Location: [facility] OR     ESOPHAGOSCOPY, GASTROSCOPY, DUODENOSCOPY (EGD), COMBINED  8/16/2011    Procedure:COMBINED ESOPHAGOSCOPY, GASTROSCOPY, DUODENOSCOPY (EGD), BIOPSY SINGLE OR MULTIPLE; Surgeon:TONY GARCIA; Location:[facility] GI     GENERAL SURGERY                           DATE:  07/07/10    Gastrectomy     HC COLONOSCOPY W/WO BRUSH/WASH  01/31/2006     HC REPAIR ROTATOR CUFF,ACUTE  3/21/2005    Right     HC  UGI ENDOSCOPY, SIMPLE EXAM  1/13/10    [facility] UGI ENDOSCOPY, SIMPLE EXAM  05/25/10     LASER HOLMIUM LITHOTRIPSY URETER(S), INSERT STENT, COMBINED  1/5/2013    Procedure: COMBINED CYSTOSCOPY, URETEROSCOPY, LASER HOLMIUM LITHOTRIPSY URETER(S), INSERT STENT;  Bilateral  Cystoscopy, Bilateral Ureteroscopy, Bilateral retrogrades and  placement of ureteral stent right side. Laser Holmium Lithotripsy  and Exchange  of stent left side;  Surgeon: Tone Morejon MD;  Location: [facility] OR     LASER HOLMIUM LITHOTRIPSY URETER(S), INSERT STENT, COMBINED  1/30/2013    Procedure: COMBINED CYSTOSCOPY, URETEROSCOPY, LASER HOLMIUM LITHOTRIPSY URETER(S), INSERT STENT;  Right Ureteroscopy; Stone basketing; Right Stent exchange and  removal of left stent.;  Surgeon: Tone Morejon MD;  Location: [facility] OR     VIDEO CAPSULE ENDOSCOPY  01/15/10    [facility]     Current Outpatient Prescriptions   Medication Sig Dispense Refill     MELATONIN PO        cyanocobalamin (VITAMIN B12) 1000 MCG/ML injection Inject 1 mL (1,000 mcg) into the muscle every 30 days 1 year of injections approved through 4/19/2017 per Dr. Ayon. 1 mL 11     blood glucose monitoring (ROBERT CONTOUR NEXT) test strip Use to test blood sugar 1 times daily or as directed. 100 each 3     blood glucose monitoring (ROBERT MICROLET) lancets Use to test blood sugar 1 time daily or as directed. 1 Box 2     diphenhydrAMINE (BENADRYL) 25 MG capsule Take 2 capsules (50 mg) by mouth At Bedtime 56 capsule      OTC products: None, except as noted above    Allergies   Allergen Reactions     Mobic [Meloxicam] Nausea and Diarrhea     Diarrhea, nausea, flu like symptoms since start of use      Latex Allergy: NO    Social History   Substance Use Topics     Smoking status: Current Some Day Smoker     Packs/day: 0.00     Years: 20.00     Types: Pipe, Cigars     Last attempt to quit: 1/1/1998     Smokeless tobacco: Never Used      Comment: Rare pipe and cigar  "smoking     Alcohol use No     History   Drug Use No       REVIEW OF SYSTEMS:[LB1.1]                                                    Constitutional, HEENT, cardiovascular, pulmonary, gi and gu systems are negative, except as otherwise noted.[GG1.1]      EXAM:                                                    /68  Pulse 62  Temp 97.3  F (36.3  C) (Temporal)  Resp 20  Ht 6' 1\" (1.854 m)  Wt 163 lb 4.8 oz (74.1 kg)  SpO2 93%  BMI 21.54 kg/m2[LB1.1]    GENERAL APPEARANCE: healthy, alert and no distress     EYES: EOMI,  PERRL     HENT: ear canals and TM's normal and nose and mouth without ulcers or lesions     NECK: no adenopathy, no asymmetry, masses, or scars and thyroid normal to palpation     RESP: lungs clear to auscultation - no rales, rhonchi or wheezes     CV: regular rates and rhythm, normal S1 S2, no S3 or S4 and no murmur, click or rub     ABDOMEN:  soft, nontender, no HSM or masses and bowel sounds normal     MS: extremities normal- no gross deformities noted, no evidence of inflammation in joints, FROM in all extremities.     SKIN: no suspicious lesions or rashes     NEURO: Normal strength and tone, sensory exam grossly normal, mentation intact and speech normal     PSYCH: mentation appears normal. and affect normal/bright     LYMPHATICS: No axillary, cervical, or supraclavicular nodes    DIAGNOSTI[GG1.1]CS:[LB1.1]                                                      Labs Drawn and in Process:[GG1.1]   Unresulted Labs Ordered in the Past 30 Days of this Admission     Date and Time Order Name Status Description    8/1/2017 1407 MRSA MSSA PRESURGICAL SCREEN In process[GG1.2]           Recent Labs   Lab Test  01/31/17   0956  07/29/16   1053  06/12/16   1211   08/16/11   1244  03/17/11   HGB   --   14.3  14.8   < >  13.7   < >   --    PLT   --   194  199   < >  128*   < >   --    INR   --    --    --    --   1.08   --   2.2*   NA   --   137  139   < >   --    < >   --    POTASSIUM   --   4.8  " 4.5   < >   --    < >   --    CR   --   1.08  1.14   < >   --    < >   --    A1C  6.2*   --    --    --    --    --    --     < > = values in this interval not displayed.        IMPRESSION:[LB1.1]                                                    Reason for surgery/procedure: left total knee replacement  Diagnosis/reason for consult: clearance for surgery/nesthesia[GG1.1]    The proposed surgical procedure is considered[LB1.1] INTERMEDIATE[GG1.1] risk.    REVISED CARDIAC RISK INDEX  The patient has the following serious cardiovascular risks for perioperative complications such as (MI, PE, VFib and 3  AV Block):[LB1.1]  No serious cardiac risks[GG1.1]  INTERPRETATION:[LB1.1] 0 risks: Class I (very low risk - 0.4% complication rate)[GG1.1]    The patient has the following additional risks for perioperative complications:[LB1.1]  No identified additional risks[GG1.1]    No diagnosis found.    RECOMMENDATIONS:[LB1.1]                                                          --Patient is to take all scheduled medications on the day of surgery EXCEPT for modifications listed below.    APPROVAL GIVEN to proceed with proposed procedure, without further diagnostic evaluation[GG1.1]       Signed Electronically by: Devan Ayon MD, MD    Copy of this evaluation report is provided to requesting physician.    [place] Preop Guidelines[LB1.1]     Revision History        User Key Date/Time User Provider Type Action    > GG1.2 8/14/2017 10:38 AM Devan Ayon MD Physician Sign     GG1.1 8/1/2017  2:39 PM Devan Ayon MD Physician      LB1.1 8/1/2017  2:03 PM Sunita Lou LPN (none)                   Discharge Summaries     No notes of this type exist for this encounter.         Consult Notes      Consults by Hilda Carey RPH at 8/17/2017  1:51 PM     Author:  Hilda Carey RPH Service:  Pharmacy Author Type:  Pharmacist    Filed:  8/17/2017  1:51 PM Date of Service:  8/17/2017  1:51 PM Creation Time:   8/17/2017  1:48 PM    Status:  Signed :  Hilda Carey RPH (Pharmacist)     Consult Orders:    1. Pharmacy IP Consult [[id number]] ordered by Jonathan Cardona DO at 08/17/17 1144                I counseled both Art and his wife on his inpatient medications yesterday and today. I discussed his Xarelto in depth and answered both of their questions about his medications. They both were engaged in the conversation and stated they had no additional questions.    Hilda Carey PharmD[EC1.1]     Revision History        User Key Date/Time User Provider Type Action    > EC1.1 8/17/2017  1:51 PM Hilda Carey RPH Pharmacist Sign            Consults by Shoshana Moya RN at 8/16/2017  2:00 PM     Author:  Griffin, Shoshana, RN Service:  General Medicine Author Type:      Filed:  8/16/2017  2:28 PM Date of Service:  8/16/2017  2:00 PM Creation Time:  8/16/2017  2:20 PM    Status:  Addendum :  Shoshana Moya RN ()     Consult Orders:    1. Care Coordinator IP Consult [[id number]] ordered by Jonathan Cardona DO at 08/16/17 1420                Care Transition Initial Assessment - RN      Met with: Patient and Family.    DATA   Active Problems:    Status post total left knee replacement using cement       Primary [facility] Clinic [facility]  Primary Care MD Name: Dr Ayon  Contact information and PCP information verified: Yes      ASSESSMENT  Cognitive Status: awake, alert and oriented.       Resources List: Home Care     Lives With: spouse  Living Arrangements: house  Quality Of Family Relationships: supportive, involved  Description of Support System: Supportive, Involved   Who is your support system?: Wife       Insurance Concerns: No Insurance issues identified          This writer met with pt and wife, introduced self and role. 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 Writer talked with patient and wife  about home PT after discharge for 2 weeks in their home a certified medicare home care list given to them and they both thought [facility] would be good patient has used them in the past.[SN1.1]  IM letter given with verbal understanding.[SN1.2]      PLAN    Patient will transport home with wife in their son's van with [facility] PT      Shoshana Myoa CTS, RN [facility] [phone] [facility] [phone]    Discharge Planner   Discharge Plans in progress: Home with [facility] PT  Barriers to discharge plan: none  Follow up plan: Follow up with Dr. Cardona 10-13 days after discharge       Entered by: Shoshana Moya 08/16/2017 2:20 PM[SN1.1]            Revision History        User Key Date/Time User Provider Type Action    > SN1.2 8/16/2017  2:28 PM Shoshana Moya RN Case Manager Addend     SN1.1 8/16/2017  2:27 PM Shoshana Moya RN Case Manager Sign                     Progress Notes - Physician (Notes from 08/19/17 through 08/22/17)      Progress Notes by Delma Patel PA-C at 8/21/2017 11:29 AM     Author:  Delma Patel PA-C Service:  Orthopedics Author Type:  Physician Assistant    Filed:  8/21/2017 11:35 AM Date of Service:  8/21/2017 11:29 AM Creation Time:  8/21/2017 11:29 AM    Status:  Addendum :  Delma Patel PA-C (Physician Assistant)         [facility] Orthopedic Post-Op Note         Assessment and Plan:    Assessment:   Post-operative day #5  Patient with catheter for urinary retention  Now with some orthostatic hypotension  Procedure(s):  ARTHROPLASTY KNEE left  Pain well-controlled.  Tolerating physical therapy and rehabilitation well.       Plan:   Continue ambulation per tolerance  Should have outpatient urinary consultation regarding retention.  Discharge plans to TCU[LM1.1]  Ortho sign off until needed. Now medical issues[LM1.2]           Interval History:   Stable.  Pain in left knee doing well.  Patient with urinary retention and orthostatic hypotension holding up  discharge. Plans for dc to TCU.             Physical Exam:   All vitals have been reviewedPatient Vitals for the past 8 hrs:   BP Temp Temp src Pulse Resp SpO2   08/21/17 0705 117/64 96.5  F (35.8  C) Oral 70 20 96 %     I/O last 3 completed shifts:  In: 670 [P.O.:670]  Out: 3225 [Urine:3225]    Dressing dry and intact.  aquacell in place           Data:   All laboratory/imaging data related to this surgery reviewed  Results for orders placed or performed during the hospital encounter of 08/15/17 (from the past 24 hour(s))   Glucose by meter   Result Value Ref Range    Glucose 120 (H) 70 - 99 mg/dL   Glucose by meter   Result Value Ref Range    Glucose 100 (H) 70 - 99 mg/dL        Delma Patel PA-C[LM1.1]     Revision History        User Key Date/Time User Provider Type Action    > LM1.2 8/21/2017 11:35 AM Delma Patel PA-C Physician Assistant Addend     LM1.1 8/21/2017 11:33 AM Delma Patel PA-C Physician Assistant Sign            Progress Notes by Marleni Barrett MD at 8/21/2017  9:32 AM     Author:  Marleni Barrett MD Service:  Family Medicine Author Type:  Physician    Filed:  8/21/2017 11:32 AM Date of Service:  8/21/2017  9:32 AM Creation Time:  8/21/2017  9:32 AM    Status:  Signed :  Marleni Barrett MD (Physician)         [facility] Progress Note          Assessment and Plan:   Assessment:   Principal Problem:    Status post total left knee replacement using cement    Assessment:[EP1.1] Postop day #6. Orthopedic surgery has signed off from case as most of patient's issues ongoing are medical in nature. Continues to improve from a surgical standpoint. Did have urinary retention with Perez in place as below[EP1.2]    Plan:[EP1.1]  Plan is to discharge to TCU once patient is medically stable. Patient has been accepted at [facility] for rehabilitation and hopeful this discharge could occur tomorrow.[EP1.2]    Active Problems:    Orthostatic  hypotension    Assessment:[EP1.1] Diagnosed during this hospitalization and improved, but did not fully resolve, with IV fluid administration. As IV fluids and then discontinued patient is once more becoming symptomatically orthostatic hypotensive. On discussion with patient, he has been having subtle symptoms that likely are related to this for several years, however never as severe as current and blood pressure does get into the 60s systolic on standing[EP1.2]    Plan:[EP1.1] Did review patient's medications and at this time will continue metoprolol completely and watch heart rate closely via telemetry to ensure rate control of atrial fibrillation without beta blockade. We'll also schedule Tylenol and see if we can reduce narcotic administration of oxycodone by doing this, as narcotics can sometimes cause orthostatic hypotension to worsen. We'll decrease oxycodone to 2.5 mg every 4 hours as needed and monitor closely for adequate pain control. Patient will also increase his oral water intake to at least 2 L today and will increase his salt consumption. Echocardiogram has already been performed and is overall unremarkable other than atrial fibrillation. We will proceed with carotid artery ultrasound to evaluate for any significant stenosis that might be contributing. Did discuss that if he continues to have significant symptomatic orthostatic hypotension, may need to consider addition of fludrocortisone 0.1 mg daily and titrate as needed to avoid severe symptomatology as he continues to recover surgically - patient would like to try increasing his oral volume consumption and the above medication changes and workup prior to medication administration.[EP1.2]      Near syncope    Assessment:[EP1.1] Likely secondary to orthostatic hypotension as above[EP1.2]    Plan:[EP1.1] Proceed with plan as above[EP1.2]      Urinary retention with incomplete bladder emptying    Assessment:[EP1.1] Perez catheter is in place[EP1.2]     Plan:[EP1.1] Patient will be discharged with Magallanes catheter and outpatient urology follow-up.[EP1.2]      Paroxysmal atrial fibrillation (H)    Assessment:[EP1.1] Presenting following surgery and patient was in rapid ventricular rate in the 150s. Heart rate has been very well controlled in the 60s for the past 3 days with patient flipping in and out of atrial fibrillation but is spending longer period of time in normal sinus rhythm[EP1.2]    Plan:[EP1.1]  Will hold metoprolol for now and continue to monitor patient's heart rate on telemetry. If patient once more with rapid ventricular rate, would need to restart metoprolol at 12.5 mg b.i.d. 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    Assessment:[EP1.1] Secondary to gastric cancer, no acute complication[EP1.2]    Plan:[EP1.1]  Continue routine cares[EP1.2]      Thoracic aortic aneurysm without rupture (H) - 4.7 cm on 8/18/17 (4.5 cm in 8/15)    Assessment:[EP1.1]  Noted on echocardiogram, on review of chart is minimally increased in size compared to 2 years ago[EP1.2]    Plan:[EP1.1]  Outpatient follow-up recommended.[EP1.2]        VTE:  SCDs/Xarelto  Code Status:  Full code        Interval History:   Appears worse today with orthostatic blood pressures once more dropping into the 60s when standing and patient being symptomatic with diaphoresis, dizziness and feeling unwell.  Other vitals stable.  Patient also had repeat urinary retention and needed magallanes placement yesterday evening secondary to this.  Pain well controlled and continuing to recover from an ortho standpoint.          Significant Problems:[EP1.1]     Past Medical History:   Diagnosis Date     Blood transfusion      Chronic gastric ulcer without mention of hemorrhage, perforation, without mention of obstruction     Ulcer, Gastric     Gastric cancer (H)      Hemorrhage of gastrointestinal tract, unspecified 01/13/10    [facility]      "Hypoglycemia, unspecified 1/26/2015     Measles without mention of complication     Measles     Mixed hyperlipidemia     Hyperlipidemia     Mumps without mention of complication     Mumps     Pulmonary embolism (H) Sept 4, 2010     Urinary calculus, unspecified     Renal stones     Varicella without mention of complication     Chickenpox[EP1.3]            Physical Exam:   Blood pressure 117/64, pulse 70, temperature 96.5  F (35.8  C), temperature source Oral, resp. rate 20, height 1.854 m (6' 1\"), weight 78.5 kg (173 lb 1 oz), SpO2 96 %.  Constitutional:   awake, alert, cooperative, no apparent distress while laying, and appears stated age     Lungs:   No increased work of breathing, good air exchange, clear to auscultation bilaterally, no crackles or wheezing     Cardiovascular:   Irregularly irregular rhythm with HR in the 60s, no murmur     Abdomen:   Bowel sounds present, abdomen soft and non-tender     Musculoskeletal:   Compression hose in place bilaterally, brace in place on the left leg  no lower extremity pitting edema present     Neurologic:   Awake, alert, oriented to name, place and time.      Skin:   normal skin color, texture, turgor          Data:   All laboratory data reviewed    Attestation:  I have reviewed today's vital signs, notes, medications, labs and imaging.[EP1.1]     More than 35 minutes was spent face to face with patient and his wife discussing diagnosis of orthostatic blood pressure, possible causes, suspected acute on chronic nature, treatment options and further work up recommended.[EP1.4]      Electronically Signed:  Marleni Barrett MD    Note: Chart documentation done in part with Dragon Voice Recognition software. Although reviewed after completion, some word and grammatical errors may remain.[EP1.1]        Revision History        User Key Date/Time User Provider Type Action    > EP1.2 8/21/2017 11:32 AM Marleni Barrett MD Physician Sign     EP1.4 8/21/2017  9:36 " AM Marleni Barrett MD Physician      EP1.3 8/21/2017  9:34 AM Marleni Barrett MD Physician      EP1.1 8/21/2017  9:32 AM Marleni Barrett MD Physician             Progress Notes by yKle Christian at 8/21/2017 10:04 AM     Author:  Kyle Christian Service:  Spiritual Health Author Type:      Filed:  8/21/2017 10:04 AM Date of Service:  8/21/2017 10:04 AM Creation Time:  8/21/2017 10:04 AM    Status:  Signed :  Kyle Christian ()         SPIRITUAL HEALTH SERVICES  SPIRITUAL ASSESSMENT Progress Note  [facility]      Pt declined a visit from .   is available for pt/family needs.    Kyle Christian M.Div., [facility]  Staff   Office tel: [phone][JV1.1]       Revision History        User Key Date/Time User Provider Type Action    > JV1.1 8/21/2017 10:04 AM Kyle Christian  Sign            Progress Notes by Marleni Barrett RN at 8/21/2017  6:27 AM     Author:  Marleni Barrett RN Service:  (none) Author Type:  Registered Nurse    Filed:  8/21/2017  6:34 AM Date of Service:  8/21/2017  6:27 AM Creation Time:  8/21/2017  6:27 AM    Status:  Signed :  Marleni Barrett RN (Registered Nurse)         S-(situation): Symptomatic orthostatic BP    B-(background): Orthostatic hypotension.     A-(assessment): BP 63/33 while standing, pt became diaphoretic and dizzy. Repeat BP lying down increased BP to 125/65.   Orthostatic BP:   Lying- /68 HR 69   Sitting BP 82/44   HR 77   Standing BP 63/33 HR 78    R-(recommendations): Continue to monitor.[EP1.1]        Revision History        User Key Date/Time User Provider Type Action    > EP1.1 8/21/2017  6:34 AM Marleni Barrett RN Registered Nurse Sign            Progress Notes by Tommie Hoover MD at 8/20/2017  3:23 PM     Author:  Tommie Hoover MD Service:  Orthopedics Author Type:  Physician    Filed:  8/20/2017  3:24 PM  "Date of Service:  2017  3:23 PM Creation Time:  2017  3:23 PM    Status:  Signed :  Tommie Hoover MD (Physician)         ORTHO PROGRESS NOTE    POD # 5  Procedure(s):  ARTHROPLASTY KNEE - left on 8/15/2017  Unable to void.    Pain:  mild    /74 (BP Location: Right arm)  Pulse 70  Temp 98.4  F (36.9  C) (Oral)  Resp 18  Ht 1.854 m (6' 1\")  Wt 78.5 kg (173 lb 1 oz)  SpO2 95%  BMI 22.83 kg/m2    Temp (24hrs), Av.1  F (36.7  C), Min:97.7  F (36.5  C), Max:98.4  F (36.9  C)      Recent Labs   Lab Test  17   0536  17   0544  17   1409   11   1244  11   HGB  11.0*  11.0*  13.8   < >  13.7   < >   --    --    INR   --    --    --    --   1.08   --   2.2*  2.0    < > = values in this interval not displayed.                 Circulation intact.   Sensation intact.   Calves soft and nontender.   Incision is healing well      PT  Walked 200 feet.  Range of motion 3-65 degrees.         ASSESSMENT:  Left total knee arthroplasty doing well, but patient unable to void.    PLAN:  Physical Therapy/ Occupational Therapy  discharge on hold pending urinary status.    Tommie Hoover M.D.  Department of Orthopaedic Surgery  [facility]  Pager: [phone][DL1.1]         Revision History        User Key Date/Time User Provider Type Action    > DL1.1 2017  3:24 PM Tommie Hoover MD Physician Sign            Progress Notes by Marleni Barrett MD at 2017 12:37 PM     Author:  Marleni Barrett MD Service:  Family Medicine Author Type:  Physician    Filed:  2017 12:50 PM Date of Service:  2017 12:37 PM Creation Time:  2017 12:37 PM    Status:  Signed :  Marleni Barrett MD (Physician)         [facility] Progress Note[EP1.1]          Assessment and Plan:   Assessment:   Principal Problem:    Status post total left knee replacement using cement    Assessment:[EP1.2] Postop " day #5, with patient now having difficulty with urinary retention as below. The near-syncopal episodes have resolved with improved control of atrial fibrillation and orthostatic blood pressures.[EP1.3]    Plan:[EP1.2] Ongoing management per orthopedic surgery, however anticipate discharge to TCU tomorrow once it is known if Perez catheter placement is required from a urinary retention standpoint.[EP1.3]    Active Problems:    Near syncope    Assessment: Multifactorial, thought secondary to atrial fibrillation with rapid ventricular rate in combination with discovered orthostatic hypotension causing blood pressures in the 70s upon standing. Both have improved with treatment as below and patient is having no further difficulty with lightheadedness upon standing    Plan:  Continue with plans as noted below for management of these individual condition thought to be contributing.        Orthostatic hypotension    Assessment: Ongoing. On review of chart unable to identify any reversible etiology with patient not on any medications that could cause this. On further discussion with patient today, he has been having symptoms of mild lightheadedness intermittently suddenly standing that lasts for approximately 10-30 seconds and states that this has been going on for years but has worsened in the past few months so it is suspected that patient likely has been having orthostatic hypotension for months to years.  When patient is well hydrated, as he is today, continues to have significant drop in systolic blood pressure, but is completely asymptomatic with movement    Plan: Reviewed in great detail with patient and his wife the diagnosis of orthostatic hypotension and the importance of staying hydrated going forward. We will repeat orthostatic blood pressures tomorrow to ensure ongoing stability of blood pressure with standing prior to discharge to TCU.      Urinary retention with incomplete bladder emptying    Assessment:  progressively worsening yesterday with IV fluids administered.  UA showed no infection but post-void residual was over 500 and this morning patient was straight cathed for over 500 cc.  Since that time has not felt the urge to void.      Plan: Will continue to monitor patient's bladder status closely and when patient does void next, perform post-void residual ultrasound.  If residual is once more over 500 cc, would need to place a Perez catheter, consideration initiation of Flomax, and arrange for outpatient urology follow-up for 1-2 weeks following discharge to TCU for consideration of Perez removal      Paroxysmal atrial fibrillation (H)    Assessment: Patient has been flipping in and out of atrial fibrillation over the past 24 hours but is spending more time in normal sinus rhythm. Rate is well controlled with low-dose metoprolol    Plan: Continue with low-dose metoprolol and therapeutic dosing of Xarelto.  Did discuss that if paroxysmal A. fib resolves going forward, could consider discontinuation of metoprolol and blood thinner.  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 Patient does continue to have orthostatic hypotension on repeat evaluation today but blood pressures have improved greatly and even upon standing is over 100 systolic with patient  "being asymptomatic.  Patient's main issue is increased urinary frequency to the point of needing to void every 15-30 minutes overnight and only being able to go small volumes. Post void residual ultrasound performed this morning shows over 500 cc left.  Vital signs stable.  Eating and voiding well.  Tolerating medications without significant side effects.[EP1.2]           Significant Problems:[EP1.1]     Past Medical History:   Diagnosis Date     Blood transfusion      Chronic gastric ulcer without mention of hemorrhage, perforation, without mention of obstruction     Ulcer, Gastric     Gastric cancer (H)      Hemorrhage of gastrointestinal tract, unspecified 01/13/10    [facility]     Hypoglycemia, unspecified 1/26/2015     Measles without mention of complication     Measles     Mixed hyperlipidemia     Hyperlipidemia     Mumps without mention of complication     Mumps     Pulmonary embolism (H) Sept 4, 2010     Urinary calculus, unspecified     Renal stones     Varicella without mention of complication     Chickenpox[EP1.4]            Physical Exam:   Blood pressure 144/74, pulse 70, temperature 98.4  F (36.9  C), temperature source Oral, resp. rate 18, height 1.854 m (6' 1\"), weight 78.5 kg (173 lb 1 oz), SpO2 95 %.[EP1.1]  Constitutional:   awake, alert, cooperative, no apparent distress, and appears stated age     Lungs:   No increased work of breathing, good air exchange, clear to auscultation bilaterally, no crackles or wheezing     Cardiovascular:   Normal apical impulse, regular rate and rhythm - does sound to be in NSR currently, normal S1 and S2, no S3 or S4, and no murmur noted     Abdomen:   Bowel sounds present, abdomen soft and non-tender     Musculoskeletal:   no lower extremity pitting edema present  Left leg remains in brace     Neurologic:   Awake, alert, oriented to name, place and time.       Skin:   normal skin color, texture, turgor[EP1.2]          Data:[EP1.1]   All laboratory data " reviewed[EP1.2]    Attestation:  I have reviewed today's vital signs, notes, medications, labs and imaging.     Electronically Signed:  Marleni Barrett MD    Note: Chart documentation done in part with Dragon Voice Recognition software. Although reviewed after completion, some word and grammatical errors may remain.[EP1.1]        Revision History        User Key Date/Time User Provider Type Action    > EP1.3 8/20/2017 12:50 PM Marleni Barrett MD Physician Sign     EP1.4 8/20/2017 12:44 PM Marleni Barrett MD Physician      EP1.2 8/20/2017 12:38 PM Marleni Barrett MD Physician      EP1.1 8/20/2017 12:37 PM Marleni Barrett MD Physician             Progress Notes by Essence Watson RN at 8/20/2017 12:46 PM     Author:  Essence Watson RN Service:  Nursing Author Type:  Registered Nurse    Filed:  8/20/2017 12:46 PM Date of Service:  8/20/2017 12:46 PM Creation Time:  8/20/2017 12:46 PM    Status:  Signed :  Essence Watson RN (Registered Nurse)         Pt up and trying to urinate but was unable- had the urge but could not go.[BH1.1]     Revision History        User Key Date/Time User Provider Type Action    > BH1.1 8/20/2017 12:46 PM Essence Watson RN Registered Nurse Sign            Progress Notes by Krissy Barba PT at 8/20/2017  9:09 AM     Author:  Krissy Barba PT Service:  (none) Author Type:  Physical Therapist    Filed:  8/20/2017  9:12 AM Date of Service:  8/20/2017  9:09 AM Creation Time:  8/20/2017  9:09 AM    Status:  Signed :  Krissy Barba PT (Physical Therapist)           Goal status: meeting all goals but did not attempt stairs as will DC to TCU    Functional status: SBA bed mobility, CGA transfers with FWW, ambulating with swing-through gait with FWW up to 200 ft. 3-65 L knee ROM.     Areas of progress: improved extension mobility, less impulsive, improved gait pattern and distance    Barrier to discharge Home: stairs,  hx of impulsivity    Equipment needs: defer to TCU    Discharge disposition/rationale: TCU for ongoing rehab    Transport recommendations: family transport[RB1.1]                 Revision History        User Key Date/Time User Provider Type Action    > RB1.1 8/20/2017  9:12 AM Krissy Barba PT Physical Therapist Sign            Progress Notes by Essence Watson RN at 8/19/2017  6:57 PM     Author:  Essence Watson RN Service:  Nursing Author Type:  Registered Nurse    Filed:  8/19/2017  6:58 PM Date of Service:  8/19/2017  6:57 PM Creation Time:  8/19/2017  6:57 PM    Status:  Signed :  Essence Watson RN (Registered Nurse)         Pt has been urinating frequently in small amounts - bladder scanned for 642 ml, retention.  Charge nurse notified and note left for [BH1.1]     Revision History        User Key Date/Time User Provider Type Action    > BH1.1 8/19/2017  6:58 PM Essence Watson RN Registered Nurse Sign            Progress Notes by Marleni Barrett MD at 8/19/2017 10:15 AM     Author:  Marleni Barrett MD Service:  Family Medicine Author Type:  Physician    Filed:  8/19/2017  6:44 PM Date of Service:  8/19/2017 10:15 AM Creation Time:  8/19/2017 10:15 AM    Status:  Signed :  Marleni Barrett MD (Physician)         [facility] Progress Note          Assessment and Plan:   Assessment:   Principal Problem:    Status post total left knee replacement using cement    Assessment:[EP1.1]  Postop day #4, with patient recovering well from a surgical standpoint and resolution of dizziness episodes and orthostatic hypotension.[EP1.2]    Plan:[EP1.1] Ongoing management per orthopedic surgery but anticipate discharge to TCU today as placement has been found at [facility].[EP1.2]    Active Problems:    Near syncope    Assessment:[EP1.1] Thought secondary to orthostatic hypotension, which is resolved with fluid bolusing and blood pressures are now  normalized with patient having no further episodes of dizziness or near syncope. Patient also had paroxysmal atrial fibrillation with rapid ventricular rate, which was felt to be a contributing factor however this has been rate controlled with a small dose of metoprolol. Patient has not had any further episodes since yesterday morning[EP1.2]    Plan:[EP1.1]  Continue with metoprolol 12.5 mg b.i.d. and monitor for reoccurrence of symptoms following discharge to TCU. Patient will make sure to drink appropriate amount to stay hydrated as he continues to recover from surgery.[EP1.2]      Paroxysmal atrial fibrillation (H)    Assessment:[EP1.1]  Rate controlled on metoprolol. Patient did go back into normal sinus rhythm for a period of time overnight, however is once more in atrial fibrillation today with heart rate in the 60s to 70s.[EP1.2]     Plan:[EP1.1] Continue with metoprolol 12.5 mg b.i.d. for rate control as well as oral Xarelto for anticoagulation.  Referral has been placed for outpatient cardiology follow-up[EP1.2]      Orthostatic hy[EP1.1]po[EP1.2]tension    Assessment:[EP1.1] Discovered yesterday and likely contributing to his near syncopal episodes. Patient was given a fluid bolus as well as increased maintenance fluids and orthostatic blood pressures today are normal[EP1.2]    Plan:[EP1.1] We'll discontinue IV fluids and patient will continue to have increased oral intake to ensure hydration going forward.[EP1.2]      Urinary hesitancy and urgency    Assessment:[EP1.1] UA repeated today still shows no signs of infection.[EP1.3]      Plan:[EP1.1] Discussed with patient and his wife[EP1.3] and at this time urinary symptoms are felt most likely secondary to the Perez catheter that was placed during surgery causing mild irritation of the prostate. Patient is able to void large volume and therefore no intervention is needed at this time. 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  HR has been under 100 with lower dose of metoprolol and patient still flipping between a fib and NSR.  Orthostatic blood pressures today are normal. Vital signs generally better.  Patient continues to complain of urinary urgency and hesitancy.  Eating and voiding well.  Tolerating medications without significant side effects.  No new concerns today.            Significant Problems:[EP1.1]     Past Medical History:   Diagnosis Date     Blood transfusion      Chronic gastric ulcer without mention of hemorrhage, perforation, without mention of obstruction     Ulcer, Gastric     Gastric cancer (H)      Hemorrhage of gastrointestinal tract, unspecified 01/13/10    [facility]     Hypoglycemia, unspecified 1/26/2015     Measles without mention of complication     Measles     Mixed hyperlipidemia     Hyperlipidemia     Mumps without mention of complication     Mumps     Pulmonary embolism (H) Sept 4, 2010     Urinary calculus, unspecified     Renal stones     Varicella without mention of complication     Chickenpox[EP1.4]            Physical Exam:   Blood pressure 133/70, pulse 60, temperature 97.5  F (36.4  C), temperature source Oral, resp. rate 18, height 1.854 m (6' 1\"), weight 78.5 kg (173 lb 1 oz), SpO2 96 %.  Constitutional:   awake, alert, cooperative, no " "apparent distress, and appears stated age     Lungs:   No increased work of breathing, good air exchange, clear to auscultation bilaterally, no crackles or wheezing     Cardiovascular:   Irregularly irregular heartbeat with HR in the low 70s     Abdomen:   Bowel sounds present     Musculoskeletal:   No edema in right leg  Left leg in brace and was not examined     Neurologic:   Awake, alert, oriented to name, place and time.      Skin:   normal skin color, texture, turgor           Data:   All laboratory data reviewed    Attestation:  I have reviewed today's vital signs, notes, medications, labs and imaging.     Electronically Signed:  Marleni Barrett MD    Note: Chart documentation done in part with Dragon Voice Recognition software. Although reviewed after completion, some word and grammatical errors may remain.[EP1.1]        Revision History        User Key Date/Time User Provider Type Action    > EP1.2 2017  6:44 PM Marleni Barrett MD Physician Sign     EP1.3 2017 11:39 AM Marleni Barrett MD Physician      EP1.4 2017 10:17 AM Marleni Barrett MD Physician      EP1.1 2017 10:15 AM Marleni Barrett MD Physician             Progress Notes by Tommie Hoover MD at 2017  1:29 PM     Author:  Tommie Hoover MD Service:  Orthopedics Author Type:  Physician    Filed:  2017  1:31 PM Date of Service:  2017  1:29 PM Creation Time:  2017  1:29 PM    Status:  Signed :  Tommie Hoover MD (Physician)         ORTHO PROGRESS NOTE    POD # 4  Procedure(s):  ARTHROPLASTY KNEE - left on 8/15/2017    Pain:  mild    /70 (BP Location: Right arm)  Pulse 60  Temp 97.5  F (36.4  C) (Oral)  Resp 18  Ht 1.854 m (6' 1\")  Wt 78.5 kg (173 lb 1 oz)  SpO2 96%  BMI 22.83 kg/m2    Temp (24hrs), Av.5  F (36.4  C), Min:97  F (36.1  C), Max:97.8  F (36.6  C)      Recent Labs   Lab Test  17   0536  17   " 0544  08/01/17   1409   08/16/11   1244  03/17/11 03/04/11   HGB  11.0*  11.0*  13.8   < >  13.7   < >   --    --    INR   --    --    --    --   1.08   --   2.2*  2.0    < > = values in this interval not displayed.                 Circulation intact.   Sensation intact.   Calves soft and nontender.   Incision is healing well      PT  Walked 80 feet  Range of motion 8-65 degrees.         ASSESSMENT:  Left total knee arthroplasty doing well.  Has had atrial fibrillation being evaluated by internal medicine.    PLAN:  Physical Therapy/ Occupational Therapy.  Short term rehab whenever approved by internal medicine.  Ortho orders are in.    Tommie Hoover M.D.  Department of Orthopaedic Surgery  [facility]  Pager: [phone][DL1.1]         Revision History        User Key Date/Time User Provider Type Action    > DL1.1 8/19/2017  1:31 PM Tommie Hoover MD Physician Sign            Progress Notes by Krissy Barba PT at 8/19/2017 11:04 AM     Author:  Krissy Barba PT Service:  (none) Author Type:  Physical Therapist    Filed:  8/19/2017 11:06 AM Date of Service:  8/19/2017 11:04 AM Creation Time:  8/19/2017 11:04 AM    Status:  Signed :  Krissy Barba PT (Physical Therapist)           Goal status: meeting bed mobility goal, nearly met transfer goal, gait goal in progress, stairs goal no longer necessary as pt will DC to TCU    Functional status: CGA/SBA bed mobility, CGA transfers but somewhat impulsive, ambulating 80 feet with FWW. 8-65 degrees L knee ROM.     Areas of progress: increased ambulation distance    Barrier to discharge Home: stairs, impulsivity, slow to progress    Equipment needs: defer to TCU    Discharge disposition/rationale: TCU    Transport recommendations: family transport may be appropriate[RB1.1]               Revision History        User Key Date/Time User Provider Type Action    > RB1.1 8/19/2017 11:06 AM Krissy Barba PT Physical Therapist Sign                   Procedure Notes     No notes of this type exist for this encounter.         Progress Notes - Therapies (Notes from 08/19/17 through 08/22/17)      Progress Notes by Krissy Barba PT at 8/20/2017  9:09 AM     Author:  Krissy Barba PT Service:  (none) Author Type:  Physical Therapist    Filed:  8/20/2017  9:12 AM Date of Service:  8/20/2017  9:09 AM Creation Time:  8/20/2017  9:09 AM    Status:  Signed :  Krissy Barba PT (Physical Therapist)           Goal status: meeting all goals but did not attempt stairs as will DC to TCU    Functional status: SBA bed mobility, CGA transfers with FWW, ambulating with swing-through gait with FWW up to 200 ft. 3-65 L knee ROM.     Areas of progress: improved extension mobility, less impulsive, improved gait pattern and distance    Barrier to discharge Home: stairs, hx of impulsivity    Equipment needs: defer to TCU    Discharge disposition/rationale: TCU for ongoing rehab    Transport recommendations: family transport[RB1.1]                 Revision History        User Key Date/Time User Provider Type Action    > RB1.1 8/20/2017  9:12 AM Krissy Barba PT Physical Therapist Sign            Progress Notes by Krissy Barba PT at 8/19/2017 11:04 AM     Author:  Krissy Barba PT Service:  (none) Author Type:  Physical Therapist    Filed:  8/19/2017 11:06 AM Date of Service:  8/19/2017 11:04 AM Creation Time:  8/19/2017 11:04 AM    Status:  Signed :  Krissy Barba PT (Physical Therapist)           Goal status: meeting bed mobility goal, nearly met transfer goal, gait goal in progress, stairs goal no longer necessary as pt will DC to TCU    Functional status: CGA/SBA bed mobility, CGA transfers but somewhat impulsive, ambulating 80 feet with FWW. 8-65 degrees L knee ROM.     Areas of progress: increased ambulation distance    Barrier to discharge Home: stairs, impulsivity, slow to progress    Equipment needs: defer to TCU    Discharge  disposition/rationale: TCU    Transport recommendations: family transport may be appropriate[RB1.1]               Revision History        User Key Date/Time User Provider Type Action    > RB1.1 8/19/2017 11:06 AM Krissy Barba, PT Physical Therapist Sign

## 2017-08-15 NOTE — LETTER
Transition Communication Hand-off for Care Transitions to Next Level of Care Provider    Name: Remigio Holly  MRN #: [id number]  Primary Care Provider: Moy Celis  Primary Care MD Name: Dr Ayon  Primary Clinic: [address] [facility] [address]  Primary Care Clinic Name: [facility]  Reason for Hospitalization:  chronic pain of both knees primary osteoarthritis of both knees  Status post total left knee replacement using cement  Admit Date/Time: 8/15/2017  6:00 AM  Discharge Date: 8/17/17  Payor Source: Payor: MEDICA / Plan: MEDICA PRIME SOLUTION / Product Type: Indemnity /     Readmission Assessment Measure (CECILIO) Risk Score/category: Low    Plan of Care Goals/Milestone Events:   Patient Concerns:    Patient Goals:   Short-term    Long-term    Medical Goals   Short-term    Long-term          Reason for Communication Hand-off Referral: Other Patient discharged with [facility]    Discharge Plan:  Post TKA sent home with [facility]     Concern for non-adherence with plan of care:   Y/N n  Discharge Needs Assessment:  Needs       Most Recent Value    Anticipated Changes Related to Illness none    Equipment Currently Used at Home none    Transportation Available family or friend will provide          Already enrolled in Tele-monitoring program and name of program:  no  Follow-up specialty is recommended: Yes ortho    Follow-up plan:  Future Appointments  Date Time Provider Department Center   8/17/2017 9:15 AM Bella Shafer, OT [facility] [place] [facility]   8/17/2017 10:30 AM Vinay Pisano, PT [facility] [place] [facility]   8/17/2017 2:45 PM Vinay Pisano, PT [facility] [place] [facility]   8/18/2017 9:15 AM Vinay Pisano, PT [facility] [place] [facility]   8/31/2017 8:50 AM [facility]       Any outstanding tests or procedures:              Key Recommendations: Key Recommendations: Home with [facility] PT with clinic follow up    Shoshana Moya    AVS/Discharge Summary is the source of truth; this is a  helpful guide for improved communication of patient story

## 2017-08-15 NOTE — PROGRESS NOTES
08/15/17 1623   Quick Adds   Type of Visit Initial PT Evaluation   Living Environment   Lives With spouse   Living Arrangements house   Home Accessibility stairs to enter home;stairs (2 railings present)   Number of Stairs to Enter Home 2   Number of Stairs Within Home 12  (they can stay on the main level)   Stair Railings at Home outside, present at both sides   Transportation Available car   Living Environment Comment walk in shower and bed, bath and kitchen all on main level   Self-Care   Dominant Hand right   Usual Activity Tolerance excellent   Current Activity Tolerance moderate   Regular Exercise no   Equipment Currently Used at Home none  (has an SPC at home)   Activity/Exercise/Self-Care Comment [place], has a wood shop/ machine shop   Functional Level Prior   Ambulation 0-->independent   Transferring 0-->independent   Toileting 0-->independent   Bathing 0-->independent   Dressing 0-->independent   Eating 0-->independent   Communication 0-->understands/communicates without difficulty   Swallowing 0-->swallows foods/liquids without difficulty   Cognition 0 - no cognition issues reported   Fall history within last six months no   Which of the above functional risks had a recent onset or change? ambulation;transferring;toileting;bathing;dressing   General Information   Onset of Illness/Injury or Date of Surgery - Date 08/15/17   Referring Physician Dr. Cardona   Patient/Family Goals Statement Get back out to his shop   Pertinent History of Current Problem (include personal factors and/or comorbidities that impact the POC) Pt admitted today for L TKA.. PMHX significant for DVT and stomach cancer   Precautions/Limitations (KI on until Indep with SLR)   Weight-Bearing Status - LLE weight-bearing as tolerated   Cognitive Status Examination   Orientation orientation to person, place and time   Level of Consciousness alert   Follows Commands and Answers Questions 100% of the time   Personal Safety and Judgment intact    Memory intact   Pain Assessment   Patient Currently in Pain Yes, see Vital Sign flowsheet   Integumentary/Edema   Integumentary/Edema Comments Leg is wrapped in ace wrap from proximal thigh to toes.  No drainage appreciated   Posture    Posture Comments forward head   Range of Motion (ROM)   ROM Comment ROM is grossly WFL except for L knee which is grossly 0 degrees extn to 85 degrees   Strength   Strength Comments functional strength is good , supine to sit with min A and sit <> stand from high bed with min A   Bed Mobility   Bed Mobility Comments Supine <> sit with min A   Transfer Skills   Transfer Comments Sit <> stand with min A    Gait   Gait Gait Skill;Gait Analysis   Gait Skills   Level of [place]: Gait minimum assist (75% patients effort)   Physical Assist/Nonphysical Assist: Gait supervision;verbal cues;nonverbal cues (demo/gestures);1 person assist   Weight-Bearing Restrictions: Gait weight-bearing as tolerated   Assistive Device for Transfer: Gait rolling walker   Gait Distance other (see comment)  (5 side steps to HOB)   Gait Analysis   Gait Pattern Used swing-to gait   Gait Deviations Noted decreased mirella;increased time in double stance;decreased velocity of limb motion;decreased step length;decreased toe-to-floor clearance   Impairments Contributing to Gait Deviations decreased ROM;pain;decreased strength   Balance   Balance Comments good sitting balance, standing balance is fair to good today, continue to monitor   Muscle Tone   Muscle Tone no deficits were identified   Modality Interventions   Planned Modality Interventions Cryotherapy   General Therapy Interventions   Planned Therapy Interventions balance training;gait training;neuromuscular re-education;ROM;strengthening;transfer training   Clinical Impression   Criteria for Skilled Therapeutic Intervention yes, treatment indicated   PT Diagnosis Pain, decreased ROM and decreased strength and swelling S/P TKA   Influenced by the  "following impairments pain, swellling, decreased ROM and decreased strength   Functional limitations due to impairments decreased indep with functional mobility and gait   Clinical Presentation Evolving/Changing   Clinical Presentation Rationale HX of cancer and DVT   Clinical Decision Making (Complexity) Moderate complexity   Therapy Frequency` 2 times/day   Predicted Duration of Therapy Intervention (days/wks) 3 days   Anticipated Equipment Needs at Discharge (Has a walker, and a cane)   Anticipated Discharge Disposition Home with Assist;Home with Outpatient Therapy;Home with Home Therapy   Risk & Benefits of therapy have been explained Yes   Patient, Family & other staff in agreement with plan of care Yes   Clinical Impression Comments see care plan   [facility] TM \"6 Clicks\"   2016, Trustees of [facility], under license to [employer].  All rights reserved.   6 Clicks Short Forms Basic Mobility Inpatient Short Form   [facility]  \"6 Clicks\" V.2 Basic Mobility Inpatient Short Form   1. Turning from your back to your side while in a flat bed without using bedrails? 3 - A Little   2. Moving from lying on your back to sitting on the side of a flat bed without using bedrails? 3 - A Little   3. Moving to and from a bed to a chair (including a wheelchair)? 3 - A Little   4. Standing up from a chair using your arms (e.g., wheelchair, or bedside chair)? 3 - A Little   5. To walk in hospital room? 3 - A Little   6. Climbing 3-5 steps with a railing? 3 - A Little   Basic Mobility Raw Score (Score out of 24.Lower scores equate to lower levels of function) 18   Total Evaluation Time   Total Evaluation Time (Minutes) 15     "

## 2017-08-15 NOTE — IP AVS SNAPSHOT
"          [address] SURGICAL: [phone]                                              INTERAGENCY TRANSFER FORM - LAB / IMAGING / EKG / EMG RESULTS   8/15/2017                    Hospital Admission Date: 8/15/2017  SIENNA KENNEDY   : 1933  Sex: Male        Attending Provider: Jonathan Cardona DO     Allergies:  Mobic [Meloxicam]    Infection:  None   Service:  SURGERY    Ht:  1.854 m (6' 1\")   Wt:  78.5 kg (173 lb 1 oz)   Admission Wt:  75.8 kg (167 lb)    BMI:  22.83 kg/m 2   BSA:  2.01 m 2            Patient PCP Information     Provider PCP Type    Moy Celis MD General         Lab Results - 3 Days      Glucose by meter [[id number]]  Resulted: 17 0806, Result status: Final result    Ordering provider: Jonathan Cardona DO  17 0758 Resulting lab: POINT OF CARE TEST, GLUCOSE    Specimen Information    Type Source Collected On     17 0758          Components       Value Reference Range Flag Lab   Glucose 87 70 - 99 mg/dL  170            Basic metabolic panel [[id number]] (Abnormal)  Resulted: 17 0719, Result status: Final result    Ordering provider: Marleni Barrett MD  17 0000 Resulting lab: [facility]    Specimen Information    Type Source Collected On   Blood  17 0650          Components       Value Reference Range Flag Lab   Sodium 138 133 - 144 mmol/L  [facility] Lab   Potassium 4.4 3.4 - 5.3 mmol/L  [facility] Lab   Chloride 102 94 - 109 mmol/L  FN Lab   Carbon Dioxide 28 20 - 32 mmol/L  FN Lab   Anion Gap 8 3 - 14 mmol/L  [facility] Lab   Glucose 97 70 - 99 mg/dL  [facility] Lab   Urea Nitrogen 26 7 - 30 mg/dL  [facility] Lab   Creatinine 1.17 0.66 - 1.25 mg/dL  [facility] Lab   GFR Estimate 59 >60 mL/min/1.7m2 L [facility] Lab   Comment:  Non  GFR Calc   GFR Estimate If Black 72 >60 mL/min/1.7m2  [facility] Lab   Comment:  African American GFR Calc   Calcium 8.8 8.5 - 10.1 mg/dL  [facility] Lab            Glucose by meter " [[id number]] (Abnormal)  Resulted: 08/21/17 1646, Result status: Final result    Ordering provider: Jonathan Cardona,   08/21/17 1642 Resulting lab: POINT OF CARE TEST, GLUCOSE    Specimen Information    Type Source Collected On     08/21/17 1642          Components       Value Reference Range Flag Lab   Glucose 112 70 - 99 mg/dL H 170            Glucose by meter [[id number]] (Abnormal)  Resulted: 08/21/17 1140, Result status: Final result    Ordering provider: Jonathan Cardona,   08/21/17 1138 Resulting lab: POINT OF CARE TEST, GLUCOSE    Specimen Information    Type Source Collected On     08/21/17 1138          Components       Value Reference Range Flag Lab   Glucose 118 70 - 99 mg/dL H 170            Glucose by meter [[id number]] (Abnormal)  Resulted: 08/21/17 0741, Result status: Final result    Ordering provider: Jonathan Cardona DO  08/21/17 0734 Resulting lab: POINT OF CARE TEST, GLUCOSE    Specimen Information    Type Source Collected On     08/21/17 0734          Components       Value Reference Range Flag Lab   Glucose 100 70 - 99 mg/dL H 170            Glucose by meter [[id number]] (Abnormal)  Resulted: 08/20/17 1941, Result status: Final result    Ordering provider: Jonathan Cardona DO  08/20/17 1706 Resulting lab: POINT OF CARE TEST, GLUCOSE    Specimen Information    Type Source Collected On     08/20/17 1706          Components       Value Reference Range Flag Lab   Glucose 120 70 - 99 mg/dL H 170            Glucose by meter [[id number]]  Resulted: 08/20/17 0801, Result status: Final result    Ordering provider: Jonathan Cardona DO  08/20/17 0746 Resulting lab: POINT OF CARE TEST, GLUCOSE    Specimen Information    Type Source Collected On     08/20/17 0746          Components       Value Reference Range Flag Lab   Glucose 92 70 - 99 mg/dL  170            Glucose by meter [[id number]]  Resulted: 08/19/17 1706, Result status: Final result    Ordering provider:  Jonathan Cardona,   08/19/17 1651 Resulting lab: POINT OF CARE TEST, GLUCOSE    Specimen Information    Type Source Collected On     08/19/17 1651          Components       Value Reference Range Flag Lab   Glucose 85 70 - 99 mg/dL  170            UA with Microscopic reflex to Culture [[id number]] (Abnormal)  Resulted: 08/19/17 1115, Result status: Final result    Ordering provider: Marleni Barrett MD  08/19/17 1014 Resulting lab: [facility]    Specimen Information    Type Source Collected On   Unspecified Urine  08/19/17 1055          Components       Value Reference Range Flag Lab   Color Urine Yellow   [facility]   Appearance Urine Clear   [facility]   Glucose Urine Negative NEG^Negative mg/dL  [facility]   Bilirubin Urine Negative NEG^Negative  [facility]   Ketones Urine Negative NEG^Negative mg/dL  [facility]   Specific [place] Urine 1.013 1.003 - 1.035  [facility]   Blood Urine Negative NEG^Negative  [facility]   pH Urine 5.0 5.0 - 7.0 pH  [facility]   Protein Albumin Urine Negative NEG^Negative mg/dL  [facility]   Urobilinogen mg/dL 0.0 0.0 - 2.0 mg/dL  [facility]   Nitrite Urine Negative NEG^Negative  [facility]   Leukocyte Esterase Urine Negative NEG^Negative  [facility]   Source Unspecified Urine   [facility]   WBC Urine <1 0 - 2 /HPF  [facility]   RBC Urine <1 0 - 2 /HPF  [facility]   Mucous Urine Present NEG^Negative /LPF A [facility]            Glucose by meter [[id number]]  Resulted: 08/19/17 0746, Result status: Final result    Ordering provider: Jonathan Cardona DO  08/19/17 0741 Resulting lab: POINT OF CARE TEST, GLUCOSE    Specimen Information    Type Source Collected On     08/19/17 0741          Components       Value Reference Range Flag Lab   Glucose 92 70 - 99 mg/dL  170            Basic metabolic panel [[id number]] (Abnormal)  Resulted: 08/19/17 0623, Result status: Final result    Ordering provider: Marleni Barrett MD  08/19/17 0000 Resulting lab: [facility]  CENTER    Specimen Information    Type Source Collected On   Blood  08/19/17 0557          Components       Value Reference Range Flag Lab   Sodium 139 133 - 144 mmol/L  [facility]   Potassium 4.8 3.4 - 5.3 mmol/L  [facility]   Chloride 106 94 - 109 mmol/L  [facility]   Carbon Dioxide 27 20 - 32 mmol/L  [facility]   Anion Gap 6 3 - 14 mmol/L  [facility]   Glucose 90 70 - 99 mg/dL  [facility]   Urea Nitrogen 26 7 - 30 mg/dL  [facility]   Creatinine 1.12 0.66 - 1.25 mg/dL  [facility]   GFR Estimate 62 >60 mL/min/1.7m2  [facility]   Comment:  Non  GFR Calc   GFR Estimate If Black 76 >60 mL/min/1.7m2  [facility]   Comment:  African American GFR Calc   Calcium 8.4 8.5 - 10.1 mg/dL L [facility]            Testing Performed By     Lab - Abbreviation Name Director Address Valid Date Range    22 - [facility] Unknown [address] [place] [address] 05/08/15 1057 - Present    170 - Unknown POINT OF CARE TEST, GLUCOSE Unknown Unknown 10/31/11 1114 - Present            Unresulted Labs (24h ago through future)    Start       Ordered    Unscheduled  Hemoglobin  CONDITIONAL X 2,   Routine     Comments:  Release on POD 1 and POD 2 if the morning Hgb is less than 8.0    08/15/17 1049         Imaging Results - 3 Days      US carotid bilateral [[id number]]  Resulted: 08/21/17 1343, Result status: Final result    Ordering provider: Marleni Barrett MD  08/21/17 0915 Resulted by: Yousif Doll MD    Performed: 08/21/17 1226 - 08/21/17 1318 Resulting lab: RAD[facility] RESULTS    Narrative:       ULTRASOUND CAROTID BILATERAL August 21, 2017 1:18 PM     HISTORY: Severe acute on chronic orthostatic hypotension, evaluate for  stenosis.    RIGHT CAROTID FINDINGS: Minimal plaque. Tortuous vessel.    Right ICA PSV: 142 cm/sec.    Right internal carotid artery EDV: 34 cm/s.    Right ICA/CCA PSV Ratio: 1.4.      These indicate less than 50% diameter stenosis of the right ICA.      Right Vertebral: Antegrade flow.      Right ECA: Antegrade flow.     LEFT CAROTID FINDINGS: Minimal plaque. Tortuous vessel.    Left ICA PSV: 119 cm/sec.    Left internal carotid artery EDV: 30 cm/s.    Left ICA/CCA PSV Ratio: 1.4.      These indicate less than 50% diameter stenosis of the left ICA.      Left Vertebral: Antegrade flow.     Left ECA: Antegrade flow.     Causes of Decreased Accuracy: None.       Impression:       IMPRESSION:    1. Less than 50% diameter stenosis of the right internal carotid  artery relative to the distal internal carotid artery' diameter.   2. Less than 50% diameter stenosis of the left internal carotid artery  relative to the distal internal carotid artery diameter.     ONOFRE MORAN MD      Testing Performed By     Lab - Abbreviation Name Director Address Valid Date Range    104 - [facility] RADIOLOGY RESULTS Unknown Unknown 05 1553 - Present               ECG/EMG Results      ECHO COMPLETE WITH OPTISON [[id number]]  Resulted: 17, Result status: Edited Result - FINAL    Ordering provider: Fernando Chiang MD  17 0604 Resulted by: Colin Dahl MD    Performed: 17 0934 - 17 0936 Resulting lab: RADIOLOGY RESULTS    Narrative:       [id number]  [id number]  [id number]  [id number]^АНДРЕЙ^FERNANDO^DARLENE           [facility]  Echocardiography Laboratory  [address] [place] [address]        Name: SIENNA KENNEDY  MRN: [id number]  : 1933  Study Date: 2017 08:19 AM  Age: 83 yrs  Gender: Male  Patient Location: [facility]  Reason For Study: Afib, Left Knee Replacement  History: Smoke a pipe daily, Hyperlipidemia, AF, CKD,  Ordering Physician: FERNANDO CHIANG  Referring Physician: Moy Celis  Performed By: Alvin Mckeon     BSA: 2.0 m2  Height: 73 in  Weight: 174 lb  HR: 82  BP: 127/78 mmHg  _____________________________________________________________________________  __        Procedure  Complete Portable Echo Adult. Contrast  Optison.  _____________________________________________________________________________  __        Interpretation Summary     Technically difficult study despite using LV contrast.     Left ventricular systolic function is normal.The visual ejection fraction is  estimated at 65-70%.  Mildly decreased right ventricular systolic function.The right ventricle is  moderately dilated.  There is mild (1+) tricuspid regurgitation.  The ascending aorta is moderately dilated-4.7 cm  Mild to moderate aortic root dilatation 4.4 cm.     Ascending aorta was 3.8 cm in study dated 09/05/2010.     _____________________________________________________________________________  __        Left Ventricle  The left ventricle is normal in size. There is borderline concentric left  ventricular hypertrophy. Left ventricular systolic function is normal. The  visual ejection fraction is estimated at 65-70%. No regional wall motion  abnormalities noted.     Right Ventricle  The right ventricle is moderately dilated. Mildly decreased right ventricular  systolic function.     Atria  The left atrium is mildly dilated. The right atrium is mildly dilated. There  is no color Doppler evidence of an atrial shunt.     Mitral Valve  There is mild mitral annular calcification. There is trace mitral  regurgitation.        Tricuspid Valve  There is mild (1+) tricuspid regurgitation. The right ventricular systolic  pressure is approximated at 21.7 mmHg plus the right atrial pressure.     Aortic Valve  The aortic valve is trileaflet with aortic valve sclerosis. No aortic  regurgitation is present. No aortic stenosis is present.     Pulmonic Valve  There is trace pulmonic valvular regurgitation. There is no pulmonic valvular  stenosis.     Vessels  Mild to moderate aortic root dilatation 4.4 cm. The ascending aorta is  Moderately dilated. 4.7 cm. Inferior vena cava not well visualized for  estimation of right atrial pressure. Dilated inferior vena cava.      Pericardium  There is no pericardial effusion.        Rhythm  probably afib.  _____________________________________________________________________________  __  MMode/2D Measurements & Calculations  IVSd: 1.1 cm     LVIDd: 4.7 cm  LVIDs: 3.0 cm  LVPWd: 1.1 cm  FS: 36.2 %  EDV(Teich): 102.5 ml  ESV(Teich): 35.1 ml  LV mass(C)d: 195.6 grams  LV mass(C)dI: 96.5 grams/m2  Ao root diam: 4.4 cm  LA dimension: 3.5 cm  asc Aorta Diam: 4.7 cm  LA/Ao: 0.80        Doppler Measurements & Calculations  Ao V2 max: 101.5 cm/sec  Ao max P.1 mmHg  PA acc time: 0.14 sec  TR max helio: 232.9 cm/sec  TR max P.7 mmHg              _____________________________________________________________________________  __        Report approved by: Mariela Conway 2017 11:00 AM       1    Type Source Collected On     17 08          View Image (below)        Echocardiogram Complete [[id number]]  Resulted: 17 0935, Result status: In process    Ordering provider: Fernando Abreu MD  17 0604 Performed: 17 0934 - 17 0934    Resulting lab: [facility]                   Encounter-Level Documents:     There are no encounter-level documents.      Order-Level Documents:     There are no order-level documents.

## 2017-08-15 NOTE — LETTER
Transition Communication Hand-off for Care Transitions to Next Level of Care Provider    Name: Remigio Holly  MRN #: [id number]  Primary Care Provider: Moy Celis  Primary Care MD Name: Dr Ayon  Primary Clinic: [address] [facility] [address]  Primary Care Clinic Name: [facility]  Reason for Hospitalization:  chronic pain of both knees primary osteoarthritis of both knees  Status post total left knee replacement using cement  Admit Date/Time: 8/15/2017  6:00 AM  Discharge Date: Planned d/c for 8/21/17  Payor Source: Payor: MEDICA / Plan: MEDICA PRIME SOLUTION / Product Type: Indemnity /     Readmission Assessment Measure (CECILIO) Risk Score/category: Low          Reason for Communication Hand-off Referral: Fragility  Other planned d/c to [facility] TCU on 8/21/17.    Discharge Plan:  Discharge Plan:       Most Recent Value    Disposition Comments Patient in agreement with TCU placement.           Concern for non-adherence with plan of care:   Y/N No    Discharge Needs Assessment:  Needs       Most Recent Value    Anticipated Changes Related to Illness none    Equipment Currently Used at Home none    Transportation Available family or friend will provide    # of Referrals Placed by [facility] Post Acute Facilities    Home Care [facility] [phone], Fax: [phone]    Skilled Nursing Facility [facility] [phone], Fax: [phone]    PAS Number [id number]          Already enrolled in Tele-monitoring program and name of program:  NO   Follow-up specialty is recommended: Yes    Follow-up plan:  Future Appointments  Date Time Provider Department Center   8/31/2017 8:50 AM Jonathan Cardona DO [facility] [place] [facility]       Any outstanding tests or procedures:        Referrals     Future Labs/Procedures    CARDIOLOGY EVAL ADULT REFERRAL     Comments:    Your provider has referred you to:  FMG: [facility] [phone]    [email or web address]    Please be aware that coverage of these services is subject to the terms and limitations of your health insurance plan.  Call member services at your health plan with any benefit or coverage questions.      Type of Referral:  New Cardiology Consult    Timeframe requested:  Within 1 month    Please bring the following to your appointment:  >>   Any x-rays, CTs or MRIs which have been performed.  Contact the facility where they were done to arrange for  prior to your scheduled appointment.    >>   List of current medications  >>   This referral request   >>   Any documents/labs given to you for this referral    Med Therapy Manage Referral     Comments:    Your provider has referred you to: **[facility] Scheduling (numerous locations) [phone]   [email or web address]    Reason for Referral: MARK, patient needs continued education regarding medications    The [facility] department will contact you to schedule an appointment.  You may also schedule the appointment by calling [phone].  For [facility] [place] [facility] [place] patients, please call [phone] to confirm/schedule your appointment on the next business day.    This service is designed to help you get the most from your medications.  A specially trained Pharmacist will work closely with you and your providers to solve any questions, concerns, issues or problems related to your medications.    Please bring all of your prescription and non-prescription medications (such as vitamins, over-the-counter medications, and herbals) or a detailed medication list to your appointment.    If you have a glucose meter or other home monitoring information, please also bring this to your appointment (i.e. blood glucose log, blood pressure log, pain log, etc.).    Physical Therapy Adult Consult   "   Comments:    Evaluate and treat as clinically indicated.    Reason:  S/p left knee replacement, gait abnormality    Physical Therapy Referral     Comments:    *This therapy referral will be filtered to a centralized scheduling office at [facility] and the patient will receive a call to schedule an appointment at a [facility] location most convenient for them. *     Please contact patient to set up seamless transition from 14 days of in home physical therapy to outpatient therapy.  Patient prefers [facility].     [facility] provides Physical Therapy evaluation and treatment and many specialty services across the [facility] system.  If requesting a specialty program, please choose from the list below.    If you have not heard from the scheduling office within 2 business days, please call [phone] for all locations, with the exception of [place], please call [phone].  Treatment: Evaluation & Treatment  Special Instructions/Modalities: Follow s/p knee replacement protocol    Please be aware that coverage of these services is subject to the terms and limitations of your health insurance plan.  Call member services at your health plan with any benefit or coverage questions.      **Note to Provider:  If you are referring outside of [facility] for the therapy appointment, please list the name of the location in the \"special instructions\" above, print the referral and give to the patient to schedule the appointment.        Supplies     Future Labs/Procedures    AntiEmbolism Stockings     Comments:    Bilateral below knee length.On in the morning, off at night          Key Recommendation: TCU placement, then return home. Attend all follow up appointments.     RADHIKA Franks     AVS/Discharge Summary is the source of truth; this is a helpful guide for improved communication of patient story          "

## 2017-08-15 NOTE — INTERVAL H&P NOTE
This H&P has been reviewed and there are no clinically significant changes in the patient s condition.  The patient was evaluated by myself as well as Dr. Ayon prior to surgery. The Patient is approved for surgery.

## 2017-08-15 NOTE — IP AVS SNAPSHOT
` ` Patient Information     Patient Name Sex     Remigio Holly ([id number]) Male 1933       Room Bed    270 270-01      Patient Demographics     Address Phone E-mail Address    [address] [facility] [address] [phone] (Home)  none (Work)  [phone] (Mobile) [email or web address]      Patient Ethnicity & Race     Ethnic Group Patient Race    American White      Emergency Contact(s)     Name Relation Home Work Mobile    Pily Holly Spouse [phone]    No secondary contact  none        Documents on File        Status Date Received Description       Documents for the Patient    Privacy Notice - [place] Received 09/10/08     Insurance Card Received () 05/10/04 BCBS Basic Medicare Select Senior Gold Grp -EY    Face Sheet Received () 07     Insurance Card Received () 06 Medicare- Flood A Holly    Insurance Card Received () 07 Humana Gold Choice Grp -Yduibwk A Holly    Face Sheet Received () 09     Insurance Card Received () 09 Medica Advantage Standard Grp [id number] A Avni    External Medication Information Consent Accepted () 09     Face Sheet Received () 01/27/10     External Medication Information Consent Accepted () 08/05/10     Insurance Card Received () 08/05/10 Medica Advantage Standard Grp [id number] A Avni    Patient ID Received () 12     Consent for Services - Hospital/Clinic Received () 11     Consent for Services - Informed Received () 10/22/10     Other Received 11/02/10 Medica COB    Insurance Card Received () 11 Medica Prime Solution Grp [id number]- Remigio A Avni- Online 10.00 copay. VSD    Insurance Card Received () 11 MEDICA    Consent for Services - Hospital/Clinic Received () 11     Consent for Services - Hospital/Clinic  Received () 11     External Medication Information Consent Accepted () 11     Other Received 11 Med[employer] COB    Consent for Services - Hospital/Clinic  () 11 CONSENT FOR SERVICES - CLINIC AND HOD    Insurance Card Received () 12     CMS IM for Patient Signature       Insurance Card Received () 12     Other Received 12 MEDICA COB    Consent for Services - Hospital/Clinic Received () 12     External Medication Information Consent Accepted 13     Insurance Card Received () 12 medica    Insurance Card Received () 12     Consent for EHR Access  13 Copied from existing Consent for services - C/HOD collected on 2012    Advance Directives and Living Will Received 13 Health Care Directive-13    Advance Directives and Living Will Not Received  Validation of Advance Directive-13    [facility] Specified Other       Consent for Services - Hospital/Clinic Received () 13     Privacy Notice - [place] Received 13     Privacy Notice - [place]  13 ACKNOWLEDGMENT OF RECEIPT OF NOTICE OF PRIVACY PRACTICE    Consent for Services - Hospital/Clinic  () 13 AFFIRMATION OF INFORMED CONSENT FOR SURGERY/DESIGNATED PROCEDURE 2013    Insurance Card Received () 13     Insurance Card Received () 13     Consent for Services - Hospital/Clinic Received () 14     Consent for Services - Hospital/Clinic Received () 05/01/15     Insurance Card Received () 10/12/15 Medica    Consent for Services/Privacy Notice - Hospital/Clinic Received () 16     Consent for Services - [facility]       Consent for Services/Privacy Notice - Hospital/Clinic-[place] Received 17     Insurance Card Received () 16 Medica    Business/Insurance/Care Coordination/Health Form - Patient  16 [facility] GI DIVISION CONCERNS  FOR AT RISK  LETTER 12/19/16    Business/Insurance/Care Coordination/Health Form - Patient  12/21/16 N GI DIVISION CONCERN FOR AT RISK  LETTER 12/19/16    Patient ID Received 01/10/17 expires 11/6/2020    Insurance Card Received 01/10/17 Med[employer]    Business/Insurance/Care Coordination/Health Form - Patient  04/24/17 [place] DEPT OF PUBLIC SAFETY LOSS OF CONSCIOUSNESS OR VOLUNTARY CONTROL 4/24/17    Insurance Card Received 08/15/17 Medicare    Face Sheet Received (Deleted) 09/04/10     Insurance Card Received (Deleted) 12/06/16 Medica       Documents for the Encounter    CMS IM for Patient Signature Received 08/16/17     CMS IM for Patient Signature Received 08/19/17     ECG   ECG Report    ECG   ECG Report      Admission Information     Attending Provider Admitting Provider Admission Type Admission Date/Time    Jonathan Cardona, Jonathan Vu DO Elective 08/15/17  0600    Discharge Date Hospital Service Auth/Cert Status Service Area     Surgery Incomplete [facility] SERVICES    Unit Room/Bed Admission Status       PH 2A MEDICAL SURGICAL 270/270-01 Admission (Confirmed)       Admission     Complaint    chronic pain of both knees primary osteoarthritis of both knees, Status post total left knee replacement using cement      Hospital Account     Name Acct ID Class Status Primary Coverage    Remigio Holly [id number] Inpatient Open MEDICARE - MEDICARE FOR HB SUPPLEMENT            Guarantor Account (for Hospital Account #[id number])     Name Relation to Pt Service Area Active? Acct Type    Remigio Holly  FCS Yes Personal/Family    Address Phone          [address] [place] [address] [phone](H)  none(O)              Coverage Information (for Hospital Account #[id number])     1. MEDICARE/MEDICARE FOR HB SUPPLEMENT     F/O Payor/Plan Precert #    MEDICARE/MEDICARE FOR HB SUPPLEMENT     Subscriber Subscriber #    Remigio Holly  [id number]    Address Phone    ATT[employer] CLAIMS  [address]  [place] [address] [phone]          2. MEDICA/MEDICA PRIME SOLUTION     F/O Payor/Plan Precert #    MEDICA/MEDICA PRIME SOLUTION     Subscriber Subscriber #    Remigio Holly [id number]    Address Phone    [address] [address]  [place] [address] [phone]

## 2017-08-15 NOTE — IP AVS SNAPSHOT
"[address] MEDICAL SURGICAL: [phone]                                              INTERAGENCY TRANSFER FORM - PHYSICIAN ORDERS   8/15/2017                    Hospital Admission Date: 8/15/2017  SIENNA KENNEDY   : 1933  Sex: Male        Attending Provider: Jonathan Cardona DO     Allergies:  Mobic [Meloxicam]    Infection:  None   Service:  SURGERY    Ht:  1.854 m (6' 1\")   Wt:  78.5 kg (173 lb 1 oz)   Admission Wt:  75.8 kg (167 lb)    BMI:  22.83 kg/m 2   BSA:  2.01 m 2            Patient PCP Information     Provider PCP Type    Moy Celis MD General      ED Clinical Impression     Diagnosis Description Comment Added By Time Added    Status post total left knee replacement using cement [Z96.652] Status post total left knee replacement using cement [Z96.652]  Amador Cruz APRN CNP 2017  6:35 AM    Paroxysmal atrial fibrillation (H) [I48.0] Paroxysmal atrial fibrillation (H) [I48.0]  Marleni Barrett MD 2017  7:43 AM    Urinary retention with incomplete bladder emptying [R33.9] Urinary retention with incomplete bladder emptying [R33.9]  Marleni Barrett MD 2017  3:09 PM    Other insomnia [G47.09] Other insomnia [G47.09]  Marleni Barrett MD 2017  7:49 AM      Hospital Problems as of 2017              Priority Class Noted POA    Paroxysmal atrial fibrillation (H) Low  2010 Yes    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 Medium  2016 Yes    * (Principal)Status post total left knee replacement using cement Medium  8/15/2017 Yes    Near syncope Medium  2017 No    Thoracic aortic aneurysm without rupture (H) - 4.7 cm on 17 (4.5 cm in 8/15) Medium  2017 Yes    Orthostatic hypotension Medium  2017 Yes    Urinary retention with incomplete bladder emptying Medium  2017 No      Non-Hospital Problems as of 2017              Priority Class " Noted    Chest pain Medium  Unknown    Pain in joint, shoulder region Medium  Unknown    Abdominal aortic aneurysm (H) Medium  10/22/2002    Calculus of gallbladder Medium  1/9/2003    Closed fracture of calcaneus Medium  1/6/2007    Mixed hyperlipidemia Medium  3/24/2010    GERD (gastroesophageal reflux disease) Medium  3/26/2010    Gastric cancer (H) Low  8/5/2010    HYPERLIPIDEMIA LDL GOAL <100 Medium  10/31/2010    Advance Care Planning Medium  8/16/2012    Nephrolithiasis High  12/25/2012    Pain Medium  12/25/2012    CKD (chronic kidney disease) stage 3, GFR 30-59 ml/min Medium  5/5/2013    Hypoglycemia Medium  1/26/2015    Pernicious anemia Medium  4/19/2016    Postsurgical dumping syndrome Medium  12/19/2016    Primary osteoarthritis of both knees Medium  7/26/2017    Bilateral knee pain Medium  7/26/2017      Code Status History     Date Active Date Inactive Code Status Order ID Comments User Context    8/17/2017  6:42 AM  Full Code [id number]  Amador Cruz APRN CNP Outpatient    8/15/2017 10:49 AM 8/17/2017  6:42 AM Full Code [id number]  Jonathan Cardona,  Inpatient    12/25/2012  1:05 AM 12/25/2012  5:49 PM Full Code [id number]  Tara Ding, YOUSIF Inpatient    1/11/2007  3:27 PM 1/11/2007  3:27 PM None None survey mailed 1-11-07 Joceline Galarza Demographics         Medication Review      START taking        Dose / Directions Comments    acetaminophen 500 MG tablet   Commonly known as:  TYLENOL        Dose:  1000 mg   Take 2 tablets (1,000 mg) by mouth every 8 hours   Refills:  0        cyclobenzaprine 5 MG tablet   Commonly known as:  FLEXERIL        Dose:  5 mg   Take 1 tablet (5 mg) by mouth 3 times daily as needed for muscle spasms   Quantity:  42 tablet   Refills:  0        magnesium hydroxide 400 MG/5ML suspension   Commonly known as:  MILK OF MAGNESIA        Dose:  30 mL   Take 30 mLs by mouth daily as needed for constipation   Quantity:  105 mL   Refills:  0        oxyCODONE 5  MG IR tablet   Commonly known as:  ROXICODONE        Dose:  2.5 mg   Take 0.5 tablets (2.5 mg) by mouth every 4 hours as needed for moderate to severe pain   Quantity:  30 tablet   Refills:  0        polyethylene glycol Packet   Commonly known as:  MIRALAX/GLYCOLAX        Dose:  17 g   Take 17 g by mouth daily   Quantity:  7 packet   Refills:  0        rivaroxaban ANTICOAGULANT 15 MG Tabs tablet   Commonly known as:  XARELTO   Used for:  Paroxysmal atrial fibrillation (H)        Dose:  15 mg   Take 1 tablet (15 mg) by mouth daily   Refills:  0        senna-docusate 8.6-50 MG per tablet   Commonly known as:  SENOKOT-S;PERICOLACE        Dose:  2 tablet   Take 2 tablets by mouth 2 times daily May hold for loose stools   Quantity:  100 tablet   Refills:  0          CONTINUE these medications which may have CHANGED, or have new prescriptions. If we are uncertain of the size of tablets/capsules you have at home, strength may be listed as something that might have changed.        Dose / Directions Comments    melatonin 1 MG Caps   This may have changed:    - medication strength  - how much to take  - when to take this   Used for:  Other insomnia        Dose:  1 mg   Take 1 mg by mouth At Bedtime   Refills:  0          CONTINUE these medications which have NOT CHANGED        Dose / Directions Comments    blood glucose monitoring lancets   Used for:  Hypoglycemia        Use to test blood sugar 1 time daily or as directed.   Quantity:  1 Box   Refills:  2        blood glucose monitoring test strip   Commonly known as:  ROBERT CONTOUR NEXT   Used for:  Hypoglycemia        Use to test blood sugar 1 times daily or as directed.   Quantity:  100 each   Refills:  3        cyanocobalamin 1000 MCG/ML injection   Commonly known as:  VITAMIN B12   Used for:  Pernicious anemia        Dose:  1 mL   Inject 1 mL (1,000 mcg) into the muscle every 30 days 1 year of injections approved through 4/19/2017 per Dr. Ayon.   Quantity:  1 mL    Refills:  11          STOP taking     BENADRYL 25 MG capsule   Generic drug:  diphenhydrAMINE                     Further instructions from your care team       Appointment with Dr Tena in [place] Aug. 31st at 10:10.        Total Knee Replacement Discharge Instructions                                     [phone]  Bone and Joint Service Line for issues or concerns    General Care:  After surgery you may feel tired/sleepy. This is normal. If you have any question along the way please contact the office. If you feel it is an issue cannot wait for normal office h    Diet:  Start with non-alcoholic liquids at first, particularly water or sports drinks after surgery. Progress to bland foods such as crackers and bread and finally to your normal diet if you have no problems. Avoid alcohol when taking narcotic pain medications.      Pain control:  Take your pain medications as prescribed. These medications may make you sleepy. Do not drive, operate equipment, or drink alcohol when taking these.  You may take Tylenol (Generic name is acetaminophen) as directed on the bottle for additional relief or in place of the prescribed pain medications as your pain gets better. Do not take any other NSAIDs (Motrin, Ibuprofen, Aleve, Naproxen) while taking the blood thinner. If the medications cause a reaction such as nausea or skin rash, stop taking them and contact your doctor. Please plan accordingly, pain medications will not be re-filled on the weekends or at night. Call the office during the day if you need more medications.    Blood thinner:  It is very important to take it as prescribed. It is a medication to help prevent blood clots in your legs or lungs. No medication is perfect, so if you notice a sudden onset of pain/swelling in your calf area call your doctor. If you notice a sudden onset of troubles breathing and/or chest pain call 911.     Swelling (edema) control:  Preventing swelling (edema) in your legs  after surgery is very important. It is helpful for achieving optimal range of motion as well as preventing blood clots.  It starts with simple things such as elevation of your legs and icing. Elevate your legs above your heart.    Do this for 20 minutes every couple hours the first few days after surgery. We also recommend SANDRA hose (compression hose) to be worn. Wear on both legs during the day. You may remove at night. Wear these until directed to stop.      Icing:  It is common for some swelling, aching and stiffness to occur for up to 6-9 months after a knee replacement. If you knee swells, get off your feet, elevate your leg and apply some ice packs. Apply for 20 minutes at a time. For the first 1-2 weeks apply ice 2-3 x day or more after therapy.    Walker/crutches:  Use a walker/crutches when you go home. You will transition to the use of a cane and finally to no additional support.     Braces:  You will go home with a knee immobilizer. You can take it off when you are lying or sitting down. Wear it when you are walking. This immobilizer can come off after you are doing a straight leg raise. Your physician and or physical therapist will help to determine when to stop wearing it.     Physical Therapy:  The success of your knee replacement is based on doing physical therapy. You will have some pain and discomfort along the way. If you feel your pain is limiting your progress make sure to take some pain medication prior to your therapy session. If you pain medications are not working talk to your surgeon.   For the first 2 weeks after going home you will have in-home physical therapy. The goal is to work on range of motion. While it is important to working on bending your knee, it is equally important to make sure you knee comes out all the way straight. To assist in this do not place any bumps, pillows and or blankets under your knee when you are lying down. You should have an outpatient physical therapy  appointment scheduled for about 2 weeks after surgery.     Activity:  Unless otherwise instructed, you can weight bear as tolerated. While laying or sitting down you should straighten your knee all the way out and then gently work on bending the knee back. Do not worry at first if your knee feels stiff and will not bend like normal, this will get better. Never put a pillow or bump under you knee, instead put a pillow under your ankle so your knee will straighten out all the way.     Normal findings after surgery:  Numbness and tenderness around the incisions and to the outside of the incision is normal.  You may have bruising around the incisions and down the lower leg.   Your knee will be swollen for months after surgery. It will feel  tight  to move.   Low grade fevers less than 100.5 degrees Fahrenheit are normal.   You may have some minor swelling in the leg/calf area.  You will have some increased pain after your therapy sessions.     When to call the Office:  Temperature greater than 101.5 degrees Fahreheit.  Fever, chills, and increasing pain in the knee.  Excessive drainage from the incisions that include bright red blood.  Drainage from the incisions sites that appear yellow, pus-like, or foul smelling.  Increasing pain the knee not relieved by the prescribed pain medications or ice.  Persistent nausea or vomiting not helped by the Zofran.  Increased pain or swelling in your calf area (in back above your ankle) that wasn t there when in the hospital.  Any other effects you feel are significant.  Call 911 if you experience any chest pain and/or shortness or breath.        Summary of Visit     Reason for your hospital stay       Following knee replacement surgery on your left       Reason for your hospital stay       Following knee replacement surgery             After Care     Activity       Your activity upon discharge: activity as tolerated.  See AVS for more details       Activity - Ambulate in hallway        Every shift at a minimum.  Allow time to rest, ice, elevate extremity, do not place pillow directly under knee.  See discharge instructions for more information.       Activity - Up ad nena           Activity - Up with assistive device           Activity - Up with nursing assistance       Until felt safe to ambulate independently with assistive device       Advance Diet as Tolerated       Follow this diet upon discharge: Orders Placed This Encounter      Room Service      Advance Diet as Tolerated: Regular Diet Adult; (patient is on a high protein diet)      Diet       Bladder scan       X 2 for post void residual       Daily weights       Call Provider for weight gain of more than 2 pounds per day or 5 pounds per week.       Diet       Follow this diet upon discharge: Orders Placed This Encounter      Room Service      Advance Diet as Tolerated: Regular Diet Adult; (patient is on a high protein diet)       Discharge Instructions       See AVS       Fall precautions           General info for SNF       Length of Stay Estimate: Short Term Care: Estimated # of Days <30  Condition at Discharge: Improving  Level of care:skilled   Rehabilitation Potential: Good  Admission H&P remains valid and up-to-date: Yes  Recent Chemotherapy: N/A  Use Nursing Home Standing Orders: Yes       General info for SNF       Length of Stay Estimate: Short Term Care: Estimated # of Days <30  Condition at Discharge: Improving  Level of care:skilled   Rehabilitation Potential: Excellent  Admission H&P remains valid and up-to-date: Yes  Recent Chemotherapy: N/A  Use Nursing Home Standing Orders: Yes       Glucose monitor nursing POCT       Before meals and at bedtime       Intake and output       Every shift       Mantoux instructions       Give two-step Mantoux (PPD) Per Facility Policy Yes       NO CPM           Weight bearing status           Wound care (specify)       Site:   Left knee  Instructions:  Keep incision covered with hospital  dressing (Aquacel) for one week. It is okay to shower with this dressing on. However, do not submerge your dressing and incision in water for roughly 2 weeks. After one week remove this dressing and then keep it clean, dry, and covered. If this dressing become looses or falls off it is ok to cover with gauze and tape.  Wash the incision gently with warm soapy water after removing your hospital dressing and lightly pat dry.       Wound care and dressings       Keep incision covered with hospital dressing (Aquacel) for one week. It is okay to shower with this dressing on. However, do not submerge your dressing and incision in water for roughly 2 weeks. After one week remove this dressing and then keep it clean, dry, and covered. If this dressing become looses or falls off it is ok to cover with gauze and tape.  Wash the incision gently with warm soapy water after removing your hospital dressing and lightly pat dry.             Referrals     CARDIOLOGY EVAL ADULT REFERRAL       Your provider has referred you to:  FM: [facility] [phone]   [email or web address]    Please be aware that coverage of these services is subject to the terms and limitations of your health insurance plan.  Call member services at your health plan with any benefit or coverage questions.      Type of Referral:  New Cardiology Consult    Timeframe requested:  Within 1 month    Please bring the following to your appointment:  >>   Any x-rays, CTs or MRIs which have been performed.  Contact the facility where they were done to arrange for  prior to your scheduled appointment.    >>   List of current medications  >>   This referral request   >>   Any documents/labs given to you for this referral       Med Therapy Manage Referral       Your provider has referred you to: **[facility] Scheduling (numerous locations) [phone]   [email or web address]    Reason for Referral: MARK, patient needs continued education regarding medications    The [facility] department will contact you to schedule an appointment.  You may also schedule the appointment by calling [phone].  For [facility] [place] patients, please call [phone] to confirm/schedule your appointment on the next business day.    This service is designed to help you get the most from your medications.  A specially trained Pharmacist will work closely with you and your providers to solve any questions, concerns, issues or problems related to your medications.    Please bring all of your prescription and non-prescription medications (such as vitamins, over-the-counter medications, and herbals) or a detailed medication list to your appointment.    If you have a glucose meter or other home monitoring information, please also bring this to your appointment (i.e. blood glucose log, blood pressure log, pain log, etc.).       Physical Therapy Adult Consult       Evaluate and treat as clinically indicated.    Reason:  S/p left knee replacement, gait abnormality       Physical Therapy Referral       *This therapy referral will be filtered to a centralized scheduling office at [facility] and the patient will receive a call to schedule an appointment at a [facility] location most convenient for them. *     Please contact patient to set up seamless transition from 14 days of in home physical therapy to outpatient therapy.  Patient prefers [place].     [facility] provides Physical Therapy evaluation and treatment and many specialty services across the [facility] system.  If requesting a specialty program, please choose from the list below.    If you have not heard from the scheduling office within 2 business days, please call [phone] for all locations, with the  "exception of [place], please call [phone].  Treatment: Evaluation & Treatment  Special Instructions/Modalities: Follow s/p knee replacement protocol    Please be aware that coverage of these services is subject to the terms and limitations of your health insurance plan.  Call member services at your health plan with any benefit or coverage questions.      **Note to Provider:  If you are referring outside of [facility] for the therapy appointment, please list the name of the location in the \"special instructions\" above, print the referral and give to the patient to schedule the appointment.       UROLOGY ADULT REFERRAL       Your provider has referred you to: N: [facility] [phone]   [email or web address]    Please be aware that coverage of these services is subject to the terms and limitations of your health insurance plan.  Call member services at your health plan with any benefit or coverage questions.      Please bring the following with you to your appointment:    (1) Any X-Rays, CTs or MRIs which have been performed.  Contact the facility where they were done to arrange for  prior to your scheduled appointment.    (2) List of current medications  (3) This referral request   (4) Any documents/labs given to you for this referral             Supplies     AntiEmbolism Stockings       Bilateral below knee length.On in the morning, off at night             Your next 10 appointments already scheduled     Aug 30, 2017  2:00 PM CDT   New Visit with Ricky Arroyo MD   [facility] ([address] [id number] [phone]            Aug 31, 2017  8:50 AM CDT   Return Visit with Jonathan Cardona DO   [facility] ([address] [id number] [phone]              Follow-Up Appointment Instructions     Future Labs/Procedures    Follow " Up and recommended labs and tests     Comments:    1.  Follow up with cardiology , at (location with clinic name or city) [place], within as soon as able regarding your ongoing intermittent irregularly irregular heart rhythm  2.  Follow up with urology in 1-2 weeks for removal of the magallanes and discussion of any further treatment that might be needed    Follow-up and recommended labs and tests      Comments:    Follow up with Dr. Cardona , at (location with [facility] name or [facility]) [place] or [place], within 14  to evaluate after surgery. The following labs/tests are recommended: xray of your knees.      Follow-Up Appointment Instructions     Follow Up and recommended labs and tests       1.  Follow up with cardiology , at (location with clinic name or [facility]) [place], within as soon as able regarding your ongoing intermittent irregularly irregular heart rhythm  2.  Follow up with urology in 1-2 weeks for removal of the magallanes and discussion of any further treatment that might be needed       Follow-up and recommended labs and tests        Follow up with Dr. Cardona , at (location with clinic name or [facility]) [place] or [place], within 14  to evaluate after surgery. The following labs/tests are recommended: xray of your knees.             Statement of Approval     Ordered          08/22/17 0802  I have reviewed and agree with all the recommendations and orders detailed in this document.  EFFECTIVE NOW     Approved and electronically signed by:  Marleni Barrett MD

## 2017-08-15 NOTE — OP NOTE
DATE OF PROCEDURE:  08/15/2017.      PREOPERATIVE DIAGNOSIS:  Left knee primary osteoarthritis.      POSTOPERATIVE DIAGNOSIS:  Left knee primary osteoarthritis.      PROCEDURE:  Left total knee replacement.      SURGEON:  Jonathan Cardona DO      FIRST ASSISTANT:  Timothy Cruz, nurse practitioner (He was utilized throughout the procedure due to the case with positioning, soft tissue retraction, placement of the trial and final implants and he provided skin closure.)      ANESTHESIA:  Spinal converted to general.      ESTIMATED BLOOD LOSS:  Less than 10 mL.      FLUIDS:  1600 mL crystalloids.      URINE OUTPUT:  350 mm.      TOURNIQUET TIME AND PRESSURE:  1 hour 43 minutes.      COUNTS:  Correct.      DISPOSITION:  PACU in stable condition.      SPECIFICATIONS:  Include DePuy Attune size 8 posterior stabilized femoral component with a size 8 rotating platform tibial baseplate, a 5 mm polyethylene insert and a 38 mm patellar button.      GROSS FINDINGS:  There was bone-on-bone arthritis, medial compartment.  There was also full-thickness chondral loss of the lateral compartment and patellofemoral.      INDICATIONS:  Remigio Holly is an 83-year-old male with complaints of left knee pain.  Pain has been present for many years.  Both knees have actually been hurting.  He had attempted rest, activity modification, over-the-counter analgesic cream, intra-articular steroid injections.  He continued to have pain with activity.  Radiographs are consistent with his clinical exam.  Previous injections did not provide any relief.  Given his pain impacting the quality of life, we discussed total knee replacement.  We had a lengthy discussion regarding risks and benefits.  Once thoroughly reviewed, he opted to proceed surgically.      DETAILS OF PROCEDURE:  He was met preoperatively.  Again, informed consent was verified.  Appropriate extremity was marked.  He was wheeled to OP suite #1, transferred off the cart to the OR table  without incidence.  All bony prominences were padded.  When deemed appropriate by Anesthesia, left lower extremity was sterilely prepped and draped in a normal manner.  Prior to incision, a timeout was performed and the patient, surgery and extremity were verified and antibiotics administered.  The HemaClear tourniquet was applied.  A midline skin incision was performed followed with a medial parapatellar arthrotomy.  The knee was gently flexed up.  Retractors were placed, protecting the collateral ligaments throughout the case.  A drill was utilized to enter the intramedullary canal of the distal femur with no issues.  The distal jig was placed.  It was set in 5 degrees of valgus, taking 9 mm off distally.  This cut was then performed on the distal femur with no issues.  The tibia was then approached.  The extramedullary tibia device set to take 4 mm off the medial side, which was the low side, was placed into position.  Care was taken to recreate the posterior slope, as well as the appropriate varus and valgus alignment.  This jig was pinned into position.  With the collateral ligaments and the posterior neurovascular structures protected, the osteotomy was performed of both medial and lateral aspects of the tibia.  The knee was brought out to extension.  A spacer block was placed.  It showed full extension and was balanced in extension.  This recreated anatomic alignment.  The block was removed.  The knee was gently flexed up.  The sizing guide was placed on the distal femur.  Rotation was based on the [place] line and epicondylar axis.  This was pinned into position and measured a size 8.  The size eight 4-in-1 block was placed.  The knee was brought out to approximately 90 degrees, showing a nice rectangular space, with a gap that was equal and consistent with his extension gap.  The cuts were performed with no issues.  This was followed with a box cut.  Excess bone and meniscus remnants removed.  Trial  implants were placed.  The knee was taken from 0-130 degrees of range of motion with no instability through the arc of motion.  At 90 degrees, I was able to take a Toussaint and he had equal, approximately 1 mm play both medial and lateral aspects.  This was consistent with his extension space.  Patella tracked midline with a no-thumbs technique.  The patella was then resurfaced back down to anatomic dimensions using a freehand technique, and again, it was taken through range of motion, showing it tracking nicely with no thumbs.  The tibia and femur were then prepared.  The final components as listed above were cemented into position and held in extension until the cement cured.  During this process, a dilute Betadine lavage was performed followed by pulse lavage.  The knee was inspected.  All the cement fragments had been removed that were visible.  The arthrotomy was closed with 0 Vicryl, followed with 2-0 Vicryl subcutaneous, followed with a running 4-0 Monocryl suture and Dermabond on the skin.  A sterile bandage was applied and he subsequently transferred to the PACU in stable condition.      PLAN:  He will be admitted to the hospital for continued orthopedic care, DVT prophylaxis and pain management.         STANISLAW YEUNG DO             D: 08/15/2017 10:54   T: 08/15/2017 11:20   MT: TS      Name:     SIENNA KENNEDY   MRN:      [id number]        Account:        [id number]   :      1933           Procedure Date: 08/15/2017      Document: [id number]       cc: Stanislaw Yeung DO

## 2017-08-15 NOTE — ANESTHESIA CARE TRANSFER NOTE
Patient: Remigio Holly    Procedure(s):  Left total knee replacement - Wound Class: I-Clean    Diagnosis: chronic pain of both knees primary osteoarthritis of both knees  Diagnosis Additional Information: No value filed.    Anesthesia Type:   Spinal, General     Note:  Airway :Face Mask  Patient transferred to:PACU  Comments: VSS, pt alert and conversive. Pt not complaining of any pain. Will follow as needed.      Vitals: (Last set prior to Anesthesia Care Transfer)    CRNA VITALS  8/15/2017 0927 - 8/15/2017 1027      8/15/2017             Pulse: 84    SpO2: 100 %                Electronically Signed By: Carolynn Song  August 15, 2017  11:40 AM

## 2017-08-15 NOTE — IP AVS SNAPSHOT
MRN:[id number]                      After Visit Summary   8/15/2017    Remigio Holly    MRN: [id number]           Thank you!     Thank you for choosing [facility] for your care. Our goal is always to provide you with excellent care. Hearing back from our patients is one way we can continue to improve our services. Please take a few minutes to complete the written survey that you may receive in the mail after you visit with us. Thank you!        Patient Information     Date Of Birth          11/6/1933        Designated Caregiver       Most Recent Value    Caregiver    Will someone help with your care after discharge? yes    Name of designated caregiver prema    Phone number of caregiver [phone]    Caregiver address [place], [place]      About your hospital stay     You were admitted on:  August 15, 2017 You last received care in the:  [address] [facility]    You were discharged on:  August 22, 2017        Reason for your hospital stay       Following knee replacement surgery on your left            Reason for your hospital stay       Following knee replacement surgery                  Who to Call     For medical emergencies, please call 911.  For non-urgent questions about your medical care, please call your primary care provider or clinic, [phone]  For questions related to your surgery, please call your surgery clinic        Attending Provider     Provider Jonathan Coates, DO Orthopedics       Primary Care Provider Office Phone # Fax #    Moy Celis [phone]       When to contact your care team       When to call the Office:  Temperature greater than 101.5 degrees Fahreheit.  Fever, chills, and increasing pain in the knee.  Excessive drainage from the incisions that include bright red blood.  Drainage from the incisions sites that appear yellow, pus-like, or foul smelling.  Increasing pain the knee not relieved by  the prescribed pain medications or ice.   Persistent nausea or vomiting not helped by the Zofran.  Increased pain or swelling in your calf area (in back above your ankle) that wasn't there when in the hospital.  Any other effects you feel are significant.  Call 911 if you experience any chest pain and/or shortness or breath.                  After Care Instructions     Activity       Your activity upon discharge: activity as tolerated.  See AVS for more details            Activity - Ambulate in hallway       Every shift at a minimum.  Allow time to rest, ice, elevate extremity, do not place pillow directly under knee.  See discharge instructions for more information.            Activity - Up ad nena           Activity - Up with assistive device           Activity - Up with nursing assistance       Until felt safe to ambulate independently with assistive device            Advance Diet as Tolerated       Follow this diet upon discharge: Orders Placed This Encounter      Room Service      Advance Diet as Tolerated: Regular Diet Adult; (patient is on a high protein diet)      Diet            Bladder scan       X 2 for post void residual            Daily weights       Call Provider for weight gain of more than 2 pounds per day or 5 pounds per week.            Diet       Follow this diet upon discharge: Orders Placed This Encounter      Room Service      Advance Diet as Tolerated: Regular Diet Adult; (patient is on a high protein diet)            Discharge Instructions       See AVS            Fall precautions           General info for SNF       Length of Stay Estimate: Short Term Care: Estimated # of Days <30  Condition at Discharge: Improving  Level of care:skilled   Rehabilitation Potential: Good  Admission H&P remains valid and up-to-date: Yes  Recent Chemotherapy: N/A  Use Nursing Home Standing Orders: Yes            General info for SNF       Length of Stay Estimate: Short Term Care: Estimated # of Days  <30  Condition at Discharge: Improving  Level of care:skilled   Rehabilitation Potential: Excellent  Admission H&P remains valid and up-to-date: Yes  Recent Chemotherapy: N/A  Use Nursing Home Standing Orders: Yes            Glucose monitor nursing POCT       Before meals and at bedtime            Intake and output       Every shift            Mantoux instructions       Give two-step Mantoux (PPD) Per Facility Policy Yes            NO CPM           Weight bearing status           Wound care (specify)       Site:   Left knee  Instructions:  Keep incision covered with hospital dressing (Aquacel) for one week. It is okay to shower with this dressing on. However, do not submerge your dressing and incision in water for roughly 2 weeks. After one week remove this dressing and then keep it clean, dry, and covered. If this dressing become looses or falls off it is ok to cover with gauze and tape.  Wash the incision gently with warm soapy water after removing your hospital dressing and lightly pat dry.            Wound care and dressings       Keep incision covered with hospital dressing (Aquacel) for one week. It is okay to shower with this dressing on. However, do not submerge your dressing and incision in water for roughly 2 weeks. After one week remove this dressing and then keep it clean, dry, and covered. If this dressing become looses or falls off it is ok to cover with gauze and tape.  Wash the incision gently with warm soapy water after removing your hospital dressing and lightly pat dry.                  Follow-up Appointments     Follow Up and recommended labs and tests       1.  Follow up with cardiology , at (location with clinic name or city) [facility], [facility] as soon as able regarding your ongoing intermittent irregularly irregular heart rhythm  2.  Follow up with urology in 1-2 weeks for removal of the magallanes and discussion of any further treatment that might be needed            Follow-up and recommended labs  and tests        Follow up with Dr. Cardona , at (location with clinic name or city) [place] or [place], within 14  to evaluate after surgery. The following labs/tests are recommended: xray of your knees.                  Your next 10 appointments already scheduled     Aug 30, 2017  2:00 PM CDT   [facility] Visit [facility] Ricky [facility]o MD   [facility]    [address] [place] [address] [id number] [phone]            Aug 31, 2017  8:50 AM CDT   Return Visit with Jonathan Cardona DO   [facility] ([facility])    [address] [place] [address] [id number] [phone]              Additional Services     CARDIOLOGY EVAL ADULT REFERRAL       Your provider has referred you to:  [facility]: [facility] [phone]   [email or web address]    Please be aware that coverage of these services is subject to the terms and limitations of your health insurance plan.  Call member services at your health plan with any benefit or coverage questions.      Type of Referral:  New Cardiology Consult    Timeframe requested:  Within 1 month    Please bring the following to your appointment:  >>   Any x-rays, CTs or MRIs which have been performed.  Contact the facility where they were done to arrange for  prior to your scheduled appointment.    >>   List of current medications  >>   This referral request   >>   Any documents/labs given to you for this referral            Med Therapy Manage Referral       Your provider has referred you to: **[facility] Scheduling (numerous locations) [phone]   [email or web address]    Reason for Referral: MARK, patient needs continued education regarding medications    The [facility] department will contact you to  schedule an appointment.  You may also schedule the appointment by calling [phone].  For [facility] patients, please call [phone] to confirm/schedule your appointment on the next business day.    This service is designed to help you get the most from your medications.  A specially trained Pharmacist will work closely with you and your providers to solve any questions, concerns, issues or problems related to your medications.    Please bring all of your prescription and non-prescription medications (such as vitamins, over-the-counter medications, and herbals) or a detailed medication list to your appointment.    If you have a glucose meter or other home monitoring information, please also bring this to your appointment (i.e. blood glucose log, blood pressure log, pain log, etc.).            Physical Therapy Adult Consult       Evaluate and treat as clinically indicated.    Reason:  S/p left knee replacement, gait abnormality            Physical Therapy Referral       *This therapy referral will be filtered to a centralized scheduling office at [facility] and the patient will receive a call to schedule an appointment at a [facility] location most convenient for them. *     Please contact patient to set up seamless transition from 14 days of in home physical therapy to outpatient therapy.  Patient prefers [facility].     [facility] provides Physical Therapy evaluation and treatment and many specialty services across the [facility] system.  If requesting a specialty program, please choose from the list below.    If you have not heard from the scheduling office within 2 business days, please call [phone] for all locations, with the exception of [place], please call [phone].  Treatment: Evaluation & Treatment  Special Instructions/Modalities: Follow s/p knee replacement protocol    Please be aware that coverage of these services is subject to the  "terms and limitations of your health insurance plan.  Call member services at your health plan with any benefit or coverage questions.      **Note to Provider:  If you are referring outside of [facility] for the therapy appointment, please list the name of the location in the \"special instructions\" above, print the referral and give to the patient to schedule the appointment.            UROLOGY ADULT REFERRAL       Your provider has referred you to: [facility]: [facility] [phone]   [email or web address]    Please be aware that coverage of these services is subject to the terms and limitations of your health insurance plan.  Call member services at your health plan with any benefit or coverage questions.      Please bring the following with you to your appointment:    (1) Any X-Rays, CTs or MRIs which have been performed.  Contact the facility where they were done to arrange for  prior to your scheduled appointment.    (2) List of current medications  (3) This referral request   (4) Any documents/labs given to you for this referral                  Future tests that were ordered for you     AntiEmbolism Stockings       Bilateral below knee length.On in the morning, off at night                  Further instructions from your care team       Appointment with Dr Tena in [place] Aug. 31st at 10:10.        [facility] Knee Replacement Discharge Instructions                                     [phone]  Bone and Joint Service Line for issues or concerns    General Care:  After surgery you may feel tired/sleepy. This is normal. If you have any question along the way please contact the office. If you feel it is an issue cannot wait for normal office h    Diet:  Start with non-alcoholic liquids at first, particularly water or sports drinks after surgery. Progress to bland foods such as crackers and bread and finally to your normal diet if you have no problems. Avoid alcohol when taking " narcotic pain medications.      Pain control:  Take your pain medications as prescribed. These medications may make you sleepy. Do not drive, operate equipment, or drink alcohol when taking these.  You may take Tylenol (Generic name is acetaminophen) as directed on the bottle for additional relief or in place of the prescribed pain medications as your pain gets better. Do not take any other NSAIDs (Motrin, Ibuprofen, Aleve, Naproxen) while taking the blood thinner. If the medications cause a reaction such as nausea or skin rash, stop taking them and contact your doctor. Please plan accordingly, pain medications will not be re-filled on the weekends or at night. Call the office during the day if you need more medications.    Blood thinner:  It is very important to take it as prescribed. It is a medication to help prevent blood clots in your legs or lungs. No medication is perfect, so if you notice a sudden onset of pain/swelling in your calf area call your doctor. If you notice a sudden onset of troubles breathing and/or chest pain call 911.     Swelling (edema) control:  Preventing swelling (edema) in your legs after surgery is very important. It is helpful for achieving optimal range of motion as well as preventing blood clots.  It starts with simple things such as elevation of your legs and icing. Elevate your legs above your heart.    Do this for 20 minutes every couple hours the first few days after surgery. We also recommend SANDRA hose (compression hose) to be worn. Wear on both legs during the day. You may remove at night. Wear these until directed to stop.      Icing:  It is common for some swelling, aching and stiffness to occur for up to 6-9 months after a knee replacement. If you knee swells, get off your feet, elevate your leg and apply some ice packs. Apply for 20 minutes at a time. For the first 1-2 weeks apply ice 2-3 x day or more after therapy.    Walker/crutches:  Use a walker/crutches when you go  home. You will transition to the use of a cane and finally to no additional support.     Braces:  You will go home with a knee immobilizer. You can take it off when you are lying or sitting down. Wear it when you are walking. This immobilizer can come off after you are doing a straight leg raise. Your physician and or physical therapist will help to determine when to stop wearing it.     Physical Therapy:  The success of your knee replacement is based on doing physical therapy. You will have some pain and discomfort along the way. If you feel your pain is limiting your progress make sure to take some pain medication prior to your therapy session. If you pain medications are not working talk to your surgeon.   For the first 2 weeks after going home you will have in-home physical therapy. The goal is to work on range of motion. While it is important to working on bending your knee, it is equally important to make sure you knee comes out all the way straight. To assist in this do not place any bumps, pillows and or blankets under your knee when you are lying down. You should have an outpatient physical therapy appointment scheduled for about 2 weeks after surgery.     Activity:  Unless otherwise instructed, you can weight bear as tolerated. While laying or sitting down you should straighten your knee all the way out and then gently work on bending the knee back. Do not worry at first if your knee feels stiff and will not bend like normal, this will get better. Never put a pillow or bump under you knee, instead put a pillow under your ankle so your knee will straighten out all the way.     Normal findings after surgery:  Numbness and tenderness around the incisions and to the outside of the incision is normal.  You may have bruising around the incisions and down the lower leg.   Your knee will be swollen for months after surgery. It will feel  tight  to move.   Low grade fevers less than 100.5 degrees Fahrenheit are  "normal.   You may have some minor swelling in the leg/calf area.  You will have some increased pain after your therapy sessions.     When to call the Office:  Temperature greater than 101.5 degrees Fahreheit.  Fever, chills, and increasing pain in the knee.  Excessive drainage from the incisions that include bright red blood.  Drainage from the incisions sites that appear yellow, pus-like, or foul smelling.  Increasing pain the knee not relieved by the prescribed pain medications or ice.  Persistent nausea or vomiting not helped by the Zofran.  Increased pain or swelling in your calf area (in back above your ankle) that wasn t there when in the hospital.  Any other effects you feel are significant.  Call 911 if you experience any chest pain and/or shortness or breath.        Pending Results     No orders found from 8/13/2017 to 8/16/2017.            Statement of Approval     Ordered          08/22/17 0802  I have reviewed and agree with all the recommendations and orders detailed in this document.  EFFECTIVE NOW     Approved and electronically signed by:  Marleni Barrett MD             Admission Information     Date & Time Provider Department Dept. Phone    8/15/2017 Jonathan Cardona DO [address] [facility] [phone]      Your Vitals Were     Blood Pressure Pulse Temperature Respirations Height Weight    137/52 (BP Location: Right arm) 88 98.1  F (36.7  C) (Oral) 16 1.854 m (6' 1\") 78.5 kg (173 lb 1 oz)    Pulse Oximetry BMI (Body Mass Index)                98% 22.83 kg/m2          MyChart Information     [employer] gives you secure access to your electronic health record. If you see a primary care provider, you can also send messages to your care team and make appointments. If you have questions, please call your primary care clinic.  If you do not have a primary care provider, please call [phone] and they will assist you.        Care EveryWhere ID     This is your Care " EveryWhere ID. This could be used by other organizations to access your [facility] [place] medical records  [id number]        Equal Access to Services     ERICA RUSSELL : Hadii suzy Tiwari, wacrystalda lupratikgregha, qaradhata kaalmada niravclaude, waxluis parker denyscorinna gastelumshreyas james meliza mendezRashard Hoang [facility] [phone].    ATENCIÓN: Si habla español, tiene a atbares disposición servicios gratuitos de asistencia lingüística. Llame al [phone].    We comply with applicable federal civil rights laws and [place] laws. We do not discriminate on the basis of race, color, national origin, age, disability sex, sexual orientation or gender identity.               Review of your medicines      START taking        Dose / Directions    acetaminophen 500 MG tablet   Commonly known as:  TYLENOL        Dose:  1000 mg   Take 2 tablets (1,000 mg) by mouth every 8 hours   Refills:  0       cyclobenzaprine 5 MG tablet   Commonly known as:  FLEXERIL        Dose:  5 mg   Take 1 tablet (5 mg) by mouth 3 times daily as needed for muscle spasms   Quantity:  42 tablet   Refills:  0       magnesium hydroxide 400 MG/5ML suspension   Commonly known as:  MILK OF MAGNESIA        Dose:  30 mL   Take 30 mLs by mouth daily as needed for constipation   Quantity:  105 mL   Refills:  0       oxyCODONE 5 MG IR tablet   Commonly known as:  ROXICODONE        Dose:  2.5 mg   Take 0.5 tablets (2.5 mg) by mouth every 4 hours as needed for moderate to severe pain   Quantity:  30 tablet   Refills:  0       polyethylene glycol Packet   Commonly known as:  MIRALAX/GLYCOLAX        Dose:  17 g   Take 17 g by mouth daily   Quantity:  7 packet   Refills:  0       rivaroxaban ANTICOAGULANT 15 MG Tabs tablet   Commonly known as:  XARELTO   Used for:  Paroxysmal atrial fibrillation (H)        Dose:  15 mg   Take 1 tablet (15 mg) by mouth daily   Refills:  0       senna-docusate 8.6-50 MG per tablet   Commonly known as:  SENOKOT-S;PERICOLACE        Dose:  2 tablet   Take 2 tablets by  mouth 2 times daily May hold for loose stools   Quantity:  100 tablet   Refills:  0         CONTINUE these medicines which may have CHANGED, or have new prescriptions. If we are uncertain of the size of tablets/capsules you have at home, strength may be listed as something that might have changed.        Dose / Directions    melatonin 1 MG Caps   This may have changed:    - medication strength  - how much to take  - when to take this   Used for:  Other insomnia        Dose:  1 mg   Take 1 mg by mouth At Bedtime   Refills:  0         CONTINUE these medicines which have NOT CHANGED        Dose / Directions    blood glucose monitoring lancets   Used for:  Hypoglycemia        Use to test blood sugar 1 time daily or as directed.   Quantity:  1 Box   Refills:  2       blood glucose monitoring test strip   Commonly known as:  ROBERT CONTOUR NEXT   Used for:  Hypoglycemia        Use to test blood sugar 1 times daily or as directed.   Quantity:  100 each   Refills:  3       cyanocobalamin 1000 MCG/ML injection   Commonly known as:  VITAMIN B12   Used for:  Pernicious anemia        Dose:  1 mL   Inject 1 mL (1,000 mcg) into the muscle every 30 days 1 year of injections approved through 4/19/2017 per Dr. Ayon.   Quantity:  1 mL   Refills:  11         STOP taking     BENADRYL 25 MG capsule   Generic drug:  diphenhydrAMINE                Where to get your medicines      Some of these will need a paper prescription and others can be bought over the counter. Ask your nurse if you have questions.     Bring a paper prescription for each of these medications     cyclobenzaprine 5 MG tablet    oxyCODONE 5 MG IR tablet       You don't need a prescription for these medications     acetaminophen 500 MG tablet    magnesium hydroxide 400 MG/5ML suspension    melatonin 1 MG Caps    polyethylene glycol Packet    rivaroxaban ANTICOAGULANT 15 MG Tabs tablet    senna-docusate 8.6-50 MG per tablet                Protect others around you:  Learn how to safely use, store and throw away your medicines at www.disposemymeds.org.             Medication List: This is a list of all your medications and when to take them. Check marks below indicate your daily home schedule. Keep this list as a reference.      Medications           Morning Afternoon Evening Bedtime As Needed    acetaminophen 500 MG tablet   Commonly known as:  TYLENOL   Take 2 tablets (1,000 mg) by mouth every 8 hours   Last time this was given:  1,000 mg on 8/22/2017  2:14 AM                                      blood glucose monitoring lancets   Use to test blood sugar 1 time daily or as directed.                                blood glucose monitoring test strip   Commonly known as:  Mingyian CONTOUR NEXT   Use to test blood sugar 1 times daily or as directed.                                cyanocobalamin 1000 MCG/ML injection   Commonly known as:  VITAMIN B12   Inject 1 mL (1,000 mcg) into the muscle every 30 days 1 year of injections approved through 4/19/2017 per Dr. Ayon.                                cyclobenzaprine 5 MG tablet   Commonly known as:  FLEXERIL   Take 1 tablet (5 mg) by mouth 3 times daily as needed for muscle spasms   Last time this was given:  5 mg on 8/20/2017  8:56 PM                                   magnesium hydroxide 400 MG/5ML suspension   Commonly known as:  MILK OF MAGNESIA   Take 30 mLs by mouth daily as needed for constipation   Last time this was given:  30 mLs on 8/21/2017  7:42 AM                                   melatonin 1 MG Caps   Take 1 mg by mouth At Bedtime                                   oxyCODONE 5 MG IR tablet   Commonly known as:  ROXICODONE   Take 0.5 tablets (2.5 mg) by mouth every 4 hours as needed for moderate to severe pain   Last time this was given:  2.5 mg on 8/22/2017  2:20 AM                                   polyethylene glycol Packet   Commonly known as:  MIRALAX/GLYCOLAX   Take 17 g by mouth daily   Last time this was given:   17 g on 8/21/2017 10:43 AM                                   rivaroxaban ANTICOAGULANT 15 MG Tabs tablet   Commonly known as:  XARELTO   Take 1 tablet (15 mg) by mouth daily   Last time this was given:  15 mg on 8/21/2017  7:38 AM                                   senna-docusate 8.6-50 MG per tablet   Commonly known as:  SENOKOT-S;PERICOLACE   Take 2 tablets by mouth 2 times daily May hold for loose stools   Last time this was given:  2 tablets on 8/21/2017  8:28 PM

## 2017-08-15 NOTE — PROGRESS NOTES
S-(situation): Patient arrives to room 270 via cart from OR    B-(background): LTKA    A-(assessment): VSS, afebrile RA, CO2 35-45, denies pain and or n/v, good CMS.  Numbness and tingling to the lower extremity.      R-(recommendations): Orders reviewed with patient and spouse. Will monitor patient per MD orders.     Inpatient nursing criteria listed below were met:    Health care directives status obtained and documented: Yes  Core Measures assessed (SSI): Yes  SCD's Documented: Yes  Vaccine assessment done and vaccines ordered if appropriate: Yes  Skin issues/needs documented:NA  Isolation needs addressed, if appropriate: Yes  Fall Prevention: Care plan updated, Education given and documented Yes  MRSA swab completed for patient 55 years and older (exclude REBECCA and TKA): NA  My Chart patient sign up addressed and documented: Yes  Care Plan initiated: Yes  Education Assessment documented:Yes  Education Documented (Pre-existing chronic infection such as, MRSA/VRE need education on admission): Yes  New medication patient education completed and documented (Possible Side Effects of Common Medications handout): Yes  Home medications if not able to send immediately home with family stored here: NA   Reminder note placed in discharge instructions: NA  Discharge planning review completed (admission navigator) Yes

## 2017-08-15 NOTE — IP AVS SNAPSHOT
"` `           [address] MEDICAL SURGICAL: [phone]                                              INTERAGENCY TRANSFER FORM - NURSING   8/15/2017                    Hospital Admission Date: 8/15/2017  SIENNA KENNEDY   : 1933  Sex: Male        Attending Provider: Jonathan Cardona DO     Allergies:  Mobic [Meloxicam]    Infection:  None   Service:  SURGERY    Ht:  1.854 m (6' 1\")   Wt:  78.5 kg (173 lb 1 oz)   Admission Wt:  75.8 kg (167 lb)    BMI:  22.83 kg/m 2   BSA:  2.01 m 2            Patient PCP Information     Provider PCP Type    Moy Celis MD General      Current Code Status     Date Active Code Status Order ID Comments User Context       Prior      Code Status History     Date Active Date Inactive Code Status Order ID Comments User Context    2017  6:42 AM  Full Code [id number]  Amador Cruz APRN CNP Outpatient    8/15/2017 10:49 AM 2017  6:42 AM Full Code [id number]  Jonathan Cardona DO Inpatient    2012  1:05 AM 2012  5:49 PM Full Code [id number]  Tara Ding, YOUSIF Inpatient    2007  3:27 PM 2007  3:27 PM None None survey mailed 07 Joceline Galarza Demographics      Advance Directives        Does patient have a scanned Advance Directive/ACP document in EPIC?           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 Medium  2016 Yes    * (Principal)Status post total left knee replacement using cement Medium  8/15/2017 Yes    Near syncope Medium  2017 No    Thoracic aortic aneurysm without rupture (H) - 4.7 cm on 17 (4.5 cm in 8/15) Medium  2017 Yes    Orthostatic hypotension Medium  2017 Yes    Urinary retention with incomplete bladder emptying Medium  2017 No      Non-Hospital Problems as of 2017              " Priority Class Noted    Chest pain Medium  Unknown    Pain in joint, shoulder region Medium  Unknown    Abdominal aortic aneurysm (H) Medium  10/22/2002    Calculus of gallbladder Medium  1/9/2003    Closed fracture of calcaneus Medium  1/6/2007    Mixed hyperlipidemia Medium  3/24/2010    GERD (gastroesophageal reflux disease) Medium  3/26/2010    Gastric cancer (H) Low  8/5/2010    HYPERLIPIDEMIA LDL GOAL <100 Medium  10/31/2010    Advance Care Planning Medium  8/16/2012    Nephrolithiasis High  12/25/2012    Pain Medium  12/25/2012    CKD (chronic kidney disease) stage 3, GFR 30-59 ml/min Medium  5/5/2013    Hypoglycemia Medium  1/26/2015    Pernicious anemia Medium  4/19/2016    Postsurgical dumping syndrome Medium  12/19/2016    Primary osteoarthritis of both knees Medium  7/26/2017    Bilateral knee pain Medium  7/26/2017      Immunizations     Name Date      Influenza (High Dose) 3 valent vaccine 11/04/16     Influenza (High Dose) 3 valent vaccine 10/15/15     Influenza (High Dose) 3 valent vaccine 10/01/14     Influenza (High Dose) 3 valent vaccine 10/08/13     Influenza (High Dose) 3 valent vaccine 09/21/12     Influenza (High Dose) 3 valent vaccine 09/29/11     Influenza (High Dose) 3 valent vaccine 10/22/10     Influenza (IIV3) 10/01/10     Influenza (IIV3) 11/25/09     Influenza (IIV3) 10/27/08     Influenza (IIV3) 11/16/07     Influenza (IIV3) 10/25/06     Influenza (IIV3) 10/12/05     Influenza (IIV3) 10/12/04     Influenza (IIV3) 11/05/03     Influenza (IIV3) 10/28/02     Influenza (IIV3) 10/13/99     Pneumococcal 23 valent 05/12/09     TD (ADULT, 7+) 06/29/00          END      ASSESSMENT     Discharge Profile Flowsheet     EXPECTED DISCHARGE     COMMUNICATION ASSESSMENT      Expected Discharge Date  08/21/17 08/20/17 1135   Patient's communication style  spoken language (English or Bilingual) 08/15/17 1132    DISCHARGE NEEDS ASSESSMENT     FINAL RESOURCES      Patient/family verbalizes understanding  of discharge plan recommendations?  Yes 08/19/17 0808   Resources List  Skilled Nursing Facility 08/20/17 1135    Medical Team notified of plan?  yes 08/19/17 0808   Home Care  [facility] [phone], Fax: [phone] 08/17/17 1420    Readmission Within The Last 30 Days  no previous admission in last 30 days 08/16/17 1419   Skilled Nursing Facility  [facility] [phone], Fax: [phone] 08/20/17 1135    Anticipated Changes Related to Illness  none 08/15/17 1132   PAS Number  [id number] 08/20/17 1150    Equipment Currently Used at Home  none 08/16/17 1419   SKIN      Transportation Available  family or friend will provide 08/16/17 1419   Inspection of bony prominences  Full 08/22/17 0813    # of Referrals Placed by CTS  Post Acute Facilities 08/17/17 1620   Procedural focused assessment (identify areas inspected)   -- (operative site) 08/22/17 0813    Key Recommendations  Home with [facility] PT with clinic follow up 08/16/17 1419   Skin WDL  ex 08/22/17 0813    Discharge Planning Comments  TCU placement 08/20/17 1135   Skin Color/Characteristics  pale 08/22/17 0813    Confirm patient is eligible for [facility] telemonitoring  No - b/c of payor 08/16/17 1419   Skin Temperature  warm 08/22/17 0813    Discussed w/pt use of [facility] telemonitoring and told [facility] staff would call within 72 hours  No 08/16/17 1419   Skin Moisture  dry 08/22/17 0813    Is patient in another telemonitoring program  No 08/16/17 1419   Skin Integrity  incision(s) 08/22/17 0813    GASTROINTESTINAL (ADULT,PEDIATRIC,OB)     Skin Elasticity  quick return to original state 08/21/17 1628    GI WDL  WDL 08/22/17 0813   SAFETY      Last Bowel Movement  08/18/17 08/18/17 2228   Safety WDL  WDL 08/22/17 0813    Passing flatus  yes 08/22/17 0813   Safety Factors  bed in low position;call light in reach 08/22/17 0813                 Assessment WDL (Within Defined Limits) Definitions           Safety WDL     Effective:  "09/28/15    Row Information: <b>WDL Definition:</b> Bed in low position, wheels locked; call light in reach; upper side rails up x 2; ID band on<br> <font color=\"gray\"><i>Item=AS safety wdl>>List=AS safety wdl>>Version=F14</i></font>      Skin WDL     Effective: 09/28/15    Row Information: <b>WDL Definition:</b> Warm; dry; intact; elastic; without discoloration; pressure points without redness<br> <font color=\"gray\"><i>Item=AS skin wdl>>List=AS skin wdl>>Version=F14</i></font>      Vitals     Vital Signs Flowsheet     VITAL SIGNS     Comfort  comfortably manageable 08/21/17 1625    Temp  98.1  F (36.7  C) 08/22/17 0214   Change in Pain  getting better 08/21/17 1625    Temp src  Oral 08/22/17 0214   Pain Control  partially effective 08/21/17 1625    Resp  16 08/22/17 0214   Functioning  can do most things, but pain gets in the way of some 08/21/17 1625    Pulse  88 08/22/17 0214   Sleep  normal sleep 08/21/17 1625    Heart Rate  75 08/21/17 1531   ANALGESIA SIDE EFFECTS MONITORING      Pulse/Heart Rate Source  Monitor 08/22/17 0214   Side Effects Monitoring: Respiratory Quality  R 08/21/17 1625    BP  137/52 08/22/17 0214   Side Effects Monitoring: Respiratory Depth  N 08/21/17 1625    BP Location  Right arm 08/22/17 0214   Side Effects Monitoring: Sedation Level  S 08/21/17 1625    Patient Position  Lying 08/15/17 1056   HEIGHT AND WEIGHT      LYING ORTHOSTATIC BP     Height  1.854 m (6' 1\") 08/15/17 1150    Lying Orthostatic BP  124/70 08/22/17 0629   Height Method  Stated 08/15/17 1150    Lying Orthostatic Pulse  65 bpm 08/22/17 0629   Weight  78.5 kg (173 lb 1 oz) 08/15/17 1150    OXYGEN THERAPY     BSA (Calculated - sq m)  2.01 08/15/17 1150    SpO2  98 % 08/22/17 0214   BMI (Calculated)  22.88 08/15/17 1150    O2 Device  None (Room air) 08/21/17 1531   POSITIONING      Oxygen Delivery  -- (.) 08/18/17 2223   Body Position  independently positioning 08/22/17 0813    RESPIRATORY MONITORING     Head of Bed (HOB)  " HOB at 60 degrees 08/21/17 1733    Respiratory Monitoring (EtCO2)  32 mmHg 08/15/17 2356   DAILY CARE      Integrated Pulmonary Index (IPI)  10 08/15/17 2356   Activity Type  up in chair 08/21/17 1932    PAIN/COMFORT     Activity Level of Assistance  assistance, 1 person 08/21/17 1932    Patient Currently in Pain  denies 08/21/17 1625   SITTING ORTHOSTATIC BP      Preferred Pain Scale  CAPA (Clinically Aligned Pain Assessment) ([facility] Adults Only) 08/21/17 1625   Sitting Orthostatic BP  112/53 08/22/17 0629    0-10 Pain Scale  6 08/22/17 0220   Sitting Orthostatic Pulse  69 bpm 08/22/17 0629    Pain Location  Knee 08/21/17 1719   STANDING ORTHOSTATIC BP      Pain Orientation  Left 08/21/17 1719   Standing Orthostatic BP  100/58 08/22/17 0629    Pain Descriptors  Aching 08/21/17 1719   Standing Orthostatic Pulse  75 bpm 08/22/17 0629    Pain Management Interventions  analgesia administered 08/21/17 0739   EKG MONITORING      Pain Intervention(s)  Medication (See eMAR) 08/21/17 0739   Cardiac Regularity  Irregular 08/21/17 1833    Response to Interventions  Decrease in pain 08/21/17 0952   Cardiac Rhythm  Atrial fibrillation 08/21/17 1833    CLINICALLY ALIGNED PAIN ASSESSMENT (CAPA) ([facility] ADULTS ONLY)                   Patient Lines/Drains/Airways Status    Active LINES/DRAINS/AIRWAYS     Name: Placement date: Placement time: Site: Days: Last dressing change:    Urethral Catheter 08/20/17   1530      1     Peripheral IV 08/18/17 Left Lower forearm 08/18/17   0920   Lower forearm   3     Incision/Surgical Site 08/15/17 Left Knee 08/15/17   0915    6             Patient Lines/Drains/Airways Status    Active PICC/CVC     None            Intake/Output Detail Report     Date Intake     Output   Net    Shift P.O. I.V. IV Piggyback Total Urine Blood Total       Noc 08/20/17 2300 - 08/21/17 0659 -- -- -- -- 1150 -- 1150 -1150    Day 08/21/17 0700 - 08/21/17 1459 360 -- -- 360 -- -- --  360    Eloise 08/21/17 1500 - 08/21/17 2259 890 -- -- 890 1250 -- 1250 -360    Noc 08/21/17 2300 - 08/22/17 0659 -- -- -- -- -- -- -- 0    Day 08/22/17 0700 - 08/22/17 1459 -- -- -- -- -- -- -- 0      Last Void/BM       Most Recent Value    Urine Occurrence 1 at 08/18/2017 0236    Stool Occurrence 1 at 08/21/2017 2143      Case Management/Discharge Planning     Case Management/Discharge Planning Flowsheet     REFERRAL INFORMATION     EXPECTED DISCHARGE      Did the Initial Social Work Assessment result in a Social Work Case?  Yes 08/19/17 0808   Expected Discharge Date  08/21/17 08/20/17 1135    Admission Type  inpatient 08/16/17 1419   DISCHARGE PLANNING      Arrived From  operating room 08/16/17 1419   Patient/family verbalizes understanding of discharge plan recommendations?  Yes 08/19/17 0808    Referral Source  interdisciplinary rounds 08/16/17 1419   Medical Team notified of plan?  yes 08/19/17 0808    # of Referrals Placed by CTS  Post Acute Facilities 08/17/17 1620   Readmission Within The Last 30 Days  no previous admission in last 30 days 08/16/17 1419    Record Reviewed  clinical discipline documentation;history and physical;medical record;patient profile;plan of care 08/16/17 1419   Anticipated Changes Related to Illness  none 08/15/17 1132     Assigned to Case  Puja  08/17/17 1420   Transportation Available  family or friend will provide 08/16/17 1419    [facility] Name  [facility] 08/16/17 1419   Discharge Planning Comments  TCU placement 08/20/17 1135    Primary Care MD Name  Dr Ayon 08/16/17 1419   Key Recommendations  Home with [facility] with clinic follow up 08/16/17 1419    LIVING ENVIRONMENT     FINAL RESOURCES      Lives With  spouse 08/16/17 1419   Equipment Currently Used at Home  none 08/16/17 1419    Living Arrangements  house 08/16/17 1419   Resources List  Skilled Nursing Facility 08/20/17 1135    Provides Primary Care For  no one 08/16/17 1419   Home  [facility] [phone], Fax: [phone] 08/17/17 1420    Quality Of Family Relationships  supportive;involved 08/16/17 1419   Skilled Nursing Facility  [facility] [phone], Fax: [phone] 08/20/17 1135    Able to Return to Prior Living Arrangements  yes 08/16/17 1419   PAS Number  [id number] 08/20/17 1150    HOME SAFETY     ABUSE RISK SCREEN      Patient Feels Safe Living in Home?  yes 08/16/17 1419   QUESTION TO PATIENT:  Has a member of your family or a partner(now or in the past) intimidated, hurt, manipulated, or controlled you in any way?  no 08/15/17 1132    ASSESSMENT OF FAMILY/SOCIAL SUPPORT     QUESTION TO PATIENT: Do you feel safe going back to the place where you are living?  yes 08/15/17 1132    Marital Status   08/16/17 1419   OBSERVATION: Is there reason to believe there has been maltreatment of a vulnerable adult (ie. Physical/Sexual/Emotional abuse, self neglect, lack of adequate food, shelter, medical care, or financial exploitation)?  no 08/15/17 1132    Who is your support system?  Wife 08/16/17 1419   (R) MENTAL HEALTH SUICIDE RISK      Spouse's Name  Merlyn 08/16/17 1419   Are you depressed or being treated for depression?  No 08/16/17 2114    Description of Support System  Supportive;Involved 08/16/17 1419   HOMICIDE RISK      COPING/STRESS     Homicidal Ideation  no 08/15/17 1132    Major Change/Loss/Stressor  denies 08/08/17 1517

## 2017-08-15 NOTE — ANESTHESIA PROCEDURE NOTES
Peripheral nerve/Neuraxial procedure note : intrathecal  Pre-Procedure  Performed by  DESTINEY LYNNE   Location: OR      Pre-Anesthestic Checklist: patient identified, IV checked, risks and benefits discussed, informed consent, monitors and equipment checked and pre-op evaluation    Timeout  Correct Patient: Yes   Correct Procedure: Yes   Correct Site: Yes   Correct Laterality: N/A   Correct Position: Yes   Site Marked: N/A   .   Procedure Documentation  ASA 3  Diagnosis:OSTEOARTHROSIS LEFT KNEE - PRESENTS FOR TKA..    Procedure:    Intrathecal.  Insertion Site:L3-4  (midline approach)      Patient Prep;mask, sterile gloves, povidone-iodine 7.5% surgical scrub, patient draped.  .  Needle: Sage tip Spinal Needle (gauge): 25  Spinal/LP Needle Length (inches): 3.5 # of attempts: 1 and # of redirects:  Introducer used Introducer: 20 G .       Assessment/Narrative  Paresthesias: No.  .  .  clear CSF fluid removed while sitting   . Time Injected: 07:42  Sensory Level: T10 Comments:  Attempted SAB per GT Devine under the direct supervision of COSTA Lynne CRNA without success.  COSTA Lynne attempted but only bony resistance met.  Elect for paramedial approach approach x 1 pass with success.  Medications injected per GT without noted incident.

## 2017-08-15 NOTE — BRIEF OP NOTE
[facility] Brief Operative Note    Pre-operative diagnosis: left knee primary osteoarthritis   Post-operative diagnosis: left knee primary osteoarthritis    Procedure: Procedure(s):  ARTHROPLASTY KNEE   Surgeon: Jonathan Cardona DO   Assistant(s): DISHA Kearney, CNP, DNP (A advanced practice provider was necessary for his expertise, exposure and surgical assistance throughout the case.)   Estimated blood loss:  Fluids:  Urine output:  TT/pressure Less than 10 ml  1600 ml Crystalloids  350 ml  minutes at 289 mmhg   Specimens: None   Findings: See dictated operative report for full details [id number]     Yrn Cardona D.O.

## 2017-08-15 NOTE — PLAN OF CARE
Problem: Goal Outcome Summary  Goal: Goal Outcome Summary  Patient is a 83 year old year old male. Prior to admission, patient was living in a house with his wife with 2+1 stairs to enter, using nothing for mobility, and requiring no assist for ADLs. Currently, patient is requiring min assistance for functional mobility and min A for ambulation using WW and KI. Barriers to return to previous living situation include level of assist and stairs.  Patient equipment needs at discharge include has a walker, likely no other needs.  Recommend discharge to home with family A and home vs OP PT depending on progress with family car transport.

## 2017-08-15 NOTE — ANESTHESIA PREPROCEDURE EVALUATION
Anesthesia Evaluation     . Pt has had prior anesthetic. Type: General and MAC           ROS/MED HX    ENT/Pulmonary:     (+)tobacco use, Current use Rare pipe use packs/day  , . .    Neurologic:  - neg neurologic ROS    (-) seizures   Cardiovascular: Comment: Afib converted to NSR with beta blocker therapy    (+) Dyslipidemia, ----. : . . . :. Irregular Heartbeat/Palpitations, . No previous cardiac testing       METS/Exercise Tolerance:  >4 METS   Hematologic: Comments: Pt as a history of pulmonary embolism. Pernicious Anemia    (+) History of blood clots pt is not anticoagulated, Anemia, History of Transfusion no previous transfusion reaction -      Musculoskeletal: Comment: Osteoarthritis of bilateral knees.  (+) arthritis, , , -       GI/Hepatic:     (+) GERD Asymptomatic on medication, Other GI/Hepatic hx of gastric cancer s/p total gastrectomy and steven-en-y esophagojejunal anastomosis.       Renal/Genitourinary: Comment: Most recent creatinine 1.08 and GFR 65 from 7/29/16    (+) chronic renal disease, type: CRI, Nephrolithiasis , Pt does not require dialysis, Pt has no history of transplant,       Endo:     (+) Other Endocrine Disorder unspecified hypoglycemia- has gotten seizures from hypoglycemia in the past.      Psychiatric:  - neg psychiatric ROS       Infectious Disease:  - neg infectious disease ROS       Malignancy:   (+) Malignancy History of GI  GI CA  Remission status post Surgery, Chemo and Radiation, Pt had surgery for GI CA in 2010        Other:    - neg other ROS                 Physical Exam  Normal systems: cardiovascular, pulmonary and dental    Airway   Mallampati: II  TM distance: >3 FB  Neck ROM: full    Dental     Cardiovascular   Rhythm and rate: regular and normal      Pulmonary    breath sounds clear to auscultation                    Anesthesia Plan      History & Physical Review  History and physical reviewed and following examination; no interval change.    ASA Status:  3 .    NPO  Status:  > 8 hours    Plan for Spinal   PONV prophylaxis:  Ondansetron (or other 5HT-3) and Dexamethasone or Solumedrol       Postoperative Care  Postoperative pain management:  Neuraxial analgesia, Multi-modal analgesia and IV analgesics.      Consents  Anesthetic plan, risks, benefits and alternatives discussed with:  Patient..                          .

## 2017-08-16 ENCOUNTER — APPOINTMENT (OUTPATIENT)
Dept: OCCUPATIONAL THERAPY | Facility: CLINIC | Age: 82
DRG: 470 | End: 2017-08-16
Attending: ORTHOPAEDIC SURGERY
Payer: MEDICARE

## 2017-08-16 ENCOUNTER — APPOINTMENT (OUTPATIENT)
Dept: PHYSICAL THERAPY | Facility: CLINIC | Age: 82
DRG: 470 | End: 2017-08-16
Attending: ORTHOPAEDIC SURGERY
Payer: MEDICARE

## 2017-08-16 ENCOUNTER — CARE COORDINATION (OUTPATIENT)
Dept: CARE COORDINATION | Facility: CLINIC | Age: 82
End: 2017-08-16

## 2017-08-16 LAB
CREAT SERPL-MCNC: 1.24 MG/DL (ref 0.66–1.25)
GFR SERPL CREATININE-BSD FRML MDRD: 56 ML/MIN/1.7M2
GLUCOSE BLDC GLUCOMTR-MCNC: 108 MG/DL (ref 70–99)
GLUCOSE BLDC GLUCOMTR-MCNC: 109 MG/DL (ref 70–99)
GLUCOSE BLDC GLUCOMTR-MCNC: 56 MG/DL (ref 70–99)
GLUCOSE BLDC GLUCOMTR-MCNC: 89 MG/DL (ref 70–99)
GLUCOSE SERPL-MCNC: 101 MG/DL (ref 70–99)
HGB BLD-MCNC: 11 G/DL (ref 13.3–17.7)

## 2017-08-16 PROCEDURE — 12000007 ZZH R&B INTERMEDIATE

## 2017-08-16 PROCEDURE — 97530 THERAPEUTIC ACTIVITIES: CPT | Mod: GP | Performed by: PHYSICAL THERAPIST

## 2017-08-16 PROCEDURE — 82947 ASSAY GLUCOSE BLOOD QUANT: CPT | Performed by: ORTHOPAEDIC SURGERY

## 2017-08-16 PROCEDURE — 85018 HEMOGLOBIN: CPT | Performed by: ORTHOPAEDIC SURGERY

## 2017-08-16 PROCEDURE — 97535 SELF CARE MNGMENT TRAINING: CPT | Mod: GO

## 2017-08-16 PROCEDURE — 25000132 ZZH RX MED GY IP 250 OP 250 PS 637: Mod: GY | Performed by: ORTHOPAEDIC SURGERY

## 2017-08-16 PROCEDURE — 82565 ASSAY OF CREATININE: CPT | Performed by: ORTHOPAEDIC SURGERY

## 2017-08-16 PROCEDURE — 36415 COLL VENOUS BLD VENIPUNCTURE: CPT | Performed by: ORTHOPAEDIC SURGERY

## 2017-08-16 PROCEDURE — A9270 NON-COVERED ITEM OR SERVICE: HCPCS | Mod: GY | Performed by: ORTHOPAEDIC SURGERY

## 2017-08-16 PROCEDURE — 97110 THERAPEUTIC EXERCISES: CPT | Mod: GP | Performed by: PHYSICAL THERAPIST

## 2017-08-16 PROCEDURE — 25000128 H RX IP 250 OP 636: Performed by: ORTHOPAEDIC SURGERY

## 2017-08-16 PROCEDURE — 40000133 ZZH STATISTIC OT WARD VISIT

## 2017-08-16 PROCEDURE — 25000132 ZZH RX MED GY IP 250 OP 250 PS 637: Mod: GY | Performed by: FAMILY MEDICINE

## 2017-08-16 PROCEDURE — 00000146 ZZHCL STATISTIC GLUCOSE BY METER IP

## 2017-08-16 PROCEDURE — 97165 OT EVAL LOW COMPLEX 30 MIN: CPT | Mod: GO

## 2017-08-16 PROCEDURE — 97116 GAIT TRAINING THERAPY: CPT | Mod: GP | Performed by: PHYSICAL THERAPIST

## 2017-08-16 PROCEDURE — 97530 THERAPEUTIC ACTIVITIES: CPT | Mod: GO

## 2017-08-16 PROCEDURE — 40000193 ZZH STATISTIC PT WARD VISIT: Performed by: PHYSICAL THERAPIST

## 2017-08-16 RX ORDER — DEXTROSE MONOHYDRATE 25 G/50ML
25-50 INJECTION, SOLUTION INTRAVENOUS
Status: DISCONTINUED | OUTPATIENT
Start: 2017-08-16 | End: 2017-08-22 | Stop reason: HOSPADM

## 2017-08-16 RX ORDER — DIPHENHYDRAMINE HCL 25 MG
25 CAPSULE ORAL
Status: DISCONTINUED | OUTPATIENT
Start: 2017-08-16 | End: 2017-08-22 | Stop reason: HOSPADM

## 2017-08-16 RX ORDER — NICOTINE POLACRILEX 4 MG
15-30 LOZENGE BUCCAL
Status: DISCONTINUED | OUTPATIENT
Start: 2017-08-16 | End: 2017-08-22 | Stop reason: HOSPADM

## 2017-08-16 RX ADMIN — OXYCODONE HYDROCHLORIDE 10 MG: 5 TABLET ORAL at 22:40

## 2017-08-16 RX ADMIN — CEFAZOLIN SODIUM 1 G: 1 INJECTION, POWDER, FOR SOLUTION INTRAMUSCULAR; INTRAVENOUS at 02:29

## 2017-08-16 RX ADMIN — DOCUSATE SODIUM 100 MG: 100 CAPSULE, LIQUID FILLED ORAL at 22:41

## 2017-08-16 RX ADMIN — ACETAMINOPHEN 1000 MG: 10 INJECTION, SOLUTION INTRAVENOUS at 11:37

## 2017-08-16 RX ADMIN — OXYCODONE HYDROCHLORIDE 10 MG: 5 TABLET ORAL at 18:12

## 2017-08-16 RX ADMIN — RIVAROXABAN 10 MG: 10 TABLET, FILM COATED ORAL at 08:41

## 2017-08-16 RX ADMIN — DIPHENHYDRAMINE HYDROCHLORIDE 25 MG: 25 CAPSULE ORAL at 00:29

## 2017-08-16 RX ADMIN — OXYCODONE HYDROCHLORIDE 5 MG: 5 TABLET ORAL at 08:47

## 2017-08-16 RX ADMIN — OXYCODONE HYDROCHLORIDE 5 MG: 5 TABLET ORAL at 15:43

## 2017-08-16 RX ADMIN — CYCLOBENZAPRINE 5 MG: 5 TABLET, FILM COATED ORAL at 22:40

## 2017-08-16 RX ADMIN — DOCUSATE SODIUM 100 MG: 100 CAPSULE, LIQUID FILLED ORAL at 08:41

## 2017-08-16 RX ADMIN — OXYCODONE HYDROCHLORIDE 5 MG: 5 TABLET ORAL at 06:24

## 2017-08-16 RX ADMIN — CYCLOBENZAPRINE 5 MG: 5 TABLET, FILM COATED ORAL at 16:58

## 2017-08-16 RX ADMIN — OXYCODONE HYDROCHLORIDE 5 MG: 5 TABLET ORAL at 13:38

## 2017-08-16 RX ADMIN — ACETAMINOPHEN 1000 MG: 10 INJECTION, SOLUTION INTRAVENOUS at 02:30

## 2017-08-16 RX ADMIN — ACETAMINOPHEN 975 MG: 325 TABLET ORAL at 18:12

## 2017-08-16 ASSESSMENT — ACTIVITIES OF DAILY LIVING (ADL): PREVIOUS_RESPONSIBILITIES: DRIVING;YARDWORK

## 2017-08-16 NOTE — PROGRESS NOTES
"Saw and examined patient.  No more reports of hypogylemic issues.    Per patient he noted since his gastric resection after stomach cancer he will occasionally have similar episodes at home if he does not eat well or enough.  No hyperglykemia or diabetes noted.     Patient states the pain is increasing in his knee now, stiff and very sore.  Has declined muscle relaxers previous.  Has been taking only 1 oxycodone at a time.  Discussed with patient and he will start taking muscle relaxer and possibly 2 oxycodone at a time.   No other patient or nursing concerns.      Family present.      /71  Pulse 69  Temp 97.6  F (36.4  C) (Oral)  Resp 16  Ht 1.854 m (6' 1\")  Wt 78.5 kg (173 lb 1 oz)  SpO2 96%  BMI 22.83 kg/m2  Patient alert and orient x 4.  No current s/s of hypoglycemia.  Patient states did great right after eating more this AM.   Distal neurovascular intact.  Dressing CDI.  Able to flex/extend knee/df/pf to mild resistance.  Not able to straight leg raise yet.     Plan:  Discussed with patient, and would be best to keep patient overnight yet tonight to give him one more night to recover now that the joint injection is likely worn off.  Also we can monitor his blood sugars again, and ensure that his pain is tolerable as well as patient could benefit from another PT session tomorrow AM.  Tentative plan to d/c tomorrow AM. Will see patient again in AM.     In regards to pain: discussed flexeril and taking 2 oxycodone at a time along with rest, ice, compression, elevation.  Will monitor how he does with pain tonight.     Amador Cruz, APRN, CNP, DNP  Orthopedic Surgery         "

## 2017-08-16 NOTE — PROGRESS NOTES
S-(situation): hypoglycemic    B-(background): LTKA    A-(assessment): pt dizzy when walking, BS taken 56, other vitals stable.      R-(recommendations): orange juice given with PB and toast; recheck ; charge nurse and  notified.

## 2017-08-16 NOTE — PLAN OF CARE
Occupational Therapy Evaluation:  Patient lives in a 4 level remodeled farm house with spouse.  Patient has living facilities on main level.  Patient is currently using walker for equipment.  Prior level of function consisted of independence with BADL/IADL.    Functional status: Patient currently min-mod A with ADL and to joe/doff knee immobilzer   Barriers to discharge: None anticipated  Equipment needs: education and visual of toilet safety rails if they wanted for home for ease of transfers.   Discharge recommendations: Home with assist from family as needed for LB dressing and bathing.

## 2017-08-16 NOTE — PROGRESS NOTES
Received call from nursing, patient had symptoms of hypoglyemia and had blood 56.  Patient was given food and by 1150 bgl had returned to 109.  Per nursing patient has hx of hypoglycemic events but no diabetic history.     Ordered BID glucose checks and hypoglyemic protocol to use if needed.    Amador Cruz APRN, CNP, DNP  Orthopedic Surgery

## 2017-08-16 NOTE — CONSULTS
Care Transition Initial Assessment - RN      Met with: Patient and Family.    DATA   Active Problems:    Status post total left knee replacement using cement       [facility]  Primary Care MD Name: Dr Ayon  Contact information and PCP information verified: Yes      ASSESSMENT  Cognitive Status: awake, alert and oriented.       Resources List: Home Care     Lives With: spouse  Living Arrangements: house  Quality Of Family Relationships: supportive, involved  Description of Support System: Supportive, Involved   Who is your support system?: Wife       Insurance Concerns: No Insurance issues identified          This writer met with pt and wife, introduced self and role. 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 Writer talked with patient and wife about home PT after discharge for 2 weeks in their home a certified medicare home care list given to them and they both thought [facility] would be good patient has used them in the past.  IM letter given with verbal understanding.      PLAN    Patient will transport home with wife in their son's van with [facility]      Shoshana Moya [facility] RN [facility] [phone] [facility] [phone]    Discharge Planner   Discharge Plans in progress: Home with [facility]  Barriers to discharge plan: none  Follow up plan: Follow up with Dr. Cardona 10-13 days after discharge       Entered by: Shoshana Moya 08/16/2017 2:20 PM

## 2017-08-16 NOTE — PROGRESS NOTES
"[facility]  Orthopedics Progress Note           Assessment and Plan:    Assessment:   Post-operative day #1 Procedure(s):  ARTHROPLASTY KNEE   Acute post-operative blood loss anemia-asymptomatic, last hgb 11.0        Plan:   Doing well.  No immediate surgical complications identified.  No excessive bleeding  Pain well-controlled.  Tolerating physical therapy and rehabilitation well.  Encourage IS  Start or continue physical therapy  Activity as tolerated  Weight-bear as tolerated.  Anticipate discharge from hospital in 24 hours.  Will see how today goes and re-evaluate this afternoon/evening for discharge plan.    Pain control measures  Advance diet as tolerated  Routine wound care  DVT Prophylaxis: Xarelto, SCD's, Compression Hose  No acute medical issues.            Interval History:   Doing well.  Continues to improve.  Pain is well-controlled.  No fevers.  Per notes patient had an uneventful night.  Mild pain tolerable with oral medication.  Perez was d/c,  Patient voiding.     This AM states: the pain is not as bad as he expected, and mild currently.  Last oxycodone was 5mg at 630.  Patient states he is feeling ok, voiding, and passing flatus.  No nursing concerns, no patient concerns.  Patient tolerating oral diet.  Per patient PT is going well and his ambulatory    PT on POD0 recommended d/c home with home vs outpatient PT.           Review of Systems:    The patient denies any chest pain, shortness of breath, excessive pain, fever, chills, purulent drainage from the wound, nausea or vomiting.               Physical Exam:   General: awake, alert, appropriate and in no acute distressBlood pressure 134/66, pulse 60, temperature 97  F (36.1  C), temperature source Oral, resp. rate 17, height 1.854 m (6' 1\"), weight 78.5 kg (173 lb 1 oz), SpO2 97 %.  Left knee:  Dressing clean, dry and intact. Surrounding skin intact, no breakdown. Calf is soft, nontender with no significant swelling. Distal " neurovascular grossly intact.  Compartments soft and non-tender.  Unable to do straight leg lift yet, knee flexion/extension, df/pf intact.              Data:     Results for orders placed or performed during the hospital encounter of 08/15/17 (from the past 24 hour(s))   XR Knee Port Left 1/2 Views    Narrative    XR KNEE PORT LT 1/2 VW 8/15/2017 10:19 AM    COMPARISON: 7/26/2017    HISTORY: Postop total knee.      Impression    IMPRESSION: Postoperative changes of left total knee arthroplasty.  Hardware appears intact and no fractures are seen on this single AP  projection.    AMELIA SEGURA   Glucose by meter   Result Value Ref Range    Glucose 233 (H) 70 - 99 mg/dL   Hemoglobin   Result Value Ref Range    Hemoglobin 11.0 (L) 13.3 - 17.7 g/dL   Glucose   Result Value Ref Range    Glucose 101 (H) 70 - 99 mg/dL     Amador Cruz APRN, CNP, DNP  Orthopedic Surgery

## 2017-08-16 NOTE — PLAN OF CARE
Problem: Goal Outcome Summary  Goal: Goal Outcome Summary  Outcome: Improving  S-(situation): End of shift report.      B-(background): LTKA     A-(assessment): AOX4, VSS, afebrile, RA. Patient up with walker, gait belt, and 1 assist. Tolerating pain with oxycodone. Incision is CDI, with good CMS and pulse. BS decreased this am and controlled with diet see note in flowsheet.  Lungs clear       R-(recommendations):  Will continue to monitor per POC

## 2017-08-16 NOTE — PLAN OF CARE
Problem: Goal Outcome Summary  Goal: Goal Outcome Summary  Very minimal reports of pain overnight. Pt has declined anything until more towards morning. Dressing is clean and intact. Vitals stable. Pt is taking adequate oral intake.

## 2017-08-16 NOTE — PROGRESS NOTES
08/16/17 0920   Quick Adds   Type of Visit Initial Occupational Therapy Evaluation   Living Environment   Lives With spouse   Living Arrangements house   Home Accessibility stairs to enter home;stairs (2 railings present);bed and bath on same level   Transportation Available car   Living Environment Comment Patient lives with wife in a 4 level remodeled farm home.  All main living facilities are on one level.  Patient does have a walk in shower with seat and hand held shower head.     Self-Care   Dominant Hand right   Functional Level Prior   Ambulation 0-->independent   Transferring 0-->independent   Toileting 0-->independent   Bathing 0-->independent   Dressing 0-->independent   Eating 0-->independent   Communication 0-->understands/communicates without difficulty   Swallowing 0-->swallows foods/liquids without difficulty   Cognition 0 - no cognition issues reported   General Information   Onset of Illness/Injury or Date of Surgery - Date 08/15/17   Referring Physician Dr. Cardona   Patient/Family Goals Statement Patient would like to go home today   Additional Occupational Profile Info/Pertinent History of Current Problem Patient is a 83 year old male who underwent elective Left TKA.     Precautions/Limitations fall precautions;other (see comments)  (Knee immobilzer on until can do SLR. )   Weight-Bearing Status - LLE weight-bearing as tolerated   Cognitive Status Examination   Orientation orientation to person, place and time   Visual Perception   Visual Perception Wears glasses   Range of Motion (ROM)   ROM Comment BUE WFL   Strength   Strength Comments BUE WFL    Coordination   Upper Extremity Coordination No deficits were identified   Bathing   Level of [place] minimum assist (75% patients effort)   Physical Assist/Nonphysical Assist verbal cues;supervision;1 person assist   Upper Body Dressing   Level of [place]: Dress Upper Body stand-by assist   Physical Assist/Nonphysical Assist: Dress Upper Body  "set-up required   Lower Body Dressing   Level of [place]: Dress Lower Body moderate assist (50% patients effort)   Physical Assist/Nonphysical Assist: Dress Lower Body verbal cues;supervision;1 person assist   Toileting   Level of [place]: Toilet minimum assist (75% patients effort)   Physical Assist/Nonphysical Assist: Toilet verbal cues;supervision;1 person assist   Grooming   Level of [place]: Grooming stand-by assist   Physical Assist/Nonphysical Assist: Grooming supervision   Eating/Self Feeding   Level of [place]: Eating independent   Instrumental Activities of Daily Living (IADL)   Previous Responsibilities driving;yardwork   IADL Comments Patient has a hobby of restoring old cars and keeps busy by mowing lawn/yardwork.    Activities of Daily Living Analysis   Impairments Contributing to Impaired Activities of Daily Living pain;post surgical precautions   General Therapy Interventions   Planned Therapy Interventions ADL retraining   Clinical Impression   Criteria for Skilled Therapeutic Interventions Met yes, treatment indicated   OT Diagnosis Decreased ability to complete BADL due to recent impairments   Influenced by the following impairments pain, post surgical precautions   Assessment of Occupational Performance 3-5 Performance Deficits   Identified Performance Deficits Dressing, toileting, bathing, grooming/hygiene, yardwork/home management   Clinical Decision Making (Complexity) Low complexity   Therapy Frequency daily   Predicted Duration of Therapy Intervention (days/wks) 2 days   Anticipated Discharge Disposition Home with Assist   Risks and Benefits of Treatment have been explained. Yes   Patient, Family & other staff in agreement with plan of care Yes   [facility] TM \"6 Clicks\"   2016, Trustees of [facility], under license to [employer].  All rights reserved.   6 Clicks Short Forms Daily Activity Inpatient Short Form   [facility]  \"6 Clicks\" " Daily Activity Inpatient Short Form   1. Putting on and taking off regular lower body clothing? 2 - A Lot   2. Bathing (including washing, rinsing, drying)? 2 - A Lot   3. Toileting, which includes using toilet, bedpan or urinal? 3 - A Little   4. Putting on and taking off regular upper body clothing? 4 - None   5. Taking care of personal grooming such as brushing teeth? 3 - A Little   6. Eating meals? 4 - None   Daily Activity Raw Score (Score out of 24.Lower scores equate to lower levels of function) 18   Total Evaluation Time   Total Evaluation Time (Minutes) 12     OLGA Garcia  [facility]  [phone]

## 2017-08-16 NOTE — PLAN OF CARE
Problem: Goal Outcome Summary  Goal: Goal Outcome Summary  Outcome: Improving     Goal status: goals not yet met, but progressing well      Functional status: Supine <> sit with CGA, sit<> stand with min A, amb with min A and WW x 25' with cues for sequencing. 5 deg-75 deg knee ROM.      Hypoglycemic at end of session: commented that he was feeling hot and getting light headed in chair.  This had also happened with OT this morning.  PT assisted pt back to bed. When assisting pt back to bed from ravi he was sweaty and very warm which he had not been a few mins before.  Called the nurse and requested BG be checked. HR was 60's, /62, O2 95% on RA. BG was 56. CNA was getting pt juice and peanutbutter toast when this writer left.      Areas of progress:increased gait distance and much more able to participate today.     Barrier to discharge Home: stairs and level of assist     Equipment needs: has walker and SPC     Discharge disposition/rationale: home with home PT and family A     Transport recommendations: family car

## 2017-08-16 NOTE — ANESTHESIA POSTPROCEDURE EVALUATION
Patient: Remigio Holly    Procedure(s):  Left total knee replacement - Wound Class: I-Clean    Diagnosis:chronic pain of both knees primary osteoarthritis of both knees  Diagnosis Additional Information: No value filed.    Anesthesia Type:  Spinal, General    Note:  Anesthesia Post Evaluation    Patient location during evaluation: Floor and Bedside  Patient participation: Able to fully participate in evaluation  Level of consciousness: awake and alert  Pain management: adequate  Airway patency: patent  Cardiovascular status: blood pressure returned to baseline  Respiratory status: spontaneous ventilation and room air  Hydration status: euvolemic  PONV: none     Anesthetic complications: None    Comments: Patient resting in bed has been up for PT today.  Patient was very pleased with his anesthetic, his pain has been managed well with pain medications. No anesthesia concerns at this time.         Last vitals:  Vitals:    08/15/17 2159 08/15/17 2355 08/16/17 0726   BP: 112/63 134/66 127/62   Pulse: 65 60 56   Resp: 18 17 20   Temp: 97.2  F (36.2  C) 97  F (36.1  C) 96.9  F (36.1  C)   SpO2: 95% 97%          Electronically Signed By: DISHA Villeda CRNA  August 16, 2017  12:23 PM

## 2017-08-17 ENCOUNTER — APPOINTMENT (OUTPATIENT)
Dept: OCCUPATIONAL THERAPY | Facility: CLINIC | Age: 82
DRG: 470 | End: 2017-08-17
Attending: ORTHOPAEDIC SURGERY
Payer: MEDICARE

## 2017-08-17 ENCOUNTER — APPOINTMENT (OUTPATIENT)
Dept: PHYSICAL THERAPY | Facility: CLINIC | Age: 82
DRG: 470 | End: 2017-08-17
Attending: ORTHOPAEDIC SURGERY
Payer: MEDICARE

## 2017-08-17 ENCOUNTER — CARE COORDINATION (OUTPATIENT)
Dept: CARE COORDINATION | Facility: CLINIC | Age: 82
End: 2017-08-17

## 2017-08-17 PROBLEM — R55 NEAR SYNCOPE: Status: ACTIVE | Noted: 2017-08-17

## 2017-08-17 LAB
GLUCOSE BLDC GLUCOMTR-MCNC: 131 MG/DL (ref 70–99)
GLUCOSE BLDC GLUCOMTR-MCNC: 131 MG/DL (ref 70–99)
GLUCOSE BLDC GLUCOMTR-MCNC: 93 MG/DL (ref 70–99)
HGB BLD-MCNC: 11 G/DL (ref 13.3–17.7)

## 2017-08-17 PROCEDURE — 40000193 ZZH STATISTIC PT WARD VISIT

## 2017-08-17 PROCEDURE — 85018 HEMOGLOBIN: CPT | Performed by: ORTHOPAEDIC SURGERY

## 2017-08-17 PROCEDURE — 00000146 ZZHCL STATISTIC GLUCOSE BY METER IP

## 2017-08-17 PROCEDURE — 36415 COLL VENOUS BLD VENIPUNCTURE: CPT | Performed by: ORTHOPAEDIC SURGERY

## 2017-08-17 PROCEDURE — 97116 GAIT TRAINING THERAPY: CPT | Mod: GP

## 2017-08-17 PROCEDURE — A9270 NON-COVERED ITEM OR SERVICE: HCPCS | Mod: GY | Performed by: ORTHOPAEDIC SURGERY

## 2017-08-17 PROCEDURE — 97110 THERAPEUTIC EXERCISES: CPT | Mod: GP

## 2017-08-17 PROCEDURE — 99232 SBSQ HOSP IP/OBS MODERATE 35: CPT | Performed by: FAMILY MEDICINE

## 2017-08-17 PROCEDURE — 40000133 ZZH STATISTIC OT WARD VISIT

## 2017-08-17 PROCEDURE — 40000718 ZZHC STATISTIC PT DEPARTMENT ORTHO VISIT

## 2017-08-17 PROCEDURE — 97530 THERAPEUTIC ACTIVITIES: CPT | Mod: GP

## 2017-08-17 PROCEDURE — 12000007 ZZH R&B INTERMEDIATE

## 2017-08-17 PROCEDURE — 25000132 ZZH RX MED GY IP 250 OP 250 PS 637: Mod: GY | Performed by: ORTHOPAEDIC SURGERY

## 2017-08-17 PROCEDURE — 97535 SELF CARE MNGMENT TRAINING: CPT | Mod: GO

## 2017-08-17 RX ORDER — ACETAMINOPHEN 325 MG/1
975 TABLET ORAL EVERY 8 HOURS
Qty: 100 TABLET | Refills: 3 | Status: SHIPPED | OUTPATIENT
Start: 2017-08-17 | End: 2017-08-22

## 2017-08-17 RX ORDER — CYCLOBENZAPRINE HCL 5 MG
5 TABLET ORAL 3 TIMES DAILY PRN
Qty: 42 TABLET | Refills: 0 | Status: SHIPPED | OUTPATIENT
Start: 2017-08-17 | End: 2017-09-05

## 2017-08-17 RX ORDER — DOCUSATE SODIUM 100 MG/1
100 CAPSULE, LIQUID FILLED ORAL 2 TIMES DAILY
Qty: 60 CAPSULE | Refills: 1 | Status: SHIPPED | OUTPATIENT
Start: 2017-08-17 | End: 2017-08-22

## 2017-08-17 RX ORDER — OXYCODONE HYDROCHLORIDE 5 MG/1
5-10 TABLET ORAL EVERY 4 HOURS PRN
Qty: 90 TABLET | Refills: 0 | Status: SHIPPED | OUTPATIENT
Start: 2017-08-17 | End: 2017-08-22

## 2017-08-17 RX ADMIN — ACETAMINOPHEN 975 MG: 325 TABLET ORAL at 11:56

## 2017-08-17 RX ADMIN — MAGNESIUM HYDROXIDE 30 ML: 400 SUSPENSION ORAL at 14:39

## 2017-08-17 RX ADMIN — DOCUSATE SODIUM 100 MG: 100 CAPSULE, LIQUID FILLED ORAL at 08:28

## 2017-08-17 RX ADMIN — ACETAMINOPHEN 975 MG: 325 TABLET ORAL at 03:04

## 2017-08-17 RX ADMIN — OXYCODONE HYDROCHLORIDE 10 MG: 5 TABLET ORAL at 08:28

## 2017-08-17 RX ADMIN — RIVAROXABAN 10 MG: 10 TABLET, FILM COATED ORAL at 08:28

## 2017-08-17 RX ADMIN — CYCLOBENZAPRINE 5 MG: 5 TABLET, FILM COATED ORAL at 08:28

## 2017-08-17 RX ADMIN — CYCLOBENZAPRINE 5 MG: 5 TABLET, FILM COATED ORAL at 14:38

## 2017-08-17 RX ADMIN — OXYCODONE HYDROCHLORIDE 10 MG: 5 TABLET ORAL at 03:04

## 2017-08-17 RX ADMIN — HYDROXYZINE HYDROCHLORIDE 10 MG: 10 TABLET ORAL at 03:04

## 2017-08-17 RX ADMIN — DOCUSATE SODIUM 100 MG: 100 CAPSULE, LIQUID FILLED ORAL at 18:38

## 2017-08-17 RX ADMIN — OXYCODONE HYDROCHLORIDE 5 MG: 5 TABLET ORAL at 18:34

## 2017-08-17 RX ADMIN — ACETAMINOPHEN 975 MG: 325 TABLET ORAL at 18:34

## 2017-08-17 RX ADMIN — OXYCODONE HYDROCHLORIDE 10 MG: 5 TABLET ORAL at 12:45

## 2017-08-17 NOTE — PLAN OF CARE
Problem: Discharge Planning  Goal: Discharge Planning (Adult, OB, Behavioral, Peds)  Outcome: Improving  Patient stated during rounds that he didn't know what all his medications were for. Writer feels that patient would benefit from IP pharmacy to see patient again, when his wife is present. Will also refer to MTM.

## 2017-08-17 NOTE — PROGRESS NOTES
Asked by orthopedic to see this patient who is now postop day number 3 for a left total knee arthroplasty. While in the hospital as had several episodes of near syncope. These occur when he's attempting to get up and walk with his therapy he oftentimes has a fair amount of pain. Yesterday had a low blood sugar. Today when episode occurred his blood sugar is normal as blood pressure was okay. Past history is significant for a almost total gastrectomy for history of stomach cancer and he has history of hypoglycemia ever since. Nursing notes that he is not very compliant about his diet, often eating very sugary things and not eating very consistently. Patient complains that he's having a fair amount of discomfort in his left knee, currently taking oxycodone for this. With the episodes he denies chest pain, shortness of breath but feels diaphoretic and like he might pass out. The plant was initially for him to go home for rehab, but now family is planning to have them go to a TCU temporarily. Patient is currently sitting in denies any cardiac symptoms. Review of the charge shows that he had an episode of atrial fibrillation which converted to sinus rhythm after beta blocker therapy. He's not currently on any type of cardiac medications.    Past Medical History:   Diagnosis Date     Blood transfusion      Chronic gastric ulcer without mention of hemorrhage, perforation, without mention of obstruction     Ulcer, Gastric     Gastric cancer (H)      Hemorrhage of gastrointestinal tract, unspecified 01/13/10    [facility]     Hypoglycemia, unspecified 1/26/2015     Measles without mention of complication     Measles     Mixed hyperlipidemia     Hyperlipidemia     Mumps without mention of complication     Mumps     Pulmonary embolism (H) Sept 4, 2010     Urinary calculus, unspecified     Renal stones     Varicella without mention of complication     Chickenpox     Past Surgical History:   Procedure Laterality Date      ABDOMEN SURGERY       ARTHROPLASTY KNEE Left 8/15/2017    Procedure: ARTHROPLASTY KNEE;  Left total knee replacement;  Surgeon: Jonathan Cardona DO;  Location: [facility] OR     C EXCIS STOMACH ULCER,LESN;LOCAL       CHOLECYSTECTOMY, LAPOROSCOPIC  10/6/2008    Cholecystectomy, Laparoscopic     COLONOSCOPY  1/14/10    [facility]     COLONOSCOPY  05/25/10     CYSTOSCOPY, RETROGRADES, EXTRACT STONE, INSERT STENT, COMBINED  12/25/2012    Procedure: COMBINED CYSTOSCOPY, RETROGRADES, EXTRACT STONE, INSERT STENT;  Cystoscopy, Left Stent Placement, left retrograde pylogram, uc;  Surgeon: Amador Sanchez MD;  Location: [facility] OR     ESOPHAGOSCOPY, GASTROSCOPY, DUODENOSCOPY (EGD), COMBINED  8/16/2011    Procedure:COMBINED ESOPHAGOSCOPY, GASTROSCOPY, DUODENOSCOPY (EGD), BIOPSY SINGLE OR MULTIPLE; Surgeon:TONY GARCIA; Location:[facility] GI     GENERAL SURGERY                           DATE:  07/07/10    Gastrectomy     HC COLONOSCOPY W/WO BRUSH/WASH  01/31/2006     HC REPAIR ROTATOR CUFF,ACUTE  3/21/2005    Right      UGI ENDOSCOPY, SIMPLE EXAM  1/13/10    [facility] UGI ENDOSCOPY, SIMPLE EXAM  05/25/10     LASER HOLMIUM LITHOTRIPSY URETER(S), INSERT STENT, COMBINED  1/5/2013    Procedure: COMBINED CYSTOSCOPY, URETEROSCOPY, LASER HOLMIUM LITHOTRIPSY URETER(S), INSERT STENT;  Bilateral  Cystoscopy, Bilateral Ureteroscopy, Bilateral retrogrades and  placement of ureteral stent right side. Laser Holmium Lithotripsy  and Exchange  of stent left side;  Surgeon: Tone Morejon MD;  Location: [facility] OR     LASER HOLMIUM LITHOTRIPSY URETER(S), INSERT STENT, COMBINED  1/30/2013    Procedure: COMBINED CYSTOSCOPY, URETEROSCOPY, LASER HOLMIUM LITHOTRIPSY URETER(S), INSERT STENT;  Right Ureteroscopy; Stone basketing; Right Stent exchange and  removal of left stent.;  Surgeon: Tone Morejon MD;  Location: [facility] OR     VIDEO CAPSULE ENDOSCOPY  01/15/10    [facility]     Family History   Problem  "Relation Age of Onset     Alzheimer Disease Father      Cardiovascular Mother      C.A.D. Mother      HEART DISEASE Mother      Cardiovascular Brother      HEART DISEASE Brother      C.A.D. Brother        No current facility-administered medications on file prior to encounter.   Current Outpatient Prescriptions on File Prior to Encounter:  MELATONIN PO    cyanocobalamin (VITAMIN B12) 1000 MCG/ML injection Inject 1 mL (1,000 mcg) into the muscle every 30 days 1 year of injections approved through 4/19/2017 per Dr. Ayon.   blood glucose monitoring ([employer] CONTOUR NEXT) test strip Use to test blood sugar 1 times daily or as directed.   blood glucose monitoring (ROBERT MICROLET) lancets Use to test blood sugar 1 time daily or as directed.     REVIEW OF SYSTEMS  General: negative for, fever, chills, night sweats, headaches  Skin: negative  Eyes: negative  ENT: negative  Resp: No shortness of breath, dyspnea on exertion, cough, or hemoptysis  CV: negative for, palpitations, tachycardia, chest pain, dyspnea on exertion and orthopnea  GI: negative for, nausea and vomiting  Musculoskeletal: left knee pain since surgery, currently wearing knee immobilizer  Neurologic: negative for, headaches, seizures, local weakness, involuntary movements, speech problems and incoordination  Psychiatric: has been somewhat confusing the hospital, thought to be related to pain management  Hematologic: negative  Endocrine: positive for episodes of syncope and your syncope at home. These have been thought to be related to low sugars    Vital signs:  Temp: 97.4  F (36.3  C) Temp src: Oral BP: 129/67 Pulse: 72 Heart Rate: 76 Resp: 20 SpO2: 94 % O2 Device: None (Room air) Oxygen Delivery: 4 LPM Height: 185.4 cm (6' 1\") Weight: 78.5 kg (173 lb 1 oz)  Estimated body mass index is 22.83 kg/(m^2) as calculated from the following:    Height as of this encounter: 1.854 m (6' 1\").    Weight as of this encounter: 78.5 kg (173 lb 1 " oz).        Constitutional: alert, mild distress, cooperative and moving uncomfortably with use of blocker and has left leg in a knee immobilizer  Head: Normocephalic. No masses, lesions, tenderness or abnormalities  Neck: Neck supple. No adenopathy. Thyroid symmetric, normal size,  Cardiovascular: PMI normal. No lifts, heaves, or thrills. RRR. No murmurs, clicks gallops or rub  Respiratory: Percussion normal. Good diaphragmatic excursion. Lungs clear  Gastrointestinal: Abdomen soft, non-tender. BS normal. No masses, organomegaly  Musculoskeletal left knee in the immobilizer, not removed  Neurologic: nonfocal, cranial nerves intact  Psychiatric: mildly confused, thought I was an  does re-orientate. Wife thinks it is the oxycodone    ASSESSMENT:  episodes of near syncope possibly vagal  although does have previous history of atrial fibrillation, and could be having some type of dysrhythmias. Certainly with history of stomach cancer and subsequent surgery it seems that he's prone to hypoglycemia and this could be part of it as well.    Plan: will order an EKG now and will order cardiac telemetry to monitor what is heart is doing with activity.  Agree that it's a good idea that he goes to TCU for more help with ambulation  Electronically signed by SUSIE Abreu on August 17, 2017

## 2017-08-17 NOTE — PLAN OF CARE
Physical therapy is now recommending TCU placement for this patient.  Writer has visited with patient and his wife about this. Referrals have been sent to [facility] (first [facility] and [facility] (second [facility] for assessment.      It is anticipated that this patient will be ready for d/c tomorrow.  Wife stated that she is willing to transport patient if ok with PT.  Otherwise, did discuss the option of wheelchair van transport and the cost for this.       will continue to follow for d/c planning.

## 2017-08-17 NOTE — CONSULTS
I counseled both Art and his wife on his inpatient medications yesterday and today. I discussed his Xarelto in depth and answered both of their questions about his medications. They both were engaged in the conversation and stated they had no additional questions.    Hilda Carey, PharmD

## 2017-08-17 NOTE — PROGRESS NOTES
"[facility]  Orthopedics Progress Note           Assessment and Plan:    Assessment:   Post-operative day #2 Procedure(s):  ARTHROPLASTY KNEE   Acute post-operative blood loss anemia-asymptomatic   Confusion - single episode last night - none currently  Hypoglyemia with symptoms - single episode yesterday      Plan:   Doing well.  Clean wound without signs of infection.  Normal healing wound.  No immediate surgical complications identified.  No excessive bleeding  Pain currently not well controlled, recommend regular oxycodone and flexeril use, hesitant to add ms contin given age and episode of confusion  Single episode of confusion - d/c atarax.    Tolerating physical therapy and rehabilitation well.  Encourage IS  Start or continue physical therapy  Activity as tolerated  Weight-bear as tolerated.  Anticipate discharge from hospital in 24 hours. pending more tolerable pain control  Pain control measures: ice, rest, elevation, flexeril and oxycodone.  Advance diet as tolerated  Routine wound care  DVT Prophylaxis: Xarelto, Compression Hose, SCD  Hypoglycemia: checking blood sugar BID.  No other episodes of hypoglycemia, patient eating more.   No acute medical issues.            Interval History:   Per notes patient had one episode of confusion. Per notes patient was easily reoriented and no other episodes reported.  He had also received oxycodone, flexeril, and atarax due increased pain yesterday and last night, pain increase possible related to effects of joint injection done intra-op wearing off and reluctance  to take pain meds yesterday . Last bgl was 108mg/dl.  Hemoglobin 11.0.    This AM: patient alert and orient x4, no confusion.  Patient states she is very sore about 8/10 to anterior thigh.  Patient states \"knee itself is good\".  Patient states was hard to get out of bed early this AM when he was having so much pain.    Patient voiding, denies nausea/vomiting, tolerating oral intake.  Feels " "good other than pain.      With regards to increased pain, recommend that patient stay on schedule for oxycodone and flexeril.  Concerned starting a new pain medication or long acting pain medication due to issue with confusion and patient's age.  Will see how patient does today.  Anterior thigh pain likely related to surgery.            Review of Systems:    The patient denies any chest pain, shortness of breath, excessive pain, fever, chills, purulent drainage from the wound, nausea or vomiting.               Physical Exam:   General: awake, alert, appropriate and in mild distress due to pain in knee.   Blood pressure 147/85, pulse 69, temperature 96  F (35.6  C), temperature source Oral, resp. rate 18, height 1.854 m (6' 1\"), weight 78.5 kg (173 lb 1 oz), SpO2 96 %.  Left lower extremity:  Dressing clean, dry and intact. Surrounding skin intact, no breakdown. Calf is soft, nontender with no significant swelling.  Knee has expected swelling. Distal neurovascular grossly intact.  Compartments soft and non-tender. Dressing changed, appropriate amount of dried blood on dressing. No active bleeding.  Skin edges well approximated, no wound breakdown. 5/5 DF/PF.  Able to flex and ext knee against mild resistance.  Limited by pain.  Not able to perform straight leg raise at this point.    Left anterior thigh tender about midpoint.  No swelling, mass, or other abnormalities felt.                 Data:     Results for orders placed or performed during the hospital encounter of 08/15/17 (from the past 24 hour(s))   Glucose by meter   Result Value Ref Range    Glucose 56 (L) 70 - 99 mg/dL   Glucose by meter   Result Value Ref Range    Glucose 109 (H) 70 - 99 mg/dL   Care Coordinator IP Consult    Narrative    Shoshana Moya RN     8/16/2017  2:28 PM  Care Transition Initial Assessment - RN      Met with: Patient and Family.    DATA   Active Problems:    Status post total left knee replacement using cement       Primary Care " Clinic Name: [facility]  Primary Care MD Name: Dr Ayon  Contact information and PCP information verified: Yes      ASSESSMENT  Cognitive Status: awake, alert and oriented.       Resources List: Home Care     Lives With: spouse  Living Arrangements: house  Quality Of Family Relationships: supportive, involved  Description of Support System: Supportive, Involved   Who is your support system?: Wife       Insurance Concerns: No Insurance issues identified          This writer met with pt and wife, introduced self and role.   Patient states normally very independent at home still drives,   farms and takes care of all of his own needs. Writer talked with   patient and wife about home PT after discharge for 2 weeks in   their home a certified medicare home care list given to them and   they both thought [facility] would be good patient has used them in   the past.  IM letter given with verbal understanding.      PLAN    Patient will transport home with wife in their son's van with   [facility] PT      Shoshana Moya CTS RN [facility] [phone] [facility] [phone]    Discharge Planner   Discharge Plans in progress: Home with [facility] PT  Barriers to discharge plan: none  Follow up plan: Follow up with Dr. Cardona 10-13 days after   discharge       Entered by: Shoshana Moya 08/16/2017 2:20 PM          Glucose by meter   Result Value Ref Range    Glucose 89 70 - 99 mg/dL   Glucose by meter   Result Value Ref Range    Glucose 108 (H) 70 - 99 mg/dL   Hemoglobin   Result Value Ref Range    Hemoglobin 11.0 (L) 13.3 - 17.7 g/dL     Amador Cruz, APRN, CNP, DNP  Orthopedic Surgery

## 2017-08-17 NOTE — DISCHARGE INSTRUCTIONS
Appointment with Dr Tena in [place] Aug. 31st at 10:10.        Total Knee Replacement Discharge Instructions                                     [phone]  Bone and Joint Service Line for issues or concerns    General Care:  After surgery you may feel tired/sleepy. This is normal. If you have any question along the way please contact the office. If you feel it is an issue cannot wait for normal office h    Diet:  Start with non-alcoholic liquids at first, particularly water or sports drinks after surgery. Progress to bland foods such as crackers and bread and finally to your normal diet if you have no problems. Avoid alcohol when taking narcotic pain medications.      Pain control:  Take your pain medications as prescribed. These medications may make you sleepy. Do not drive, operate equipment, or drink alcohol when taking these.  You may take Tylenol (Generic name is acetaminophen) as directed on the bottle for additional relief or in place of the prescribed pain medications as your pain gets better. Do not take any other NSAIDs (Motrin, Ibuprofen, Aleve, Naproxen) while taking the blood thinner. If the medications cause a reaction such as nausea or skin rash, stop taking them and contact your doctor. Please plan accordingly, pain medications will not be re-filled on the weekends or at night. Call the office during the day if you need more medications.    Blood thinner:  It is very important to take it as prescribed. It is a medication to help prevent blood clots in your legs or lungs. No medication is perfect, so if you notice a sudden onset of pain/swelling in your calf area call your doctor. If you notice a sudden onset of troubles breathing and/or chest pain call 911.     Swelling (edema) control:  Preventing swelling (edema) in your legs after surgery is very important. It is helpful for achieving optimal range of motion as well as preventing blood clots.  It starts with simple things such as  elevation of your legs and icing. Elevate your legs above your heart.    Do this for 20 minutes every couple hours the first few days after surgery. We also recommend SANDRA hose (compression hose) to be worn. Wear on both legs during the day. You may remove at night. Wear these until directed to stop.      Icing:  It is common for some swelling, aching and stiffness to occur for up to 6-9 months after a knee replacement. If you knee swells, get off your feet, elevate your leg and apply some ice packs. Apply for 20 minutes at a time. For the first 1-2 weeks apply ice 2-3 x day or more after therapy.    Walker/crutches:  Use a walker/crutches when you go home. You will transition to the use of a cane and finally to no additional support.     Braces:  You will go home with a knee immobilizer. You can take it off when you are lying or sitting down. Wear it when you are walking. This immobilizer can come off after you are doing a straight leg raise. Your physician and or physical therapist will help to determine when to stop wearing it.     Physical Therapy:  The success of your knee replacement is based on doing physical therapy. You will have some pain and discomfort along the way. If you feel your pain is limiting your progress make sure to take some pain medication prior to your therapy session. If you pain medications are not working talk to your surgeon.   For the first 2 weeks after going home you will have in-home physical therapy. The goal is to work on range of motion. While it is important to working on bending your knee, it is equally important to make sure you knee comes out all the way straight. To assist in this do not place any bumps, pillows and or blankets under your knee when you are lying down. You should have an outpatient physical therapy appointment scheduled for about 2 weeks after surgery.     Activity:  Unless otherwise instructed, you can weight bear as tolerated. While laying or sitting down you  should straighten your knee all the way out and then gently work on bending the knee back. Do not worry at first if your knee feels stiff and will not bend like normal, this will get better. Never put a pillow or bump under you knee, instead put a pillow under your ankle so your knee will straighten out all the way.     Normal findings after surgery:  Numbness and tenderness around the incisions and to the outside of the incision is normal.  You may have bruising around the incisions and down the lower leg.   Your knee will be swollen for months after surgery. It will feel  tight  to move.   Low grade fevers less than 100.5 degrees Fahrenheit are normal.   You may have some minor swelling in the leg/calf area.  You will have some increased pain after your therapy sessions.     When to call the Office:  Temperature greater than 101.5 degrees Fahreheit.  Fever, chills, and increasing pain in the knee.  Excessive drainage from the incisions that include bright red blood.  Drainage from the incisions sites that appear yellow, pus-like, or foul smelling.  Increasing pain the knee not relieved by the prescribed pain medications or ice.  Persistent nausea or vomiting not helped by the Zofran.  Increased pain or swelling in your calf area (in back above your ankle) that wasn t there when in the hospital.  Any other effects you feel are significant.  Call 911 if you experience any chest pain and/or shortness or breath.

## 2017-08-17 NOTE — PLAN OF CARE
Patient not discharging home today.  Working on pain management.  Plan is to d/c home tomorrow with wife and [facility] PT services.  Care Transitions will continue to follow.

## 2017-08-17 NOTE — PLAN OF CARE
Problem: Goal Outcome Summary  Goal: Goal Outcome Summary  Occupational Therapy      Goal status:              1:  Patient was CGA during hand hygiene at sink due to increased fatigue and sweating.                2:  Patient required mod A with LB dressing including knee immobilizer.  Declined AE to increase independence reporting that his wife would help him.  (she was there to verify that she would).              3:  Patient was CGA for functional mobility to and from bathroom with min A for clothing management.  Patient was hurrying to the bathroom with education provided on the increase fall risk with that.  Patient verbalized understanding.  Right after needed to go lay down with increased sweating and fatigue.  (nursing notified that patient thought his blood sugar might be low).      Functional status: Patient currently requiring increased assistance and decreased activity tolerance.      Areas of progress: pain is better managed.     Barrier to discharge Home: Increased level of assistance and decreased activity tolerance to about 5-10 minutes before needing to lay down.      Equipment needs: safety rails for toilet.     Discharge disposition/rationale: Home with family assist.  Patient could benefit from TCU if family is hesitant for patient to return home with increased level of assist needed today.      Transport recommendations: private vehicle.

## 2017-08-17 NOTE — PLAN OF CARE
"Problem: Goal Outcome Summary  Goal: Goal Outcome Summary     Goal status: little progress     Functional status: Pt requires Min Assist for bed mobility and supine <> sit transfers, assisting L LE mobility, but is able to work the rest of the way to the edge of the bed given extra time to problem solve. Sit <> stand transfers with CGA x1 for safety, verbal cues given for hand placement. Ambulates x15 ft during today's PM session, before developing symptoms concerning for pre-syncope, reporting feeling \"like I'm going to faint\", becoming pale and clammy, requiring 2nd person to bring chair behind him. Blood pressures and BG checked and within relatively normal ranges. Pt has consistently developed these types of symptoms for the previous 4 times attempting to ambulate per chart review. 0-8-61 knee ROM.      Areas of progress: Tolerating LE exercises in bed better and more able to assist    Barrier to discharge Home: Has been unable to demonstrate household ambulation and stair mobility, limited by symptoms of reported light-headedness, nausea and develops pallor requiring pt to sit/rest     Equipment needs: Defer to TCU     Discharge disposition/rationale: TCU to progress safety and independence with functional mobility and ambulation to facilitate safe return home     Transport recommendations: Private transport           Vinay Pisano PT, DPT        8/17/2017      4:28 PM  [facility]  [phone]                                  "

## 2017-08-17 NOTE — PROGRESS NOTES
Was reviewing notes.  Patient having episodes of nausea, lightheadedness, dizziness, with PT.  Per notes BGL and B/P was WDL per PT.  Although these episodes may be coinciding with pain, would like patient evaluated by hospitalist for other causes.  Spoke with Dr. Jett directly, he stated Dr. Abreu would see patient this afternoon/evening.    PT recommending TCU at this point.  With better pain control patient may perform better with PT and be able to discharge home.  Per nursing notes, patient has not staying current on pain medication.  Dr. Cardona spoke with nursing regarding staying current on pain medications for patient.  Will re-evaluate patient tomorrow.     Amador Cruz, APRN, CNP, DNP  Orthopedic Surgery

## 2017-08-17 NOTE — PLAN OF CARE
Problem: Goal Outcome Summary  Goal: Goal Outcome Summary  Outcome: No Change  S-(situation): end of shift note     B-(background): Left TKA     A-(assessment): VSS, afebrile.  Having more pain tonight than he did yesteday, medicated with Flexeril, oxycodone x2, and Atarax.  Bed alarm placed due to some confusion witnessed last night when pt was getting dressed and had all his belongings gathered to go home.  Has since been oriented without confusion but bed alarm remains on.  CMS intact, good pulse in foot, dressing CDI.  Uses urinal at the side of the bed with 1 assist.     R-(recommendations): Continue to monitor for pain and increase activity as tolerated.

## 2017-08-17 NOTE — PLAN OF CARE
Problem: Goal Outcome Summary  Goal: Goal Outcome Summary  Outcome: Improving  Pt had 8-9/10 pain this am. He went about 5 1/2 hours between doses of oxycodone and this was obviously too long for him. When dosing was given every 4 hours along with flexeril, he stated he felt much better with his activity.

## 2017-08-17 NOTE — PROGRESS NOTES
Clinic Care Coordination Contact    Situation: Patient chart reviewed by care coordinator.    Background: received a CTS pt was discharging from the hospital    Assessment: reviewed chart pt has not yet discharged    Plan/Recommendations: will monitor for discharge and follow up at that time.       Rosemary Cardenas RN, BSN  Care Coordination    [address] [address]  Office: [phone]  Fax [phone]   [email or web address]   [email or web address]     Connect with [facility] on social media.

## 2017-08-18 ENCOUNTER — APPOINTMENT (OUTPATIENT)
Dept: PHYSICAL THERAPY | Facility: CLINIC | Age: 82
DRG: 470 | End: 2017-08-18
Attending: ORTHOPAEDIC SURGERY
Payer: MEDICARE

## 2017-08-18 ENCOUNTER — APPOINTMENT (OUTPATIENT)
Dept: CARDIOLOGY | Facility: CLINIC | Age: 82
DRG: 470 | End: 2017-08-18
Attending: FAMILY MEDICINE
Payer: MEDICARE

## 2017-08-18 ENCOUNTER — TELEPHONE (OUTPATIENT)
Dept: FAMILY MEDICINE | Facility: OTHER | Age: 82
End: 2017-08-18

## 2017-08-18 PROBLEM — I10 ORTHOSTATIC HYPERTENSION: Status: ACTIVE | Noted: 2017-08-18

## 2017-08-18 PROBLEM — I71.20 THORACIC AORTIC ANEURYSM WITHOUT RUPTURE (H): Status: ACTIVE | Noted: 2017-08-18

## 2017-08-18 LAB
ALBUMIN UR-MCNC: NEGATIVE MG/DL
APPEARANCE UR: CLEAR
BILIRUB UR QL STRIP: NEGATIVE
COLOR UR AUTO: YELLOW
GLUCOSE BLDC GLUCOMTR-MCNC: 110 MG/DL (ref 70–99)
GLUCOSE BLDC GLUCOMTR-MCNC: 91 MG/DL (ref 70–99)
GLUCOSE UR STRIP-MCNC: NEGATIVE MG/DL
HGB UR QL STRIP: NEGATIVE
KETONES UR STRIP-MCNC: NEGATIVE MG/DL
LEUKOCYTE ESTERASE UR QL STRIP: NEGATIVE
NITRATE UR QL: NEGATIVE
PH UR STRIP: 7 PH (ref 5–7)
RBC #/AREA URNS AUTO: <1 /HPF (ref 0–2)
SOURCE: ABNORMAL
SP GR UR STRIP: 1.01 (ref 1–1.03)
UROBILINOGEN UR STRIP-MCNC: 4 MG/DL (ref 0–2)
WBC #/AREA URNS AUTO: <1 /HPF (ref 0–2)

## 2017-08-18 PROCEDURE — 40000264 ECHO COMPLETE WITH OPTISON

## 2017-08-18 PROCEDURE — A9270 NON-COVERED ITEM OR SERVICE: HCPCS | Mod: GY | Performed by: FAMILY MEDICINE

## 2017-08-18 PROCEDURE — 40000133 ZZH STATISTIC OT WARD VISIT: Performed by: OCCUPATIONAL THERAPIST

## 2017-08-18 PROCEDURE — A9270 NON-COVERED ITEM OR SERVICE: HCPCS | Mod: GY | Performed by: ORTHOPAEDIC SURGERY

## 2017-08-18 PROCEDURE — 12000007 ZZH R&B INTERMEDIATE

## 2017-08-18 PROCEDURE — 93306 TTE W/DOPPLER COMPLETE: CPT | Mod: 26 | Performed by: INTERNAL MEDICINE

## 2017-08-18 PROCEDURE — 25000132 ZZH RX MED GY IP 250 OP 250 PS 637: Mod: GY | Performed by: FAMILY MEDICINE

## 2017-08-18 PROCEDURE — 97110 THERAPEUTIC EXERCISES: CPT | Mod: GP

## 2017-08-18 PROCEDURE — 25500064 ZZH RX 255 OP 636: Performed by: INTERNAL MEDICINE

## 2017-08-18 PROCEDURE — 00000146 ZZHCL STATISTIC GLUCOSE BY METER IP

## 2017-08-18 PROCEDURE — 40000187 ZZH STATISTIC PATIENT MED CONFERENCE < 30 MIN: Performed by: OCCUPATIONAL THERAPIST

## 2017-08-18 PROCEDURE — 97530 THERAPEUTIC ACTIVITIES: CPT | Mod: GP

## 2017-08-18 PROCEDURE — 40000193 ZZH STATISTIC PT WARD VISIT

## 2017-08-18 PROCEDURE — 81001 URINALYSIS AUTO W/SCOPE: CPT | Performed by: NURSE PRACTITIONER

## 2017-08-18 PROCEDURE — 99232 SBSQ HOSP IP/OBS MODERATE 35: CPT | Performed by: FAMILY MEDICINE

## 2017-08-18 PROCEDURE — 25000132 ZZH RX MED GY IP 250 OP 250 PS 637: Mod: GY | Performed by: ORTHOPAEDIC SURGERY

## 2017-08-18 PROCEDURE — 25000128 H RX IP 250 OP 636: Performed by: FAMILY MEDICINE

## 2017-08-18 RX ORDER — METOPROLOL TARTRATE 25 MG/1
25 TABLET, FILM COATED ORAL 2 TIMES DAILY
Status: DISCONTINUED | OUTPATIENT
Start: 2017-08-18 | End: 2017-08-18

## 2017-08-18 RX ORDER — OXYCODONE HYDROCHLORIDE 5 MG/1
5 TABLET ORAL EVERY 4 HOURS PRN
Status: DISCONTINUED | OUTPATIENT
Start: 2017-08-18 | End: 2017-08-21

## 2017-08-18 RX ORDER — METOPROLOL TARTRATE 25 MG/1
25 TABLET, FILM COATED ORAL 2 TIMES DAILY
Qty: 60 TABLET | DISCHARGE
Start: 2017-08-18 | End: 2017-08-19

## 2017-08-18 RX ORDER — SODIUM CHLORIDE 9 MG/ML
INJECTION, SOLUTION INTRAVENOUS CONTINUOUS
Status: DISCONTINUED | OUTPATIENT
Start: 2017-08-18 | End: 2017-08-19

## 2017-08-18 RX ADMIN — CYCLOBENZAPRINE 5 MG: 5 TABLET, FILM COATED ORAL at 22:09

## 2017-08-18 RX ADMIN — ACETAMINOPHEN 975 MG: 325 TABLET ORAL at 02:35

## 2017-08-18 RX ADMIN — OXYCODONE HYDROCHLORIDE 5 MG: 5 TABLET ORAL at 16:57

## 2017-08-18 RX ADMIN — METOPROLOL TARTRATE 25 MG: 25 TABLET ORAL at 08:27

## 2017-08-18 RX ADMIN — DOCUSATE SODIUM 100 MG: 100 CAPSULE, LIQUID FILLED ORAL at 08:27

## 2017-08-18 RX ADMIN — DOCUSATE SODIUM 100 MG: 100 CAPSULE, LIQUID FILLED ORAL at 20:16

## 2017-08-18 RX ADMIN — OXYCODONE HYDROCHLORIDE 5 MG: 5 TABLET ORAL at 10:13

## 2017-08-18 RX ADMIN — OXYCODONE HYDROCHLORIDE 5 MG: 5 TABLET ORAL at 08:27

## 2017-08-18 RX ADMIN — SODIUM CHLORIDE: 9 INJECTION, SOLUTION INTRAVENOUS at 15:46

## 2017-08-18 RX ADMIN — OXYCODONE HYDROCHLORIDE 5 MG: 5 TABLET ORAL at 02:35

## 2017-08-18 RX ADMIN — ACETAMINOPHEN 975 MG: 325 TABLET ORAL at 10:13

## 2017-08-18 RX ADMIN — RIVAROXABAN 15 MG: 15 TABLET, FILM COATED ORAL at 10:13

## 2017-08-18 RX ADMIN — ACETAMINOPHEN 975 MG: 325 TABLET ORAL at 20:16

## 2017-08-18 RX ADMIN — SODIUM CHLORIDE: 9 INJECTION, SOLUTION INTRAVENOUS at 23:13

## 2017-08-18 RX ADMIN — DIPHENHYDRAMINE HYDROCHLORIDE 25 MG: 25 CAPSULE ORAL at 22:09

## 2017-08-18 RX ADMIN — CYCLOBENZAPRINE 5 MG: 5 TABLET, FILM COATED ORAL at 08:27

## 2017-08-18 RX ADMIN — HUMAN ALBUMIN MICROSPHERES AND PERFLUTREN 3 ML: 10; .22 INJECTION, SOLUTION INTRAVENOUS at 09:36

## 2017-08-18 RX ADMIN — SODIUM CHLORIDE 1000 ML: 9 INJECTION, SOLUTION INTRAVENOUS at 13:44

## 2017-08-18 RX ADMIN — DIPHENHYDRAMINE HYDROCHLORIDE 25 MG: 25 CAPSULE ORAL at 00:05

## 2017-08-18 RX ADMIN — METOPROLOL TARTRATE 25 MG: 25 TABLET ORAL at 03:22

## 2017-08-18 RX ADMIN — OXYCODONE HYDROCHLORIDE 5 MG: 5 TABLET ORAL at 20:31

## 2017-08-18 RX ADMIN — METOPROLOL TARTRATE 12.5 MG: 25 TABLET, FILM COATED ORAL at 20:17

## 2017-08-18 NOTE — PROGRESS NOTES
[facility]  Orthopedics Progress Note           Assessment and Plan:    Assessment:   Post-operative day #3 Procedure(s):  ARTHROPLASTY KNEE   Atrial fibrillation  Hypoglyemia with symptoms - single episode POD1  Voiding frequently with small amounts with occasional report of pain      Plan:    Doing well.  Clean wound without signs of infection.  Normal healing wound.  No immediate surgical complications identified.  No excessive bleeding  Pain better controlled, continue to encourage staying on schedule with pain medications -- discussed with patient  He is not tolerating physical therapy and rehabilitation well. Having some pain control issues as well as dizziness most likely related to new onset A-fib, because of this he would be safer in nursing home/rehab  Encourage IS  Continue physical therapy  Activity as tolerated  Weight-bear as tolerated.  Anticipate discharge to , possible today if cleared by medicine and placement found  Pain control measures: ice, rest, elevation, flexeril and oxycodone.  Advance diet as tolerated  Routine wound care  DVT Prophylaxis: Xarelto, Compression Hose, SCD  Hypoglycemia: checking blood sugar BID.  No other episodes of hypoglycemia, patient eating more.   Medicine consulted for episodes of dizziness, lightheadedness, nausea  Afib with RVR: medicine following  Voiding complaints: UA ordered                 Interval History:   Patient last night had episode of afib RVR, medicine feels that afib may be contributing to his episodes.  Medicine increased dose of Xeralto and started him on Lopressor for rate control.  Recommended him to be seen by cardiology. Echo being done today Asked charge nurse to set up patient to see cardiology.  BGL have been remaining above 100.  Vitals WDL other than heart rate.  Per patient no previous hx of afib.  Discussed with patient at length importance of staying current on pain medication, and keeping pain better managed for the  "healing and rehab process.    Per patient the pain is much better controlled today.  Still very tender and sore to anterior thigh, but ambulating better per patient.  Will have patient work with PT today.     In regards to complaints of frequent voiding with small amounts and occasional pain.  Could be related to previous catheter, but will check UA to ensure no UTI.     Patient's spouse and family does not feel patient would be safe at home.  PT recommended TCU.  Arrangements being made.  Patient initially not willing but upon further discussion he understands and will agree to go.  If medicine agrees, could d/c to TCU today if placement found and he does as expected today.             Review of Systems:    The patient denies any chest pain, shortness of breath, excessive pain, fever, chills, purulent drainage from the wound, nausea or vomiting except frequent voiding for small amounts- with occasional pain               Physical Exam:   General: awake, alert, appropriate and in no acute distress  Blood pressure 127/78, pulse 111, temperature 98.7  F (37.1  C), temperature source Oral, resp. rate 18, height 1.854 m (6' 1\"), weight 78.5 kg (173 lb 1 oz), SpO2 96 %.  Left lower extremity:  Dressing clean, dry and intact. Surrounding skin intact, no breakdown. Calf is soft, nontender with no significant swelling.  Knee has expected swelling. Distal neurovascular grossly intact.  Compartments soft and non-tender.  No active bleeding.   5/5 DF/PF.  Able to flex and ext knee against mild resistance.  Limited by pain.  Not able to perform straight leg raise at this point.    Left anterior thigh tender and tight about midpoint.  No swelling, mass, or other abnormalities felt.         Data:     Results for orders placed or performed during the hospital encounter of 08/15/17 (from the past 24 hour(s))   Glucose by meter   Result Value Ref Range    Glucose 93 70 - 99 mg/dL   Glucose by meter   Result Value Ref Range    " Glucose 131 (H) 70 - 99 mg/dL   Pharmacy IP Consult    Narrative    Hilda Carey, LTAC, located within [facility]     8/17/2017  1:51 PM  I counseled both Art and his wife on his inpatient medications   yesterday and today. I discussed his Xarelto in depth and   answered both of their questions about his medications. They both   were engaged in the conversation and stated they had no   additional questions.    Hilda Carey, PharmD   Glucose by meter   Result Value Ref Range    Glucose 131 (H) 70 - 99 mg/dL   Yrn Cardona D.O.  Amador Cruz, APRN, CNP, DNP  Orthopedic Surgery

## 2017-08-18 NOTE — PLAN OF CARE
Problem: Goal Outcome Summary  Goal: Goal Outcome Summary  Outcome: No Change  Up in recliner for 2 hours, needing assist of 2 for transfers.  Leg immobilizer on when up, voiding small amts. EKG done this evening, tele, gave pt 5mg oxycodone,  Effective with just 5 mg. Up to bathroom, no episode  of syncopal.  Pt appears more awake and states he is feeling better this evening.  Had a bedtime snack, toast / ice cream.

## 2017-08-18 NOTE — PLAN OF CARE
Problem: Goal Outcome Summary  Goal: Goal Outcome Summary  Outcome: Therapy, progress towards functional goals is fair  S-(situation): shift note     B-(background): TKA - left     A-(assessment): Pt is A&O.  B/P has been low this shift.  Pt was diaphoretic when B/P was low.  Now it is 134/61.  Pt is feeling better.  Has had pain in his left knee.  Medicated with 1 oxycodone.  Had relief.  Bolus given per Drs. Order.  Appears more alert.  Still in A-fib.      R-(recommendations): Will cont to monitor the above.

## 2017-08-18 NOTE — PLAN OF CARE
"Writer visited with patient and family earlier this afternoon explaining that [facility] is unable to take any admissions over the weekend.  Discussed the possible option of [facility].  Patient and wife were ok with this as [facility] was their third choice.  Wife had stated that [facility] \"is not too much further      Writer went in again at 1645. Discussed that [facility] has accepted patient for admission over the weekend if ready for d/c. Wife stated that they now are not really wanting to go to [place] and wants the patient to stay in the hospital until Monday to go to [facility]. Discussed that we can see if patient will even be medically stable for d/c tomorrow and talk about options further tomorrow.  Patient and wife in agreement to this.   "

## 2017-08-18 NOTE — PROGRESS NOTES
Occupational Therapy    S (situation):  Patient per [facility] chart review , PT consult and discussion with patient along with wife; at time of OT scheduled appointment; was having increased dizziness and fatigue, recently returned to bed due to symptoms.    B (back ground):  Left TKA and is currently experiencing possible cardiac issues per EPIC chart review.  He also has had varied blood sugar levels per report.      A (Assessemt): OT delayed session due to active symptoms and through discussion with patient and wife will resume attempt of OT services tomorrow 8/19/17 , if he remains in the hospital at that time.    R (Recommendations): Due to medical complication and symptoms at this time; OT recommends TCU prior report to home.  TCU to medically manage as needed and improve his functional independence with BADL/IADLs as possible.    Amena CHEN/L  [facility]  [phone]

## 2017-08-18 NOTE — PROGRESS NOTES
Pt became SOA and stated had to go to bed, was in chair at this time.  Put to bed and B/P taken - unable to get a reading from the monitor, taken by manual.  B/p 100/50.  Respirations 22-24.  Pt also states has increased pain.  Medications given right before transfer.  Charge nurse and Doctor informed.  Orders received.

## 2017-08-18 NOTE — PROGRESS NOTES
[facility] Progress Note          Assessment and Plan:   Assessment:   Principal Problem:    Status post total left knee replacement using cement    Assessment: Postop day #3 with patient recovering well from a surgical standpoint but continued to have episodes of dizziness as below    Plan:  Ongoing management of surgical issues per orthopedic surgery. Anticipate TCU once episodes of dizziness have been fully evaluated and improved.    Active Problems:    Near syncope    Assessment: Patient has had several episodes of near syncope following surgical intervention. Initially patient was attributing this to hypoglycemia, however glucose during one of these episodes was normal. Patient has subsequently been found to have paroxysmal atrial fibrillation as well as significant orthostatic hypotension, both of which might be attributing to patient's dizziness and near syncope    Plan: Proceed with fluid bolus as well as IV fluids overnight to ensure adequate volume status post surgery. We'll also reduce metoprolol started last evening to 12.5 mg b.i.d. and monitor heart rate via telemetry to ensure rate control of A. fib while hopefully avoiding hypotension.  Other medications patient is currently taking reviewed and are not felt to be contributing to patient's orthostatic blood pressures. Repeat orthostatic blood pressures tomorrow and if these have improved and no longer are hypotensive upon standing, anticipate discharge in the      Orthostatic hypotension    Assessment: Patient continues to have significant lightheadedness with position changes, especially when standing. Orthostatic blood pressures show reduction from 97/59 once again to 73/44 when standing with patient having recurrence of dizziness and diaphoresis.    Plan: Proceed with plan as above including fluid bolus in addition to maintenance fluid and reduction of metoprolol medication. Recheck orthostatic blood pressures tomorrow.      Paroxysmal  atrial fibrillation (H)    Assessment: Patient was found to have paroxysmal atrial fibrillation overnight and was initiated on metoprolol 25 mg b.i.d. Patient continues to be in A. fib but rate controlled in the 60s. Echocardiogram was performed and does not show any obvious etiology of atrial enlargement or other underlying cardiac issue apart from known ascending thoracic aortic aneurysm    Plan: Reduce metoprolol to 12.5 mg b.i.d. and monitor heart rate and blood pressure closely with this adjustment. 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  Orthostatic blood pressures performed and do show significant drop in blood pressure with positional changes.  Other vitals stable.  Eating and voiding well.  Tolerating medications without significant side effects.            Significant Problems:     Past Medical History:   Diagnosis Date     Blood transfusion      Chronic gastric ulcer without mention of hemorrhage, perforation, without mention of obstruction     Ulcer, Gastric     Gastric cancer (H)      Hemorrhage of gastrointestinal tract, unspecified 01/13/10    [facility]     Hypoglycemia, unspecified 1/26/2015     Measles without mention of complication     Measles     Mixed hyperlipidemia     Hyperlipidemia     Mumps without mention of complication     Mumps     Pulmonary  "embolism (H) Sept 4, 2010     Urinary calculus, unspecified     Renal stones     Varicella without mention of complication     Chickenpox            Physical Exam:   Blood pressure 101/59, pulse 70, temperature 96.9  F (36.1  C), temperature source Oral, resp. rate 18, height 1.854 m (6' 1\"), weight 78.5 kg (173 lb 1 oz), SpO2 96 %.  Constitutional:   awake, alert, cooperative, no apparent distress, and appears stated age     Lungs:   No increased work of breathing, good air exchange, clear to auscultation bilaterally, no crackles or wheezing     Cardiovascular:   Irregularly irregular rhythm with HR in the upper 60s currently     Abdomen:   Bowel sounds present, abdomen soft and non-tender     Musculoskeletal:   no lower extremity pitting edema present     Neurologic:   Awake, alert, oriented to name, place and time.      Skin:   normal skin color, texture, turgor             Data:   All laboratory data reviewed    Attestation:  I have reviewed today's vital signs, notes, medications, labs and imaging.     Electronically Signed:  Marleni Barrett MD    Note: Chart documentation done in part with Dragon Voice Recognition software. Although reviewed after completion, some word and grammatical errors may remain.     "

## 2017-08-18 NOTE — PROGRESS NOTES
Nutrition Note:     Received a provider order for patient/family request. Spoke with patient's wife and she reports that they do not have any nutrition concerns at this time. Please re-consult if something shall arise.     Evon Machado RDN, LD  Clinical Dietitian  [phone]

## 2017-08-18 NOTE — TELEPHONE ENCOUNTER
Reason for Call:  Other call back    Detailed comments: Sage is wanting Dr. FRANCISCO or his nurse call him back to discuss Flood. Please call and advise.     Phone Number Patient can be reached at: Other phone number:      Best Time: anytime    Can we leave a detailed message on this number? NO    Call taken on 8/18/2017 at 1:21 PM by Lorena Huizar

## 2017-08-18 NOTE — PROGRESS NOTES
Overnight had rates in 150 in atrial fibrillation. Was given lopressor 25 mg bid and this AM resting is still in atrial fib with rates at 59.  Will order echocardiogram for this AM  Could explain his episodes of dizziness and near syncope.  Can still be discharged later today to TCU, consider cardiology follow-up   Electronically signed by SUSIE Abreu on August 18, 2017

## 2017-08-18 NOTE — PLAN OF CARE
Problem: Goal Outcome Summary  Goal: Goal Outcome Summary  Left knee dressing clean dry and intact. Vitals have been stable. Oxycodone given for mild discomfort. Pt ambulated well to the bathroom with one assist and walker. Pt has been in A fib with rates 100-150 with activity. Lopressor started during the night and rates are now 60-80's. Pt continues to be in a fib. No complaints of dizziness when up.

## 2017-08-18 NOTE — PLAN OF CARE
Patient has been accepted for TCU placement at [facility] for today.  Patient and wife are aware.  Patient still not convinced that he will need TCU and is waiting to see PT.  Discussed that there are other facilities in the area that can be looked into if he does not want to go to [facility] (although this was his first choice yesterday.)  Patient not convinced that he needs to go to any TCU at this point.  Wife concerned about being able to help patient at home at this time.   to see patient and wife after the PT session this morning to talk about d/c planning further.

## 2017-08-18 NOTE — PLAN OF CARE
Problem: Goal Outcome Summary  Goal: Goal Outcome Summary     Goal status: Slow progress today     Functional status: Pt demonstrates little progress today, limited by symptoms of dizziness, clamminess and fatigue with sitting and standing positions. He demonstrates improved bed mobility and quad control today, demonstrating the ability to perform 6 SLRs with knee immobilizer off without assist.       Areas of progress: bed mobility independence, SLR performance    Barrier to discharge Home: Level of assist and symptomatic response with sitting and standing positions making household ambulation unsafe at this time; stairs to enter home and have been unable to attempt these to this point     Equipment needs: Defer to TCU     Discharge disposition/rationale: TCU to progress safety and independence with functional mobility and ambulation, with additional monitoring of medical needs     Transport recommendations: Family transport if pt can tolerate sitting positions without symptomatic response         Vinay Pisano PT, DPT        8/18/2017      11:30 AM  [facility]  [phone]

## 2017-08-18 NOTE — PLAN OF CARE
Patient is not medically stable to d/c today.  Patient had been accepted for admission to [facility] for today, but they cannot accept any admissions over the weekend due to staffing.  Marier has contacted [facility] and they do not have any open male TCU beds and they also do not have staffing to accommodate a weekend admission.      Writer has called [facility], as this was patient's 3rd choice for TCU facilities.  They are willing to assess patient and can possibly accommodate patient over the weekend.  Referral being sent to [facility].

## 2017-08-19 ENCOUNTER — APPOINTMENT (OUTPATIENT)
Dept: OCCUPATIONAL THERAPY | Facility: CLINIC | Age: 82
DRG: 470 | End: 2017-08-19
Attending: ORTHOPAEDIC SURGERY
Payer: MEDICARE

## 2017-08-19 ENCOUNTER — APPOINTMENT (OUTPATIENT)
Dept: PHYSICAL THERAPY | Facility: CLINIC | Age: 82
DRG: 470 | End: 2017-08-19
Attending: ORTHOPAEDIC SURGERY
Payer: MEDICARE

## 2017-08-19 PROBLEM — R39.11 URINARY HESITANCY: Status: ACTIVE | Noted: 2017-08-19

## 2017-08-19 PROBLEM — I10 ORTHOSTATIC HYPERTENSION: Status: RESOLVED | Noted: 2017-08-18 | Resolved: 2017-08-19

## 2017-08-19 PROBLEM — I95.1 ORTHOSTATIC HYPOTENSION: Status: ACTIVE | Noted: 2017-08-19

## 2017-08-19 LAB
ALBUMIN UR-MCNC: NEGATIVE MG/DL
ANION GAP SERPL CALCULATED.3IONS-SCNC: 6 MMOL/L (ref 3–14)
APPEARANCE UR: CLEAR
BILIRUB UR QL STRIP: NEGATIVE
BUN SERPL-MCNC: 26 MG/DL (ref 7–30)
CALCIUM SERPL-MCNC: 8.4 MG/DL (ref 8.5–10.1)
CHLORIDE SERPL-SCNC: 106 MMOL/L (ref 94–109)
CO2 SERPL-SCNC: 27 MMOL/L (ref 20–32)
COLOR UR AUTO: YELLOW
CREAT SERPL-MCNC: 1.12 MG/DL (ref 0.66–1.25)
GFR SERPL CREATININE-BSD FRML MDRD: 62 ML/MIN/1.7M2
GLUCOSE BLDC GLUCOMTR-MCNC: 85 MG/DL (ref 70–99)
GLUCOSE BLDC GLUCOMTR-MCNC: 92 MG/DL (ref 70–99)
GLUCOSE SERPL-MCNC: 90 MG/DL (ref 70–99)
GLUCOSE UR STRIP-MCNC: NEGATIVE MG/DL
HGB UR QL STRIP: NEGATIVE
KETONES UR STRIP-MCNC: NEGATIVE MG/DL
LEUKOCYTE ESTERASE UR QL STRIP: NEGATIVE
MUCOUS THREADS #/AREA URNS LPF: PRESENT /LPF
NITRATE UR QL: NEGATIVE
PH UR STRIP: 5 PH (ref 5–7)
POTASSIUM SERPL-SCNC: 4.8 MMOL/L (ref 3.4–5.3)
RBC #/AREA URNS AUTO: <1 /HPF (ref 0–2)
SODIUM SERPL-SCNC: 139 MMOL/L (ref 133–144)
SOURCE: ABNORMAL
SP GR UR STRIP: 1.01 (ref 1–1.03)
UROBILINOGEN UR STRIP-MCNC: 0 MG/DL (ref 0–2)
WBC #/AREA URNS AUTO: <1 /HPF (ref 0–2)

## 2017-08-19 PROCEDURE — 81001 URINALYSIS AUTO W/SCOPE: CPT | Performed by: FAMILY MEDICINE

## 2017-08-19 PROCEDURE — 25000132 ZZH RX MED GY IP 250 OP 250 PS 637: Mod: GY | Performed by: FAMILY MEDICINE

## 2017-08-19 PROCEDURE — 97110 THERAPEUTIC EXERCISES: CPT | Mod: GP | Performed by: PHYSICAL THERAPIST

## 2017-08-19 PROCEDURE — 97116 GAIT TRAINING THERAPY: CPT | Mod: GP | Performed by: PHYSICAL THERAPIST

## 2017-08-19 PROCEDURE — 40000193 ZZH STATISTIC PT WARD VISIT: Performed by: PHYSICAL THERAPIST

## 2017-08-19 PROCEDURE — A9270 NON-COVERED ITEM OR SERVICE: HCPCS | Mod: GY | Performed by: ORTHOPAEDIC SURGERY

## 2017-08-19 PROCEDURE — 25000128 H RX IP 250 OP 636: Performed by: FAMILY MEDICINE

## 2017-08-19 PROCEDURE — 12000007 ZZH R&B INTERMEDIATE

## 2017-08-19 PROCEDURE — 25000132 ZZH RX MED GY IP 250 OP 250 PS 637: Mod: GY | Performed by: ORTHOPAEDIC SURGERY

## 2017-08-19 PROCEDURE — 97535 SELF CARE MNGMENT TRAINING: CPT | Mod: GO

## 2017-08-19 PROCEDURE — 80048 BASIC METABOLIC PNL TOTAL CA: CPT | Performed by: FAMILY MEDICINE

## 2017-08-19 PROCEDURE — 00000146 ZZHCL STATISTIC GLUCOSE BY METER IP

## 2017-08-19 PROCEDURE — 99232 SBSQ HOSP IP/OBS MODERATE 35: CPT | Performed by: FAMILY MEDICINE

## 2017-08-19 PROCEDURE — A9270 NON-COVERED ITEM OR SERVICE: HCPCS | Mod: GY | Performed by: FAMILY MEDICINE

## 2017-08-19 PROCEDURE — 40000718 ZZHC STATISTIC PT DEPARTMENT ORTHO VISIT: Performed by: PHYSICAL THERAPIST

## 2017-08-19 PROCEDURE — 40000133 ZZH STATISTIC OT WARD VISIT

## 2017-08-19 RX ORDER — METOPROLOL TARTRATE 25 MG/1
12.5 TABLET, FILM COATED ORAL 2 TIMES DAILY
Qty: 60 TABLET | DISCHARGE
Start: 2017-08-19 | End: 2017-08-22

## 2017-08-19 RX ORDER — ACETAMINOPHEN 325 MG/1
975 TABLET ORAL EVERY 8 HOURS PRN
Status: DISCONTINUED | OUTPATIENT
Start: 2017-08-19 | End: 2017-08-21

## 2017-08-19 RX ADMIN — OXYCODONE HYDROCHLORIDE 5 MG: 5 TABLET ORAL at 11:00

## 2017-08-19 RX ADMIN — CYCLOBENZAPRINE 5 MG: 5 TABLET, FILM COATED ORAL at 16:16

## 2017-08-19 RX ADMIN — CYCLOBENZAPRINE 5 MG: 5 TABLET, FILM COATED ORAL at 07:20

## 2017-08-19 RX ADMIN — METOPROLOL TARTRATE 12.5 MG: 25 TABLET, FILM COATED ORAL at 21:37

## 2017-08-19 RX ADMIN — OXYCODONE HYDROCHLORIDE 5 MG: 5 TABLET ORAL at 02:53

## 2017-08-19 RX ADMIN — RIVAROXABAN 15 MG: 15 TABLET, FILM COATED ORAL at 09:30

## 2017-08-19 RX ADMIN — SODIUM CHLORIDE: 9 INJECTION, SOLUTION INTRAVENOUS at 07:15

## 2017-08-19 RX ADMIN — OXYCODONE HYDROCHLORIDE 5 MG: 5 TABLET ORAL at 21:37

## 2017-08-19 RX ADMIN — OXYCODONE HYDROCHLORIDE 5 MG: 5 TABLET ORAL at 07:21

## 2017-08-19 RX ADMIN — OXYCODONE HYDROCHLORIDE 5 MG: 5 TABLET ORAL at 16:16

## 2017-08-19 RX ADMIN — ACETAMINOPHEN 975 MG: 325 TABLET ORAL at 11:00

## 2017-08-19 RX ADMIN — DOCUSATE SODIUM 100 MG: 100 CAPSULE, LIQUID FILLED ORAL at 21:37

## 2017-08-19 RX ADMIN — DOCUSATE SODIUM 100 MG: 100 CAPSULE, LIQUID FILLED ORAL at 09:30

## 2017-08-19 RX ADMIN — ACETAMINOPHEN 975 MG: 325 TABLET ORAL at 02:53

## 2017-08-19 RX ADMIN — METOPROLOL TARTRATE 12.5 MG: 25 TABLET, FILM COATED ORAL at 09:30

## 2017-08-19 NOTE — PROGRESS NOTES
Pt has been urinating frequently in small amounts - bladder scanned for 642 ml, retention.  Charge nurse notified and note left for

## 2017-08-19 NOTE — PROGRESS NOTES
Goal status: meeting bed mobility goal, nearly met transfer goal, gait goal in progress, stairs goal no longer necessary as pt will DC to TCU    Functional status: CGA/SBA bed mobility, CGA transfers but somewhat impulsive, ambulating 80 feet with FWW. 8-65 degrees L knee ROM.     Areas of progress: increased ambulation distance    Barrier to discharge Home: stairs, impulsivity, slow to progress    Equipment needs: defer to TCU    Discharge disposition/rationale: TCU    Transport recommendations: family transport may be appropriate

## 2017-08-19 NOTE — PLAN OF CARE
Problem: Goal Outcome Summary  Goal: Goal Outcome Summary  Outcome: Improving  Pt is alert and oriented. Vital signs stable, afebrile. Telemetry showing SR with PACs. Tele was showing A.fib at start of shift. (see tele strips). CMS intact. Pt denies any numbness or tingling. Dressing is CDI. Immobilizer worn when out of bed. PRN Oxycodone given for pain. Pt needs frequent reminders to not put pillow under knee, and not to hang left leg off the bed. 1 lower side rail put up to help pt keep left leg in bed.

## 2017-08-19 NOTE — PROGRESS NOTES
"ORTHO PROGRESS NOTE    POD # 4  Procedure(s):  ARTHROPLASTY KNEE - left on 8/15/2017    Pain:  mild    /70 (BP Location: Right arm)  Pulse 60  Temp 97.5  F (36.4  C) (Oral)  Resp 18  Ht 1.854 m (6' 1\")  Wt 78.5 kg (173 lb 1 oz)  SpO2 96%  BMI 22.83 kg/m2    Temp (24hrs), Av.5  F (36.4  C), Min:97  F (36.1  C), Max:97.8  F (36.6  C)      Recent Labs   Lab Test  17   0536  17   0544  17   1409   11   1244  11   HGB  11.0*  11.0*  13.8   < >  13.7   < >   --    --    INR   --    --    --    --   1.08   --   2.2*  2.0    < > = values in this interval not displayed.                 Circulation intact.   Sensation intact.   Calves soft and nontender.   Incision is healing well      PT  Walked 80 feet  Range of motion 8-65 degrees.         ASSESSMENT:  Left total knee arthroplasty doing well.  Has had atrial fibrillation being evaluated by internal medicine.    PLAN:  Physical Therapy/ Occupational Therapy.  Short term rehab whenever approved by internal medicine.  Ortho orders are in.    Tommie Hoover M.D.  Department of Orthopaedic Surgery  [facility]  Pager: [phone]      "

## 2017-08-19 NOTE — PLAN OF CARE
visited with patient and his wife today.  Writer reviewed second IM letter with patient and he signed it.  Again discussed that [facility] has a TCU bed available for patient today or tomorrow.  Patient and wife were waiting to talk with the hospitalist and the surgeon regarding whether he is medically stable for d/c or not today.      Writer had contacted Gita at [facility], #[phone], earlier today regarding possible d/c today.  Writer has also left her a message now that surgeon has not rounded yet, so still waiting to hear about d/c.     Wife has been planning to transport patient.     1232- Gita from [facility] has called and stated that they need d/c orders by 1pm today, as their pharmacy closes at 2pm. (This had not been mentioned until now). Surgeon not expected to be here before 1pm, so d/c will be delayed until tomorrow.  Patient and wife aware.  [facility] will have the bed available for patient tomorrow.  Will need d/c orders tomorrow by 1300.

## 2017-08-19 NOTE — PLAN OF CARE
Occupational Therapy     Goal status:   1:  Progressing; currently SBA for safety with task as patient tends to be in a hurry or step away from walker.    2:  Patient was min-mod A with LB dressing including knee immobilizer.  He did attempt to increased independence with LLE dressing and was not able to reach that far.    3:  Patient completed standing with CGA/SBA for safety as he was hurry to complete.     Functional status: Patient currently min-mod A with BADL. .     Areas of progress: decreased need for assistance, decreased pain with activity, activity tolerance    Barrier to discharge Home: Level of assist needed to safety complete BADL.      Equipment needs: Defer to TCU    Discharge disposition/rationale: TCU for continued progression of independence with BADL.      Transport recommendations: Private vehicle/family transport.

## 2017-08-19 NOTE — PLAN OF CARE
Problem: Goal Outcome Summary  Goal: Goal Outcome Summary  Outcome: Therapy, progress toward functional goals as expected  S-(situation): shift note     B-(background): Left TKA     A-(assessment): Pt is A&O.  VSS.  Afebrile.  Pt doing much better today.   Ortho VS stable- not feeling light headed when standing up.  Taking pain meds approx every 4 hours- this is keeping him comfortable.  CMS intact.  Has good appetite.  Voiding frequently in small amounts.  Rechecked U/A-is negative.     R-(recommendations): Will discharge tomorrow to [facility].

## 2017-08-19 NOTE — PROGRESS NOTES
[facility] Progress Note          Assessment and Plan:   Assessment:   Principal Problem:    Status post total left knee replacement using cement    Assessment:  Postop day #4, with patient recovering well from a surgical standpoint and resolution of dizziness episodes and orthostatic hypotension.    Plan: Ongoing management per orthopedic surgery but anticipate discharge to TCU today as placement has been found at [facility].    Active Problems:    Near syncope    Assessment: Thought secondary to orthostatic hypotension, which is resolved with fluid bolusing and blood pressures are now normalized with patient having no further episodes of dizziness or near syncope. Patient also had paroxysmal atrial fibrillation with rapid ventricular rate, which was felt to be a contributing factor however this has been rate controlled with a small dose of metoprolol. Patient has not had any further episodes since yesterday morning    Plan:  Continue with metoprolol 12.5 mg b.i.d. and monitor for reoccurrence of symptoms following discharge to TCU. Patient will make sure to drink appropriate amount to stay hydrated as he continues to recover from surgery.      Paroxysmal atrial fibrillation (H)    Assessment:  Rate controlled on metoprolol. Patient did go back into normal sinus rhythm for a period of time overnight, however is once more in atrial fibrillation today with heart rate in the 60s to 70s.     Plan: Continue with metoprolol 12.5 mg b.i.d. for rate control as well as oral Xarelto for anticoagulation.  Referral has been placed for outpatient cardiology follow-up      Orthostatic hypotension    Assessment: Discovered yesterday and likely contributing to his near syncopal episodes. Patient was given a fluid bolus as well as increased maintenance fluids and orthostatic blood pressures today are normal    Plan: We'll discontinue IV fluids and patient will continue to have increased oral intake to ensure hydration  going forward.      Urinary hesitancy and urgency    Assessment: UA repeated today still shows no signs of infection.      Plan: Discussed with patient and his wife and at this time urinary symptoms are felt most likely secondary to the [facility] catheter that was placed during surgery causing mild irritation of the prostate. Patient is able to void large volume and therefore no intervention is needed at this time. Did discuss the symptoms are ongoing, could consider urology referral in the next 1-2 weeks.      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       VTE:  SCDs, Xarelto  Code Status:  Full code        Interval History:   Has improved greatly overnight without any further dizziness following fluid bolus.  HR has been under 100 with lower dose of metoprolol and patient still flipping between a fib and NSR.  Orthostatic blood pressures today are normal. Vital signs generally better.  Patient continues to complain of urinary urgency and hesitancy.  Eating and voiding well.  Tolerating medications without significant side effects.  No new concerns today.            Significant Problems:     Past Medical History:   Diagnosis Date     Blood transfusion      Chronic gastric ulcer without mention of hemorrhage, perforation, without mention of obstruction     Ulcer, Gastric     Gastric cancer (H)      Hemorrhage of gastrointestinal tract, unspecified 01/13/10    [facility]     Hypoglycemia, unspecified 1/26/2015     Measles without mention of complication     Measles     Mixed hyperlipidemia     Hyperlipidemia     Mumps without mention of  "complication     Mumps     Pulmonary embolism (H) Sept 4, 2010     Urinary calculus, unspecified     Renal stones     Varicella without mention of complication     Chickenpox            Physical Exam:   Blood pressure 133/70, pulse 60, temperature 97.5  F (36.4  C), temperature source Oral, resp. rate 18, height 1.854 m (6' 1\"), weight 78.5 kg (173 lb 1 oz), SpO2 96 %.  Constitutional:   awake, alert, cooperative, no apparent distress, and appears stated age     Lungs:   No increased work of breathing, good air exchange, clear to auscultation bilaterally, no crackles or wheezing     Cardiovascular:   Irregularly irregular heartbeat with HR in the low 70s     Abdomen:   Bowel sounds present     Musculoskeletal:   No edema in right leg  Left leg in brace and was not examined     Neurologic:   Awake, alert, oriented to name, place and time.      Skin:   normal skin color, texture, turgor           Data:   All laboratory data reviewed    Attestation:  I have reviewed today's vital signs, notes, medications, labs and imaging.     Electronically Signed:  Marleni Barrett MD    Note: Chart documentation done in part with Dragon Voice Recognition software. Although reviewed after completion, some word and grammatical errors may remain.     "

## 2017-08-20 ENCOUNTER — APPOINTMENT (OUTPATIENT)
Dept: PHYSICAL THERAPY | Facility: CLINIC | Age: 82
DRG: 470 | End: 2017-08-20
Attending: ORTHOPAEDIC SURGERY
Payer: MEDICARE

## 2017-08-20 PROBLEM — R39.11 URINARY HESITANCY: Status: RESOLVED | Noted: 2017-08-19 | Resolved: 2017-08-20

## 2017-08-20 PROBLEM — R33.9 URINARY RETENTION WITH INCOMPLETE BLADDER EMPTYING: Status: ACTIVE | Noted: 2017-08-20

## 2017-08-20 LAB
GLUCOSE BLDC GLUCOMTR-MCNC: 120 MG/DL (ref 70–99)
GLUCOSE BLDC GLUCOMTR-MCNC: 92 MG/DL (ref 70–99)

## 2017-08-20 PROCEDURE — 99232 SBSQ HOSP IP/OBS MODERATE 35: CPT | Performed by: FAMILY MEDICINE

## 2017-08-20 PROCEDURE — 25000132 ZZH RX MED GY IP 250 OP 250 PS 637: Mod: GY | Performed by: ORTHOPAEDIC SURGERY

## 2017-08-20 PROCEDURE — A9270 NON-COVERED ITEM OR SERVICE: HCPCS | Mod: GY | Performed by: FAMILY MEDICINE

## 2017-08-20 PROCEDURE — 00000146 ZZHCL STATISTIC GLUCOSE BY METER IP

## 2017-08-20 PROCEDURE — 97116 GAIT TRAINING THERAPY: CPT | Mod: GP | Performed by: PHYSICAL THERAPIST

## 2017-08-20 PROCEDURE — 97110 THERAPEUTIC EXERCISES: CPT | Mod: GP | Performed by: PHYSICAL THERAPIST

## 2017-08-20 PROCEDURE — 40000193 ZZH STATISTIC PT WARD VISIT: Performed by: PHYSICAL THERAPIST

## 2017-08-20 PROCEDURE — 25000132 ZZH RX MED GY IP 250 OP 250 PS 637: Mod: GY | Performed by: FAMILY MEDICINE

## 2017-08-20 PROCEDURE — 12000007 ZZH R&B INTERMEDIATE

## 2017-08-20 PROCEDURE — A9270 NON-COVERED ITEM OR SERVICE: HCPCS | Mod: GY | Performed by: ORTHOPAEDIC SURGERY

## 2017-08-20 RX ADMIN — OXYCODONE HYDROCHLORIDE 5 MG: 5 TABLET ORAL at 09:11

## 2017-08-20 RX ADMIN — OXYCODONE HYDROCHLORIDE 5 MG: 5 TABLET ORAL at 14:41

## 2017-08-20 RX ADMIN — RIVAROXABAN 15 MG: 15 TABLET, FILM COATED ORAL at 09:13

## 2017-08-20 RX ADMIN — DOCUSATE SODIUM 100 MG: 100 CAPSULE, LIQUID FILLED ORAL at 20:53

## 2017-08-20 RX ADMIN — METOPROLOL TARTRATE 12.5 MG: 25 TABLET, FILM COATED ORAL at 09:11

## 2017-08-20 RX ADMIN — METOPROLOL TARTRATE 12.5 MG: 25 TABLET, FILM COATED ORAL at 20:52

## 2017-08-20 RX ADMIN — DOCUSATE SODIUM 100 MG: 100 CAPSULE, LIQUID FILLED ORAL at 09:11

## 2017-08-20 RX ADMIN — CYCLOBENZAPRINE 5 MG: 5 TABLET, FILM COATED ORAL at 20:56

## 2017-08-20 RX ADMIN — OXYCODONE HYDROCHLORIDE 5 MG: 5 TABLET ORAL at 01:06

## 2017-08-20 RX ADMIN — CYCLOBENZAPRINE 5 MG: 5 TABLET, FILM COATED ORAL at 14:41

## 2017-08-20 RX ADMIN — MAGNESIUM HYDROXIDE 30 ML: 400 SUSPENSION ORAL at 20:53

## 2017-08-20 RX ADMIN — OXYCODONE HYDROCHLORIDE 5 MG: 5 TABLET ORAL at 05:02

## 2017-08-20 RX ADMIN — ACETAMINOPHEN 975 MG: 325 TABLET ORAL at 17:18

## 2017-08-20 RX ADMIN — OXYCODONE HYDROCHLORIDE 5 MG: 5 TABLET ORAL at 20:56

## 2017-08-20 RX ADMIN — CYCLOBENZAPRINE 5 MG: 5 TABLET, FILM COATED ORAL at 01:06

## 2017-08-20 NOTE — PLAN OF CARE
Patient not medically stable for d/c today.  Patient is planning to admit to the TCU at [facility] on Monday, 8/21, as had been the plan of Friday, 8/18.  Patient and family not wanting the bed at [facility].  Writer has contacted [facility] and they are aware, although will keep patient on their list in case something falls through with [facility] on Monday.      PAS-RR    Per DHS regulation, CTS team completed and submitted PAS-RR to [place] [employer] via the [employer]. CTS team advised SNF and they are aware a PAS-RR has been submitted.     CTS team reviewed with pt or health care agent that they may be contacted for a follow up appointment within 10 days of hospital discharge if SNF stay is <30 days. Contact information for [employer] was also provided.     Pt or health care agent verbalized understanding.     PAS-RR # [id number]

## 2017-08-20 NOTE — PROGRESS NOTES
[facility] Progress Note          Assessment and Plan:   Assessment:   Principal Problem:    Status post total left knee replacement using cement    Assessment: Postop day #5, with patient now having difficulty with urinary retention as below. The near-syncopal episodes have resolved with improved control of atrial fibrillation and orthostatic blood pressures.    Plan: Ongoing management per orthopedic surgery, however anticipate discharge to TCU tomorrow once it is known if Perez catheter placement is required from a urinary retention standpoint.    Active Problems:    Near syncope    Assessment: Multifactorial, thought secondary to atrial fibrillation with rapid ventricular rate in combination with discovered orthostatic hypotension causing blood pressures in the 70s upon standing. Both have improved with treatment as below and patient is having no further difficulty with lightheadedness upon standing    Plan:  Continue with plans as noted below for management of these individual condition thought to be contributing.        Orthostatic hypotension    Assessment: Ongoing. On review of chart unable to identify any reversible etiology with patient not on any medications that could cause this. On further discussion with patient today, he has been having symptoms of mild lightheadedness intermittently suddenly standing that lasts for approximately 10-30 seconds and states that this has been going on for years but has worsened in the past few months so it is suspected that patient likely has been having orthostatic hypotension for months to years.  When patient is well hydrated, as he is today, continues to have significant drop in systolic blood pressure, but is completely asymptomatic with movement    Plan: Reviewed in great detail with patient and his wife the diagnosis of orthostatic hypotension and the importance of staying hydrated going forward. We will repeat orthostatic blood pressures tomorrow to ensure  ongoing stability of blood pressure with standing prior to discharge to TCU.      Urinary retention with incomplete bladder emptying    Assessment: progressively worsening yesterday with IV fluids administered.  UA showed no infection but post-void residual was over 500 and this morning patient was straight cathed for over 500 cc.  Since that time has not felt the urge to void.      Plan: Will continue to monitor patient's bladder status closely and when patient does void next, perform post-void residual ultrasound.  If residual is once more over 500 cc, would need to place a Perez catheter, consideration initiation of Flomax, and arrange for outpatient urology follow-up for 1-2 weeks following discharge to TCU for consideration of Perez removal      Paroxysmal atrial fibrillation (H)    Assessment: Patient has been flipping in and out of atrial fibrillation over the past 24 hours but is spending more time in normal sinus rhythm. Rate is well controlled with low-dose metoprolol    Plan: Continue with low-dose metoprolol and therapeutic dosing of Xarelto.  Did discuss that if paroxysmal A. fib resolves going forward, could consider discontinuation of metoprolol and blood thinner.  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 Patient does continue to have orthostatic hypotension on repeat evaluation  "today but blood pressures have improved greatly and even upon standing is over 100 systolic with patient being asymptomatic.  Patient's main issue is increased urinary frequency to the point of needing to void every 15-30 minutes overnight and only being able to go small volumes. Post void residual ultrasound performed this morning shows over 500 cc left.  Vital signs stable.  Eating and voiding well.  Tolerating medications without significant side effects.           Significant Problems:     Past Medical History:   Diagnosis Date     Blood transfusion      Chronic gastric ulcer without mention of hemorrhage, perforation, without mention of obstruction     Ulcer, Gastric     Gastric cancer (H)      Hemorrhage of gastrointestinal tract, unspecified 01/13/10    [facility]     Hypoglycemia, unspecified 1/26/2015     Measles without mention of complication     Measles     Mixed hyperlipidemia     Hyperlipidemia     Mumps without mention of complication     Mumps     Pulmonary embolism (H) Sept 4, 2010     Urinary calculus, unspecified     Renal stones     Varicella without mention of complication     Chickenpox            Physical Exam:   Blood pressure 144/74, pulse 70, temperature 98.4  F (36.9  C), temperature source Oral, resp. rate 18, height 1.854 m (6' 1\"), weight 78.5 kg (173 lb 1 oz), SpO2 95 %.  Constitutional:   awake, alert, cooperative, no apparent distress, and appears stated age     Lungs:   No increased work of breathing, good air exchange, clear to auscultation bilaterally, no crackles or wheezing     Cardiovascular:   Normal apical impulse, regular rate and rhythm - does sound to be in NSR currently, normal S1 and S2, no S3 or S4, and no murmur noted     Abdomen:   Bowel sounds present, abdomen soft and non-tender     Musculoskeletal:   no lower extremity pitting edema present  Left leg remains in brace     Neurologic:   Awake, alert, oriented to name, place and time.       Skin:   normal skin " color, texture, turgor          Data:   All laboratory data reviewed    Attestation:  I have reviewed today's vital signs, notes, medications, labs and imaging.     Electronically Signed:  Marleni Barrett MD    Note: Chart documentation done in part with Dragon Voice Recognition software. Although reviewed after completion, some word and grammatical errors may remain.

## 2017-08-20 NOTE — PLAN OF CARE
Problem: Goal Outcome Summary  Goal: Goal Outcome Summary  Outcome: Improving  Vitals stable, afebrile. CMS intact. Dressing CDI. Tele is showing Afib. Oxycodone 5mg and Flexeril given PRN. Pt reports dysuria and urinary frequency. Pt states he can void 200mls,  but it takes 20-30 minutes of him voiding 50 mls at a time. After voiding 100mls pt bladder Scanned-648    Orthostatic BP laying-151/67 standing-105/47

## 2017-08-20 NOTE — PROGRESS NOTES
"ORTHO PROGRESS NOTE    POD # 5  Procedure(s):  ARTHROPLASTY KNEE - left on 8/15/2017  Unable to void.    Pain:  mild    /74 (BP Location: Right arm)  Pulse 70  Temp 98.4  F (36.9  C) (Oral)  Resp 18  Ht 1.854 m (6' 1\")  Wt 78.5 kg (173 lb 1 oz)  SpO2 95%  BMI 22.83 kg/m2    Temp (24hrs), Av.1  F (36.7  C), Min:97.7  F (36.5  C), Max:98.4  F (36.9  C)      Recent Labs   Lab Test  17   0536  17   0544  17   1409   11   1244  11   HGB  11.0*  11.0*  13.8   < >  13.7   < >   --    --    INR   --    --    --    --   1.08   --   2.2*  2.0    < > = values in this interval not displayed.                 Circulation intact.   Sensation intact.   Calves soft and nontender.   Incision is healing well      PT  Walked 200 feet.  Range of motion 3-65 degrees.         ASSESSMENT:  Left total knee arthroplasty doing well, but patient unable to void.    PLAN:  Physical Therapy/ Occupational Therapy  discharge on hold pending urinary status.    Tommie Hoover M.D.  Department of Orthopaedic Surgery  [facility]  Pager: [phone]      "

## 2017-08-20 NOTE — PLAN OF CARE
Problem: Goal Outcome Summary  Goal: Goal Outcome Summary  Outcome: Improving  Pt has not been able to void on hs own, cath placed.  Patent had 8ooml out within 1-2 hours.  Urine light yellow.  Encouraged to drink fluids.  Pain under control with oxycodone and flexaril.  VSS.  Afebrile.  Discharge tomorrow to [facility].

## 2017-08-20 NOTE — PROGRESS NOTES
Goal status: meeting all goals but did not attempt stairs as will DC to TCU    Functional status: SBA bed mobility, CGA transfers with FWW, ambulating with swing-through gait with FWW up to 200 ft. 3-65 L knee ROM.     Areas of progress: improved extension mobility, less impulsive, improved gait pattern and distance    Barrier to discharge Home: stairs, hx of impulsivity    Equipment needs: defer to TCU    Discharge disposition/rationale: TCU for ongoing rehab    Transport recommendations: family transport

## 2017-08-21 ENCOUNTER — CARE COORDINATION (OUTPATIENT)
Dept: CARE COORDINATION | Facility: CLINIC | Age: 82
End: 2017-08-21

## 2017-08-21 ENCOUNTER — APPOINTMENT (OUTPATIENT)
Dept: ULTRASOUND IMAGING | Facility: CLINIC | Age: 82
DRG: 470 | End: 2017-08-21
Attending: FAMILY MEDICINE
Payer: MEDICARE

## 2017-08-21 ENCOUNTER — APPOINTMENT (OUTPATIENT)
Dept: OCCUPATIONAL THERAPY | Facility: CLINIC | Age: 82
DRG: 470 | End: 2017-08-21
Attending: ORTHOPAEDIC SURGERY
Payer: MEDICARE

## 2017-08-21 ENCOUNTER — APPOINTMENT (OUTPATIENT)
Dept: PHYSICAL THERAPY | Facility: CLINIC | Age: 82
DRG: 470 | End: 2017-08-21
Attending: ORTHOPAEDIC SURGERY
Payer: MEDICARE

## 2017-08-21 LAB
GLUCOSE BLDC GLUCOMTR-MCNC: 100 MG/DL (ref 70–99)
GLUCOSE BLDC GLUCOMTR-MCNC: 112 MG/DL (ref 70–99)
GLUCOSE BLDC GLUCOMTR-MCNC: 118 MG/DL (ref 70–99)

## 2017-08-21 PROCEDURE — 12000007 ZZH R&B INTERMEDIATE

## 2017-08-21 PROCEDURE — 25000132 ZZH RX MED GY IP 250 OP 250 PS 637: Mod: GY | Performed by: ORTHOPAEDIC SURGERY

## 2017-08-21 PROCEDURE — 40000133 ZZH STATISTIC OT WARD VISIT

## 2017-08-21 PROCEDURE — 97110 THERAPEUTIC EXERCISES: CPT | Mod: GP | Performed by: PHYSICAL THERAPIST

## 2017-08-21 PROCEDURE — 97116 GAIT TRAINING THERAPY: CPT | Mod: GP | Performed by: PHYSICAL THERAPIST

## 2017-08-21 PROCEDURE — 40000193 ZZH STATISTIC PT WARD VISIT: Performed by: PHYSICAL THERAPIST

## 2017-08-21 PROCEDURE — A9270 NON-COVERED ITEM OR SERVICE: HCPCS | Mod: GY | Performed by: ORTHOPAEDIC SURGERY

## 2017-08-21 PROCEDURE — 93880 EXTRACRANIAL BILAT STUDY: CPT

## 2017-08-21 PROCEDURE — 99233 SBSQ HOSP IP/OBS HIGH 50: CPT | Performed by: FAMILY MEDICINE

## 2017-08-21 PROCEDURE — A9270 NON-COVERED ITEM OR SERVICE: HCPCS | Mod: GY | Performed by: FAMILY MEDICINE

## 2017-08-21 PROCEDURE — 25000132 ZZH RX MED GY IP 250 OP 250 PS 637: Mod: GY | Performed by: FAMILY MEDICINE

## 2017-08-21 PROCEDURE — 97530 THERAPEUTIC ACTIVITIES: CPT | Mod: GP | Performed by: PHYSICAL THERAPIST

## 2017-08-21 PROCEDURE — 97535 SELF CARE MNGMENT TRAINING: CPT | Mod: GO

## 2017-08-21 PROCEDURE — 00000146 ZZHCL STATISTIC GLUCOSE BY METER IP

## 2017-08-21 RX ORDER — DOCUSATE SODIUM 100 MG/1
200 CAPSULE, LIQUID FILLED ORAL 2 TIMES DAILY
Status: DISCONTINUED | OUTPATIENT
Start: 2017-08-21 | End: 2017-08-21

## 2017-08-21 RX ORDER — ACETAMINOPHEN 500 MG
1000 TABLET ORAL EVERY 8 HOURS
Status: DISCONTINUED | OUTPATIENT
Start: 2017-08-21 | End: 2017-08-22 | Stop reason: HOSPADM

## 2017-08-21 RX ORDER — AMOXICILLIN 250 MG
2 CAPSULE ORAL 2 TIMES DAILY
Status: DISCONTINUED | OUTPATIENT
Start: 2017-08-21 | End: 2017-08-22 | Stop reason: HOSPADM

## 2017-08-21 RX ORDER — POLYETHYLENE GLYCOL 3350 17 G/17G
17 POWDER, FOR SOLUTION ORAL DAILY
Status: DISCONTINUED | OUTPATIENT
Start: 2017-08-21 | End: 2017-08-22 | Stop reason: HOSPADM

## 2017-08-21 RX ORDER — AMOXICILLIN 250 MG
1 CAPSULE ORAL ONCE
Status: COMPLETED | OUTPATIENT
Start: 2017-08-21 | End: 2017-08-21

## 2017-08-21 RX ADMIN — SENNOSIDES AND DOCUSATE SODIUM 2 TABLET: 8.6; 5 TABLET ORAL at 20:28

## 2017-08-21 RX ADMIN — POLYETHYLENE GLYCOL 3350 17 G: 17 POWDER, FOR SOLUTION ORAL at 10:43

## 2017-08-21 RX ADMIN — DOCUSATE SODIUM 100 MG: 100 CAPSULE, LIQUID FILLED ORAL at 07:38

## 2017-08-21 RX ADMIN — ACETAMINOPHEN 975 MG: 325 TABLET ORAL at 03:37

## 2017-08-21 RX ADMIN — DIPHENHYDRAMINE HYDROCHLORIDE 25 MG: 25 CAPSULE ORAL at 00:01

## 2017-08-21 RX ADMIN — OXYCODONE HYDROCHLORIDE 5 MG: 5 TABLET ORAL at 00:01

## 2017-08-21 RX ADMIN — RIVAROXABAN 15 MG: 15 TABLET, FILM COATED ORAL at 07:38

## 2017-08-21 RX ADMIN — SENNOSIDES AND DOCUSATE SODIUM 1 TABLET: 8.6; 5 TABLET ORAL at 10:43

## 2017-08-21 RX ADMIN — OXYCODONE HYDROCHLORIDE 5 MG: 5 TABLET ORAL at 07:38

## 2017-08-21 RX ADMIN — ACETAMINOPHEN 1000 MG: 500 TABLET ORAL at 17:26

## 2017-08-21 RX ADMIN — MAGNESIUM HYDROXIDE 30 ML: 400 SUSPENSION ORAL at 07:42

## 2017-08-21 RX ADMIN — METOPROLOL TARTRATE 12.5 MG: 25 TABLET, FILM COATED ORAL at 07:46

## 2017-08-21 RX ADMIN — ACETAMINOPHEN 1000 MG: 500 TABLET ORAL at 10:43

## 2017-08-21 RX ADMIN — OXYCODONE HYDROCHLORIDE 5 MG: 5 TABLET ORAL at 03:37

## 2017-08-21 RX ADMIN — OXYCODONE HYDROCHLORIDE 2.5 MG: 5 TABLET ORAL at 13:34

## 2017-08-21 NOTE — PROGRESS NOTES
S-(situation): Symptomatic orthostatic BP    B-(background): Orthostatic hypotension.     A-(assessment): BP 63/33 while standing, pt became diaphoretic and dizzy. Repeat BP lying down increased BP to 125/65.   Orthostatic BP:   Lying- /68 HR 69   Sitting BP 82/44   HR 77   Standing BP 63/33 HR 78    R-(recommendations): Continue to monitor.

## 2017-08-21 NOTE — PLAN OF CARE
Problem: Goal Outcome Summary  Goal: Goal Outcome Summary  Outcome: Improving  Vitals stable, afebrile. CMS intact. Dressing CDI. Perez patent with good urine output. Pt is up with 1 assist. Pain controlled with prn Oxycodone and Flexeril. Tele showing Afib. PRN Milk of Mag given for constipation. Pt had small hard BM.

## 2017-08-21 NOTE — TELEPHONE ENCOUNTER
Called and spoke to the patient's son Sage. He wanted to update Dr. FRANCISCO on some things going on. He is aware that per HIPPA we cant release any information to him. He said he is really calling because the patient's wife wanted him to call and update Dr. FRANCISCO.     Sage said in the past, the patient has had his drivers license taken away due to some diabetic issues he has had. Sage said patient has passed out, has no idea where he is, and gets pretty combative (which is not like the patient). Patient has had his license removed in the past. Sage said patient did get his license back with the condition that if these episodes happen again, it would be taken away.     Sage said about a week ago the patient had another one of these episodes. He said he passed out and it caused quite the scene.     Patient is currently in the hospital. Sage said the patient had a knee replacement and is having some complications. Sage said these episodes have happened a few times in the hospital as well. Sage said he talked to the hospital staff and they said the family would have to go through Dr. FRANCISCO.     Sage feels the patient is to the point where he needs his license removed again. He said these diabetic episodes seem to be happening more and for the safety of everyone, he should not be driving.     Sage is aware that Dr. FRANCISCO is out this week. He said the message could wait until he is back. Sage said he does not need a call back. He just wanted to call and inform Dr. FRANCISCO what is going on so he is aware. Sage also said if the clinic has questions, they can call Sage or the patient's wife.     Will route to Dr. FRANCISCO as GIORGIO.     Leeann Martinez RN  [facility]

## 2017-08-21 NOTE — PROGRESS NOTES
[facility] Progress Note          Assessment and Plan:   Assessment:   Principal Problem:    Status post total left knee replacement using cement    Assessment: Postop day #6. Orthopedic surgery has signed off from case as most of patient's issues ongoing are medical in nature. Continues to improve from a surgical standpoint. Did have urinary retention with Perez in place as below    Plan:  Plan is to discharge to TCU once patient is medically stable. Patient has been accepted at [facility] for rehabilitation and hopeful this discharge could occur tomorrow.    Active Problems:    Orthostatic hypotension    Assessment: Diagnosed during this hospitalization and improved, but did not fully resolve, with IV fluid administration. As IV fluids and then discontinued patient is once more becoming symptomatically orthostatic hypotensive. On discussion with patient, he has been having subtle symptoms that likely are related to this for several years, however never as severe as current and blood pressure does get into the 60s systolic on standing    Plan: Did review patient's medications and at this time will continue metoprolol completely and watch heart rate closely via telemetry to ensure rate control of atrial fibrillation without beta blockade. We'll also schedule Tylenol and see if we can reduce narcotic administration of oxycodone by doing this, as narcotics can sometimes cause orthostatic hypotension to worsen. We'll decrease oxycodone to 2.5 mg every 4 hours as needed and monitor closely for adequate pain control. Patient will also increase his oral water intake to at least 2 L today and will increase his salt consumption. Echocardiogram has already been performed and is overall unremarkable other than atrial fibrillation. We will proceed with carotid artery ultrasound to evaluate for any significant stenosis that might be contributing. Did discuss that if he continues to have significant symptomatic  orthostatic hypotension, may need to consider addition of fludrocortisone 0.1 mg daily and titrate as needed to avoid severe symptomatology as he continues to recover surgically - patient would like to try increasing his oral volume consumption and the above medication changes and workup prior to medication administration.      Near syncope    Assessment: Likely secondary to orthostatic hypotension as above    Plan: Proceed with plan as above      Urinary retention with incomplete bladder emptying    Assessment: Magallanes catheter is in place    Plan: Patient will be discharged with Magallanes catheter and outpatient urology follow-up.      Paroxysmal atrial fibrillation (H)    Assessment: Presenting following surgery and patient was in rapid ventricular rate in the 150s. Heart rate has been very well controlled in the 60s for the past 3 days with patient flipping in and out of atrial fibrillation but is spending longer period of time in normal sinus rhythm    Plan:  Will hold metoprolol for now and continue to monitor patient's heart rate on telemetry. If patient once more with rapid ventricular rate, would need to restart metoprolol at 12.5 mg b.i.d. and monitor closely.      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        VTE:  SCDs/Xarelto  Code Status:  Full code        Interval History:   Appears worse today with orthostatic blood pressures once more dropping into the 60s when standing and patient being symptomatic with diaphoresis, dizziness and feeling unwell.  Other vitals stable.  Patient also had repeat urinary retention and needed magallanes placement  "yesterday evening secondary to this.  Pain well controlled and continuing to recover from an ortho standpoint.          Significant Problems:     Past Medical History:   Diagnosis Date     Blood transfusion      Chronic gastric ulcer without mention of hemorrhage, perforation, without mention of obstruction     Ulcer, Gastric     Gastric cancer (H)      Hemorrhage of gastrointestinal tract, unspecified 01/13/10    [facility]     Hypoglycemia, unspecified 1/26/2015     Measles without mention of complication     Measles     Mixed hyperlipidemia     Hyperlipidemia     Mumps without mention of complication     Mumps     Pulmonary embolism (H) Sept 4, 2010     Urinary calculus, unspecified     Renal stones     Varicella without mention of complication     Chickenpox            Physical Exam:   Blood pressure 117/64, pulse 70, temperature 96.5  F (35.8  C), temperature source Oral, resp. rate 20, height 1.854 m (6' 1\"), weight 78.5 kg (173 lb 1 oz), SpO2 96 %.  Constitutional:   awake, alert, cooperative, no apparent distress while laying, and appears stated age     Lungs:   No increased work of breathing, good air exchange, clear to auscultation bilaterally, no crackles or wheezing     Cardiovascular:   Irregularly irregular rhythm with HR in the 60s, no murmur     Abdomen:   Bowel sounds present, abdomen soft and non-tender     Musculoskeletal:   Compression hose in place bilaterally, brace in place on the left leg  no lower extremity pitting edema present     Neurologic:   Awake, alert, oriented to name, place and time.      Skin:   normal skin color, texture, turgor          Data:   All laboratory data reviewed    Attestation:  I have reviewed today's vital signs, notes, medications, labs and imaging.     More than 35 minutes was spent face to face with patient and his wife discussing diagnosis of orthostatic blood pressure, possible causes, suspected acute on chronic nature, treatment options and further work up " recommended.      Electronically Signed:  Marleni Barrett MD    Note: Chart documentation done in part with Dragon Voice Recognition software. Although reviewed after completion, some word and grammatical errors may remain.

## 2017-08-21 NOTE — PROGRESS NOTES
[facility] Orthopedic Post-Op Note         Assessment and Plan:    Assessment:   Post-operative day #5  Patient with catheter for urinary retention  Now with some orthostatic hypotension  Procedure(s):  ARTHROPLASTY KNEE left  Pain well-controlled.  Tolerating physical therapy and rehabilitation well.       Plan:   Continue ambulation per tolerance  Should have outpatient urinary consultation regarding retention.  Discharge plans to TCU  Ortho sign off until needed. Now medical issues           Interval History:   Stable.  Pain in left knee doing well.  Patient with urinary retention and orthostatic hypotension holding up discharge. Plans for dc to TCU.             Physical Exam:   All vitals have been reviewedPatient Vitals for the past 8 hrs:   BP Temp Temp src Pulse Resp SpO2   08/21/17 0705 117/64 96.5  F (35.8  C) Oral 70 20 96 %     I/O last 3 completed shifts:  In: 670 [P.O.:670]  Out: 3225 [Urine:3225]    Dressing dry and intact.  aquacell in place           Data:   All laboratory/imaging data related to this surgery reviewed  Results for orders placed or performed during the hospital encounter of 08/15/17 (from the past 24 hour(s))   Glucose by meter   Result Value Ref Range    Glucose 120 (H) 70 - 99 mg/dL   Glucose by meter   Result Value Ref Range    Glucose 100 (H) 70 - 99 mg/dL        Delma Patel PA-C

## 2017-08-21 NOTE — PROGRESS NOTES
SPIRITUAL HEALTH SERVICES  SPIRITUAL ASSESSMENT Progress Note  [facility]      Pt declined a visit from .   is available for pt/family needs.    Kyle Christian M.Div., [facility]  Staff   Office tel: [phone]

## 2017-08-21 NOTE — PLAN OF CARE
Problem: Goal Outcome Summary  Goal: Goal Outcome Summary  Goal status: meeting some goals, progressed and updated to reflect ongoing need     Functional status: SBA bed mobility, CGA transfers with FWW, ambulating with swing-to and emerging swing through gait with FWW up to 200 ft. 3-65 L knee ROM.      Areas of progress: improved extension mobility, slower with flexion progression. Remains in need to assist for transfers given vitals and symptomatic hypotension this AM. Didn't have any noted impulsivity this AM, improved gait pattern and distance. Able to demo some SLR ability so will work a bit in room gait without brace this PM.     Barrier to discharge Home: stairs, hx of impulsivity, still needing CGA for mobilities     Equipment needs: defer to TCU , has 2WW with seat here.      Discharge disposition/rationale: TCU for ongoing rehab     Transport recommendations: family transport        Sophy Moya................... PT, DPT, CLT          8/21/2017, 11:24 AM  [phone]

## 2017-08-21 NOTE — PLAN OF CARE
Problem: Goal Outcome Summary  Goal: Goal Outcome Summary  Patient complaining of left knee pain related to surgery. PRN oxycodone and scheduled tylenol given. Orthostatic BP completed this am, standing BP 94/46 and patient complaining of dizziness and sweatiness. Remains in normal sinus rhythm this shift. Patient also complaining of feeling constipated. Bowel sounds are active and patient is passing gas. PRN milk of mag, colace, senna and miralax given. Will continue to monitor and assess.

## 2017-08-21 NOTE — PLAN OF CARE
Occupational Therapy     Goal status:   1:  Progressing; patient did well standing with support of the walker and was able to safely lift hands off walker to complete grooming/hygiene task.    2:  Progressing; patient was able to complete RLE and attempted LLE although was not able to slide sock off.    3:  Not addressed this date.     Functional status: Patient progress with level of independence with BADL to min A with dressing, SBA for toileting and grooming/hygiene.     Areas of progress:  Activity tolerance, increased independence with BADL    Barrier to discharge Home: Level of assist needed for safety and to complete BADL    Equipment needs: Defer to TCU    Discharge disposition/rationale: TCU for continued progression of independence and safety with BADL.     Transport recommendations: Private vehicle/family transport.

## 2017-08-22 ENCOUNTER — APPOINTMENT (OUTPATIENT)
Dept: PHYSICAL THERAPY | Facility: CLINIC | Age: 82
DRG: 470 | End: 2017-08-22
Attending: ORTHOPAEDIC SURGERY
Payer: MEDICARE

## 2017-08-22 ENCOUNTER — NURSING HOME VISIT (OUTPATIENT)
Dept: GERIATRICS | Facility: CLINIC | Age: 82
End: 2017-08-22
Payer: COMMERCIAL

## 2017-08-22 VITALS
RESPIRATION RATE: 16 BRPM | TEMPERATURE: 98.1 F | SYSTOLIC BLOOD PRESSURE: 137 MMHG | HEIGHT: 73 IN | BODY MASS INDEX: 22.94 KG/M2 | OXYGEN SATURATION: 98 % | WEIGHT: 173.06 LBS | DIASTOLIC BLOOD PRESSURE: 52 MMHG | HEART RATE: 88 BPM

## 2017-08-22 VITALS
HEART RATE: 83 BPM | RESPIRATION RATE: 16 BRPM | OXYGEN SATURATION: 96 % | TEMPERATURE: 98 F | SYSTOLIC BLOOD PRESSURE: 133 MMHG | DIASTOLIC BLOOD PRESSURE: 84 MMHG

## 2017-08-22 DIAGNOSIS — I48.0 PAROXYSMAL ATRIAL FIBRILLATION (H): ICD-10-CM

## 2017-08-22 DIAGNOSIS — I71.20 THORACIC AORTIC ANEURYSM WITHOUT RUPTURE (H): ICD-10-CM

## 2017-08-22 DIAGNOSIS — I95.1 ORTHOSTATIC HYPOTENSION: ICD-10-CM

## 2017-08-22 DIAGNOSIS — M17.0 PRIMARY OSTEOARTHRITIS OF BOTH KNEES: Primary | ICD-10-CM

## 2017-08-22 DIAGNOSIS — R33.9 URINARY RETENTION WITH INCOMPLETE BLADDER EMPTYING: ICD-10-CM

## 2017-08-22 DIAGNOSIS — Z96.652 STATUS POST TOTAL LEFT KNEE REPLACEMENT USING CEMENT: ICD-10-CM

## 2017-08-22 DIAGNOSIS — N18.30 CKD (CHRONIC KIDNEY DISEASE) STAGE 3, GFR 30-59 ML/MIN (H): ICD-10-CM

## 2017-08-22 LAB
ANION GAP SERPL CALCULATED.3IONS-SCNC: 8 MMOL/L (ref 3–14)
BUN SERPL-MCNC: 26 MG/DL (ref 7–30)
CALCIUM SERPL-MCNC: 8.8 MG/DL (ref 8.5–10.1)
CHLORIDE SERPL-SCNC: 102 MMOL/L (ref 94–109)
CO2 SERPL-SCNC: 28 MMOL/L (ref 20–32)
CREAT SERPL-MCNC: 1.17 MG/DL (ref 0.66–1.25)
GFR SERPL CREATININE-BSD FRML MDRD: 59 ML/MIN/1.7M2
GLUCOSE BLDC GLUCOMTR-MCNC: 87 MG/DL (ref 70–99)
GLUCOSE SERPL-MCNC: 97 MG/DL (ref 70–99)
POTASSIUM SERPL-SCNC: 4.4 MMOL/L (ref 3.4–5.3)
SODIUM SERPL-SCNC: 138 MMOL/L (ref 133–144)

## 2017-08-22 PROCEDURE — 25000132 ZZH RX MED GY IP 250 OP 250 PS 637: Mod: GY | Performed by: FAMILY MEDICINE

## 2017-08-22 PROCEDURE — 97116 GAIT TRAINING THERAPY: CPT | Mod: GP

## 2017-08-22 PROCEDURE — 99239 HOSP IP/OBS DSCHRG MGMT >30: CPT | Performed by: FAMILY MEDICINE

## 2017-08-22 PROCEDURE — 00000146 ZZHCL STATISTIC GLUCOSE BY METER IP

## 2017-08-22 PROCEDURE — 40000193 ZZH STATISTIC PT WARD VISIT

## 2017-08-22 PROCEDURE — 36415 COLL VENOUS BLD VENIPUNCTURE: CPT | Performed by: FAMILY MEDICINE

## 2017-08-22 PROCEDURE — A9270 NON-COVERED ITEM OR SERVICE: HCPCS | Mod: GY | Performed by: FAMILY MEDICINE

## 2017-08-22 PROCEDURE — 97530 THERAPEUTIC ACTIVITIES: CPT | Mod: GP

## 2017-08-22 PROCEDURE — 99310 SBSQ NF CARE HIGH MDM 45: CPT | Performed by: NURSE PRACTITIONER

## 2017-08-22 PROCEDURE — 97110 THERAPEUTIC EXERCISES: CPT | Mod: GP

## 2017-08-22 PROCEDURE — 80048 BASIC METABOLIC PNL TOTAL CA: CPT | Performed by: FAMILY MEDICINE

## 2017-08-22 RX ORDER — ACETAMINOPHEN 500 MG
1000 TABLET ORAL EVERY 8 HOURS
DISCHARGE
Start: 2017-08-22 | End: 2017-08-22

## 2017-08-22 RX ORDER — POLYETHYLENE GLYCOL 3350 17 G/17G
17 POWDER, FOR SOLUTION ORAL DAILY
Qty: 7 PACKET | DISCHARGE
Start: 2017-08-22 | End: 2017-09-05

## 2017-08-22 RX ORDER — OXYCODONE HYDROCHLORIDE 5 MG/1
2.5 TABLET ORAL EVERY 4 HOURS PRN
Qty: 30 TABLET | Refills: 0 | Status: SHIPPED | OUTPATIENT
Start: 2017-08-22 | End: 2017-09-20

## 2017-08-22 RX ORDER — ACETAMINOPHEN 500 MG
1000 TABLET ORAL EVERY 8 HOURS
DISCHARGE
Start: 2017-08-22 | End: 2020-03-13

## 2017-08-22 RX ORDER — AMOXICILLIN 250 MG
2 CAPSULE ORAL 2 TIMES DAILY
Qty: 100 TABLET | DISCHARGE
Start: 2017-08-22 | End: 2017-09-05

## 2017-08-22 RX ADMIN — OXYCODONE HYDROCHLORIDE 2.5 MG: 5 TABLET ORAL at 02:20

## 2017-08-22 RX ADMIN — RIVAROXABAN 15 MG: 15 TABLET, FILM COATED ORAL at 08:36

## 2017-08-22 RX ADMIN — SENNOSIDES AND DOCUSATE SODIUM 2 TABLET: 8.6; 5 TABLET ORAL at 08:36

## 2017-08-22 RX ADMIN — DIPHENHYDRAMINE HYDROCHLORIDE 25 MG: 25 CAPSULE ORAL at 00:06

## 2017-08-22 RX ADMIN — POLYETHYLENE GLYCOL 3350 17 G: 17 POWDER, FOR SOLUTION ORAL at 08:36

## 2017-08-22 RX ADMIN — ACETAMINOPHEN 1000 MG: 500 TABLET ORAL at 02:14

## 2017-08-22 NOTE — PLAN OF CARE
Problem: Goal Outcome Summary  Goal: Goal Outcome Summary     Goal status: Progressing     Functional status: Pt demonstrates improved use of functional quad strength today, with ability to perform SLR without assist. Sit <> stand transfers and sit <> supine transfers with SBA, no orthostatic symptoms today reported. Ambulates x100 ft with his walker from home with CGA x1, and cues with fair effect to step-through gait pattern.  0-3-68 knee ROM.      Areas of progress: Level of assist with functional mobility, SLR control    Barrier to discharge Home: Level of assist with functional mobility and gait, stairs to enter home     Equipment needs: Defer to TCU     Discharge disposition/rationale: TCU planned today to progress safety and independence with functional mobility and ambulation to facilitate safe return home     Transport recommendations: Private transport       Physical Therapy Discharge Summary    Reason for therapy discharge:    Discharged to transitional care facility.    Progress towards therapy goal(s). See goals on Care Plan in [facility] electronic health record for goal details.  Goals met    Therapy recommendation(s):    Continued therapy is recommended.  Rationale/Recommendations:  Will benefit from continued skilled therapy to progress safety and independence with functional mobility and ambulation, and to facilitate safe return home.            Vinay Pisano, PT, DPT        8/22/2017      9:49 AM  [facility]  [phone]

## 2017-08-22 NOTE — PLAN OF CARE
Problem: Goal Outcome Summary  Goal: Goal Outcome Summary  Outcome: No Change  Patients vitals stable on room air. Afebrile. Patient has been ambulating with immobilizer on 1 assist with walker and gait belt. Dressing is intact. Lungs are clear. CMS intact. NSR and a strip of A-fib on tele monitor. Patient has history of A-fib. Patients orthostatic blood pressures were negative tonight. Patient had a large BM. Adequate urine output from Perez tonight. Patient denies pain or need for pain medication.

## 2017-08-22 NOTE — PLAN OF CARE
Care Coordinator- Discharge Planning     Admission Date/Time:  8/15/2017  Attending MD:  No att. providers found     Data  Date of initial CC assessment:  8/16/17  Chart reviewed, discussed with interdisciplinary team.   Patient was admitted for:   1. Status post total left knee replacement using cement    2. Paroxysmal atrial fibrillation (H)    3. Urinary retention with incomplete bladder emptying    4. Other insomnia         Assessment  Full assessment completed in previous note    Coordination of Care and Referrals: Provided patient/family with options for Skilled Nursing Facility, TCU.[facility]      Plan  Anticipated Discharge Date:  8/22/17  Anticipated Discharge Plan:  [facility] for rehab then return home with wife    CTS Handoff completed:  YES    Shoshana Moya RN

## 2017-08-22 NOTE — DISCHARGE SUMMARY
[facility] Discharge Summary    Remigio Holly MRN# [id number]   Age: 83 year old YOB: 1933     Date of Admission:  8/15/2017  Date of Discharge::  8/22/2017  Admitting Physician:  Jonathan Cardona,   Discharge Physician:  Marleni Barrett MD    Home clinic: [facility]          Admission Diagnoses:   chronic pain of both knees primary osteoarthritis of both knees  Status post total left knee replacement using cement          Discharge Diagnosis:   Principal Problem:    Status post total left knee replacement using cement    Assessment: Patient presented for left knee replacement and from a surgical standpoint has recovered very well with pain adequately controlled     Plan: Pain will be discharged to [facility] for TCU. He will follow-up with Dr. Cardona in one week for surgical recheck.    Active Problems:    Orthostatic hypotension    Assessment: Patient was found to have orthostatic hypotension as a complication following his surgical intervention and was characterized by dizziness upon standing with blood pressures decreasing into the 60s systolic. Patient initially had improvement with IV rehydration, however orthostatic blood pressures remained they did not get less than 100 systolic and patient was asymptomatic. On further discussion it became apparent the patient has been having symptoms of mild orthostatic hypotension intermittently for several years and this was felt to be an acute on chronic worsening.  As the IV fluids were stopped patient struggled to have appropriate oral intake and symptomatic orthostatic hypotension returned. Metoprolol, initiated secondary to atrial fibrillation with initial RVR was discontinued and patient's narcotics were decreased. Patient also started drinking 2 L of fluid daily and with this accommodation of changes orthostatic blood pressures are once more remaining above 100 systolic upon  standing and patient is asymptomatic.  Carotid artery ultrasound showed less than 50% stenosis and echocardiogram is normal.    Plan: patient will be discharged with reduced dosing of oxycodone and will continue to drink at least 2 L of water based liquid daily and consume a higher salt diet.  Did discuss with patient that if he does have intermittent ongoing symptoms, may need consideration of oral medication to try and avoid symptomatic orthostasis.        Near syncope    Assessment: Secondary to orthostatic hypotension, resolved as blood pressures improved and remained over 100 upon standing    Plan:  Discharge with plan as above      Paroxysmal atrial fibrillation (H)    Assessment:  Patient was placed on telemetry as his near syncope was being evaluated and was found to have atrial fibrillation occurring intermittently. He did have rapid ventricular rate into the 150s on initial occurrence and was placed on metoprolol. Metoprolol was subsequently stopped secondary to orthostatic hypotension and concern that metoprolol was lowering his overall blood pressure baseline and contributing to the symptomatic nature of orthostatic blood pressures.  Despite the metoprolol being discontinued, continues to flip between normal sinus rhythm and rate controlled A. Fib.  Per patient, he did have this happen once before following a surgery and he eventually went into normal sinus rhythm and remained there.    Plan: Patient will be discharged without any beta blocker for now, we'll continue full dose anticoagulation with Xarelto based on renal status and have patient follow up with cardiology as an outpatient for further evaluation and discussion      Urinary retention with incomplete bladder emptying    Assessment:  Patient following surgery had removal of his Perez on postoperative day #1 but subsequently had urinary retention requiring straight catheterization. Eventually a Perez catheter was replaced secondary to this  retention.     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 Cardona          Medications Prior to Admission:     Prescriptions Prior to Admission   Medication Sig Dispense Refill Last Dose     cyanocobalamin (VITAMIN B12) 1000 MCG/ML injection Inject 1 mL (1,000 mcg) into the muscle every 30 days 1 year of injections approved through 4/19/2017 per Dr. Ayon. 1 mL 11 Past Month at Unknown time     blood glucose monitoring (ROBERT CONTOUR NEXT) test strip Use to test blood sugar 1 times daily or as directed. 100 each 3 Taking     blood glucose monitoring (ROBERT MICROLET) lancets Use to test blood sugar 1 time daily or as directed. 1 Box 2 Taking     [DISCONTINUED] diphenhydrAMINE (BENADRYL) 25 MG capsule Take 2 capsules (50 mg) by mouth At Bedtime 56 capsule  8/14/2017 at 2200             Discharge Medications:     Current Discharge Medication List      START taking these medications    Details   oxyCODONE (ROXICODONE) 5 MG IR tablet Take 0.5 tablets (2.5 mg) by mouth every 4 hours as needed for moderate to severe pain  Qty: 30 tablet, Refills: 0    Associated Diagnoses: Status post total left knee replacement using cement      magnesium hydroxide (MILK OF MAGNESIA) 400 MG/5ML suspension Take 30 mLs by mouth daily as needed for constipation  Qty: 105 mL    Associated Diagnoses: Status post total left knee replacement using cement      polyethylene glycol (MIRALAX/GLYCOLAX) Packet  Take 17 g by mouth daily  Qty: 7 packet    Associated Diagnoses: Status post total left knee replacement using cement      senna-docusate (SENOKOT-S;PERICOLACE) 8.6-50 MG per tablet Take 2 tablets by mouth 2 times daily May hold for loose stools  Qty: 100 tablet    Associated Diagnoses: Status post total left knee replacement using cement      acetaminophen (TYLENOL) 500 MG tablet Take 2 tablets (1,000 mg) by mouth every 8 hours    Associated Diagnoses: Status post total left knee replacement using cement      rivaroxaban ANTICOAGULANT (XARELTO) 15 MG TABS tablet Take 1 tablet (15 mg) by mouth daily    Associated Diagnoses: Paroxysmal atrial fibrillation (H)      cyclobenzaprine (FLEXERIL) 5 MG tablet Take 1 tablet (5 mg) by mouth 3 times daily as needed for muscle spasms  Qty: 42 tablet, Refills: 0    Associated Diagnoses: Status post total left knee replacement using cement         CONTINUE these medications which have CHANGED    Details   melatonin 1 MG CAPS Take 1 mg by mouth At Bedtime    Associated Diagnoses: Other insomnia         CONTINUE these medications which have NOT CHANGED    Details   cyanocobalamin (VITAMIN B12) 1000 MCG/ML injection Inject 1 mL (1,000 mcg) into the muscle every 30 days 1 year of injections approved through 4/19/2017 per Dr. Ayon.  Qty: 1 mL, Refills: 11    Associated Diagnoses: Pernicious anemia      blood glucose monitoring (ROBERT CONTOUR NEXT) test strip Use to test blood sugar 1 times daily or as directed.  Qty: 100 each, Refills: 3    Associated Diagnoses: Hypoglycemia      blood glucose monitoring (ROBERT MICROLET) lancets Use to test blood sugar 1 time daily or as directed.  Qty: 1 Box, Refills: 2    Associated Diagnoses: Hypoglycemia         STOP taking these medications       diphenhydrAMINE (BENADRYL) 25 MG capsule Comments:   Reason for Stopping:                     Consultations:   Consultation during this admission received from Dr. Cardona and orthopedic team - they  were initially primary but signed off as patient's issues become more medical in nature and patient was recovering appropriately from surgery          Brief History of Illness:   Patient is a 83-year-old gentleman who presented for routine left knee replacement and surgery went well. He was admitted to the hospital for postsurgical recovery          Hospital Course:    In the hospital patient initially did fairly well, however has he began working with physical therapy he did have episodes of dizziness upon standing. Hospitalist service was consulted to evaluate and patient was found very quickly to have atrial fibrillation with rapid ventricular rate and placed on beta care. Despite this he continued to have symptoms of dizziness upon standing and orthostatic blood pressures showed a drop in systolic blood pressure down to the 60s to 70s upon standing. Patient initially was given a fluid bolus, as there was concern this is secondary to dehydration following surgery, and orthostatic blood pressures remained positive but patient was no longer symptomatic and standing blood pressure was in the 100-110 range systolically. As IV fluids were discontinued symptomatic orthostatic hypotension returned and standing systolic blood pressure was once more in the 60s.  Patient's pain regimen was modified, metoprolol was discontinued and patient started drinking at least 2 L of water a day and with these adjustments he did have stabilization and improvement of his standing blood pressure where it once more remained above 100 consistently.  He will follow up with cardiology as an outpatient.  Also, as patient was recovering from surgery and Perez catheter was removed he did subsequently develop urinary retention and Perez catheter had to be replaced. He will follow-up with urology as an outpatient. Patient will be discharged to the TCU for ongoing rehabilitation         Physical Exam:   Vitals were reviewed - orthostatic blood  pressures have significantly improved over the past 24 hours.  Constitutional:   awake, alert, cooperative, no apparent distress, and appears stated age     Lungs:   No increased work of breathing, good air exchange, clear to auscultation bilaterally, no crackles or wheezing     Cardiovascular:   Irregularly irregular rhythm currently in the 70s     Abdomen:   Bowel sounds present, abdomen soft and non-tender     Musculoskeletal:   Left leg still in appropriate post-surgical brace and compression stockings are on bilateral legs  no lower extremity pitting edema present     Neurologic:   Awake, alert, oriented to name, place and time.       Skin:   normal skin color, texture, turgor           Discharge Instructions and Follow-Up:   Discharge diet: Regular high protein diet, may also have high salt intake   Discharge activity: Activity as tolerated with nursing assistance   Discharge follow-up: Follow up with Dr. Cardona, orthopedics, in 1 week  Follow up with urology in 1-2 weeks  Follow up with cardiology within one month           Discharge Disposition:   Discharged to [facility]      Attestation:  I have reviewed today's vital signs, notes, medications, labs and imaging.    More than 30 minutes was spent on this discharge.      Marleni Barrett MD    Note: Chart documentation done in part with Dragon Voice Recognition software. Although reviewed after completion, some word and grammatical errors may remain.

## 2017-08-22 NOTE — PROGRESS NOTES
Patient discharged at 1005 with spouse and RN Marina via wheelchair. Discharge instructions were discussed and sent with patient and spouse. Patient belongings sent with patient. No medications from home. Patient med bin emptied.

## 2017-08-22 NOTE — PROGRESS NOTES
Floodgissell Holly  Gender: male  : 1933  [address]  [facility] [id number] [phone] (home) none (work)    Medical Record: [id number]  Pharmacy: [id number] - [place], [place] - [address] [place] [address]  Primary Care Provider: Moy Celis    Parent's names are: Data Unavailable (mother) and Data Unavailable (father).      [facility]  2017     Discharge Phone Call:  Key Words/Key Times    Discharged to Rashard Eduardo RN

## 2017-08-22 NOTE — PLAN OF CARE
Occupational Therapy Discharge Summary    Reason for therapy discharge:    Discharged to transitional care facility.    Progress towards therapy goal(s). See goals on Care Plan in [facility] electronic health record for goal details.  Goals partially met.  Barriers to achieving goals:   limited tolerance for therapy and discharge from facility.    Therapy recommendation(s):    Continued therapy is recommended.  Rationale/Recommendations:  To continue progression of safety and independence with BADL function to prior level of function to ensure a safe transition back to home. .

## 2017-08-22 NOTE — PLAN OF CARE
Problem: Goal Outcome Summary  Goal: Goal Outcome Summary  Outcome: No Change  VSS. Afebrile. Pt c/o general discomfort. PRN pain meds given per MAR. 1+ lower extremity edema noted to LLE thigh high. No complaints this shift.

## 2017-08-22 NOTE — PROGRESS NOTES
[facility]  PRIMARY CARE PROVIDER AND CLINIC:  Moy Celis [address]  / [facility] [address]  Chief Complaint   Patient presents with     Hospital F/U       HPI:    Remigio Holly is a 83 year old  (11/6/1933),admitted to the [facility]   from [facility].  Hospital stay 8/15/17 through 8/22/17.  Admitted to this facility for  rehab, medical management and nursing care.  HPI information obtained from: facility chart records, facility staff, patient report and [facility] chart review.  Current issues are:         Primary osteoarthritis of both knees  Status post total left knee replacement using cement  Orthostatic hypotension  Paroxysmal atrial fibrillation (H)  Urinary retention with incomplete bladder emptying  Thoracic aortic aneurysm without rupture (H)  CKD (chronic kidney disease) stage 3, GFR 30-59 ml/min     Patient is a pleasant 83 year old gentleman with a PMH of gastric CA s/p gastrectomy and Faizan-en-Y, urinary retention, thoracic aortic aneurysm, and pAfib who presented for elective left TKA after failed medical management of primary OA on 8/15/17.  His hospitalization was complicated by pAfib and orthostatic hypotension, for which he was followed by the Medicine team.  (see below for details).      Today he is met in his room with his wife where he reports tolerable pain on his current regimen.  He notes progress with being able to stand on his leg now, improved from 2 days ago where that hurt him.  He denies SOB, CP, heartburn, upset stomach, diarrhea and reports that his constipation is now resolved. He also denies HAs or lightheadedness.      CODE STATUS/ADVANCE DIRECTIVES DISCUSSION:   CPR/Full code   Patient's living condition: lives with spouse    ALLERGIES:Mobic [meloxicam]  PAST MEDICAL HISTORY:  has a past medical history of Blood transfusion; Chronic gastric ulcer without mention of hemorrhage,  perforation, without mention of obstruction; Gastric cancer (H); Hemorrhage of gastrointestinal tract, unspecified (01/13/10); Hypoglycemia, unspecified (1/26/2015); Measles without mention of complication; Mixed hyperlipidemia; Mumps without mention of complication; Pulmonary embolism (H) (Sept 4, 2010); Urinary calculus, unspecified; and Varicella without mention of complication.  PAST SURGICAL HISTORY:  has a past surgical history that includes EXCIS STOMACH ULCER,LESN;LOCAL; REPAIR ROTATOR CUFF,ACUTE (3/21/2005); Colonoscopy w/wo Perley **Performed** (01/31/2006); cholecystectomy, laporoscopic (10/6/2008); UPPER GI ENDOSCOPY,EXAM (1/13/10); colonoscopy (1/14/10); video capsule endoscopy (01/15/10); colonoscopy (05/25/10); UPPER GI ENDOSCOPY,EXAM (05/25/10); general surgery                           date: (07/07/10); Abdomen surgery; Esophagoscopy, gastroscopy, duodenoscopy (EGD), combined (8/16/2011); Cystoscopy, retrogrades, extract stone, insert stent, combined (12/25/2012); Laser holmium lithotripsy ureter(s), insert stent, combined (1/5/2013); Laser holmium lithotripsy ureter(s), insert stent, combined (1/30/2013); and Arthroplasty knee (Left, 8/15/2017).  FAMILY HISTORY: family history includes Alzheimer Disease in his father; C.A.D. in his brother and mother; Cardiovascular in his brother and mother; HEART DISEASE in his brother and mother.  SOCIAL HISTORY:  reports that he has been smoking Pipe and Cigars.  He has been smoking about 0.00 packs per day for the past 20.00 years. He has never used smokeless tobacco. He reports that he does not drink alcohol or use illicit drugs.    Post Discharge Medication Reconciliation Status: discharge medications reconciled, continue medications without change.  Current Outpatient Prescriptions   Medication Sig Dispense Refill     melatonin 1 MG CAPS Take 1 mg by mouth At Bedtime       oxyCODONE (ROXICODONE) 5 MG IR tablet Take 0.5 tablets (2.5 mg) by mouth every 4 hours  as needed for moderate to severe pain 30 tablet 0     magnesium hydroxide (MILK OF MAGNESIA) 400 MG/5ML suspension Take 30 mLs by mouth daily as needed for constipation 105 mL      polyethylene glycol (MIRALAX/GLYCOLAX) Packet Take 17 g by mouth daily 7 packet      senna-docusate (SENOKOT-S;PERICOLACE) 8.6-50 MG per tablet Take 2 tablets by mouth 2 times daily May hold for loose stools 100 tablet      acetaminophen (TYLENOL) 500 MG tablet Take 2 tablets (1,000 mg) by mouth every 8 hours       rivaroxaban ANTICOAGULANT (XARELTO) 15 MG TABS tablet Take 1 tablet (15 mg) by mouth daily       cyclobenzaprine (FLEXERIL) 5 MG tablet Take 1 tablet (5 mg) by mouth 3 times daily as needed for muscle spasms 42 tablet 0     cyanocobalamin (VITAMIN B12) 1000 MCG/ML injection Inject 1 mL (1,000 mcg) into the muscle every 30 days 1 year of injections approved through 4/19/2017 per Dr. Ayon. 1 mL 11     blood glucose monitoring (ROBERT CONTOUR NEXT) test strip Use to test blood sugar 1 times daily or as directed. 100 each 3     blood glucose monitoring (ROBERT MICROLET) lancets Use to test blood sugar 1 time daily or as directed. 1 Box 2       ROS:  10 point ROS of systems including Constitutional, Eyes, Respiratory, Cardiovascular, Gastroenterology, Genitourinary, Integumentary, Muscularskeletal, Psychiatric were all negative except for pertinent positives noted in my HPI.    Exam:  /84  Pulse 83  Temp 98  F (36.7  C)  Resp 16  SpO2 96%  GENERAL APPEARANCE:  Alert, in no distress, pleasant  RESP:  respiratory effort and palpation of chest normal, auscultation of lungs clear, no respiratory distress  CV:  Palpation and auscultation of heart done , rate and rhythm irregular, no murmur, no RLE peripheral edema, Breezy stocking on this leg, LLE with mild edema - no Breezy stocking on this surgical leg.   ABDOMEN:  normal bowel sounds, soft, nontender, no hepatosplenomegaly or other masses  : Perez in place with clear,  yellow urine without evidence of hematuria, clot nor sediment.   M/S:   Gait and station currently with WC mobility for long distances, working with Physical Therapy for walker-use, Digits and nails intact, slightly reduced muscle mass  SKIN:  Inspection and Palpation of skin and subcutaneous tissue with aquacell dressing in place over left knee, expected swelling around dressing, no drainage noted and no redness/heat or any signs of infection.    PSYCH:  insight and judgement, memory intact, affect and mood normal, follows commands readily         Lab/Diagnostic data:   CBC RESULTS:   Recent Labs   Lab Test  08/17/17   0536  08/16/17   0544  08/01/17   1409  07/29/16   1053   WBC   --    --   5.1  8.4   RBC   --    --   4.45  4.73   HGB  11.0*  11.0*  13.8  14.3   HCT   --    --   40.9  44.6   MCV   --    --   92  94   MCH   --    --   31.0  30.2   MCHC   --    --   33.7  32.1   RDW   --    --   13.7  13.4   PLT   --    --   189  194       Last Basic Metabolic Panel:  Recent Labs   Lab Test  08/22/17   0650  08/19/17   0557   NA  138  139   POTASSIUM  4.4  4.8   CHLORIDE  102  106   CARMEN  8.8  8.4*   CO2  28  27   BUN  26  26   CR  1.17  1.12   GLC  97  90       Liver Function Studies -   Recent Labs   Lab Test  07/29/16   1053  06/12/16   1211   PROTTOTAL  7.6  7.7   ALBUMIN  3.9  4.2   BILITOTAL  0.5  0.4   ALKPHOS  91  97   AST  27  28   ALT  25  25       TSH   Date Value Ref Range Status   07/29/2016 3.55 0.40 - 4.00 mU/L Final   09/05/2010 2.24 0.4 - 5.0 mU/L Final   ]    Lab Results   Component Value Date    A1C 6.1 08/01/2017    A1C 6.2 01/31/2017       ASSESSMENT/PLAN:  Primary osteoarthritis of both knees  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/17)  Xarelto for anticoagulation  WBAT  Analgesia with Tylenol 1000 mg TID, oxycodone 2.5 mg q4 hours PRN, Flexerill 5 mg TID PRN. - patient reports tolerable pain.  No signs of infection on left knee.  Slight edema - will order Teds for bilat  legs  Hgb stable at 11.0 by D/C    PLAN:  F/u with Dr. Cardona on 8/31/17  Physical Therapy, Occupational Therapy for rehab/strengthening.   Avoid increasing narcotics if able as hypotension occureed in past with increased doses.     Orthostatic hypotension  Post surgery with position changes (sitting to standing) where SBP dropped into 60s.    Treated with IVF --> SBP >100.  Patient noted this has been intermittent for years.  Metoprolol started for a fib with RVR however was DC'd d/t hypotension  Narcotics decreased  Patient taking in around 2L free water/day  Carotid U/S noting <50% stenosis and ECHO WNLs    PLAN: High Salt diet  2L water/day  Keep narcotics as low as possible  Slow, deliberate position changes hopefully with nursing staff present.     Paroxysmal atrial fibrillation (H)  Intermittent between NSR/A fib, noted on telemetry  RVR in 150s --> Metoprolol started but DC'd d/t hypotension  Per patient: this has happened before in setting of surgery, eventually converted to NSR and stayed there.   Xarelto 15 mg daily started  Cardiology f/u scheduled for 8/30/17    Urinary retention with incomplete bladder emptying  2/2 post-anesthesia, narcotics  Perez removed but replaced after unable to void with straight catheterization needed.    Likely unwise to initiate Flomax or other alpha blockers in setting of orthostasis.  F/U with Dr Wisdom (URology) scheduled for 8/31/17.     Thoracic aortic aneurysm without rupture (H) - 4.7 cm on 8/18/17 (4.5 cm in 8/15)  Noted 4.7 cm on 8/18/17 from ECHO  Minimal changes in last 2 years  Recommendations to f/u with Cardiology (or perhaps Vascular Surgery if/when >5cm).     CKD (chronic kidney disease) stage 3, GFR 30-59 ml/min  DC labs: BUN 28, Creat 1.17, GFR 59  Recommend avoiding nephrotoxic agents and using mindful prescribing with renal dosing.        Orders:  1.  Remove Perez 6 hours prior to Urology appointment on 8/31/17.  Dx: urinary retention  2.  Knee high Teds,  on in AM, off in PM.  Dx: edema  3.  8/29/17 labs: BMP, CBC.  Dx: s/p TKA      Electronically signed by:  DISHA Loo CNP

## 2017-08-23 NOTE — PROGRESS NOTES
Clinic Care Coordination Contact  Care Coordination Transition Communication    Referral Source: CTS    Clinical Data: Patient was hospitalized at [facility] from 8/15 to 8/22 with diagnosis of Status post total left knee replacement using cement.     Transition to Facility:              [facility]              Contact name and phone number/fax: [phone] [place]    Plan: RN/SW Care Coordinator will await notification from facility staff informing RN/SW Care Coordinator of patient's discharge plans/needs. RN/SW Care Coordinator will review chart and outreach to facility staff every 4 weeks and as needed.     Rosemary Cardenas RN, BSN  Care Coordination    [address] [address]  Office: [phone]  Fax [phone]   [email or web address]   [email or web address]     Connect with [facility] on social media.

## 2017-08-29 ENCOUNTER — HOSPITAL LABORATORY (OUTPATIENT)
Dept: NURSING HOME | Facility: OTHER | Age: 82
End: 2017-08-29

## 2017-08-29 LAB
ANION GAP SERPL CALCULATED.3IONS-SCNC: 8 MMOL/L (ref 3–14)
BUN SERPL-MCNC: 22 MG/DL (ref 7–30)
CALCIUM SERPL-MCNC: 9.1 MG/DL (ref 8.5–10.1)
CHLORIDE SERPL-SCNC: 101 MMOL/L (ref 94–109)
CO2 SERPL-SCNC: 28 MMOL/L (ref 20–32)
CREAT SERPL-MCNC: 1.18 MG/DL (ref 0.66–1.25)
ERYTHROCYTE [DISTWIDTH] IN BLOOD BY AUTOMATED COUNT: 15.5 % (ref 10–15)
GFR SERPL CREATININE-BSD FRML MDRD: 59 ML/MIN/1.7M2
GLUCOSE SERPL-MCNC: 95 MG/DL (ref 70–99)
HCT VFR BLD AUTO: 39.4 % (ref 40–53)
HGB BLD-MCNC: 12.4 G/DL (ref 13.3–17.7)
MCH RBC QN AUTO: 30.8 PG (ref 26.5–33)
MCHC RBC AUTO-ENTMCNC: 31.5 G/DL (ref 31.5–36.5)
MCV RBC AUTO: 98 FL (ref 78–100)
PLATELET # BLD AUTO: 492 10E9/L (ref 150–450)
POTASSIUM SERPL-SCNC: 4.8 MMOL/L (ref 3.4–5.3)
RBC # BLD AUTO: 4.03 10E12/L (ref 4.4–5.9)
SODIUM SERPL-SCNC: 137 MMOL/L (ref 133–144)
WBC # BLD AUTO: 6.7 10E9/L (ref 4–11)

## 2017-08-30 ENCOUNTER — OFFICE VISIT (OUTPATIENT)
Dept: CARDIOLOGY | Facility: CLINIC | Age: 82
End: 2017-08-30
Attending: FAMILY MEDICINE
Payer: COMMERCIAL

## 2017-08-30 VITALS
OXYGEN SATURATION: 96 % | BODY MASS INDEX: 21.46 KG/M2 | SYSTOLIC BLOOD PRESSURE: 114 MMHG | HEART RATE: 82 BPM | HEIGHT: 73 IN | DIASTOLIC BLOOD PRESSURE: 70 MMHG | WEIGHT: 161.9 LBS

## 2017-08-30 DIAGNOSIS — I77.810 AORTIC ROOT DILATATION (H): ICD-10-CM

## 2017-08-30 DIAGNOSIS — I48.0 PAROXYSMAL ATRIAL FIBRILLATION (H): Primary | ICD-10-CM

## 2017-08-30 PROCEDURE — 99204 OFFICE O/P NEW MOD 45 MIN: CPT | Performed by: INTERNAL MEDICINE

## 2017-08-30 NOTE — LETTER
8/30/2017      RE: Remigio Holly  [address]  [facility] [address]       Dear Colleague,    Thank you for the opportunity to participate in the care of your patient, Remigio Holly, at the [facility]. Please see a copy of my visit note below.    HISTORY:    Remigio Holly is a very nice 83-year-old gentleman accompanied by his wife today. He was asked to see me because of newly discovered atrial fibrillation. He recently underwent left knee surgery and postoperatively was found to have atrial fibrillation with variable ventricular response. Apparently this was intermittent. He was noted to have quite a few problems with orthostatic hypotension. He has been started on Xarelto.    Mr. Holly is very fit and remains very active even at age 83. 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 He states that he is never inactive and does not like to sit still. His wife agrees with him. He denies problems with lightheadedness except occasionally if he stands up too fast, but has never had syncope or near syncope. He also states that he has never been aware of any sense of palpitations and in fact is not aware that he had been diagnosed with atrial fibrillation. He reports that he occasionally has low blood pressure which sometimes symptomatic with lightheadedness.    The patient denies any exertional chest, arm, neck, or jaw discomfort with exertion. He exercises on a regular basis although he has been fairly limited by his bad knee in the recent past. He has not had problems with any sense of palpitations, syncope or near-syncope, PND/orthopnea, peripheral edema, or claudication symptoms. He has not had any strokelike symptoms.      ASSESSMENT/PLAN:    1.  Atrial fibrillation. CHADS-Vasc score is 24 age alone. He is in atrial fibrillation on examination today and appropriately has been started on  Xarelto which he is tolerating well. His heart rate is well controlled and he is asymptomatic. His echocardiogram shows no significant chamber valvular abnormalities other than some mildly decreased RV function and mildly dilated bilateral atria. Estimated pulmonary pressures are normal. TSH is negative in the past. I don't think further evaluation for cause of atrial fibrillation as necessary and no further treatment is necessary. Continue current medications.  2. Enlarged ascending aorta. The descending aorta was measured at 4.7 cm with an aortic root of 4.4. I talked to him about this. The enlargement is relatively mild in light of his tall height and advanced age. We will recheck an echocardiogram next year to reevaluate.  3. Orthostatic hypotension. This patient had some issues with orthostatic hypotension postoperatively but has been completely asymptomatic, no further evaluation necessary.    ADDENDUM:  Patient called the following day.  Will begin metoprolol 25 BID to protect against possible dissection of aorta.    Thank you for asking me to participate in your patient's care. Please don't hesitate to call if I can be of further assistance.    Orders Placed This Encounter   Procedures     Follow-Up with Cardiologist     Echocardiogram     No orders of the defined types were placed in this encounter.    There are no discontinued medications.      Encounter Diagnoses   Name Primary?     Paroxysmal atrial fibrillation (H) Yes     Aortic root dilatation (H)        CURRENT MEDICATIONS:  Current Outpatient Prescriptions   Medication Sig Dispense Refill     melatonin 1 MG CAPS Take 1 mg by mouth At Bedtime       oxyCODONE (ROXICODONE) 5 MG IR tablet Take 0.5 tablets (2.5 mg) by mouth every 4 hours as needed for moderate to severe pain 30 tablet 0     magnesium hydroxide (MILK OF MAGNESIA) 400 MG/5ML suspension Take 30 mLs by mouth daily as needed for constipation 105 mL      polyethylene glycol (MIRALAX/GLYCOLAX)  Packet Take 17 g by mouth daily 7 packet      senna-docusate (SENOKOT-S;PERICOLACE) 8.6-50 MG per tablet Take 2 tablets by mouth 2 times daily May hold for loose stools 100 tablet      acetaminophen (TYLENOL) 500 MG tablet Take 2 tablets (1,000 mg) by mouth every 8 hours       rivaroxaban ANTICOAGULANT (XARELTO) 15 MG TABS tablet Take 1 tablet (15 mg) by mouth daily       cyclobenzaprine (FLEXERIL) 5 MG tablet Take 1 tablet (5 mg) by mouth 3 times daily as needed for muscle spasms 42 tablet 0     cyanocobalamin (VITAMIN B12) 1000 MCG/ML injection Inject 1 mL (1,000 mcg) into the muscle every 30 days 1 year of injections approved through 4/19/2017 per Dr. Ayon. 1 mL 11     blood glucose monitoring (ROBERT CONTOUR NEXT) test strip Use to test blood sugar 1 times daily or as directed. 100 each 3     blood glucose monitoring (ROBERT MICROLET) lancets Use to test blood sugar 1 time daily or as directed. 1 Box 2       ALLERGIES     Allergies   Allergen Reactions     Mobic [Meloxicam] Nausea and Diarrhea     Diarrhea, nausea, flu like symptoms since start of use       PAST MEDICAL HISTORY:  Past Medical History:   Diagnosis Date     Blood transfusion      Chronic gastric ulcer without mention of hemorrhage, perforation, without mention of obstruction     Ulcer, Gastric     Gastric cancer (H)      Hemorrhage of gastrointestinal tract, unspecified 01/13/10    [facility]     Hypoglycemia, unspecified 1/26/2015     Measles without mention of complication     Measles     Mixed hyperlipidemia     Hyperlipidemia     Mumps without mention of complication     Mumps     Pulmonary embolism (H) Sept 4, 2010     Urinary calculus, unspecified     Renal stones     Varicella without mention of complication     Chickenpox       PAST SURGICAL HISTORY:  Past Surgical History:   Procedure Laterality Date     ABDOMEN SURGERY       ARTHROPLASTY KNEE Left 8/15/2017    Procedure: ARTHROPLASTY KNEE;  Left total knee replacement;  Surgeon:  Jonathan Cardona, ;  Location: [facility]     C EXCIS STOMACH ULCER,LESN;LOCAL       CHOLECYSTECTOMY, LAPOROSCOPIC  10/6/2008    Cholecystectomy, Laparoscopic     COLONOSCOPY  1/14/10    [facility]     COLONOSCOPY  05/25/10     CYSTOSCOPY, RETROGRADES, EXTRACT STONE, INSERT STENT, COMBINED  12/25/2012    Procedure: COMBINED CYSTOSCOPY, RETROGRADES, EXTRACT STONE, INSERT STENT;  Cystoscopy, Left Stent Placement, left retrograde pylogram, uc;  Surgeon: Amador Sanchez MD;  Location: [facility] OR     ESOPHAGOSCOPY, GASTROSCOPY, DUODENOSCOPY (EGD), COMBINED  8/16/2011    Procedure:COMBINED ESOPHAGOSCOPY, GASTROSCOPY, DUODENOSCOPY (EGD), BIOPSY SINGLE OR MULTIPLE; Surgeon:TONY GARCIA; Location:[facility]     GENERAL SURGERY                           DATE:  07/07/10    Gastrectomy      COLONOSCOPY W/WO BRUSH/WASH  01/31/2006     HC REPAIR ROTATOR CUFF,ACUTE  3/21/2005    Right      UGI ENDOSCOPY, SIMPLE EXAM  1/13/10    [facility] UGI ENDOSCOPY, SIMPLE EXAM  05/25/10     LASER HOLMIUM LITHOTRIPSY URETER(S), INSERT STENT, COMBINED  1/5/2013    Procedure: COMBINED CYSTOSCOPY, URETEROSCOPY, LASER HOLMIUM LITHOTRIPSY URETER(S), INSERT STENT;  Bilateral  Cystoscopy, Bilateral Ureteroscopy, Bilateral retrogrades and  placement of ureteral stent right side. Laser Holmium Lithotripsy  and Exchange  of stent left side;  Surgeon: Tone Morejon MD;  Location: [facility] OR     LASER HOLMIUM LITHOTRIPSY URETER(S), INSERT STENT, COMBINED  1/30/2013    Procedure: COMBINED CYSTOSCOPY, URETEROSCOPY, LASER HOLMIUM LITHOTRIPSY URETER(S), INSERT STENT;  Right Ureteroscopy; Stone basketing; Right Stent exchange and  removal of left stent.;  Surgeon: Tone Morejon MD;  Location: [facility] OR     VIDEO CAPSULE ENDOSCOPY  01/15/10    [facility]       FAMILY HISTORY:  Family History   Problem Relation Age of Onset     Alzheimer Disease Father      Cardiovascular Mother      C.A.D. Mother      HEART  "DISEASE Mother      Cardiovascular Brother      HEART DISEASE Brother      MARILU Brother        SOCIAL HISTORY:  Social History     Social History     Marital status:      Spouse name: Pily     Number of children: 3     Years of education: 10     Occupational History     Construction      Retired     Social History Main Topics     Smoking status: Current Some Day Smoker     Packs/day: 0.00     Years: 20.00     Types: Pipe, Cigars     Last attempt to quit: 1/1/1998     Smokeless tobacco: Never Used      Comment: Rare pipe and cigar smoking     Alcohol use No     Drug use: No     Sexual activity: Not Currently     Other Topics Concern      Service No     Blood Transfusions Yes     1960's     Caffeine Concern Yes     3 cups a day     Occupational Exposure No     Hobby Hazards No     Sleep Concern Yes     Doesn't sleep well, has hard time falling asleep     Stress Concern No     Weight Concern Yes     Underweight     Special Diet No     Back Care No     Exercise No     Bike Helmet No     Does not ride a bike     Seat Belt Yes     Self-Exams No     Parent/Sibling W/ Cabg, Mi Or Angioplasty Before 65f 55m? No     Social History Narrative       Review of Systems:  Skin:  Negative     Eyes:  Positive for    ENT:  Negative    Respiratory:  Negative    Cardiovascular:  Negative for;palpitations;chest pain;edema;fatigue lightheadedness;dizziness;Positive for  Gastroenterology: Positive for constipation  Genitourinary:  Negative    Musculoskeletal:  Positive for joint pain  Neurologic:  Negative    Psychiatric:  Positive for sleep disturbances  Heme/Lymph/Imm:  Positive for allergies  Endocrine:  Positive for diabetes    Physical Exam:  Vitals: /70 (BP Location: Right arm, Patient Position: Fowlers, Cuff Size: Adult Regular)  Pulse 82  Ht 1.854 m (6' 1\")  Wt 73.4 kg (161 lb 14.4 oz)  SpO2 96%  BMI 21.36 kg/m2    Constitutional:  cooperative, alert and oriented, well developed, well nourished, in no " acute distress        Skin:  warm and dry to the touch        Head:  normocephalic        Eyes:  no xanthalasma        ENT:  no pallor or cyanosis        Neck:  carotid pulses are full and equal bilaterally, JVP normal, no carotid bruit, no thyromegaly        Chest:  normal breath sounds, clear to auscultation, normal A-P diameter, normal symmetry, normal respiratory excursion, no use of accessory muscles        Cardiac: normal S1 and S2;no S3 or S4;apical impulse not displaced;no murmurs, gallops or rubs detected irregularly irregular rhythm                Abdomen:  abdomen soft, non-tender, BS normoactive, no mass, no HSM, no bruits        Vascular: pulses full and equal                                      Extremities and Back:  no edema        Neurological:  affect appropriate, oriented to time, person and place;no gross motor deficits          Recent Lab Results:  LIPID RESULTS:  Lab Results   Component Value Date    CHOL 190 09/05/2013    HDL 42 09/05/2013     (H) 10/15/2015     (H) 09/05/2013    TRIG 92 09/05/2013    CHOLHDLRATIO 5.0 09/05/2013       LIVER ENZYME RESULTS:  Lab Results   Component Value Date    AST 27 07/29/2016    ALT 25 07/29/2016       CBC RESULTS:  Lab Results   Component Value Date    WBC 6.7 08/29/2017    RBC 4.03 (L) 08/29/2017    HGB 12.4 (L) 08/29/2017    HCT 39.4 (L) 08/29/2017    MCV 98 08/29/2017    MCH 30.8 08/29/2017    MCHC 31.5 08/29/2017    RDW 15.5 (H) 08/29/2017     (H) 08/29/2017       BMP RESULTS:  Lab Results   Component Value Date     08/29/2017    POTASSIUM 4.8 08/29/2017    CHLORIDE 101 08/29/2017    CO2 28 08/29/2017    ANIONGAP 8 08/29/2017    GLC 95 08/29/2017    BUN 22 08/29/2017    CR 1.18 08/29/2017    GFRESTIMATED 59 (L) 08/29/2017    GFRESTBLACK 71 08/29/2017    CARMEN 9.1 08/29/2017        A1C RESULTS:  Lab Results   Component Value Date    A1C 6.1 (H) 08/01/2017       INR RESULTS:  Lab Results   Component Value Date    INR 1.08  08/16/2011    INR 2.2 (A) 03/17/2011    INR 2.0 03/04/2011    INR 1.67 (H) 09/13/2010         Ricky Arroyo MD, FACC    CC  Marleni Barrett MD  [address] [place] [address]

## 2017-08-30 NOTE — MR AVS SNAPSHOT
After Visit Summary   8/30/2017    Remigio Holly    MRN: [id number]           Patient Information     Date Of Birth          11/6/1933        Visit Information        Provider Department      8/30/2017 2:00 PM Ricky Arroyo MD [facility]        Today's Diagnoses     Paroxysmal atrial fibrillation (H)    -  1    Aortic root dilatation (H)           Follow-ups after your visit        Additional Services     Follow-Up with Cardiologist                 Your next 10 appointments already scheduled     Aug 31, 2017  8:30 AM CDT   XR KNEE LEFT 3 VIEWS with [id number]   [facility])    [address] [place] [id number]              Please bring a list of your current medicines to your exam. (Include vitamins, minerals and over-thecounter medicines.) Leave your valuables at home.  Tell your doctor if there is a chance you may be pregnant.  You do not need to do anything special for this exam.            Aug 31, 2017  8:50 AM CDT   Return Visit with Jonathan Cardona DO   [facility] ([facility])    [address] [place] [id number] [phone]              Future tests that were ordered for you today     Open Future Orders        Priority Expected Expires Ordered    Echocardiogram Routine 8/30/2018 8/31/2018 8/30/2017    Follow-Up with Cardiologist Routine 8/30/2018 8/31/2018 8/30/2017    XR Knee Left 3 Views Routine 8/31/2017 8/10/2018 8/21/2017            Who to contact     If you have questions or need follow up information about today's clinic visit or your schedule please contact [facility] directly at [phone].  Normal or non-critical lab and imaging results will be communicated to you by MyChart, letter or phone within 4 business days after the clinic has received the results. If you do not hear from us within 7 days, please contact the clinic  "through [employer] or phone. If you have a critical or abnormal lab result, we will notify you by phone as soon as possible.  Submit refill requests through [employer] or call your pharmacy and they will forward the refill request to us. Please allow 3 business days for your refill to be completed.          Additional Information About Your Visit        [employer] Information     [employer] gives you secure access to your electronic health record. If you see a primary care provider, you can also send messages to your care team and make appointments. If you have questions, please call your primary care clinic.  If you do not have a primary care provider, please call [phone] and they will assist you.        Care EveryWhere ID     This is your Care EveryWhere ID. This could be used by other organizations to access your [facility] medical records  [id number]        Your Vitals Were     Pulse Height Pulse Oximetry BMI (Body Mass Index)          82 1.854 m (6' 1\") 96% 21.36 kg/m2         Blood Pressure from Last 3 Encounters:   08/30/17 114/70   08/22/17 133/84   08/22/17 137/52    Weight from Last 3 Encounters:   08/30/17 73.4 kg (161 lb 14.4 oz)   08/15/17 78.5 kg (173 lb 1 oz)   08/09/17 75.8 kg (167 lb)               Primary Care Provider Office Phone # Fax #    Moy Celis [phone]       [address] [facility] [address]        Equal Access to Services     [facility] Center[facility] : Hadii aad ku hadasho Soomaali, waaxda luqadaha, qaybta kaalmada adeegyada, tray mendez. So [facility] [phone].    ATENCIÓN: Si habla español, tiene a tabares disposición servicios gratuitos de asistencia lingüística. Llame al [phone].    We comply with applicable federal civil rights laws and [place] laws. We do not discriminate on the basis of race, color, national origin, age, disability sex, sexual orientation or gender identity.            Thank you!     Thank you for choosing [facility]  [facility]. Our goal is always to provide you with excellent care. Hearing back from our patients is one way we can continue to improve our services. Please take a few minutes to complete the written survey that you may receive in the mail after your visit with us. Thank you!             Your Updated Medication List - Protect others around you: Learn how to safely use, store and throw away your medicines at www.disposemymeds.org.          This list is accurate as of: 8/30/17  3:18 PM.  Always use your most recent med list.                   Brand Name Dispense Instructions for use Diagnosis    acetaminophen 500 MG tablet    TYLENOL     Take 2 tablets (1,000 mg) by mouth every 8 hours    Status post total left knee replacement using cement       blood glucose monitoring lancets     1 Box    Use to test blood sugar 1 time daily or as directed.    Hypoglycemia       blood glucose monitoring test strip    ROBERT CONTOUR NEXT    100 each    Use to test blood sugar 1 times daily or as directed.    Hypoglycemia       cyanocobalamin 1000 MCG/ML injection    VITAMIN B12    1 mL    Inject 1 mL (1,000 mcg) into the muscle every 30 days 1 year of injections approved through 4/19/2017 per Dr. Ayon.    Pernicious anemia       cyclobenzaprine 5 MG tablet    FLEXERIL    42 tablet    Take 1 tablet (5 mg) by mouth 3 times daily as needed for muscle spasms    Status post total left knee replacement using cement       magnesium hydroxide 400 MG/5ML suspension    MILK OF MAGNESIA    105 mL    Take 30 mLs by mouth daily as needed for constipation    Status post total left knee replacement using cement       melatonin 1 MG Caps      Take 1 mg by mouth At Bedtime    Other insomnia       oxyCODONE 5 MG IR tablet    ROXICODONE    30 tablet    Take 0.5 tablets (2.5 mg) by mouth every 4 hours as needed for moderate to severe pain    Status post total left knee replacement using cement       polyethylene glycol Packet     MIRALAX/GLYCOLAX    7 packet    Take 17 g by mouth daily    Status post total left knee replacement using cement       rivaroxaban ANTICOAGULANT 15 MG Tabs tablet    XARELTO     Take 1 tablet (15 mg) by mouth daily    Paroxysmal atrial fibrillation (H)       senna-docusate 8.6-50 MG per tablet    SENOKOT-S;PERICOLACE    100 tablet    Take 2 tablets by mouth 2 times daily May hold for loose stools    Status post total left knee replacement using cement

## 2017-08-30 NOTE — PROGRESS NOTES
HISTORY:    Remigio Holly is a very nice 83-year-old gentleman accompanied by his wife today. He was asked to see me because of newly discovered atrial fibrillation. He recently underwent left knee surgery and postoperatively was found to have atrial fibrillation with variable ventricular response. Apparently this was intermittent. He was noted to have quite a few problems with orthostatic hypotension. He has been started on Xarelto.    Mr. Holly is very fit and remains very active even at age 83. 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 He states that he is never inactive and does not like to sit still. His wife agrees with him. He denies problems with lightheadedness except occasionally if he stands up too fast, but has never had syncope or near syncope. He also states that he has never been aware of any sense of palpitations and in fact is not aware that he had been diagnosed with atrial fibrillation. He reports that he occasionally has low blood pressure which sometimes symptomatic with lightheadedness.    The patient denies any exertional chest, arm, neck, or jaw discomfort with exertion. He exercises on a regular basis although he has been fairly limited by his bad knee in the recent past. He has not had problems with any sense of palpitations, syncope or near-syncope, PND/orthopnea, peripheral edema, or claudication symptoms. He has not had any strokelike symptoms.      ASSESSMENT/PLAN:    1.  Atrial fibrillation. CHADS-Vasc score is 24 age alone. He is in atrial fibrillation on examination today and appropriately has been started on Xarelto which he is tolerating well. His heart rate is well controlled and he is asymptomatic. His echocardiogram shows no significant chamber valvular abnormalities other than some mildly decreased RV function and mildly dilated bilateral atria. Estimated pulmonary pressures are normal. TSH is negative in the past. I don't think further  evaluation for cause of atrial fibrillation as necessary and no further treatment is necessary. Continue current medications.  2. Enlarged ascending aorta. The descending aorta was measured at 4.7 cm with an aortic root of 4.4. I talked to him about this. The enlargement is relatively mild in light of his tall height and advanced age. We will recheck an echocardiogram next year to reevaluate.  3. Orthostatic hypotension. This patient had some issues with orthostatic hypotension postoperatively but has been completely asymptomatic, no further evaluation necessary.    ADDENDUM:  Patient called the following day.  Will begin metoprolol 25 BID to protect against possible dissection of aorta.    Thank you for asking me to participate in your patient's care. Please don't hesitate to call if I can be of further assistance.    Orders Placed This Encounter   Procedures     Follow-Up with Cardiologist     Echocardiogram     No orders of the defined types were placed in this encounter.    There are no discontinued medications.      Encounter Diagnoses   Name Primary?     Paroxysmal atrial fibrillation (H) Yes     Aortic root dilatation (H)        CURRENT MEDICATIONS:  Current Outpatient Prescriptions   Medication Sig Dispense Refill     melatonin 1 MG CAPS Take 1 mg by mouth At Bedtime       oxyCODONE (ROXICODONE) 5 MG IR tablet Take 0.5 tablets (2.5 mg) by mouth every 4 hours as needed for moderate to severe pain 30 tablet 0     magnesium hydroxide (MILK OF MAGNESIA) 400 MG/5ML suspension Take 30 mLs by mouth daily as needed for constipation 105 mL      polyethylene glycol (MIRALAX/GLYCOLAX) Packet Take 17 g by mouth daily 7 packet      senna-docusate (SENOKOT-S;PERICOLACE) 8.6-50 MG per tablet Take 2 tablets by mouth 2 times daily May hold for loose stools 100 tablet      acetaminophen (TYLENOL) 500 MG tablet Take 2 tablets (1,000 mg) by mouth every 8 hours       rivaroxaban ANTICOAGULANT (XARELTO) 15 MG TABS tablet Take 1  tablet (15 mg) by mouth daily       cyclobenzaprine (FLEXERIL) 5 MG tablet Take 1 tablet (5 mg) by mouth 3 times daily as needed for muscle spasms 42 tablet 0     cyanocobalamin (VITAMIN B12) 1000 MCG/ML injection Inject 1 mL (1,000 mcg) into the muscle every 30 days 1 year of injections approved through 4/19/2017 per Dr. Ayon. 1 mL 11     blood glucose monitoring (ROBERT CONTOUR NEXT) test strip Use to test blood sugar 1 times daily or as directed. 100 each 3     blood glucose monitoring (ROBERT MICROLET) lancets Use to test blood sugar 1 time daily or as directed. 1 Box 2       ALLERGIES     Allergies   Allergen Reactions     Mobic [Meloxicam] Nausea and Diarrhea     Diarrhea, nausea, flu like symptoms since start of use       PAST MEDICAL HISTORY:  Past Medical History:   Diagnosis Date     Blood transfusion      Chronic gastric ulcer without mention of hemorrhage, perforation, without mention of obstruction     Ulcer, Gastric     Gastric cancer (H)      Hemorrhage of gastrointestinal tract, unspecified 01/13/10    [facility]     Hypoglycemia, unspecified 1/26/2015     Measles without mention of complication     Measles     Mixed hyperlipidemia     Hyperlipidemia     Mumps without mention of complication     Mumps     Pulmonary embolism (H) Sept 4, 2010     Urinary calculus, unspecified     Renal stones     Varicella without mention of complication     Chickenpox       PAST SURGICAL HISTORY:  Past Surgical History:   Procedure Laterality Date     ABDOMEN SURGERY       ARTHROPLASTY KNEE Left 8/15/2017    Procedure: ARTHROPLASTY KNEE;  Left total knee replacement;  Surgeon: Jonathan Cardona DO;  Location: [facility]     C EXCIS STOMACH ULCER,LESN;LOCAL       CHOLECYSTECTOMY, LAPOROSCOPIC  10/6/2008    Cholecystectomy, Laparoscopic     COLONOSCOPY  1/14/10    [facility]     COLONOSCOPY  05/25/10     CYSTOSCOPY, RETROGRADES, EXTRACT STONE, INSERT STENT, COMBINED  12/25/2012    Procedure: COMBINED  CYSTOSCOPY, RETROGRADES, EXTRACT STONE, INSERT STENT;  Cystoscopy, Left Stent Placement, left retrograde pylogram, uc;  Surgeon: Amador Sanchez MD;  Location: [facility]     ESOPHAGOSCOPY, GASTROSCOPY, DUODENOSCOPY (EGD), COMBINED  8/16/2011    Procedure:COMBINED ESOPHAGOSCOPY, GASTROSCOPY, DUODENOSCOPY (EGD), BIOPSY SINGLE OR MULTIPLE; Surgeon:TONY GARCIA; [facility]     GENERAL SURGERY                           DATE:  07/07/10    Gastrectomy     HC COLONOSCOPY W/WO BRUSH/WASH  01/31/2006      REPAIR ROTATOR CUFF,ACUTE  3/21/2005    Right      UGI ENDOSCOPY, SIMPLE EXAM  1/13/10    [facility] UGI ENDOSCOPY, SIMPLE EXAM  05/25/10     LASER HOLMIUM LITHOTRIPSY URETER(S), INSERT STENT, COMBINED  1/5/2013    Procedure: COMBINED CYSTOSCOPY, URETEROSCOPY, LASER HOLMIUM LITHOTRIPSY URETER(S), INSERT STENT;  Bilateral  Cystoscopy, Bilateral Ureteroscopy, Bilateral retrogrades and  placement of ureteral stent right side. Laser Holmium Lithotripsy  and Exchange  of stent left side;  Surgeon: Tone Morejon MD;  [facility]     LASER HOLMIUM LITHOTRIPSY URETER(S), INSERT STENT, COMBINED  1/30/2013    Procedure: COMBINED CYSTOSCOPY, URETEROSCOPY, LASER HOLMIUM LITHOTRIPSY URETER(S), INSERT STENT;  Right Ureteroscopy; Stone basketing; Right Stent exchange and  removal of left stent.;  Surgeon: Tone Morejon MD;  Location: [facility]     VIDEO CAPSULE ENDOSCOPY  01/15/10    [facility]       FAMILY HISTORY:  Family History   Problem Relation Age of Onset     Alzheimer Disease Father      Cardiovascular Mother      C.A.D. Mother      HEART DISEASE Mother      Cardiovascular Brother      HEART DISEASE Brother      C.A.D. Brother        SOCIAL HISTORY:  Social History     Social History     Marital status:      Spouse name: Pily     Number of children: 3     Years of education: 10     Occupational History     Construction      Retired     Social History Main Topics  "    Smoking status: Current Some Day Smoker     Packs/day: 0.00     Years: 20.00     Types: Pipe, Cigars     Last attempt to quit: 1/1/1998     Smokeless tobacco: Never Used      Comment: Rare pipe and cigar smoking     Alcohol use No     Drug use: No     Sexual activity: Not Currently     Other Topics Concern      Service No     Blood Transfusions Yes     1960's     Caffeine Concern Yes     3 cups a day     Occupational Exposure No     Hobby Hazards No     Sleep Concern Yes     Doesn't sleep well, has hard time falling asleep     Stress Concern No     Weight Concern Yes     Underweight     Special Diet No     Back Care No     Exercise No     Bike Helmet No     Does not ride a bike     Seat Belt Yes     Self-Exams No     Parent/Sibling W/ Cabg, Mi Or Angioplasty Before 65f 55m? No     Social History Narrative       Review of Systems:  Skin:  Negative     Eyes:  Positive for    ENT:  Negative    Respiratory:  Negative    Cardiovascular:  Negative for;palpitations;chest pain;edema;fatigue lightheadedness;dizziness;Positive for  Gastroenterology: Positive for constipation  Genitourinary:  Negative    Musculoskeletal:  Positive for joint pain  Neurologic:  Negative    Psychiatric:  Positive for sleep disturbances  Heme/Lymph/Imm:  Positive for allergies  Endocrine:  Positive for diabetes    Physical Exam:  Vitals: /70 (BP Location: Right arm, Patient Position: Fowlers, Cuff Size: Adult Regular)  Pulse 82  Ht 1.854 m (6' 1\")  Wt 73.4 kg (161 lb 14.4 oz)  SpO2 96%  BMI 21.36 kg/m2    Constitutional:  cooperative, alert and oriented, well developed, well nourished, in no acute distress        Skin:  warm and dry to the touch        Head:  normocephalic        Eyes:  no xanthalasma        ENT:  no pallor or cyanosis        Neck:  carotid pulses are full and equal bilaterally, JVP normal, no carotid bruit, no thyromegaly        Chest:  normal breath sounds, clear to auscultation, normal A-P diameter, " normal symmetry, normal respiratory excursion, no use of accessory muscles        Cardiac: normal S1 and S2;no S3 or S4;apical impulse not displaced;no murmurs, gallops or rubs detected irregularly irregular rhythm                Abdomen:  abdomen soft, non-tender, BS normoactive, no mass, no HSM, no bruits        Vascular: pulses full and equal                                      Extremities and Back:  no edema        Neurological:  affect appropriate, oriented to time, person and place;no gross motor deficits          Recent Lab Results:  LIPID RESULTS:  Lab Results   Component Value Date    CHOL 190 09/05/2013    HDL 42 09/05/2013     (H) 10/15/2015     (H) 09/05/2013    TRIG 92 09/05/2013    CHOLHDLRATIO 5.0 09/05/2013       LIVER ENZYME RESULTS:  Lab Results   Component Value Date    AST 27 07/29/2016    ALT 25 07/29/2016       CBC RESULTS:  Lab Results   Component Value Date    WBC 6.7 08/29/2017    RBC 4.03 (L) 08/29/2017    HGB 12.4 (L) 08/29/2017    HCT 39.4 (L) 08/29/2017    MCV 98 08/29/2017    MCH 30.8 08/29/2017    MCHC 31.5 08/29/2017    RDW 15.5 (H) 08/29/2017     (H) 08/29/2017       BMP RESULTS:  Lab Results   Component Value Date     08/29/2017    POTASSIUM 4.8 08/29/2017    CHLORIDE 101 08/29/2017    CO2 28 08/29/2017    ANIONGAP 8 08/29/2017    GLC 95 08/29/2017    BUN 22 08/29/2017    CR 1.18 08/29/2017    GFRESTIMATED 59 (L) 08/29/2017    GFRESTBLACK 71 08/29/2017    CARMEN 9.1 08/29/2017        A1C RESULTS:  Lab Results   Component Value Date    A1C 6.1 (H) 08/01/2017       INR RESULTS:  Lab Results   Component Value Date    INR 1.08 08/16/2011    INR 2.2 (A) 03/17/2011    INR 2.0 03/04/2011    INR 1.67 (H) 09/13/2010         Ricky Arroyo MD, FACC    CC  Marleni Barrett MD  [address] [place] [id number]

## 2017-08-31 ENCOUNTER — OFFICE VISIT (OUTPATIENT)
Dept: ORTHOPEDICS | Facility: CLINIC | Age: 82
End: 2017-08-31
Payer: COMMERCIAL

## 2017-08-31 ENCOUNTER — RADIANT APPOINTMENT (OUTPATIENT)
Dept: GENERAL RADIOLOGY | Facility: CLINIC | Age: 82
End: 2017-08-31
Attending: ORTHOPAEDIC SURGERY
Payer: COMMERCIAL

## 2017-08-31 ENCOUNTER — TELEPHONE (OUTPATIENT)
Dept: CARDIOLOGY | Facility: CLINIC | Age: 82
End: 2017-08-31

## 2017-08-31 ENCOUNTER — TRANSFERRED RECORDS (OUTPATIENT)
Dept: HEALTH INFORMATION MANAGEMENT | Facility: CLINIC | Age: 82
End: 2017-08-31

## 2017-08-31 ENCOUNTER — NURSING HOME VISIT (OUTPATIENT)
Dept: GERIATRICS | Facility: CLINIC | Age: 82
End: 2017-08-31
Payer: COMMERCIAL

## 2017-08-31 VITALS — BODY MASS INDEX: 21.34 KG/M2 | HEIGHT: 73 IN | TEMPERATURE: 97.6 F | WEIGHT: 161 LBS

## 2017-08-31 VITALS
SYSTOLIC BLOOD PRESSURE: 123 MMHG | HEART RATE: 106 BPM | BODY MASS INDEX: 21.07 KG/M2 | TEMPERATURE: 98 F | OXYGEN SATURATION: 96 % | RESPIRATION RATE: 18 BRPM | DIASTOLIC BLOOD PRESSURE: 79 MMHG | WEIGHT: 159.7 LBS

## 2017-08-31 DIAGNOSIS — M17.0 PRIMARY OSTEOARTHRITIS OF BOTH KNEES: ICD-10-CM

## 2017-08-31 DIAGNOSIS — Z96.652 STATUS POST TOTAL LEFT KNEE REPLACEMENT USING CEMENT: Primary | ICD-10-CM

## 2017-08-31 DIAGNOSIS — Z96.652 STATUS POST TOTAL LEFT KNEE REPLACEMENT USING CEMENT: ICD-10-CM

## 2017-08-31 DIAGNOSIS — R33.9 URINARY RETENTION WITH INCOMPLETE BLADDER EMPTYING: ICD-10-CM

## 2017-08-31 DIAGNOSIS — I48.0 PAROXYSMAL ATRIAL FIBRILLATION (H): ICD-10-CM

## 2017-08-31 DIAGNOSIS — I77.810 AORTIC ROOT DILATATION (H): Primary | ICD-10-CM

## 2017-08-31 DIAGNOSIS — I95.1 ORTHOSTATIC HYPOTENSION: ICD-10-CM

## 2017-08-31 PROCEDURE — 99024 POSTOP FOLLOW-UP VISIT: CPT | Performed by: ORTHOPAEDIC SURGERY

## 2017-08-31 PROCEDURE — 99309 SBSQ NF CARE MODERATE MDM 30: CPT | Performed by: NURSE PRACTITIONER

## 2017-08-31 PROCEDURE — 73562 X-RAY EXAM OF KNEE 3: CPT | Mod: TC

## 2017-08-31 RX ORDER — METOPROLOL TARTRATE 25 MG/1
25 TABLET, FILM COATED ORAL 2 TIMES DAILY
Qty: 180 TABLET | Refills: 3 | Status: SHIPPED | OUTPATIENT
Start: 2017-08-31 | End: 2020-03-13

## 2017-08-31 ASSESSMENT — PAIN SCALES - GENERAL: PAINLEVEL: NO PAIN (0)

## 2017-08-31 NOTE — LETTER
8/31/2017         RE: Remigio Holly  [address] [facility] [id number]        Dear Colleague,    Thank you for referring your patient, Remigio Holly, to the [facility]. Please see a copy of my visit note below.    Orthopedic Clinic Post-Operative Note    CHIEF COMPLAINT:   Chief Complaint   Patient presents with     Surgical Followup     DOS: 8/15/17~ Left total knee replacement~16 days postop       HISTORY OF PRESENT ILLNESS  Doing better. Making progress. At [place].    Patient's past medical, surgical, social and family histories reviewed.     Past Medical History:   Diagnosis Date     Blood transfusion      Chronic gastric ulcer without mention of hemorrhage, perforation, without mention of obstruction     Ulcer, Gastric     Gastric cancer (H)      Hemorrhage of gastrointestinal tract, unspecified 01/13/10    [facility]     Hypoglycemia, unspecified 1/26/2015     Measles without mention of complication     Measles     Mixed hyperlipidemia     Hyperlipidemia     Mumps without mention of complication     Mumps     Pulmonary embolism (H) Sept 4, 2010     Urinary calculus, unspecified     Renal stones     Varicella without mention of complication     Chickenpox       Past Surgical History:   Procedure Laterality Date     ABDOMEN SURGERY       ARTHROPLASTY KNEE Left 8/15/2017    Procedure: ARTHROPLASTY KNEE;  Left total knee replacement;  Surgeon: Jonathan Cardona DO;  [facility]     C EXCIS STOMACH ULCER,LESN;LOCAL       CHOLECYSTECTOMY, LAPOROSCOPIC  10/6/2008    Cholecystectomy, Laparoscopic     COLONOSCOPY  1/14/10    [facility]     COLONOSCOPY  05/25/10     CYSTOSCOPY, RETROGRADES, EXTRACT STONE, INSERT STENT, COMBINED  12/25/2012    Procedure: COMBINED CYSTOSCOPY, RETROGRADES, EXTRACT STONE, INSERT STENT;  Cystoscopy, Left Stent Placement, left retrograde pylogram, uc;  Surgeon: Amador Sanchez MD;  Location:  OR      ESOPHAGOSCOPY, GASTROSCOPY, DUODENOSCOPY (EGD), COMBINED  8/16/2011    Procedure:COMBINED ESOPHAGOSCOPY, GASTROSCOPY, DUODENOSCOPY (EGD), BIOPSY SINGLE OR MULTIPLE; Surgeon:TONY GARCIA; [facility]     GENERAL SURGERY                           DATE:  07/07/10    Gastrectomy      COLONOSCOPY W/WO BRUSH/WASH  01/31/2006      REPAIR ROTATOR CUFF,ACUTE  3/21/2005    Right      UGI ENDOSCOPY, SIMPLE EXAM  1/13/10    [facility] UGI ENDOSCOPY, SIMPLE EXAM  05/25/10     LASER HOLMIUM LITHOTRIPSY URETER(S), INSERT STENT, COMBINED  1/5/2013    Procedure: COMBINED CYSTOSCOPY, URETEROSCOPY, LASER HOLMIUM LITHOTRIPSY URETER(S), INSERT STENT;  Bilateral  Cystoscopy, Bilateral Ureteroscopy, Bilateral retrogrades and  placement of ureteral stent right side. Laser Holmium Lithotripsy  and Exchange  of stent left side;  Surgeon: Tone Morejon MD;  [facility]     LASER HOLMIUM LITHOTRIPSY URETER(S), INSERT STENT, COMBINED  1/30/2013    Procedure: COMBINED CYSTOSCOPY, URETEROSCOPY, LASER HOLMIUM LITHOTRIPSY URETER(S), INSERT STENT;  Right Ureteroscopy; Stone basketing; Right Stent exchange and  removal of left stent.;  Surgeon: Tnoe Morejon MD;  Location: [facility]     VIDEO CAPSULE ENDOSCOPY  01/15/10    [facility]       Medications:    Current Outpatient Prescriptions on File Prior to Visit:  melatonin 1 MG CAPS Take 1 mg by mouth At Bedtime   oxyCODONE (ROXICODONE) 5 MG IR tablet Take 0.5 tablets (2.5 mg) by mouth every 4 hours as needed for moderate to severe pain   magnesium hydroxide (MILK OF MAGNESIA) 400 MG/5ML suspension Take 30 mLs by mouth daily as needed for constipation   polyethylene glycol (MIRALAX/GLYCOLAX) Packet Take 17 g by mouth daily   senna-docusate (SENOKOT-S;PERICOLACE) 8.6-50 MG per tablet Take 2 tablets by mouth 2 times daily May hold for loose stools   acetaminophen (TYLENOL) 500 MG tablet Take 2 tablets (1,000 mg) by mouth every 8 hours   rivaroxaban  "ANTICOAGULANT (XARELTO) 15 MG TABS tablet Take 1 tablet (15 mg) by mouth daily   cyclobenzaprine (FLEXERIL) 5 MG tablet Take 1 tablet (5 mg) by mouth 3 times daily as needed for muscle spasms   cyanocobalamin (VITAMIN B12) 1000 MCG/ML injection Inject 1 mL (1,000 mcg) into the muscle every 30 days 1 year of injections approved through 4/19/2017 per Dr. Ayon.   blood glucose monitoring (ROBERT CONTOUR NEXT) test strip Use to test blood sugar 1 times daily or as directed.   blood glucose monitoring (ROBERT MICROLET) lancets Use to test blood sugar 1 time daily or as directed.     No current facility-administered medications on file prior to visit.     Allergies   Allergen Reactions     Mobic [Meloxicam] Nausea and Diarrhea     Diarrhea, nausea, flu like symptoms since start of use       Social History     Occupational History     Construction      Retired     Social History Main Topics     Smoking status: Current Some Day Smoker     Packs/day: 0.00     Years: 20.00     Types: Pipe, Cigars     Last attempt to quit: 1/1/1998     Smokeless tobacco: Never Used      Comment: Rare pipe and cigar smoking     Alcohol use No     Drug use: No     Sexual activity: Not Currently       Family History   Problem Relation Age of Onset     Alzheimer Disease Father      Cardiovascular Mother      C.A.D. Mother      HEART DISEASE Mother      Cardiovascular Brother      HEART DISEASE Brother      C.A.D. Brother        REVIEW OF SYSTEMS  General: negative for, night sweats, dizziness, fatigue  Resp: No shortness of breath and no cough  CV: negative for chest pain, syncope or near-syncope  GI: negative for nausea, vomiting and diarrhea  : negative for dysuria and hematuria  Musculoskeletal: as above  Neurologic: negative for syncope   Hematologic: negative for bleeding disorder    Physical Exam:  Vitals: Temp 97.6  F (36.4  C) (Temporal)  Ht 6' 1\" (1.854 m)  Wt 161 lb (73 kg)  BMI 21.24 kg/m2  BMI= Body mass index is 21.24 " kg/(m^2).  Constitutional: healthy, alert and no acute distress   Psychiatric: mentation appears normal and affect normal/bright  NEURO: no focal deficits  SKIN: .healing well, well approximated skin edges, without signs of infection including no erythema, incision breakdown or purlent drainage  JOINT/EXTREMITIES: Active motion 7-90, passive 3-95. No instability. No calf tenderness. Distal neurovascular intact. No peripheral edema.  GAIT: antalgic and with assistive device    Diagnostic Modalities:  left knee X-ray: The prosthesis has acceptable alignment. No fractures or dislocations. Prosthesis is well seated with no evidence of loosening  Independent visualization of the images was performed.      Impression:   Chief Complaint   Patient presents with     Surgical Followup     DOS: 8/15/17~ Left total knee replacement~16 days postop    doing well.    Plan:   Activity: Take frequent breaks and elevate the extremity  Staples/Sutures out: No  Pain controlled: Yes. Continue to use: Oxycodone  Immobilzation: No  Physical Therapy: decrease swelling and inflammation, passive range of motion, active range of motion  Rest, Ice, Elevation  Return to clinic 4, week(s), or sooner as needed for changes.    Re-x-ray on return: No    Yrn Cardona D.O.    Again, thank you for allowing me to participate in the care of your patient.        Sincerely,        Jonathan Cardona, DO

## 2017-08-31 NOTE — PROGRESS NOTES
Orthopedic Clinic Post-Operative Note    CHIEF COMPLAINT:   Chief Complaint   Patient presents with     Surgical Followup     DOS: 8/15/17~ Left total knee replacement~16 days postop       HISTORY OF PRESENT ILLNESS  Doing better. Making progress. At [facility].    Patient's past medical, surgical, social and family histories reviewed.     Past Medical History:   Diagnosis Date     Blood transfusion      Chronic gastric ulcer without mention of hemorrhage, perforation, without mention of obstruction     Ulcer, Gastric     Gastric cancer (H)      Hemorrhage of gastrointestinal tract, unspecified 01/13/10    [facility]     Hypoglycemia, unspecified 1/26/2015     Measles without mention of complication     Measles     Mixed hyperlipidemia     Hyperlipidemia     Mumps without mention of complication     Mumps     Pulmonary embolism (H) Sept 4, 2010     Urinary calculus, unspecified     Renal stones     Varicella without mention of complication     Chickenpox       Past Surgical History:   Procedure Laterality Date     ABDOMEN SURGERY       ARTHROPLASTY KNEE Left 8/15/2017    Procedure: ARTHROPLASTY KNEE;  Left total knee replacement;  Surgeon: Jonathan Cardona DO;  Location: [facility] OR     C EXCIS STOMACH ULCER,LESN;LOCAL       CHOLECYSTECTOMY, LAPOROSCOPIC  10/6/2008    Cholecystectomy, Laparoscopic     COLONOSCOPY  1/14/10    [facility]     COLONOSCOPY  05/25/10     CYSTOSCOPY, RETROGRADES, EXTRACT STONE, INSERT STENT, COMBINED  12/25/2012    Procedure: COMBINED CYSTOSCOPY, RETROGRADES, EXTRACT STONE, INSERT STENT;  Cystoscopy, Left Stent Placement, left retrograde pylogram, uc;  Surgeon: Amador Sanchez MD;  Location: [facility] OR     ESOPHAGOSCOPY, GASTROSCOPY, DUODENOSCOPY (EGD), COMBINED  8/16/2011    Procedure:COMBINED ESOPHAGOSCOPY, GASTROSCOPY, DUODENOSCOPY (EGD), BIOPSY SINGLE OR MULTIPLE; Surgeon:TONY GARCIA; Location:[facility]     GENERAL SURGERY                           DATE:  07/07/10     Gastrectomy     HC COLONOSCOPY W/WO BRUSH/WASH  01/31/2006     HC REPAIR ROTATOR CUFF,ACUTE  3/21/2005    Right     [facility] UGI ENDOSCOPY, SIMPLE EXAM  1/13/10    [facility] UGI ENDOSCOPY, SIMPLE EXAM  05/25/10     LASER HOLMIUM LITHOTRIPSY URETER(S), INSERT STENT, COMBINED  1/5/2013    Procedure: COMBINED CYSTOSCOPY, URETEROSCOPY, LASER HOLMIUM LITHOTRIPSY URETER(S), INSERT STENT;  Bilateral  Cystoscopy, Bilateral Ureteroscopy, Bilateral retrogrades and  placement of ureteral stent right side. Laser Holmium Lithotripsy  and Exchange  of stent left side;  Surgeon: Tone Morejon MD;  Location: [facility] OR     LASER HOLMIUM LITHOTRIPSY URETER(S), INSERT STENT, COMBINED  1/30/2013    Procedure: COMBINED CYSTOSCOPY, URETEROSCOPY, LASER HOLMIUM LITHOTRIPSY URETER(S), INSERT STENT;  Right Ureteroscopy; Stone basketing; Right Stent exchange and  removal of left stent.;  Surgeon: Tone Morejon MD;  Location: [facility] OR     VIDEO CAPSULE ENDOSCOPY  01/15/10    [facility]       Medications:    Current Outpatient Prescriptions on File Prior to Visit:  melatonin 1 MG CAPS Take 1 mg by mouth At Bedtime   oxyCODONE (ROXICODONE) 5 MG IR tablet Take 0.5 tablets (2.5 mg) by mouth every 4 hours as needed for moderate to severe pain   magnesium hydroxide (MILK OF MAGNESIA) 400 MG/5ML suspension Take 30 mLs by mouth daily as needed for constipation   polyethylene glycol (MIRALAX/GLYCOLAX) Packet Take 17 g by mouth daily   senna-docusate (SENOKOT-S;PERICOLACE) 8.6-50 MG per tablet Take 2 tablets by mouth 2 times daily May hold for loose stools   acetaminophen (TYLENOL) 500 MG tablet Take 2 tablets (1,000 mg) by mouth every 8 hours   rivaroxaban ANTICOAGULANT (XARELTO) 15 MG TABS tablet Take 1 tablet (15 mg) by mouth daily   cyclobenzaprine (FLEXERIL) 5 MG tablet Take 1 tablet (5 mg) by mouth 3 times daily as needed for muscle spasms   cyanocobalamin (VITAMIN B12) 1000 MCG/ML injection Inject 1 mL (1,000 mcg) into  "the muscle every 30 days 1 year of injections approved through 4/19/2017 per Dr. Ayon.   blood glucose monitoring (ROBERT CONTOUR NEXT) test strip Use to test blood sugar 1 times daily or as directed.   blood glucose monitoring (ROBERT MICROLET) lancets Use to test blood sugar 1 time daily or as directed.     No current facility-administered medications on file prior to visit.     Allergies   Allergen Reactions     Mobic [Meloxicam] Nausea and Diarrhea     Diarrhea, nausea, flu like symptoms since start of use       Social History     Occupational History     Construction      Retired     Social History Main Topics     Smoking status: Current Some Day Smoker     Packs/day: 0.00     Years: 20.00     Types: Pipe, Cigars     Last attempt to quit: 1/1/1998     Smokeless tobacco: Never Used      Comment: Rare pipe and cigar smoking     Alcohol use No     Drug use: No     Sexual activity: Not Currently       Family History   Problem Relation Age of Onset     Alzheimer Disease Father      Cardiovascular Mother      C.A.D. Mother      HEART DISEASE Mother      Cardiovascular Brother      HEART DISEASE Brother      C.A.D. Brother        REVIEW OF SYSTEMS  General: negative for, night sweats, dizziness, fatigue  Resp: No shortness of breath and no cough  CV: negative for chest pain, syncope or near-syncope  GI: negative for nausea, vomiting and diarrhea  : negative for dysuria and hematuria  Musculoskeletal: as above  Neurologic: negative for syncope   Hematologic: negative for bleeding disorder    Physical Exam:  Vitals: Temp 97.6  F (36.4  C) (Temporal)  Ht 6' 1\" (1.854 m)  Wt 161 lb (73 kg)  BMI 21.24 kg/m2  BMI= Body mass index is 21.24 kg/(m^2).  Constitutional: healthy, alert and no acute distress   Psychiatric: mentation appears normal and affect normal/bright  NEURO: no focal deficits  SKIN: .healing well, well approximated skin edges, without signs of infection including no erythema, incision breakdown or " purlent drainage  JOINT/EXTREMITIES: Active motion 7-90, passive 3-95. No instability. No calf tenderness. Distal neurovascular intact. No peripheral edema.  GAIT: antalgic and with assistive device    Diagnostic Modalities:  left knee X-ray: The prosthesis has acceptable alignment. No fractures or dislocations. Prosthesis is well seated with no evidence of loosening  Independent visualization of the images was performed.      Impression:   Chief Complaint   Patient presents with     Surgical Followup     DOS: 8/15/17~ Left total knee replacement~16 days postop    doing well.    Plan:   Activity: Take frequent breaks and elevate the extremity  Staples/Sutures out: No  Pain controlled: Yes. Continue to use: Oxycodone  Immobilzation: No  Physical Therapy: decrease swelling and inflammation, passive range of motion, active range of motion  Rest, Ice, Elevation  Return to clinic 4, week(s), or sooner as needed for changes.    Re-x-ray on return: No    Yrn Cardona D.O.

## 2017-08-31 NOTE — PROGRESS NOTES
Appropriate assistive devices provided during their visit. yes (Yes, No, N/A) walker (list device)    Exam table and/or cart  placed in the lowest position. yes (Yes, No, N/A)    Brakes on tables/carts/wheelchairs used at all times. na (Yes, No, N/A)    Non slip footwear applied. na (Yes, No, NA)    Patient was accompanied by staff throughout visit. yes (Yes, No, N/A)    Equipment safety straps used. na (Yes, No, N/A)    Assist with toileting. na (Yes, No, N/A)  Geneva Kumar  8/31/2017  8:43 AM

## 2017-08-31 NOTE — MR AVS SNAPSHOT
After Visit Summary   8/31/2017    Remigio Holly    MRN: [id number]           Patient Information     Date Of Birth          11/6/1933        Visit Information        Provider Department      8/31/2017 8:50 AM Jonathan Cardona,  [facility]        Today's Diagnoses     Status post total left knee replacement using cement    -  1       Follow-ups after your visit        Future tests that were ordered for you today     Open Future Orders        Priority Expected Expires Ordered    Echocardiogram Routine 8/30/2018 8/31/2018 8/30/2017    Follow-Up with Cardiologist Routine 8/30/2018 8/31/2018 8/30/2017            Who to contact     If you have questions or need follow up information about today's clinic visit or your schedule please contact [facility] directly at [phone].  Normal or non-critical lab and imaging results will be communicated to you by MyChart, letter or phone within 4 business days after the clinic has received the results. If you do not hear from us within 7 days, please contact the clinic through [employer] or phone. If you have a critical or abnormal lab result, we will notify you by phone as soon as possible.  Submit refill requests through [email or web address] or call your pharmacy and they will forward the refill request to us. Please allow 3 business days for your refill to be completed.          Additional Information About Your Visit        MyChart Information     [email or web address] gives you secure access to your electronic health record. If you see a primary care provider, you can also send messages to your care team and make appointments. If you have questions, please call your primary care clinic.  If you do not have a primary care provider, please call [phone] and they will assist you.        Care EveryWhere ID     This is your Care EveryWhere ID. This could be used by other organizations to access your [facility]  "records  [id number]        Your Vitals Were     Temperature Height BMI (Body Mass Index)             97.6  F (36.4  C) (Temporal) 6' 1\" (1.854 m) 21.24 kg/m2          Blood Pressure from Last 3 Encounters:   08/30/17 114/70   08/22/17 133/84   08/22/17 137/52    Weight from Last 3 Encounters:   08/31/17 161 lb (73 kg)   08/30/17 161 lb 14.4 oz (73.4 kg)   08/15/17 173 lb 1 oz (78.5 kg)               Primary Care Provider Office Phone # Fax #    Myo Celis [phone]       [address] [facility] [address]        Equal Access to Services     ERICA RUSSELL : Andrew cliftono Somaryali, waaxda luqadaha, qaybta kaalmada adeegyada, tray mendez. So [facility] [phone].    ATENCIÓN: Si habla español, tiene a tabares disposición servicios gratuitos de asistencia lingüística. Llame al [phone].    We comply with applicable federal civil rights laws and [place] laws. We do not discriminate on the basis of race, color, national origin, age, disability sex, sexual orientation or gender identity.            Thank you!     Thank you for choosing [facility]  for your care. Our goal is always to provide you with excellent care. Hearing back from our patients is one way we can continue to improve our services. Please take a few minutes to complete the written survey that you may receive in the mail after your visit with us. Thank you!             Your Updated Medication List - Protect others around you: Learn how to safely use, store and throw away your medicines at www.disposemymeds.org.          This list is accurate as of: 8/31/17  9:11 AM.  Always use your most recent med list.                   Brand Name Dispense Instructions for use Diagnosis    acetaminophen 500 MG tablet    TYLENOL     Take 2 tablets (1,000 mg) by mouth every 8 hours    Status post total left knee replacement using cement       blood glucose monitoring lancets     1 Box    Use to test " blood sugar 1 time daily or as directed.    Hypoglycemia       blood glucose monitoring test strip    ROBERT CONTOUR NEXT    100 each    Use to test blood sugar 1 times daily or as directed.    Hypoglycemia       cyanocobalamin 1000 MCG/ML injection    VITAMIN B12    1 mL    Inject 1 mL (1,000 mcg) into the muscle every 30 days 1 year of injections approved through 4/19/2017 per Dr. Ayon.    Pernicious anemia       cyclobenzaprine 5 MG tablet    FLEXERIL    42 tablet    Take 1 tablet (5 mg) by mouth 3 times daily as needed for muscle spasms    Status post total left knee replacement using cement       magnesium hydroxide 400 MG/5ML suspension    MILK OF MAGNESIA    105 mL    Take 30 mLs by mouth daily as needed for constipation    Status post total left knee replacement using cement       melatonin 1 MG Caps      Take 1 mg by mouth At Bedtime    Other insomnia       oxyCODONE 5 MG IR tablet    ROXICODONE    30 tablet    Take 0.5 tablets (2.5 mg) by mouth every 4 hours as needed for moderate to severe pain    Status post total left knee replacement using cement       polyethylene glycol Packet    MIRALAX/GLYCOLAX    7 packet    Take 17 g by mouth daily    Status post total left knee replacement using cement       rivaroxaban ANTICOAGULANT 15 MG Tabs tablet    XARELTO     Take 1 tablet (15 mg) by mouth daily    Paroxysmal atrial fibrillation (H)       senna-docusate 8.6-50 MG per tablet    SENOKOT-S;PERICOLACE    100 tablet    Take 2 tablets by mouth 2 times daily May hold for loose stools    Status post total left knee replacement using cement

## 2017-08-31 NOTE — NURSING NOTE
"Chief Complaint   Patient presents with     Surgical Followup     DOS: 8/15/17~ Left total knee replacement~16 days postop       Initial Temp 97.6  F (36.4  C) (Temporal)  Ht 1.854 m (6' 1\")  Wt 73 kg (161 lb)  BMI 21.24 kg/m2 Estimated body mass index is 21.24 kg/(m^2) as calculated from the following:    Height as of this encounter: 1.854 m (6' 1\").    Weight as of this encounter: 73 kg (161 lb).  Medication Reconciliation: complete   Christine/GM     "

## 2017-08-31 NOTE — PROGRESS NOTES
[place] [facility]    Chief Complaint   Patient presents with     Nursing Home Acute       HPI:    Remigio Holly is a 83 year old  (11/6/1933), who is being seen today for an episodic care visit at [facility].  Pt here rehabbing after TKA on L 2/2 DJD with hospital complications of A fib (f/u cards 8/30 started on lopressor and recommends f/u with CT for AAA), hypotension and UR (urology f/u today). Having increased pain in knee. Mainly at night and oxy only lasting ~ 4hrs -   HPI information obtained from: facility chart records, facility staff, patient report and [place] Epic chart review. Today's concern is:  1. 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/17)    2. Primary osteoarthritis of both knees    3. Paroxysmal atrial fibrillation (H)    4. Orthostatic hypotension    5. Urinary retention with incomplete bladder emptying        REVIEW OF SYSTEMS:  4 point ROS including Respiratory, CV, GI and , other than that noted in the HPI,  is negative  PMH/PSH reviewed in EPIC today      /79  Pulse 106  Temp 98  F (36.7  C)  Resp 18  Wt 159 lb 11.2 oz (72.4 kg)  SpO2 96%  BMI 21.07 kg/m2  GENERAL APPEARANCE:  Alert, in no distress  Participating in therpay well with grimace,   Non labored  Breathing .   cV - no edema   Rate [facility] and [address] [place] [address] Laboratory on 08/29/2017   Component Date Value Ref Range Status     Sodium 08/29/2017 137  133 - 144 mmol/L Final     Potassium 08/29/2017 4.8  3.4 - 5.3 mmol/L Final     Chloride 08/29/2017 101  94 - 109 mmol/L Final     Carbon Dioxide 08/29/2017 28  20 - 32 mmol/L Final     Anion Gap 08/29/2017 8  3 - 14 mmol/L Final     Glucose 08/29/2017 95  70 - 99 mg/dL Final     Urea Nitrogen 08/29/2017 22  7 - 30 mg/dL Final     Creatinine 08/29/2017 1.18  0.66 - 1.25 mg/dL Final     GFR Estimate 08/29/2017 59* >60 mL/min/1.7m2 Final    Non  GFR Calc     GFR Estimate If Black 08/29/2017 71  >60 mL/min/1.7m2  Final     GFR Calc     Calcium 08/29/2017 9.1  8.5 - 10.1 mg/dL Final     WBC 08/29/2017 6.7  4.0 - 11.0 10e9/L Final     RBC Count 08/29/2017 4.03* 4.4 - 5.9 10e12/L Final     Hemoglobin 08/29/2017 12.4* 13.3 - 17.7 g/dL Final     Hematocrit 08/29/2017 39.4* 40.0 - 53.0 % Final     MCV 08/29/2017 98  78 - 100 fl Final     MCH 08/29/2017 30.8  26.5 - 33.0 pg Final     MCHC 08/29/2017 31.5  31.5 - 36.5 g/dL Final     RDW 08/29/2017 15.5* 10.0 - 15.0 % Final     Platelet Count 08/29/2017 492* 150 - 450 10e9/L Final         ASSESSMENT/PLAN:  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/17)/Primary osteoarthritis of both knees  Have been trying to keep narcs lower 2/2 hypotension - but pain is sig and not allowing him to sleep .  Will try long acting now that bp's are better and stable.   Ortho f/u today - no new orders.     Paroxysmal atrial fibrillation (H)  Cards f/u on 8/30 - on xarelto- started lopressor. - monitor for lower bps given both    Orthostatic hypotension  Stable - but cont to montior.     Urinary retention with incomplete bladder emptying  F/u urology today - magallanes out - monitoring voiding by nsg now.       1.  Start OxyContin 10 mg po QHS x 7 nights.  Dx: pain  nsg to update FGS if any hypotension.         Coretta Stallworth, APRN CNP

## 2017-08-31 NOTE — TELEPHONE ENCOUNTER
Phone call to patient.  As I was completing his chart after he left yesterday I realized he was not on a b-blocker, which may have some benefit in slowing progression of aortic enlargement and helping to prevent dissection.  Baseline BP is on low side, so we will start low dose, 25 mg BID.  I called him and discussed, including potential side effects, and he is agreeable to this plan.  Rx sent to pharmacy.

## 2017-09-05 ENCOUNTER — NURSING HOME VISIT (OUTPATIENT)
Dept: GERIATRICS | Facility: CLINIC | Age: 82
End: 2017-09-05
Payer: COMMERCIAL

## 2017-09-05 VITALS
HEART RATE: 71 BPM | BODY MASS INDEX: 21.04 KG/M2 | SYSTOLIC BLOOD PRESSURE: 115 MMHG | DIASTOLIC BLOOD PRESSURE: 65 MMHG | OXYGEN SATURATION: 94 % | WEIGHT: 159.5 LBS | TEMPERATURE: 98.5 F | RESPIRATION RATE: 20 BRPM

## 2017-09-05 DIAGNOSIS — I48.0 PAROXYSMAL ATRIAL FIBRILLATION (H): ICD-10-CM

## 2017-09-05 DIAGNOSIS — I95.1 ORTHOSTATIC HYPOTENSION: ICD-10-CM

## 2017-09-05 DIAGNOSIS — M17.0 PRIMARY OSTEOARTHRITIS OF BOTH KNEES: ICD-10-CM

## 2017-09-05 DIAGNOSIS — Z96.652 STATUS POST TOTAL LEFT KNEE REPLACEMENT USING CEMENT: Primary | ICD-10-CM

## 2017-09-05 PROCEDURE — 99309 SBSQ NF CARE MODERATE MDM 30: CPT | Performed by: NURSE PRACTITIONER

## 2017-09-05 RX ORDER — POLYETHYLENE GLYCOL 3350 17 G/17G
17 POWDER, FOR SOLUTION ORAL DAILY PRN
COMMUNITY
End: 2018-10-29

## 2017-09-05 RX ORDER — AMOXICILLIN 250 MG
1 CAPSULE ORAL 2 TIMES DAILY PRN
COMMUNITY
End: 2017-11-09

## 2017-09-05 NOTE — PROGRESS NOTES
[place] [facility]    Chief Complaint   Patient presents with     Nursing Home Acute       HPI:    Remigio Holly is a 83 year old  (11/6/1933), who is being seen today for an episodic care visit at [facility].    HPI information obtained from: facility chart records, facility staff, patient report and [facility] chart review. Pt here rehabing s/p L TKA - post op eventful for A fib and sig/subsequent hypotension. BP's stabalized. ON 8/28 pt doing well and c/o pain in knee that was making it difficult to sleep - trial of oxycontin started given stability of hypotension, but that night pt became too hypotensive - resolved afte r8/29 holding BB (new stated by cards 2 d prior) and fluids. Oxycontin dc'd F/u today. Today's concern is f/u on   1. 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/17)    2. Primary osteoarthritis of both knees    3. Orthostatic hypotension    4. Paroxysmal atrial fibrillation (H)      Pt is up ambulating well - still c/o pain but understands that narcs make his bp drop.   C/o too loose of bowels - on agents.     REVIEW OF SYSTEMS:  4 point ROS including Respiratory, CV, GI and , other than that noted in the HPI,  is negative  PMH/PSH reviewed in EPIC today      /65  Pulse 71  Temp 98.5  F (36.9  C)  Resp 20  Wt 159 lb 8 oz (72.3 kg)  SpO2 94%  BMI 21.04 kg/m2  GENERAL APPEARANCE:  Alert, in no distress  Up ambuating well with 2 ww  Non labored breathing   No edema    Hospital Laboratory on 08/29/2017   Component Date Value Ref Range Status     Sodium 08/29/2017 137  133 - 144 mmol/L Final     Potassium 08/29/2017 4.8  3.4 - 5.3 mmol/L Final     Chloride 08/29/2017 101  94 - 109 mmol/L Final     Carbon Dioxide 08/29/2017 28  20 - 32 mmol/L Final     Anion Gap 08/29/2017 8  3 - 14 mmol/L Final     Glucose 08/29/2017 95  70 - 99 mg/dL Final     Urea Nitrogen 08/29/2017 22  7 - 30 mg/dL Final     Creatinine 08/29/2017 1.18  0.66 - 1.25 mg/dL  Final     GFR Estimate 08/29/2017 59* >60 mL/min/1.7m2 Final    Non  GFR Calc     GFR Estimate If Black 08/29/2017 71  >60 mL/min/1.7m2 Final    African American GFR Calc     Calcium 08/29/2017 9.1  8.5 - 10.1 mg/dL Final     WBC 08/29/2017 6.7  4.0 - 11.0 10e9/L Final     RBC Count 08/29/2017 4.03* 4.4 - 5.9 10e12/L Final     Hemoglobin 08/29/2017 12.4* 13.3 - 17.7 g/dL Final     Hematocrit 08/29/2017 39.4* 40.0 - 53.0 % Final     MCV 08/29/2017 98  78 - 100 fl Final     MCH 08/29/2017 30.8  26.5 - 33.0 pg Final     MCHC 08/29/2017 31.5  31.5 - 36.5 g/dL Final     RDW 08/29/2017 15.5* 10.0 - 15.0 % Final     Platelet Count 08/29/2017 492* 150 - 450 10e9/L Final       ASSESSMENT/PLAN:  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/17)/Primary osteoarthritis of both knees  Will DC flexeril as no use and likely not needed at this point -   Will add biofreeze for pain control    Orthostatic hypotension  resovlved after DC of oxycontin    Paroxysmal atrial fibrillation (H)  Con tBB - not needing to be held at all given stability of BP's -     Constipation   Decrease Senna and miralax       1.  D/c Flexeril (d/c OxyContin)  2.  Decrease Senna S to 1 tablet po BID.  Hold if loose stools.  3.  Start Biofreeze to left knee (not on incision) TID & QD PRN x 4 days, then TID PRN - ok for pt to self administer/keep if room if nursing assessment deems it to be safe.  4.  Change Miralax to 17 gms po QD PRN.  Dx: constipation (use Miralax PRN before Senna)        Coretta Stallworth, APRN CNP

## 2017-09-08 ENCOUNTER — HOSPITAL LABORATORY (OUTPATIENT)
Dept: NURSING HOME | Facility: OTHER | Age: 82
End: 2017-09-08

## 2017-09-09 LAB
C DIFF TOX B STL QL: NEGATIVE
SPECIMEN SOURCE: NORMAL

## 2017-09-12 ENCOUNTER — DISCHARGE SUMMARY NURSING HOME (OUTPATIENT)
Dept: GERIATRICS | Facility: CLINIC | Age: 82
End: 2017-09-12
Payer: COMMERCIAL

## 2017-09-12 VITALS
HEART RATE: 61 BPM | OXYGEN SATURATION: 94 % | WEIGHT: 162.6 LBS | DIASTOLIC BLOOD PRESSURE: 81 MMHG | TEMPERATURE: 98.2 F | RESPIRATION RATE: 18 BRPM | SYSTOLIC BLOOD PRESSURE: 142 MMHG | BODY MASS INDEX: 21.45 KG/M2

## 2017-09-12 DIAGNOSIS — I95.1 ORTHOSTATIC HYPOTENSION: ICD-10-CM

## 2017-09-12 DIAGNOSIS — M17.0 PRIMARY OSTEOARTHRITIS OF BOTH KNEES: Primary | ICD-10-CM

## 2017-09-12 DIAGNOSIS — N18.30 CKD (CHRONIC KIDNEY DISEASE) STAGE 3, GFR 30-59 ML/MIN (H): ICD-10-CM

## 2017-09-12 DIAGNOSIS — I71.20 THORACIC AORTIC ANEURYSM WITHOUT RUPTURE (H): ICD-10-CM

## 2017-09-12 DIAGNOSIS — R33.9 URINARY RETENTION WITH INCOMPLETE BLADDER EMPTYING: ICD-10-CM

## 2017-09-12 DIAGNOSIS — I48.0 PAROXYSMAL ATRIAL FIBRILLATION (H): ICD-10-CM

## 2017-09-12 DIAGNOSIS — Z96.652 STATUS POST TOTAL LEFT KNEE REPLACEMENT USING CEMENT: ICD-10-CM

## 2017-09-12 PROCEDURE — 99316 NF DSCHRG MGMT 30 MIN+: CPT | Performed by: NURSE PRACTITIONER

## 2017-09-12 RX ORDER — TAMSULOSIN HYDROCHLORIDE 0.4 MG/1
0.4 CAPSULE ORAL DAILY
COMMUNITY

## 2017-09-12 NOTE — PROGRESS NOTES
[place] [facility] DISCHARGE SUMMARY    PATIENT'S NAME: Remigio Holly  YOB: 1933  MEDICAL RECORD NUMBER:  [id number]    PRIMARY CARE PROVIDER AND CLINIC RESPONSIBLE AFTER TRANSFER: Moy Celis [address]  / [facility] [address]     CODE STATUS/ADVANCE DIRECTIVES DISCUSSION:   CPR/Full code        Allergies   Allergen Reactions     Mobic [Meloxicam] Nausea and Diarrhea     Diarrhea, nausea, flu like symptoms since start of use       TRANSFERRING PROVIDERS: DISHA Rodriguez CNP, Diana Del Toro MD  DATE OF SNF ADMISSION:  August / 22 / 2017  DATE OF SNF (anticipated) DISCHARGE: September / 12 / 2017  DISCHARGE DISPOSITION: FMG Provider   [facility]urs[facility] [place] [facility] stay 8/15/17 to 8/22/17.     Condition on Discharge:  Improving.  Function:  indep  Cognitive Scores: BIMS 9/5/17 = 15/15    Equipment: walker    DISCHARGE DIAGNOSIS: /HPI Nursing Facility Course:  HPI information obtained from: facility chart records, facility staff, patient report and [facility] chart review.  Primary osteoarthritis of both knees/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/17)  Rehabbing well with use of prn oxycodone before therapy    Orthostatic hypotension  In hosp r/t BB for new A fib below and pain meds - was stable at TCU except restart of oxycontin 10 mg po x 1 dose at HS for sig pain at night - med dc'd     Paroxysmal atrial fibrillation (H)  Reoccurant-  post op (hx of) - on xarelto -   Had f/u with cards - 8/31 and BB restarted. Often times regular now -     Urinary retention with incomplete bladder emptying  2/2 post-anesthesia, narcotics  Perez removed but replaced after unable to void with straight catheterization needed in hospital. .   - now resolved and stable -flomax started with urology apt on 8/31    Thoracic aortic aneurysm without rupture (H) - 4.7 cm on 8/18/17 (4.5 cm in  8/15)  Chronic -stable    CKD (chronic kidney disease) stage 3, GFR 30-59 ml/min  Chronic - stable.   GFR Estimate   Date Value Ref Range Status   08/29/2017 59 (L) >60 mL/min/1.7m2 Final     Comment:     Non  GFR Calc   08/22/2017 59 (L) >60 mL/min/1.7m2 Final     Comment:     Non  GFR Calc   08/19/2017 62 >60 mL/min/1.7m2 Final     Comment:     Non  GFR Calc           PAST MEDICAL HISTORY:  has a past medical history of Blood transfusion; Chronic gastric ulcer without mention of hemorrhage, perforation, without mention of obstruction; Gastric cancer (H); Hemorrhage of gastrointestinal tract, unspecified (01/13/10); Hypoglycemia, unspecified (1/26/2015); Measles without mention of complication; Mixed hyperlipidemia; Mumps without mention of complication; Pulmonary embolism (H) (Sept 4, 2010); Urinary calculus, unspecified; and Varicella without mention of complication.    DISCHARGE MEDICATIONS:  Current Outpatient Prescriptions   Medication Sig Dispense Refill     MELATONIN PO Take 5 mg by mouth At Bedtime       tamsulosin (FLOMAX) 0.4 MG capsule Take 0.4 mg by mouth daily       Menthol, Topical Analgesic, (BIOFREEZE EX) Externally apply topically 3 times daily as needed       senna-docusate (SENOKOT-S;PERICOLACE) 8.6-50 MG per tablet Take 1 tablet by mouth 2 times daily as needed        polyethylene glycol (MIRALAX/GLYCOLAX) powder Take 17 g by mouth daily as needed for constipation       metoprolol (LOPRESSOR) 25 MG tablet Take 1 tablet (25 mg) by mouth 2 times daily 180 tablet 3     oxyCODONE (ROXICODONE) 5 MG IR tablet Take 0.5 tablets (2.5 mg) by mouth every 4 hours as needed for moderate to severe pain 30 tablet 0     acetaminophen (TYLENOL) 500 MG tablet Take 2 tablets (1,000 mg) by mouth every 8 hours       rivaroxaban ANTICOAGULANT (XARELTO) 15 MG TABS tablet Take 1 tablet (15 mg) by mouth daily       cyanocobalamin (VITAMIN B12) 1000 MCG/ML injection Inject  1 mL (1,000 mcg) into the muscle every 30 days 1 year of injections approved through 4/19/2017 per Dr. Ayon. 1 mL 11     blood glucose monitoring (ROBERT CONTOUR NEXT) test strip Use to test blood sugar 1 times daily or as directed. 100 each 3     blood glucose monitoring (ROBERT MICROLET) lancets Use to test blood sugar 1 time daily or as directed. 1 Box 2       MEDICATION CHANGES/RATIONALE:   Trial of oxycontin x 1 resulted in hypotension   flomax started by urology  BB started by cards  Controlled medications sent with patient: Script for oxycodone medication for 30 tabs and 0 refills given to patient at dischage to have them fill at their out patient pharmacy     ROS:    4 point ROS including Respiratory, CV, GI and , other than that noted in the HPI,  is negative    Physical Exam:   Vitals: /81  Pulse 61  Temp 98.2  F (36.8  C)  Resp 18  Wt 162 lb 9.6 oz (73.8 kg)  SpO2 94%  BMI 21.45 kg/m2  BMI= Body mass index is 21.45 kg/(m^2).    A & O x 3, NAD. Lungs CTA, non labored. HR reg now, no edema.  Abdomen soft, nontender. No focal neurological deficits. GU except brittany knees with less than 90* flexion now.   Incision is c/d/i.      DISCHARGE PLAN:  out pt PT  Patient instructed to follow-up with:  PCP in 7 days      [facility] scheduled appointments:      Future Appointments  Date Time Provider Department Center   9/20/2017 2:00 PM Devan Ayon MD [facility] MEDIC   10/4/2017 8:00 AM Jonathan Cardona DO [facility] referral needed and placed by this provider: No    Pending labs: none  [facility] labs   Results for orders placed or performed in visit on 09/08/17   Clostridium difficile toxin B PCR   Result Value Ref Range    Specimen Description Feces     C Diff Toxin B PCR Negative NEG^Negative     Last Basic Metabolic Panel:  Lab Results   Component Value Date     08/29/2017      Lab Results   Component Value Date    POTASSIUM 4.8 08/29/2017     Lab  Results   Component Value Date    CHLORIDE 101 08/29/2017     Lab Results   Component Value Date    CARMEN 9.1 08/29/2017     Lab Results   Component Value Date    CO2 28 08/29/2017     Lab Results   Component Value Date    BUN 22 08/29/2017     Lab Results   Component Value Date    CR 1.18 08/29/2017     Lab Results   Component Value Date    GLC 95 08/29/2017       Discharge Treatments:    TOTAL DISCHARGE TIME:   Greater than 30 minutes  Electronically signed by:  DISHA Rodriguez CNP

## 2017-09-20 ENCOUNTER — OFFICE VISIT (OUTPATIENT)
Dept: FAMILY MEDICINE | Facility: OTHER | Age: 82
End: 2017-09-20
Payer: COMMERCIAL

## 2017-09-20 VITALS
SYSTOLIC BLOOD PRESSURE: 102 MMHG | TEMPERATURE: 97.7 F | BODY MASS INDEX: 22.23 KG/M2 | HEART RATE: 70 BPM | RESPIRATION RATE: 18 BRPM | OXYGEN SATURATION: 95 % | WEIGHT: 164.1 LBS | HEIGHT: 72 IN | DIASTOLIC BLOOD PRESSURE: 68 MMHG

## 2017-09-20 DIAGNOSIS — Z96.652 STATUS POST TOTAL LEFT KNEE REPLACEMENT USING CEMENT: ICD-10-CM

## 2017-09-20 DIAGNOSIS — D51.0 PERNICIOUS ANEMIA: ICD-10-CM

## 2017-09-20 PROCEDURE — 99213 OFFICE O/P EST LOW 20 MIN: CPT | Performed by: FAMILY MEDICINE

## 2017-09-20 RX ORDER — OXYCODONE HYDROCHLORIDE 5 MG/1
2.5 TABLET ORAL EVERY 4 HOURS PRN
Qty: 30 TABLET | Refills: 0 | Status: SHIPPED | OUTPATIENT
Start: 2017-09-20 | End: 2018-03-23

## 2017-09-20 ASSESSMENT — PAIN SCALES - GENERAL: PAINLEVEL: MODERATE PAIN (5)

## 2017-09-20 NOTE — MR AVS SNAPSHOT
After Visit Summary   9/20/2017    Remigio Holly    MRN: [id number]           Patient Information     Date Of Birth          11/6/1933        Visit Information        Provider Department      9/20/2017 2:00 PM Devan Ayon MD [facility]        Today's Diagnoses     Pernicious anemia        Status post total left knee replacement using cement           Follow-ups after your visit        Your next 10 appointments already scheduled     Oct 04, 2017  8:00 AM CDT   Return Visit with Jonathan Cardona DO   [facility]    [address] [place] [id number] [phone]              Who to contact     If you have questions or need follow up information about today's clinic visit or your schedule please contact [facility] directly at [phone].  Normal or non-critical lab and imaging results will be communicated to you by [email or web address], letter or phone within 4 business days after the clinic has received the results. If you do not hear from us within 7 days, please contact the clinic through Oris4hart or phone. If you have a critical or abnormal lab result, we will notify you by phone as soon as possible.  Submit refill requests through [facility] or call your pharmacy and they will forward the refill request to us. Please allow 3 business days for your refill to be completed.          Additional Information About Your Visit        MyChart Information     [facility] gives you secure access to your electronic health record. If you see a primary care provider, you can also send messages to your care team and make appointments. If you have questions, please call your primary care clinic.  If you do not have a primary care provider, please call [phone] and they will assist you.        Care EveryWhere ID     This is your Care EveryWhere ID. This could be used by other organizations to access your [facility]  medical records  [id number]        Your Vitals Were     Pulse Temperature Respirations Height Pulse Oximetry BMI (Body Mass Index)    70 97.7  F (36.5  C) (Temporal) 18 6' (1.829 m) 95% 22.26 kg/m2       Blood Pressure from Last 3 Encounters:   09/20/17 102/68   09/12/17 142/81   09/05/17 115/65    Weight from Last 3 Encounters:   09/20/17 164 lb 1.6 oz (74.4 kg)   09/12/17 162 lb 9.6 oz (73.8 kg)   09/05/17 159 lb 8 oz (72.3 kg)              We Performed the Following     INJECTION INTRAMUSCULAR OR SUB-Q     VITAMIN B12 INJ /1000MCG          Where to get your medicines      Some of these will need a paper prescription and others can be bought over the counter.  Ask your nurse if you have questions.     Bring a paper prescription for each of these medications     oxyCODONE 5 MG IR tablet          Primary Care Provider Office Phone # Fax #    Moy Celis [phone]       [address] [place] [id number]        Equal Access to Services     [facility]: Hadii suzy huerta Soelpidio, waaxda luqadaha, qaybta kaalmamel mosher, tray mendez. So [facility] [phone].    ATENCIÓN: Si habla español, tiene a tabares disposición servicios gratuitos de asistencia lingüística. Ll[facility] [phone].    We comply with applicable federal civil rights laws and [place] laws. We do not discriminate on the basis of race, color, national origin, age, disability sex, sexual orientation or gender identity.            Thank you!     Thank you for choosing [facility]  for your care. Our goal is always to provide you with excellent care. Hearing back from our patients is one way we can continue to improve our services. Please take a few minutes to complete the written survey that you may receive in the mail after your visit with us. Thank you!             Your Updated Medication List - Protect others around you: Learn how to safely use, store and throw away your medicines at  www.disposemymeds.org.          This list is accurate as of: 9/20/17  3:29 PM.  Always use your most recent med list.                   Brand Name Dispense Instructions for use Diagnosis    acetaminophen 500 MG tablet    TYLENOL     Take 2 tablets (1,000 mg) by mouth every 8 hours    Status post total left knee replacement using cement       BIOFREEZE EX      Externally apply topically 3 times daily as needed        blood glucose monitoring lancets     1 Box    Use to test blood sugar 1 time daily or as directed.    Hypoglycemia       blood glucose monitoring test strip    ROBERT CONTOUR NEXT    100 each    Use to test blood sugar 1 times daily or as directed.    Hypoglycemia       cyanocobalamin 1000 MCG/ML injection    VITAMIN B12    1 mL    Inject 1 mL (1,000 mcg) into the muscle every 30 days 1 year of injections approved through 4/19/2017 per Dr. Ayon.    Pernicious anemia       MELATONIN PO      Take 5 mg by mouth At Bedtime        metoprolol 25 MG tablet    LOPRESSOR    180 tablet    Take 1 tablet (25 mg) by mouth 2 times daily    Aortic root dilatation (H)       oxyCODONE 5 MG IR tablet    ROXICODONE    30 tablet    Take 0.5 tablets (2.5 mg) by mouth every 4 hours as needed for moderate to severe pain    Status post total left knee replacement using cement       polyethylene glycol powder    MIRALAX/GLYCOLAX     Take 17 g by mouth daily as needed for constipation        rivaroxaban ANTICOAGULANT 15 MG Tabs tablet    XARELTO     Take 1 tablet (15 mg) by mouth daily    Paroxysmal atrial fibrillation (H)       senna-docusate 8.6-50 MG per tablet    SENOKOT-S;PERICOLACE     Take 1 tablet by mouth 2 times daily as needed        tamsulosin 0.4 MG capsule    FLOMAX     Take 0.4 mg by mouth daily

## 2017-09-20 NOTE — PROGRESS NOTES
SUBJECTIVE:                                                    Remigio Holly is a 83 year old male who presents to clinic today for the following health issues:        Hospital Follow-up Visit:    Hospital/Nursing Home/ Rehab Facility: [facility]    Date of Admission: 8/22/17  Date of Discharge: 9/12/17  Reason(s) for Admission: rehab after   ARTHROPLASTY KNEE Left Spinal   Left total knee replacement                   Problems taking medications regularly:  None       Medication changes since discharge: pain medications       Problems adhering to non-medication therapy:  None    Summary of hospitalization:  [facility] discharge summary reviewed  Diagnostic Tests/Treatments reviewed.  Follow up needed: PT,ortho  Other Healthcare Providers Involved in Patient s Care:           Update since discharge: improved. Still pain/stiffness    Post Discharge Medication Reconciliation: discharge medications reconciled, continue medications without change.  Plan of care communicated with patient and family     Coding guidelines for this visit:  Type of Medical   Decision Making Face-to-Face Visit       within 7 Days of discharge Face-to-Face Visit        within 14 days of discharge   Moderate Complexity [id number]   High Complexity [id number]              Problem list and histories reviewed & adjusted, as indicated.    C: NEGATIVE for fever, chills, change in weight  E/M: NEGATIVE for ear, mouth and throat problems  R: NEGATIVE for significant cough or SOB  CV: NEGATIVE for chest pain, palpitations or peripheral edema  MUSCULOSKELETAL: knee pain recent total knee    OBJECTIVE:                                                    /68 (BP Location: Right arm, Patient Position: Chair)  Pulse 70  Temp 97.7  F (36.5  C) (Temporal)  Resp 18  Ht 6' (1.829 m)  Wt 164 lb 1.6 oz (74.4 kg)  SpO2 95%  BMI 22.26 kg/m2  Body mass index is 22.26 kg/(m^2).         ASSESSMENT/PLAN:                                                     Follow up from [facility],still pain    Devan Ayon MD, MD  [facility]

## 2017-09-21 NOTE — PROGRESS NOTES
SUBJECTIVE:  Remigio Kennedy, an 83-year-old man who is in for followup, having been discharged from the [facility] where he had been recuperating from a left total knee replacement.  His knee pain has markedly improved.  He is concerned somewhat that he still has some limited flexion and extension of the knee and some ongoing discomfort, but it is far improved.  His other knee is also needing replacement.  He is debating about whether he will pursue it.  He has been using small amounts of oxycodone before his physical therapy and at night and I do refill that.  He will continue with his physical therapy.  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  In the past he has badgered me about being allowed to drive.  Family had not felt comfortable with that.  Fortunately, this did not come up at the visit today and I presume he is not driving.         NEYDA HOSKINS M.D.             D: 2017 17:02   T: 2017 13:11   MT:       Name:     REMIGIO KENNEDY   MRN:      [id number]        Account:      [id number]   :      1933           Visit Date:   2017      Document: [id number]

## 2017-10-04 ENCOUNTER — OFFICE VISIT (OUTPATIENT)
Dept: ORTHOPEDICS | Facility: CLINIC | Age: 82
End: 2017-10-04
Payer: COMMERCIAL

## 2017-10-04 VITALS — TEMPERATURE: 97.8 F | BODY MASS INDEX: 21.67 KG/M2 | WEIGHT: 163.5 LBS | HEIGHT: 73 IN

## 2017-10-04 DIAGNOSIS — Z96.652 STATUS POST TOTAL LEFT KNEE REPLACEMENT USING CEMENT: Primary | ICD-10-CM

## 2017-10-04 PROCEDURE — 99024 POSTOP FOLLOW-UP VISIT: CPT | Performed by: ORTHOPAEDIC SURGERY

## 2017-10-04 NOTE — NURSING NOTE
"Chief Complaint   Patient presents with     RECHECK     left knee follow up     Surgical Followup     DOS: 8/15/17~ Left total knee replacement~7 weeks postop       Initial Temp 97.8  F (36.6  C) (Temporal)  Ht 1.854 m (6' 1\")  Wt 74.2 kg (163 lb 8 oz)  BMI 21.57 kg/m2 Estimated body mass index is 21.57 kg/(m^2) as calculated from the following:    Height as of this encounter: 1.854 m (6' 1\").    Weight as of this encounter: 74.2 kg (163 lb 8 oz).  Medication Reconciliation: complete    "

## 2017-10-04 NOTE — PROGRESS NOTES
"Orthopedic Clinic Post-Operative Note    CHIEF COMPLAINT:   Chief Complaint   Patient presents with     RECHECK     left knee follow up     Surgical Followup     DOS: 8/15/17~ Left total knee replacement~7 weeks postop       HISTORY OF PRESENT ILLNESS  Returns with his wife. He is somewhat frustrated about his motion. Continues to work with therapy.  He is worried about his \"bend\".  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 After these three-hour stints he has significant swelling in the knee associated with tightness.    Patient's past medical, surgical, social and family histories reviewed.     Past Medical History:   Diagnosis Date     Blood transfusion      Chronic gastric ulcer without mention of hemorrhage, perforation, without mention of obstruction     Ulcer, Gastric     Gastric cancer (H)      Hemorrhage of gastrointestinal tract, unspecified 01/13/10    [facility]     Hypoglycemia, unspecified 1/26/2015     Measles without mention of complication     Measles     Mixed hyperlipidemia     Hyperlipidemia     Mumps without mention of complication     Mumps     Pulmonary embolism (H) Sept 4, 2010     Urinary calculus, unspecified     Renal stones     Varicella without mention of complication     Chickenpox       Past Surgical History:   Procedure Laterality Date     ABDOMEN SURGERY       ARTHROPLASTY KNEE Left 8/15/2017    Procedure: ARTHROPLASTY KNEE;  Left total knee replacement;  Surgeon: Jonathan Cardona DO;  Location: [facility] OR     C EXCIS STOMACH ULCER,LESN;LOCAL       CHOLECYSTECTOMY, LAPOROSCOPIC  10/6/2008    Cholecystectomy, Laparoscopic     COLONOSCOPY  1/14/10    [facility]     COLONOSCOPY  05/25/10     CYSTOSCOPY, RETROGRADES, EXTRACT STONE, INSERT STENT, COMBINED  12/25/2012    Procedure: COMBINED CYSTOSCOPY, RETROGRADES, EXTRACT STONE, INSERT STENT;  Cystoscopy, Left Stent Placement, left retrograde pylogram, uc;  " Surgeon: Amador Sanchez MD;  Location: [facility] OR     ESOPHAGOSCOPY, GASTROSCOPY, DUODENOSCOPY (EGD), COMBINED  8/16/2011    Procedure:COMBINED ESOPHAGOSCOPY, GASTROSCOPY, DUODENOSCOPY (EGD), BIOPSY SINGLE OR MULTIPLE; Surgeon:TONY GARCIA; Location:[facility]     GENERAL SURGERY                           DATE:  07/07/10    Gastrectomy     HC COLONOSCOPY W/WO BRUSH/WASH  01/31/2006      REPAIR ROTATOR CUFF,ACUTE  3/21/2005    Right      UGI ENDOSCOPY, SIMPLE EXAM  1/13/10    [facility] UGI ENDOSCOPY, SIMPLE EXAM  05/25/10     LASER HOLMIUM LITHOTRIPSY URETER(S), INSERT STENT, COMBINED  1/5/2013    Procedure: COMBINED CYSTOSCOPY, URETEROSCOPY, LASER HOLMIUM LITHOTRIPSY URETER(S), INSERT STENT;  Bilateral  Cystoscopy, Bilateral Ureteroscopy, Bilateral retrogrades and  placement of ureteral stent right side. Laser Holmium Lithotripsy  and Exchange  of stent left side;  Surgeon: Tone Morejon MD;  Location: [facility] OR     LASER HOLMIUM LITHOTRIPSY URETER(S), INSERT STENT, COMBINED  1/30/2013    Procedure: COMBINED CYSTOSCOPY, URETEROSCOPY, LASER HOLMIUM LITHOTRIPSY URETER(S), INSERT STENT;  Right Ureteroscopy; Stone basketing; Right Stent exchange and  removal of left stent.;  Surgeon: Tone Morejon MD;  Location: [facility] OR     VIDEO CAPSULE ENDOSCOPY  01/15/10    [facility]       Medications:    Current Outpatient Prescriptions on File Prior to Visit:  oxyCODONE (ROXICODONE) 5 MG IR tablet Take 0.5 tablets (2.5 mg) by mouth every 4 hours as needed for moderate to severe pain   MELATONIN PO Take 5 mg by mouth At Bedtime   tamsulosin (FLOMAX) 0.4 MG capsule Take 0.4 mg by mouth daily   Menthol, Topical Analgesic, (BIOFREEZE EX) Externally apply topically 3 times daily as needed   senna-docusate (SENOKOT-S;PERICOLACE) 8.6-50 MG per tablet Take 1 tablet by mouth 2 times daily as needed    polyethylene glycol (MIRALAX/GLYCOLAX) powder Take 17 g by mouth daily as needed for  "constipation   metoprolol (LOPRESSOR) 25 MG tablet Take 1 tablet (25 mg) by mouth 2 times daily   acetaminophen (TYLENOL) 500 MG tablet Take 2 tablets (1,000 mg) by mouth every 8 hours   rivaroxaban ANTICOAGULANT (XARELTO) 15 MG TABS tablet Take 1 tablet (15 mg) by mouth daily   cyanocobalamin (VITAMIN B12) 1000 MCG/ML injection Inject 1 mL (1,000 mcg) into the muscle every 30 days 1 year of injections approved through 4/19/2017 per Dr. Ayon.   blood glucose monitoring (ROBERT CONTOUR NEXT) test strip Use to test blood sugar 1 times daily or as directed.   blood glucose monitoring (ROBERT MICROLET) lancets Use to test blood sugar 1 time daily or as directed.     No current facility-administered medications on file prior to visit.     Allergies   Allergen Reactions     Mobic [Meloxicam] Nausea and Diarrhea     Diarrhea, nausea, flu like symptoms since start of use       Social History     Occupational History     Construction      Retired     Social History Main Topics     Smoking status: Current Some Day Smoker     Packs/day: 0.00     Years: 20.00     Types: Pipe, Cigars     Last attempt to quit: 1/1/1998     Smokeless tobacco: Never Used      Comment: Rare pipe and cigar smoking     Alcohol use No     Drug use: No     Sexual activity: Not Currently       Family History   Problem Relation Age of Onset     Alzheimer Disease Father      Cardiovascular Mother      C.A.D. Mother      HEART DISEASE Mother      Cardiovascular Brother      HEART DISEASE Brother      C.A.D. Brother        REVIEW OF SYSTEMS  General: negative for, night sweats, dizziness, fatigue  Resp: No shortness of breath and no cough  CV: negative for chest pain, syncope or near-syncope  GI: negative for nausea, vomiting and diarrhea  : negative for dysuria and hematuria  Musculoskeletal: as above  Neurologic: negative for syncope   Hematologic: negative for bleeding disorder    Physical Exam:  Vitals: Temp 97.8  F (36.6  C) (Temporal)  Ht 6' 1\" " (1.854 m)  Wt 163 lb 8 oz (74.2 kg)  BMI 21.57 kg/m2  BMI= Body mass index is 21.57 kg/(m^2).  Constitutional: healthy, alert and no acute distress   Psychiatric: mentation appears normal and affect normal/bright  NEURO: no focal deficits  SKIN: .well healed, no erythema, no incision breakdown and no drainage.  JOINT/EXTREMITIES: No erythema. No instability. Active motion initially 3-100 . After bit of working on motion up to approximately 110. No instability varus valgus stressing. Peripheral neurovascular intact.  GAIT: not tested     Diagnostic Modalities:  None today.  Independent visualization of the images was performed.      Impression:   Chief Complaint   Patient presents with     RECHECK     left knee follow up     Surgical Followup     DOS: 8/15/17~ Left total knee replacement~7 weeks postop    overdoing it.    Plan:   Being up on his feet for 3 hours taking a small break and then returning is causing some pain swelling and tightness. I recommended shorter work periods. Work on the motion. Tylenol for discomfort.    Return to clinic 4, week(s), or sooner as needed for changes.    Re-x-ray on return: Yes.    Yrn Cardona D.O.

## 2017-10-04 NOTE — MR AVS SNAPSHOT
"              After Visit Summary   10/4/2017    Remigio Holly    MRN: [id number]           Patient Information     Date Of Birth          11/6/1933        Visit Information        Provider Department      10/4/2017 8:00 AM Jonathan Cardona, DO [facility]         Follow-ups after your visit        Who to contact     If you have questions or need follow up information about today's clinic visit or your schedule please contact [facility] directly at [phone].  Normal or non-critical lab and imaging results will be communicated to you by [employer], letter or phone within 4 business days after the clinic has received the results. If you do not hear from us within 7 days, please contact the clinic through [employer] or phone. If you have a critical or abnormal lab result, we will notify you by phone as soon as possible.  Submit refill requests through [employer] or call your pharmacy and they will forward the refill request to us. Please allow 3 business days for your refill to be completed.          Additional Information About Your Visit        MyChart Information     [employer] gives you secure access to your electronic health record. If you see a primary care provider, you can also send messages to your care team and make appointments. If you have questions, please call your primary care clinic.  If you do not have a primary care provider, please call [phone] and they will assist you.        Care EveryWhere ID     This is your Care EveryWhere ID. This could be used by other organizations to access your [place] medical records  [id number]        Your Vitals Were     Temperature Height BMI (Body Mass Index)             97.8  F (36.6  C) (Temporal) 1.854 m (6' 1\") 21.57 kg/m2          Blood Pressure from Last 3 Encounters:   09/20/17 102/68   09/12/17 142/81   09/05/17 115/65    Weight from Last 3 Encounters:   10/04/17 74.2 kg (163 lb 8 oz)   09/20/17 74.4 kg (164 " lb 1.6 oz)   09/12/17 73.8 kg (162 lb 9.6 oz)              Today, you had the following     No orders found for display       Primary Care Provider Office Phone # Fax #    Myo Celis [phone]       [address] [facility] [address]        Equal Access to Services     ERICA RUSSELL : Hadii aad ku hadasho Soomaali, waaxda luqadaha, qaybta kaalmada adeegyada, waxay pain hayaan adeshreyas leevaldezmeagan haider . So [facility] [phone].    ATENCIÓN: Si habla español, tiene a tabares disposición servicios gratuitos de asistencia lingüística. Llame al [phone].    We comply with applicable federal civil rights laws and [place] laws. We do not discriminate on the basis of race, color, national origin, age, disability, sex, sexual orientation, or gender identity.            Thank you!     Thank you for choosing [facility]  for your care. Our goal is always to provide you with excellent care. Hearing back from our patients is one way we can continue to improve our services. Please take a few minutes to complete the written survey that you may receive in the mail after your visit with us. Thank you!             Your Updated Medication List - Protect others around you: Learn how to safely use, store and throw away your medicines at www.disposemymeds.org.          This list is accurate as of: 10/4/17  8:00 AM.  Always use your most recent med list.                   Brand Name Dispense Instructions for use Diagnosis    acetaminophen 500 MG tablet    TYLENOL     Take 2 tablets (1,000 mg) by mouth every 8 hours    Status post total left knee replacement using cement       BIOFREEZE EX      Externally apply topically 3 times daily as needed        blood glucose monitoring lancets     1 Box    Use to test blood sugar 1 time daily or as directed.    Hypoglycemia       blood glucose monitoring test strip    ROBERT CONTOUR NEXT    100 each    Use to test blood sugar 1 times daily or as directed.    Hypoglycemia        cyanocobalamin 1000 MCG/ML injection    VITAMIN B12    1 mL    Inject 1 mL (1,000 mcg) into the muscle every 30 days 1 year of injections approved through 4/19/2017 per Dr. Ayon.    Pernicious anemia       MELATONIN PO      Take 5 mg by mouth At Bedtime        metoprolol 25 MG tablet    LOPRESSOR    180 tablet    Take 1 tablet (25 mg) by mouth 2 times daily    Aortic root dilatation (H)       oxyCODONE 5 MG IR tablet    ROXICODONE    30 tablet    Take 0.5 tablets (2.5 mg) by mouth every 4 hours as needed for moderate to severe pain    Status post total left knee replacement using cement       polyethylene glycol powder    MIRALAX/GLYCOLAX     Take 17 g by mouth daily as needed for constipation        rivaroxaban ANTICOAGULANT 15 MG Tabs tablet    XARELTO     Take 1 tablet (15 mg) by mouth daily    Paroxysmal atrial fibrillation (H)       senna-docusate 8.6-50 MG per tablet    SENOKOT-S;PERICOLACE     Take 1 tablet by mouth 2 times daily as needed        tamsulosin 0.4 MG capsule    FLOMAX     Take 0.4 mg by mouth daily

## 2017-10-04 NOTE — LETTER
"    10/4/2017         RE: Remigio Holly  [address] [facility] [address]        Dear Colleague,    Thank you for referring your patient, Remigio Holly, to the [facility]. Please see a copy of my visit note below.    Orthopedic Clinic Post-Operative Note    CHIEF COMPLAINT:   Chief Complaint   Patient presents with     RECHECK     left knee follow up     Surgical Followup     DOS: 8/15/17~ Left total knee replacement~7 weeks postop       HISTORY OF PRESENT ILLNESS  Returns with his wife. He is somewhat frustrated about his motion. Continues to work with therapy.  He is worried about his \"bend\".  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 After these three-hour stints he has significant swelling in the knee associated with tightness.    Patient's past medical, surgical, social and family histories reviewed.     Past Medical History:   Diagnosis Date     Blood transfusion      Chronic gastric ulcer without mention of hemorrhage, perforation, without mention of obstruction     Ulcer, Gastric     Gastric cancer (H)      Hemorrhage of gastrointestinal tract, unspecified 01/13/10    [facility]     Hypoglycemia, unspecified 1/26/2015     Measles without mention of complication     Measles     Mixed hyperlipidemia     Hyperlipidemia     Mumps without mention of complication     Mumps     Pulmonary embolism (H) Sept 4, 2010     Urinary calculus, unspecified     Renal stones     Varicella without mention of complication     Chickenpox       Past Surgical History:   Procedure Laterality Date     ABDOMEN SURGERY       ARTHROPLASTY KNEE Left 8/15/2017    Procedure: ARTHROPLASTY KNEE;  Left total knee replacement;  Surgeon: Jonathan Cardona DO;  Location: [facility] OR     C EXCIS STOMACH ULCER,LESN;LOCAL       CHOLECYSTECTOMY, LAPOROSCOPIC  10/6/2008    Cholecystectomy, Laparoscopic     COLONOSCOPY  1/14/10    " [facility]     COLONOSCOPY  05/25/10     CYSTOSCOPY, RETROGRADES, EXTRACT STONE, INSERT STENT, COMBINED  12/25/2012    Procedure: COMBINED CYSTOSCOPY, RETROGRADES, EXTRACT STONE, INSERT STENT;  Cystoscopy, Left Stent Placement, left retrograde pylogram, uc;  Surgeon: Amador Sanchez MD;  Location: [facility] OR     ESOPHAGOSCOPY, GASTROSCOPY, DUODENOSCOPY (EGD), COMBINED  8/16/2011    Procedure:COMBINED ESOPHAGOSCOPY, GASTROSCOPY, DUODENOSCOPY (EGD), BIOPSY SINGLE OR MULTIPLE; Surgeon:TONY GARCIA; Location:[facility]     GENERAL SURGERY                           DATE:  07/07/10    Gastrectomy     HC COLONOSCOPY W/WO BRUSH/WASH  01/31/2006     HC REPAIR ROTATOR CUFF,ACUTE  3/21/2005    Right      UGI ENDOSCOPY, SIMPLE EXAM  1/13/10    [facility] UGI ENDOSCOPY, SIMPLE EXAM  05/25/10     LASER HOLMIUM LITHOTRIPSY URETER(S), INSERT STENT, COMBINED  1/5/2013    Procedure: COMBINED CYSTOSCOPY, URETEROSCOPY, LASER HOLMIUM LITHOTRIPSY URETER(S), INSERT STENT;  Bilateral  Cystoscopy, Bilateral Ureteroscopy, Bilateral retrogrades and  placement of ureteral stent right side. Laser Holmium Lithotripsy  and Exchange  of stent left side;  Surgeon: Tone Morejon MD;  Location: [facility] OR     LASER HOLMIUM LITHOTRIPSY URETER(S), INSERT STENT, COMBINED  1/30/2013    Procedure: COMBINED CYSTOSCOPY, URETEROSCOPY, LASER HOLMIUM LITHOTRIPSY URETER(S), INSERT STENT;  Right Ureteroscopy; Stone basketing; Right Stent exchange and  removal of left stent.;  Surgeon: Tone Morejon MD;  Location: [facility] OR     VIDEO CAPSULE ENDOSCOPY  01/15/10    [facility]       Medications:    Current Outpatient Prescriptions on File Prior to Visit:  oxyCODONE (ROXICODONE) 5 MG IR tablet Take 0.5 tablets (2.5 mg) by mouth every 4 hours as needed for moderate to severe pain   MELATONIN PO Take 5 mg by mouth At Bedtime   tamsulosin (FLOMAX) 0.4 MG capsule Take 0.4 mg by mouth daily   Menthol, Topical Analgesic,  (BIOFREEZE EX) Externally apply topically 3 times daily as needed   senna-docusate (SENOKOT-S;PERICOLACE) 8.6-50 MG per tablet Take 1 tablet by mouth 2 times daily as needed    polyethylene glycol (MIRALAX/GLYCOLAX) powder Take 17 g by mouth daily as needed for constipation   metoprolol (LOPRESSOR) 25 MG tablet Take 1 tablet (25 mg) by mouth 2 times daily   acetaminophen (TYLENOL) 500 MG tablet Take 2 tablets (1,000 mg) by mouth every 8 hours   rivaroxaban ANTICOAGULANT (XARELTO) 15 MG TABS tablet Take 1 tablet (15 mg) by mouth daily   cyanocobalamin (VITAMIN B12) 1000 MCG/ML injection Inject 1 mL (1,000 mcg) into the muscle every 30 days 1 year of injections approved through 4/19/2017 per Dr. Ayon.   blood glucose monitoring (ROBERT CONTOUR NEXT) test strip Use to test blood sugar 1 times daily or as directed.   blood glucose monitoring (ROBERT MICROLET) lancets Use to test blood sugar 1 time daily or as directed.     No current facility-administered medications on file prior to visit.     Allergies   Allergen Reactions     Mobic [Meloxicam] Nausea and Diarrhea     Diarrhea, nausea, flu like symptoms since start of use       Social History     Occupational History     Construction      Retired     Social History Main Topics     Smoking status: Current Some Day Smoker     Packs/day: 0.00     Years: 20.00     Types: Pipe, Cigars     Last attempt to quit: 1/1/1998     Smokeless tobacco: Never Used      Comment: Rare pipe and cigar smoking     Alcohol use No     Drug use: No     Sexual activity: Not Currently       Family History   Problem Relation Age of Onset     Alzheimer Disease Father      Cardiovascular Mother      DENISE.ASUSAN Mother      HEART DISEASE Mother      Cardiovascular Brother      HEART DISEASE Brother      MARILU Brother        REVIEW OF SYSTEMS  General: negative for, night sweats, dizziness, fatigue  Resp: No shortness of breath and no cough  CV: negative for chest pain, syncope or near-syncope  GI:  "negative for nausea, vomiting and diarrhea  : negative for dysuria and hematuria  Musculoskeletal: as above  Neurologic: negative for syncope   Hematologic: negative for bleeding disorder    Physical Exam:  Vitals: Temp 97.8  F (36.6  C) (Temporal)  Ht 6' 1\" (1.854 m)  Wt 163 lb 8 oz (74.2 kg)  BMI 21.57 kg/m2  BMI= Body mass index is 21.57 kg/(m^2).  Constitutional: healthy, alert and no acute distress   Psychiatric: mentation appears normal and affect normal/bright  NEURO: no focal deficits  SKIN: .well healed, no erythema, no incision breakdown and no drainage.  JOINT/EXTREMITIES: No erythema. No instability. Active motion initially 3-100 . After bit of working on motion up to approximately 110. No instability varus valgus stressing. Peripheral neurovascular intact.  GAIT: not tested     Diagnostic Modalities:  None today.  Independent visualization of the images was performed.      Impression:   Chief Complaint   Patient presents with     RECHECK     left knee follow up     Surgical Followup     DOS: 8/15/17~ Left total knee replacement~7 weeks postop    overdoing it.    Plan:   Being up on his feet for 3 hours taking a small break and then returning is causing some pain swelling and tightness. I recommended shorter work periods. Work on the motion. Tylenol for discomfort.    Return to clinic 4, week(s), or sooner as needed for changes.    Re-x-ray on return: Yes.    Yrn Cardona D.O.    Again, thank you for allowing me to participate in the care of your patient.        Sincerely,        Jonathan Cardona, DO    "

## 2017-10-09 ENCOUNTER — TRANSFERRED RECORDS (OUTPATIENT)
Dept: HEALTH INFORMATION MANAGEMENT | Facility: CLINIC | Age: 82
End: 2017-10-09

## 2017-10-24 DIAGNOSIS — I48.0 PAROXYSMAL ATRIAL FIBRILLATION (H): ICD-10-CM

## 2017-10-24 NOTE — TELEPHONE ENCOUNTER
Patient stated PCP is Dr FRANCISCO, rerouting.  Thank you,  Shannon Carter  Patient Representative

## 2017-10-24 NOTE — TELEPHONE ENCOUNTER
Reason for Call:  Medication or medication refill:    Do you use a [facility]?  Name of the pharmacy and phone number for the current request:  [facility] - [phone]    Name of the medication requested: Patient is wanting to discuss the rivaroxaban ANTICOAGULANT (XARELTO) 15 MG TABS tablet further to better understand why he's taking this medication, please advise.  Patient was advised PCP is not in clinic today.      Other request:     Can we leave a detailed message on this number? YES    Phone number patient can be reached at: Home number on file [phone] (home)    Best Time:     Call taken on 10/24/2017 at 1:50 PM by Shannon Carter

## 2017-10-31 ENCOUNTER — ALLIED HEALTH/NURSE VISIT (OUTPATIENT)
Dept: FAMILY MEDICINE | Facility: OTHER | Age: 82
End: 2017-10-31
Payer: COMMERCIAL

## 2017-10-31 DIAGNOSIS — D51.0 PA (PERNICIOUS ANEMIA): ICD-10-CM

## 2017-10-31 DIAGNOSIS — Z23 NEED FOR PROPHYLACTIC VACCINATION AND INOCULATION AGAINST INFLUENZA: Primary | ICD-10-CM

## 2017-10-31 PROCEDURE — 90662 IIV NO PRSV INCREASED AG IM: CPT

## 2017-10-31 PROCEDURE — 96372 THER/PROPH/DIAG INJ SC/IM: CPT

## 2017-10-31 PROCEDURE — G0008 ADMIN INFLUENZA VIRUS VAC: HCPCS

## 2017-10-31 NOTE — PROGRESS NOTES
Injectable Influenza Immunization Documentation    1.  Is the person to be vaccinated sick today?   No    2. Does the person to be vaccinated have an allergy to a component   of the vaccine?   No  Egg Allergy Algorithm Link    3. Has the person to be vaccinated ever had a serious reaction   to influenza vaccine in the past?   No    4. Has the person to be vaccinated ever had Guillain-Barré syndrome?   No        The following medication was given:     MEDICATION: Vitamin B12  1000mcg  See medication note.  Patient instructed to remain in clinic for 20 minutes afterwards, and to report any adverse reaction to me immediately.  Prior to injection verified patient identity using patient's name and date of birth.  Nella Trejo MA     10/31/2017  '

## 2017-11-09 ENCOUNTER — RADIANT APPOINTMENT (OUTPATIENT)
Dept: GENERAL RADIOLOGY | Facility: CLINIC | Age: 82
End: 2017-11-09
Attending: ORTHOPAEDIC SURGERY
Payer: COMMERCIAL

## 2017-11-09 ENCOUNTER — CARE COORDINATION (OUTPATIENT)
Dept: CARE COORDINATION | Facility: CLINIC | Age: 82
End: 2017-11-09

## 2017-11-09 ENCOUNTER — OFFICE VISIT (OUTPATIENT)
Dept: ORTHOPEDICS | Facility: CLINIC | Age: 82
End: 2017-11-09
Payer: COMMERCIAL

## 2017-11-09 VITALS — TEMPERATURE: 97.6 F | WEIGHT: 163.3 LBS | HEIGHT: 73 IN | BODY MASS INDEX: 21.64 KG/M2

## 2017-11-09 DIAGNOSIS — Z96.652 STATUS POST TOTAL LEFT KNEE REPLACEMENT USING CEMENT: Primary | ICD-10-CM

## 2017-11-09 DIAGNOSIS — Z96.652 STATUS POST TOTAL LEFT KNEE REPLACEMENT USING CEMENT: ICD-10-CM

## 2017-11-09 PROCEDURE — 73562 X-RAY EXAM OF KNEE 3: CPT | Mod: TC

## 2017-11-09 PROCEDURE — 99024 POSTOP FOLLOW-UP VISIT: CPT | Performed by: ORTHOPAEDIC SURGERY

## 2017-11-09 ASSESSMENT — PAIN SCALES - GENERAL: PAINLEVEL: MILD PAIN (3)

## 2017-11-09 NOTE — MR AVS SNAPSHOT
After Visit Summary   11/9/2017    Remigio Holly    MRN: [id number]           Patient Information     Date Of Birth          11/6/1933        Visit Information        Provider Department      11/9/2017 8:20 AM Jonathan Cardona DO [facility]        Today's Diagnoses     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/17)    -  1       Follow-ups after your visit        Your next 10 appointments already scheduled     Nov 09, 2017  8:20 AM CST   Return Visit with Jonathan Cardona DO   [facility] ([facility]    [address] [place] [address] [id number] [phone]              Who to contact     If you have questions or need follow up information about today's clinic visit or your schedule please contact [facility] directly at [phone].  Normal or non-critical lab and imaging results will be communicated to you by MyChart, letter or phone within 4 business days after the clinic has received the results. If you do not hear from us within 7 days, please contact the clinic through [employer] or phone. If you have a critical or abnormal lab result, we will notify you by phone as soon as possible.  Submit refill requests through [employer] or call your pharmacy and they will forward the refill request to us. Please allow 3 business days for your refill to be completed.          Additional Information About Your Visit        MyChart Information     [employer] gives you secure access to your electronic health record. If you see a primary care provider, you can also send messages to your care team and make appointments. If you have questions, please call your primary care clinic.  If you do not have a primary care provider, please call [phone] and they will assist you.        Care EveryWhere ID     This is your Care EveryWhere ID. This could be used by other organizations to access your [facility]  "records  [id number]        Your Vitals Were     Temperature Height BMI (Body Mass Index)             97.6  F (36.4  C) (Temporal) 6' 1\" (1.854 m) 21.54 kg/m2          Blood Pressure from Last 3 Encounters:   09/20/17 102/68   09/12/17 142/81   09/05/17 115/65    Weight from Last 3 Encounters:   11/09/17 163 lb 4.8 oz (74.1 kg)   10/04/17 163 lb 8 oz (74.2 kg)   09/20/17 164 lb 1.6 oz (74.4 kg)               Primary Care Provider Office Phone # Fax #    Moy Celis [phone]       [address] [facility] [address]        Equal Access to Services     ERICA RUSSELL : Andrew huerta Soelpidio, waaxda luqadaha, qaybta kaalmada adeegyada, tray haider . So [facility] [phone].    ATENCIÓN: Si habla español, tiene a tabares disposición servicios gratuitos de asistencia lingüística. Llame al [phone].    We comply with applicable federal civil rights laws and [place] laws. We do not discriminate on the basis of race, color, national origin, age, disability, sex, sexual orientation, or gender identity.            Thank you!     Thank you for choosing [facility]  for your care. Our goal is always to provide you with excellent care. Hearing back from our patients is one way we can continue to improve our services. Please take a few minutes to complete the written survey that you may receive in the mail after your visit with us. Thank you!             Your Updated Medication List - Protect others around you: Learn how to safely use, store and throw away your medicines at www.disposemymeds.org.          This list is accurate as of: 11/9/17  8:19 AM.  Always use your most recent med list.                   Brand Name Dispense Instructions for use Diagnosis    acetaminophen 500 MG tablet    TYLENOL     Take 2 tablets (1,000 mg) by mouth every 8 hours    Status post total left knee replacement using cement       BIOFREEZE EX      Externally apply topically 3 times " daily as needed        blood glucose monitoring lancets     1 Box    Use to test blood sugar 1 time daily or as directed.    Hypoglycemia       blood glucose monitoring test strip    ROBERT CONTOUR NEXT    100 each    Use to test blood sugar 1 times daily or as directed.    Hypoglycemia       cyanocobalamin 1000 MCG/ML injection    VITAMIN B12    1 mL    Inject 1 mL (1,000 mcg) into the muscle every 30 days 1 year of injections approved through 4/19/2017 per Dr. Ayon.    Pernicious anemia       MELATONIN PO      Take 5 mg by mouth At Bedtime        metoprolol 25 MG tablet    LOPRESSOR    180 tablet    Take 1 tablet (25 mg) by mouth 2 times daily    Aortic root dilatation (H)       oxyCODONE IR 5 MG tablet    ROXICODONE    30 tablet    Take 0.5 tablets (2.5 mg) by mouth every 4 hours as needed for moderate to severe pain    Status post total left knee replacement using cement       polyethylene glycol powder    MIRALAX/GLYCOLAX     Take 17 g by mouth daily as needed for constipation        rivaroxaban ANTICOAGULANT 15 MG Tabs tablet    XARELTO    90 tablet    Take 1 tablet (15 mg) by mouth daily    Paroxysmal atrial fibrillation (H)       tamsulosin 0.4 MG capsule    FLOMAX     Take 0.4 mg by mouth daily

## 2017-11-09 NOTE — LETTER
11/9/2017         RE: Remigio Holly  [address] [facility] [id number]        Dear Colleague,    Thank you for referring your patient, Remigio Holly, to the [facility]. Please see a copy of my visit note below.    Orthopedic Clinic Post-Operative Note    CHIEF COMPLAINT:   Chief Complaint   Patient presents with     RECHECK     left knee follow up     Surgical Followup     DOS: 8/15/17~ Left total knee replacement~12 weeks postop       HISTORY OF PRESENT ILLNESS  Returns with his wife. doing physical therapy exercises daily. Starting feel better.  Patient's past medical, surgical, social and family histories reviewed.     Past Medical History:   Diagnosis Date     Blood transfusion      Chronic gastric ulcer without mention of hemorrhage, perforation, without mention of obstruction     Ulcer, Gastric     Gastric cancer (H)      Hemorrhage of gastrointestinal tract, unspecified 01/13/10    [facility]     Hypoglycemia, unspecified 1/26/2015     Measles without mention of complication     Measles     Mixed hyperlipidemia     Hyperlipidemia     Mumps without mention of complication     Mumps     Pulmonary embolism (H) Sept 4, 2010     Urinary calculus, unspecified     Renal stones     Varicella without mention of complication     Chickenpox       Past Surgical History:   Procedure Laterality Date     ABDOMEN SURGERY       ARTHROPLASTY KNEE Left 8/15/2017    Procedure: ARTHROPLASTY KNEE;  Left total knee replacement;  Surgeon: Jonathan Cardona DO;  Location: [facility] OR     C EXCIS STOMACH ULCER,LESN;LOCAL       CHOLECYSTECTOMY, LAPOROSCOPIC  10/6/2008    Cholecystectomy, Laparoscopic     COLONOSCOPY  1/14/10    [facility]     COLONOSCOPY  05/25/10     CYSTOSCOPY, RETROGRADES, EXTRACT STONE, INSERT STENT, COMBINED  12/25/2012    Procedure: COMBINED CYSTOSCOPY, RETROGRADES, EXTRACT STONE, INSERT STENT;  Cystoscopy, Left Stent Placement, left retrograde  pylogram, uc;  Surgeon: Amador Sanchez MD;  Location: [facility]     ESOPHAGOSCOPY, GASTROSCOPY, DUODENOSCOPY (EGD), COMBINED  8/16/2011    Procedure:COMBINED ESOPHAGOSCOPY, GASTROSCOPY, DUODENOSCOPY (EGD), BIOPSY SINGLE OR MULTIPLE; Surgeon:TONY GARCIA; Location:[facility]     GENERAL SURGERY                           DATE:  07/07/10    Gastrectomy     HC COLONOSCOPY W/WO BRUSH/WASH  01/31/2006     HC REPAIR ROTATOR CUFF,ACUTE  3/21/2005    Right      UGI ENDOSCOPY, SIMPLE EXAM  1/13/10    [facility] UGI ENDOSCOPY, SIMPLE EXAM  05/25/10     LASER HOLMIUM LITHOTRIPSY URETER(S), INSERT STENT, COMBINED  1/5/2013    Procedure: COMBINED CYSTOSCOPY, URETEROSCOPY, LASER HOLMIUM LITHOTRIPSY URETER(S), INSERT STENT;  Bilateral  Cystoscopy, Bilateral Ureteroscopy, Bilateral retrogrades and  placement of ureteral stent right side. Laser Holmium Lithotripsy  and Exchange  of stent left side;  Surgeon: Tone Morejon MD;  Location[facility]     LASER HOLMIUM LITHOTRIPSY URETER(S), INSERT STENT, COMBINED  1/30/2013    Procedure: COMBINED CYSTOSCOPY, URETEROSCOPY, LASER HOLMIUM LITHOTRIPSY URETER(S), INSERT STENT;  Right Ureteroscopy; Stone basketing; Right Stent exchange and  removal of left stent.;  Surgeon: Tone Morejon MD;  Location: [facility] OR     VIDEO CAPSULE ENDOSCOPY  01/15/10    [facility]       Medications:    Current Outpatient Prescriptions on File Prior to Visit:  rivaroxaban ANTICOAGULANT (XARELTO) 15 MG TABS tablet Take 1 tablet (15 mg) by mouth daily   MELATONIN PO Take 5 mg by mouth At Bedtime   tamsulosin (FLOMAX) 0.4 MG capsule Take 0.4 mg by mouth daily   Menthol, Topical Analgesic, (BIOFREEZE EX) Externally apply topically 3 times daily as needed   polyethylene glycol (MIRALAX/GLYCOLAX) powder Take 17 g by mouth daily as needed for constipation   acetaminophen (TYLENOL) 500 MG tablet Take 2 tablets (1,000 mg) by mouth every 8 hours   cyanocobalamin (VITAMIN B12) 1000  "MCG/ML injection Inject 1 mL (1,000 mcg) into the muscle every 30 days 1 year of injections approved through 4/19/2017 per Dr. Ayon.   blood glucose monitoring (ROBERT CONTOUR NEXT) test strip Use to test blood sugar 1 times daily or as directed.   blood glucose monitoring (ROBERT MICROLET) lancets Use to test blood sugar 1 time daily or as directed.   oxyCODONE (ROXICODONE) 5 MG IR tablet Take 0.5 tablets (2.5 mg) by mouth every 4 hours as needed for moderate to severe pain   metoprolol (LOPRESSOR) 25 MG tablet Take 1 tablet (25 mg) by mouth 2 times daily     No current facility-administered medications on file prior to visit.     Allergies   Allergen Reactions     Mobic [Meloxicam] Nausea and Diarrhea     Diarrhea, nausea, flu like symptoms since start of use       Social History     Occupational History     Construction      Retired     Social History Main Topics     Smoking status: Current Some Day Smoker     Packs/day: 0.00     Years: 20.00     Types: Pipe, Cigars     Last attempt to quit: 1/1/1998     Smokeless tobacco: Never Used      Comment: Rare pipe and cigar smoking     Alcohol use No     Drug use: No     Sexual activity: Not Currently       Family History   Problem Relation Age of Onset     Alzheimer Disease Father      Cardiovascular Mother      C.A.D. Mother      HEART DISEASE Mother      Cardiovascular Brother      HEART DISEASE Brother      C.A.D. Brother        REVIEW OF SYSTEMS  General: negative for, night sweats, dizziness, fatigue  Resp: No shortness of breath and no cough  CV: negative for chest pain, syncope or near-syncope  GI: negative for nausea, vomiting and diarrhea  : negative for dysuria and hematuria  Musculoskeletal: as above  Neurologic: negative for syncope   Hematologic: negative for bleeding disorder    Physical Exam:  Vitals: Temp 97.6  F (36.4  C) (Temporal)  Ht 6' 1\" (1.854 m)  Wt 163 lb 4.8 oz (74.1 kg)  BMI 21.54 kg/m2  BMI= Body mass index is 21.54 " kg/(m^2).  Constitutional: healthy, alert and no acute distress   Psychiatric: mentation appears normal and affect normal/bright  NEURO: no focal deficits  SKIN: .well healed, no erythema, no incision breakdown and no drainage.  JOINT/EXTREMITIES: 7-110  with a bouncy extension end point. No instability. No calf tenderness.  GAIT: not tested     Diagnostic Modalities:  left knee X-ray: The prosthesis has acceptable alignment. No fractures or dislocations. Prosthesis is well seated with no evidence of loosening  Independent visualization of the images was performed.      Impression:   Chief Complaint   Patient presents with     RECHECK     left knee follow up     Surgical Followup     DOS: 8/15/17~ Left total knee replacement~12 weeks postop    doing well with some stiffness. Making some progress.    Plan:   Continue progressing activities. Do home physical therapy exercises and stretching every other day.    Return to clinic 6, week(s), or sooner as needed for changes.    Re-x-ray on return: No    Yrn Cardona D.O.    Again, thank you for allowing me to participate in the care of your patient.        Sincerely,        Jonathan Cardona, DO

## 2017-11-09 NOTE — PROGRESS NOTES
Clinic Care Coordination Contact    Situation: Patient chart reviewed by care coordinator.    Background:Patient was hospitalized at [facility] from 8/15 to 8/22 with diagnosis of Status post total left knee replacement using cement. Pt was discharged to TCU.     Assessment: Pt was discharged from TCU and has been following up in clinic as scheduled.      Plan/Recommendations: RN CC will no longer monitor for needs    Rosemary MAHER, RN, PHN  Care Coordination    [facility]  [address] [place] [address]  Office: [phone]  Fax [phone]   [facility]  [address] [place] [address]  Office: [phone] Fax [phone]  [email or web address]   [email or web address]   Connect with [facility] on social media.

## 2017-11-09 NOTE — NURSING NOTE
"Chief Complaint   Patient presents with     RECHECK     left knee follow up     Surgical Followup     DOS: 8/15/17~ Left total knee replacement~12 weeks postop       Initial Temp 97.6  F (36.4  C) (Temporal)  Ht 1.854 m (6' 1\")  Wt 74.1 kg (163 lb 4.8 oz)  BMI 21.54 kg/m2 Estimated body mass index is 21.54 kg/(m^2) as calculated from the following:    Height as of this encounter: 1.854 m (6' 1\").    Weight as of this encounter: 74.1 kg (163 lb 4.8 oz).  Medication Reconciliation: complete   Christine/GM     "

## 2017-11-09 NOTE — PROGRESS NOTES
Orthopedic Clinic Post-Operative Note    CHIEF COMPLAINT:   Chief Complaint   Patient presents with     RECHECK     left knee follow up     Surgical Followup     DOS: 8/15/17~ Left total knee replacement~12 weeks postop       HISTORY OF PRESENT ILLNESS  Returns with his wife. doing physical therapy exercises daily. Starting feel better.  Patient's past medical, surgical, social and family histories reviewed.     Past Medical History:   Diagnosis Date     Blood transfusion      Chronic gastric ulcer without mention of hemorrhage, perforation, without mention of obstruction     Ulcer, Gastric     Gastric cancer (H)      Hemorrhage of gastrointestinal tract, unspecified 01/13/10    [facility]     Hypoglycemia, unspecified 1/26/2015     Measles without mention of complication     Measles     Mixed hyperlipidemia     Hyperlipidemia     Mumps without mention of complication     Mumps     Pulmonary embolism (H) Sept 4, 2010     Urinary calculus, unspecified     Renal stones     Varicella without mention of complication     Chickenpox       Past Surgical History:   Procedure Laterality Date     ABDOMEN SURGERY       ARTHROPLASTY KNEE Left 8/15/2017    Procedure: ARTHROPLASTY KNEE;  Left total knee replacement;  Surgeon: Jonathan Cardona DO;  Location: [facility]     C EXCIS STOMACH ULCER,LESN;LOCAL       CHOLECYSTECTOMY, LAPOROSCOPIC  10/6/2008    Cholecystectomy, Laparoscopic     COLONOSCOPY  1/14/10    [facility]     COLONOSCOPY  05/25/10     CYSTOSCOPY, RETROGRADES, EXTRACT STONE, INSERT STENT, COMBINED  12/25/2012    Procedure: COMBINED CYSTOSCOPY, RETROGRADES, EXTRACT STONE, INSERT STENT;  Cystoscopy, Left Stent Placement, left retrograde pylogram, uc;  Surgeon: Amador Sanchez MD;  [facility]     ESOPHAGOSCOPY, GASTROSCOPY, DUODENOSCOPY (EGD), COMBINED  8/16/2011    Procedure:COMBINED ESOPHAGOSCOPY, GASTROSCOPY, DUODENOSCOPY (EGD), BIOPSY SINGLE OR MULTIPLE; Surgeon:TONY GARCIA;  [facility]     GENERAL SURGERY                           DATE:  07/07/10    Gastrectomy      COLONOSCOPY W/WO BRUSH/WASH  01/31/2006      REPAIR ROTATOR CUFF,ACUTE  3/21/2005    Right      UGI ENDOSCOPY, SIMPLE EXAM  1/13/10    [facility] UGI ENDOSCOPY, SIMPLE EXAM  05/25/10     LASER HOLMIUM LITHOTRIPSY URETER(S), INSERT STENT, COMBINED  1/5/2013    Procedure: COMBINED CYSTOSCOPY, URETEROSCOPY, LASER HOLMIUM LITHOTRIPSY URETER(S), INSERT STENT;  Bilateral  Cystoscopy, Bilateral Ureteroscopy, Bilateral retrogrades and  placement of ureteral stent right side. Laser Holmium Lithotripsy  and Exchange  of stent left side;  Surgeon: Tone Morejon MD;  [facility]     LASER HOLMIUM LITHOTRIPSY URETER(S), INSERT STENT, COMBINED  1/30/2013    Procedure: COMBINED CYSTOSCOPY, URETEROSCOPY, LASER HOLMIUM LITHOTRIPSY URETER(S), INSERT STENT;  Right Ureteroscopy; Stone basketing; Right Stent exchange and  removal of left stent.;  Surgeon: Tone Morejon MD;  Location[facility]     VIDEO CAPSULE ENDOSCOPY  01/15/10    [facility]       Medications:    Current Outpatient Prescriptions on File Prior to Visit:  rivaroxaban ANTICOAGULANT (XARELTO) 15 MG TABS tablet Take 1 tablet (15 mg) by mouth daily   MELATONIN PO Take 5 mg by mouth At Bedtime   tamsulosin (FLOMAX) 0.4 MG capsule Take 0.4 mg by mouth daily   Menthol, Topical Analgesic, (BIOFREEZE EX) Externally apply topically 3 times daily as needed   polyethylene glycol (MIRALAX/GLYCOLAX) powder Take 17 g by mouth daily as needed for constipation   acetaminophen (TYLENOL) 500 MG tablet Take 2 tablets (1,000 mg) by mouth every 8 hours   cyanocobalamin (VITAMIN B12) 1000 MCG/ML injection Inject 1 mL (1,000 mcg) into the muscle every 30 days 1 year of injections approved through 4/19/2017 per Dr. Ayon.   blood glucose monitoring ([place]gor[place] CONTOUR NEXT) test strip Use to test blood sugar 1 times daily or as directed.   blood glucose  "monitoring (ROBERT MICROLET) lancets Use to test blood sugar 1 time daily or as directed.   oxyCODONE (ROXICODONE) 5 MG IR tablet Take 0.5 tablets (2.5 mg) by mouth every 4 hours as needed for moderate to severe pain   metoprolol (LOPRESSOR) 25 MG tablet Take 1 tablet (25 mg) by mouth 2 times daily     No current facility-administered medications on file prior to visit.     Allergies   Allergen Reactions     Mobic [Meloxicam] Nausea and Diarrhea     Diarrhea, nausea, flu like symptoms since start of use       Social History     Occupational History     Construction      Retired     Social History Main Topics     Smoking status: Current Some Day Smoker     Packs/day: 0.00     Years: 20.00     Types: Pipe, Cigars     Last attempt to quit: 1/1/1998     Smokeless tobacco: Never Used      Comment: Rare pipe and cigar smoking     Alcohol use No     Drug use: No     Sexual activity: Not Currently       Family History   Problem Relation Age of Onset     Alzheimer Disease Father      Cardiovascular Mother      C.A.D. Mother      HEART DISEASE Mother      Cardiovascular Brother      HEART DISEASE Brother      C.A.D. Brother        REVIEW OF SYSTEMS  General: negative for, night sweats, dizziness, fatigue  Resp: No shortness of breath and no cough  CV: negative for chest pain, syncope or near-syncope  GI: negative for nausea, vomiting and diarrhea  : negative for dysuria and hematuria  Musculoskeletal: as above  Neurologic: negative for syncope   Hematologic: negative for bleeding disorder    Physical Exam:  Vitals: Temp 97.6  F (36.4  C) (Temporal)  Ht 6' 1\" (1.854 m)  Wt 163 lb 4.8 oz (74.1 kg)  BMI 21.54 kg/m2  BMI= Body mass index is 21.54 kg/(m^2).  Constitutional: healthy, alert and no acute distress   Psychiatric: mentation appears normal and affect normal/bright  NEURO: no focal deficits  SKIN: .well healed, no erythema, no incision breakdown and no drainage.  JOINT/EXTREMITIES: 7-110  with a bouncy extension end " point. No instability. No calf tenderness.  GAIT: not tested     Diagnostic Modalities:  left knee X-ray: The prosthesis has acceptable alignment. No fractures or dislocations. Prosthesis is well seated with no evidence of loosening  Independent visualization of the images was performed.      Impression:   Chief Complaint   Patient presents with     RECHECK     left knee follow up     Surgical Followup     DOS: 8/15/17~ Left total knee replacement~12 weeks postop    doing well with some stiffness. Making some progress.    Plan:   Continue progressing activities. Do home physical therapy exercises and stretching every other day.    Return to clinic 6, week(s), or sooner as needed for changes.    Re-x-ray on return: No    Yrn Cardona D.O.

## 2017-12-15 ENCOUNTER — ALLIED HEALTH/NURSE VISIT (OUTPATIENT)
Dept: FAMILY MEDICINE | Facility: OTHER | Age: 82
End: 2017-12-15
Payer: COMMERCIAL

## 2017-12-15 DIAGNOSIS — D51.0 PERNICIOUS ANEMIA: Primary | ICD-10-CM

## 2017-12-15 PROCEDURE — 96372 THER/PROPH/DIAG INJ SC/IM: CPT

## 2018-01-29 ENCOUNTER — ALLIED HEALTH/NURSE VISIT (OUTPATIENT)
Dept: FAMILY MEDICINE | Facility: OTHER | Age: 83
End: 2018-01-29
Payer: COMMERCIAL

## 2018-01-29 DIAGNOSIS — D51.0 PERNICIOUS ANEMIA: Primary | ICD-10-CM

## 2018-01-29 PROCEDURE — 99207 ZZC NO CHARGE NURSE ONLY: CPT

## 2018-01-29 PROCEDURE — 96372 THER/PROPH/DIAG INJ SC/IM: CPT

## 2018-01-29 NOTE — NURSING NOTE
The following medication was given:     MEDICATION: Vitamin B12  1000mcg  See medication note.  Patient instructed to remain in clinic for 20 minutes afterwards, and to report any adverse reaction to me immediately.  Prior to injection verified patient identity using patient's name and date of birth.  Nella Trejo MA     1/29/2018

## 2018-01-29 NOTE — MR AVS SNAPSHOT
After Visit Summary   1/29/2018    Remigio Holly    MRN: [id number]           Patient Information     Date Of Birth          11/6/1933        Visit Information        Provider Department      1/29/2018 10:15 AM KIRK STEPHENS MA [facility]        Today's Diagnoses     Pernicious anemia    -  1       Follow-ups after your visit        Who to contact     If you have questions or need follow up information about today's clinic visit or your schedule please contact [facility] directly at [phone].  Normal or non-critical lab and imaging results will be communicated to you by [employer], letter or phone within 4 business days after the clinic has received the results. If you do not hear from us within 7 days, please contact the clinic through [employer] or phone. If you have a critical or abnormal lab result, we will notify you by phone as soon as possible.  Submit refill requests through goOutMap or call your pharmacy and they will forward the refill request to us. Please allow 3 business days for your refill to be completed.          Additional Information About Your Visit        MyChart Information     goOutMap gives you secure access to your electronic health record. If you see a primary care provider, you can also send messages to your care team and make appointments. If you have questions, please call your primary care clinic.  If you do not have a primary care provider, please call [phone] and they will assist you.        Care EveryWhere ID     This is your Care EveryWhere ID. This could be used by other organizations to access your [place] medical records  [id number]         Blood Pressure from Last 3 Encounters:   09/20/17 102/68   09/12/17 142/81   09/05/17 115/65    Weight from Last 3 Encounters:   11/09/17 163 lb 4.8 oz (74.1 kg)   10/04/17 163 lb 8 oz (74.2 kg)   09/20/17 164 lb 1.6 oz (74.4 kg)              We Performed the Following     INJECTION  INTRAMUSCULAR OR SUB-Q     VITAMIN B12 INJ /1000MCG        Primary Care Provider Office Phone # Fax #    Moy Celis [phone]       [address] [facility] [address]        Equal Access to Services     ERICA RUSSELL : Hadii aad ku hadmayteo Somaryali, waaxda luqadaha, qaybta kaalmada adeshreyasyada, tray mcfarlanecorinna gastelumshreyas james meliza mendez. So [facility] [phone].    ATENCIÓN: Si habla español, tiene a tabares disposición servicios gratuitos de asistencia lingüística. Llame al [phone].    We comply with applicable federal civil rights laws and [place] laws. We do not discriminate on the basis of race, color, national origin, age, disability, sex, sexual orientation, or gender identity.            Thank you!     Thank you for choosing [facility]  for your care. Our goal is always to provide you with excellent care. Hearing back from our patients is one way we can continue to improve our services. Please take a few minutes to complete the written survey that you may receive in the mail after your visit with us. Thank you!             Your Updated Medication List - Protect others around you: Learn how to safely use, store and throw away your medicines at www.disposemymeds.org.          This list is accurate as of 1/29/18 10:28 AM.  Always use your most recent med list.                   Brand Name Dispense Instructions for use Diagnosis    acetaminophen 500 MG tablet    TYLENOL     Take 2 tablets (1,000 mg) by mouth every 8 hours    Status post total left knee replacement using cement       BIOFREEZE EX      Externally apply topically 3 times daily as needed        blood glucose monitoring lancets     1 Box    Use to test blood sugar 1 time daily or as directed.    Hypoglycemia       blood glucose monitoring test strip    ROBERT CONTOUR NEXT    100 each    Use to test blood sugar 1 times daily or as directed.    Hypoglycemia       cyanocobalamin 1000 MCG/ML injection    VITAMIN B12    1 mL     Inject 1 mL (1,000 mcg) into the muscle every 30 days 1 year of injections approved through 4/19/2017 per Dr. Ayon.    Pernicious anemia       MELATONIN PO      Take 5 mg by mouth At Bedtime        metoprolol tartrate 25 MG tablet    LOPRESSOR    180 tablet    Take 1 tablet (25 mg) by mouth 2 times daily    Aortic root dilatation (H)       oxyCODONE IR 5 MG tablet    ROXICODONE    30 tablet    Take 0.5 tablets (2.5 mg) by mouth every 4 hours as needed for moderate to severe pain    Status post total left knee replacement using cement       polyethylene glycol powder    MIRALAX/GLYCOLAX     Take 17 g by mouth daily as needed for constipation        rivaroxaban ANTICOAGULANT 15 MG Tabs tablet    XARELTO    90 tablet    Take 1 tablet (15 mg) by mouth daily    Paroxysmal atrial fibrillation (H)       tamsulosin 0.4 MG capsule    FLOMAX     Take 0.4 mg by mouth daily

## 2018-03-03 ENCOUNTER — HOSPITAL ENCOUNTER (EMERGENCY)
Facility: CLINIC | Age: 83
Discharge: ANOTHER HOSPITAL | End: 2018-03-03
Attending: EMERGENCY MEDICINE | Admitting: EMERGENCY MEDICINE
Payer: MEDICARE

## 2018-03-03 ENCOUNTER — TRANSFERRED RECORDS (OUTPATIENT)
Dept: HEALTH INFORMATION MANAGEMENT | Facility: CLINIC | Age: 83
End: 2018-03-03

## 2018-03-03 ENCOUNTER — APPOINTMENT (OUTPATIENT)
Dept: GENERAL RADIOLOGY | Facility: CLINIC | Age: 83
End: 2018-03-03
Attending: EMERGENCY MEDICINE
Payer: MEDICARE

## 2018-03-03 VITALS
SYSTOLIC BLOOD PRESSURE: 109 MMHG | HEART RATE: 74 BPM | OXYGEN SATURATION: 97 % | RESPIRATION RATE: 15 BRPM | TEMPERATURE: 96.6 F | BODY MASS INDEX: 21.11 KG/M2 | DIASTOLIC BLOOD PRESSURE: 76 MMHG | WEIGHT: 160 LBS

## 2018-03-03 DIAGNOSIS — I24.9 ACS (ACUTE CORONARY SYNDROME) (H): ICD-10-CM

## 2018-03-03 LAB
ANION GAP SERPL CALCULATED.3IONS-SCNC: 11 MMOL/L (ref 3–14)
BASOPHILS # BLD AUTO: 0 10E9/L (ref 0–0.2)
BASOPHILS NFR BLD AUTO: 0.2 %
BUN SERPL-MCNC: 23 MG/DL (ref 7–30)
CALCIUM SERPL-MCNC: 8.9 MG/DL (ref 8.5–10.1)
CHLORIDE SERPL-SCNC: 105 MMOL/L (ref 94–109)
CO2 SERPL-SCNC: 25 MMOL/L (ref 20–32)
CREAT SERPL-MCNC: 1 MG/DL (ref 0.66–1.25)
DIFFERENTIAL METHOD BLD: NORMAL
EOSINOPHIL # BLD AUTO: 0 10E9/L (ref 0–0.7)
EOSINOPHIL NFR BLD AUTO: 0.5 %
ERYTHROCYTE [DISTWIDTH] IN BLOOD BY AUTOMATED COUNT: 14.3 % (ref 10–15)
GFR SERPL CREATININE-BSD FRML MDRD: 71 ML/MIN/1.7M2
GLUCOSE SERPL-MCNC: 104 MG/DL (ref 70–99)
HCT VFR BLD AUTO: 46.1 % (ref 40–53)
HGB BLD-MCNC: 14.5 G/DL (ref 13.3–17.7)
IMM GRANULOCYTES # BLD: 0 10E9/L (ref 0–0.4)
IMM GRANULOCYTES NFR BLD: 0.2 %
LYMPHOCYTES # BLD AUTO: 0.8 10E9/L (ref 0.8–5.3)
LYMPHOCYTES NFR BLD AUTO: 14.7 %
MCH RBC QN AUTO: 29.8 PG (ref 26.5–33)
MCHC RBC AUTO-ENTMCNC: 31.5 G/DL (ref 31.5–36.5)
MCV RBC AUTO: 95 FL (ref 78–100)
MONOCYTES # BLD AUTO: 0.6 10E9/L (ref 0–1.3)
MONOCYTES NFR BLD AUTO: 11.1 %
NEUTROPHILS # BLD AUTO: 4.1 10E9/L (ref 1.6–8.3)
NEUTROPHILS NFR BLD AUTO: 73.3 %
PLATELET # BLD AUTO: 191 10E9/L (ref 150–450)
POTASSIUM SERPL-SCNC: 4.7 MMOL/L (ref 3.4–5.3)
RBC # BLD AUTO: 4.87 10E12/L (ref 4.4–5.9)
SODIUM SERPL-SCNC: 141 MMOL/L (ref 133–144)
TROPONIN I SERPL-MCNC: 0.02 UG/L (ref 0–0.04)
WBC # BLD AUTO: 5.6 10E9/L (ref 4–11)

## 2018-03-03 PROCEDURE — 93010 ELECTROCARDIOGRAM REPORT: CPT | Mod: Z6 | Performed by: EMERGENCY MEDICINE

## 2018-03-03 PROCEDURE — 99285 EMERGENCY DEPT VISIT HI MDM: CPT | Mod: 25 | Performed by: EMERGENCY MEDICINE

## 2018-03-03 PROCEDURE — 85025 COMPLETE CBC W/AUTO DIFF WBC: CPT | Performed by: EMERGENCY MEDICINE

## 2018-03-03 PROCEDURE — 25000132 ZZH RX MED GY IP 250 OP 250 PS 637: Performed by: EMERGENCY MEDICINE

## 2018-03-03 PROCEDURE — 93005 ELECTROCARDIOGRAM TRACING: CPT | Performed by: EMERGENCY MEDICINE

## 2018-03-03 PROCEDURE — 80048 BASIC METABOLIC PNL TOTAL CA: CPT | Performed by: EMERGENCY MEDICINE

## 2018-03-03 PROCEDURE — 71045 X-RAY EXAM CHEST 1 VIEW: CPT | Mod: TC

## 2018-03-03 PROCEDURE — 84484 ASSAY OF TROPONIN QUANT: CPT | Performed by: EMERGENCY MEDICINE

## 2018-03-03 PROCEDURE — 99284 EMERGENCY DEPT VISIT MOD MDM: CPT | Mod: 25 | Performed by: EMERGENCY MEDICINE

## 2018-03-03 RX ORDER — ASPIRIN 81 MG/1
324 TABLET, CHEWABLE ORAL ONCE
Status: COMPLETED | OUTPATIENT
Start: 2018-03-03 | End: 2018-03-03

## 2018-03-03 RX ORDER — MORPHINE SULFATE 4 MG/ML
4 INJECTION, SOLUTION INTRAMUSCULAR; INTRAVENOUS
Status: DISCONTINUED | OUTPATIENT
Start: 2018-03-03 | End: 2018-03-03 | Stop reason: HOSPADM

## 2018-03-03 RX ORDER — NITROGLYCERIN 0.4 MG/1
0.4 TABLET SUBLINGUAL
Status: COMPLETED | OUTPATIENT
Start: 2018-03-03 | End: 2018-03-03

## 2018-03-03 RX ADMIN — NITROGLYCERIN 0.4 MG: 0.4 TABLET SUBLINGUAL at 19:01

## 2018-03-03 RX ADMIN — ASPIRIN 81 MG 324 MG: 81 TABLET ORAL at 19:00

## 2018-03-03 ASSESSMENT — ENCOUNTER SYMPTOMS
NAUSEA: 1
DIAPHORESIS: 1
SHORTNESS OF BREATH: 1

## 2018-03-04 NOTE — ED PROVIDER NOTES
History     Chief Complaint   Patient presents with     Chest Pain     The history is provided by the patient.     Remigio Holly is a 84 year old male with a history of abdominal aortic aneurysm, hyperlipidemia, GERD, CKD, hypoglycemia, thoracic aortic aneurysm without rupture, orthostatic hypotension, gastric cancer, and paroxysmal atrial fibrillation who presents to the ED complaining of chest pain. At 2 PM today he was outside scraping ice off his driveway and developed substernal chest discomfort of 8 out of 10 in intensity associated with nausea, diaphoresis, shortness of breath.  He came on abruptly.  He went inside and rested.  His nausea diaphoresis and shortness of breath improved and his chest pain remained.  This chest discomfort is a heaviness.  It was 8 out of 10 but arrival in the emergency department it was 6 out of 10.  It seems to gradually be getting better over the last 5 hours.  The pain does not radiate.  He had not had symptoms like this prior to a week ago when he started developing intermittent chest discomforts.  He is on apixaban for atrial fibrillation.  He has no history of angiogram and is uncertain as to whether he had a stress test previously.  He was put on apixaban after his knee surgery this year when he was found to be in atrial fibrillation.    Problem List:    Patient Active Problem List    Diagnosis Date Noted     Nephrolithiasis 12/25/2012     Priority: High     Urinary retention with incomplete bladder emptying 08/20/2017     Priority: Medium     Orthostatic hypotension 08/19/2017     Priority: Medium     Thoracic aortic aneurysm without rupture (H) - 4.7 cm on 8/18/17 (4.5 cm in 8/15) 08/18/2017     Priority: Medium     Near syncope 08/17/2017     Priority: Medium     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/17) 08/15/2017     Priority: Medium     Primary osteoarthritis of both knees 07/26/2017     Priority: Medium     Bilateral knee pain  07/26/2017     Priority: Medium     Postsurgical dumping syndrome 12/19/2016     Priority: Medium     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 Provider to review       CKD (chronic kidney disease) stage 3, GFR 30-59 ml/min 05/05/2013     Priority: Medium     Pain 12/25/2012     Priority: Medium     Advance Care Planning 08/16/2012     Priority: Medium     Advance Care Planning: Receipt of ACP document:  Received: Health Care Directive which was witnessed or notarized on 3-12-13.  Document previously scanned on 3-13-13.  Validation form completed and  scanned.  Code Status reflects choices in most recent ACP document.  Confirmed/documented designated decision maker(s). See permanent comments section of demographics in clinical tab. View document(s) and details by clicking on code status. Added by Sayra Maguire on 8/25/2014.  .Patient states has Advance Directive and will bring in a copy to clinic. 8/16/2012          HYPERLIPIDEMIA LDL GOAL <100 10/31/2010     Priority: Medium     GERD (gastroesophageal reflux disease) 03/26/2010     Priority: Medium     Mixed hyperlipidemia 03/24/2010     Priority: Medium     Closed fracture of calcaneus 01/06/2007     Priority: Medium     Calculus of gallbladder 01/09/2003     Priority: Medium     Problem list name updated by automated process. Provider to review       Abdominal aortic aneurysm (H) 10/22/2002     Priority: Medium     Problem list name updated by automated process. Provider to review       Chest pain      Priority: Medium     Problem list name updated by automated process. Provider to review       Pain in joint, shoulder region      Priority: Medium     Paroxysmal atrial fibrillation (H) 08/26/2010     Priority: Low     Gastric cancer (H) 08/05/2010      Priority: Low     (Problem list name updated by automated process. Provider to review and confirm.)          Past Medical History:    Past Medical History:   Diagnosis Date     Blood transfusion      Chronic gastric ulcer without mention of hemorrhage, perforation, without mention of obstruction      Gastric cancer (H)      Hemorrhage of gastrointestinal tract, unspecified 01/13/10     Hypoglycemia, unspecified 1/26/2015     Measles without mention of complication      Mixed hyperlipidemia      Mumps without mention of complication      Pulmonary embolism (H) Sept 4, 2010     Urinary calculus, unspecified      Varicella without mention of complication        Past Surgical History:    Past Surgical History:   Procedure Laterality Date     ABDOMEN SURGERY       ARTHROPLASTY KNEE Left 8/15/2017    Procedure: ARTHROPLASTY KNEE;  Left total knee replacement;  Surgeon: Jonathan Cardona DO;  Location: [facility]     C EXCIS STOMACH ULCER,LESN;LOCAL       CHOLECYSTECTOMY, LAPOROSCOPIC  10/6/2008    Cholecystectomy, Laparoscopic     COLONOSCOPY  1/14/10    [facility]     COLONOSCOPY  05/25/10     CYSTOSCOPY, RETROGRADES, EXTRACT STONE, INSERT STENT, COMBINED  12/25/2012    Procedure: COMBINED CYSTOSCOPY, RETROGRADES, EXTRACT STONE, INSERT STENT;  Cystoscopy, Left Stent Placement, left retrograde pylogram, uc;  Surgeon: Amador Sanchez MD;  Location: [facility] OR     ESOPHAGOSCOPY, GASTROSCOPY, DUODENOSCOPY (EGD), COMBINED  8/16/2011    Procedure:COMBINED ESOPHAGOSCOPY, GASTROSCOPY, DUODENOSCOPY (EGD), BIOPSY SINGLE OR MULTIPLE; Surgeon:TONY GARCIA; Location:[facility]     GENERAL SURGERY                           DATE:  07/07/10    Gastrectomy     HC COLONOSCOPY W/WO BRUSH/WASH  01/31/2006     HC REPAIR ROTATOR CUFF,ACUTE  3/21/2005    Right      UGI ENDOSCOPY, SIMPLE EXAM  1/13/10    [facility] UGI ENDOSCOPY, SIMPLE EXAM  05/25/10     LASER HOLMIUM LITHOTRIPSY URETER(S), INSERT STENT,  COMBINED  1/5/2013    Procedure: COMBINED CYSTOSCOPY, URETEROSCOPY, LASER HOLMIUM LITHOTRIPSY URETER(S), INSERT STENT;  Bilateral  Cystoscopy, Bilateral Ureteroscopy, Bilateral retrogrades and  placement of ureteral stent right side. Laser Holmium Lithotripsy  and Exchange  of stent left side;  Surgeon: Tone Morejon MD;  Location: [facility] OR     LASER HOLMIUM LITHOTRIPSY URETER(S), INSERT STENT, COMBINED  1/30/2013    Procedure: COMBINED CYSTOSCOPY, URETEROSCOPY, LASER HOLMIUM LITHOTRIPSY URETER(S), INSERT STENT;  Right Ureteroscopy; Stone basketing; Right Stent exchange and  removal of left stent.;  Surgeon: Tone Morejon MD;  Location: [facility] OR     VIDEO CAPSULE ENDOSCOPY  01/15/10    [facility]       Family History:    Family History   Problem Relation Age of Onset     Alzheimer Disease Father      Cardiovascular Mother      C.A.D. Mother      HEART DISEASE Mother      Cardiovascular Brother      HEART DISEASE Brother      C.A.D. Brother        Social History:  Marital Status:   [2]  Social History   Substance Use Topics     Smoking status: Current Some Day Smoker     Packs/day: 0.00     Years: 20.00     Types: Pipe, Cigars     Last attempt to quit: 1/1/1998     Smokeless tobacco: Never Used      Comment: Rare pipe and cigar smoking     Alcohol use No        Medications:      rivaroxaban ANTICOAGULANT (XARELTO) 15 MG TABS tablet   oxyCODONE (ROXICODONE) 5 MG IR tablet   MELATONIN PO   tamsulosin (FLOMAX) 0.4 MG capsule   Menthol, Topical Analgesic, (BIOFREEZE EX)   polyethylene glycol (MIRALAX/GLYCOLAX) powder   metoprolol (LOPRESSOR) 25 MG tablet   acetaminophen (TYLENOL) 500 MG tablet   cyanocobalamin (VITAMIN B12) 1000 MCG/ML injection   blood glucose monitoring (ROBERT CONTOUR NEXT) test strip   blood glucose monitoring (ROBERT MICROLET) lancets         Review of Systems   Constitutional: Positive for diaphoresis.   Respiratory: Positive for shortness of breath.    Cardiovascular:  Positive for chest pain (heaviness to substernal region, not radiating).   Gastrointestinal: Positive for nausea.   All other systems reviewed and are negative.      Physical Exam   BP: 149/89  Pulse: 74  Heart Rate: 63  Temp: 96.6  F (35.9  C)  Resp: 20  Weight: 72.6 kg (160 lb)  SpO2: 97 % Vitals reviewed  NAD  HEENT:NC/Atraumatic, EOMI, anicteric, PERRL and symmetric, mucous membranes moist, Ext ears nl, OP clear  Chest/PULM: atraumatic, NT to palpation, no resp distress, CTA Bilaterally, no wheezes, crackles.  CV: rrr without m/r/g S1S2, peripheral pulses normal  ABD: soft NT, non distended, no guarding or rebound  Extremities: atraumatic, no edema, full rom  : deferred  Neuro: CN 2-12 grossly intact, motor and sensation grossly intact          Physical Exam  Vitals reviewed  NAD  HEENT:NC/Atraumatic, EOMI, anicteric, PERRL and symmetric, mucous membranes moist, Ext ears nl, OP clear  Chest/PULM: atraumatic, NT to palpation, no resp distress, CTA Bilaterally, no wheezes, crackles.  CV: rrr without m/r/g S1S2, peripheral pulses normal  ABD: soft NT, non distended, no guarding or rebound  Extremities: atraumatic, no edema, full rom  : deferred  Neuro: CN 2-12 grossly intact, motor and sensation grossly intact      ED Course     ED Course     Procedures    Point-of-care ultrasound was not done       EKG Interpretation:      Interpreted by Yousif Ghosh  Time reviewed: 1837  Symptoms at time of EKG: chest pain    Rhythm: normal sinus   Rate: 69  Axis: normal  Ectopy: PACs  Conduction: normal  ST Segments/ T Waves: anterior and lateral st depression worse in V4/V5, no elevation  Q Waves: none  Comparison to prior: changed from previous    Clinical Impression: st depression anterior lateral leads        Results for orders placed or performed during the hospital encounter of 03/03/18   XR Chest Port 1 View    Narrative    XR CHEST PORT 1 VW   3/3/2018 7:38 PM     HISTORY: Chest pain;     COMPARISON: 8/29/2013  x-ray.      Impression    IMPRESSION: Heart size is normal. Tortuous aorta. Pulmonary  vasculature is not distended. Lungs are clear. No pleural fluid. No  acute disease.    TALHA YOON MD   CBC with platelets differential   Result Value Ref Range    WBC 5.6 4.0 - 11.0 10e9/L    RBC Count 4.87 4.4 - 5.9 10e12/L    Hemoglobin 14.5 13.3 - 17.7 g/dL    Hematocrit 46.1 40.0 - 53.0 %    MCV 95 78 - 100 fl    MCH 29.8 26.5 - 33.0 pg    MCHC 31.5 31.5 - 36.5 g/dL    RDW 14.3 10.0 - 15.0 %    Platelet Count 191 150 - 450 10e9/L    Diff Method Automated Method     % Neutrophils 73.3 %    % Lymphocytes 14.7 %    % Monocytes 11.1 %    % Eosinophils 0.5 %    % Basophils 0.2 %    % Immature Granulocytes 0.2 %    Absolute Neutrophil 4.1 1.6 - 8.3 10e9/L    Absolute Lymphocytes 0.8 0.8 - 5.3 10e9/L    Absolute Monocytes 0.6 0.0 - 1.3 10e9/L    Absolute Eosinophils 0.0 0.0 - 0.7 10e9/L    Absolute Basophils 0.0 0.0 - 0.2 10e9/L    Abs Immature Granulocytes 0.0 0 - 0.4 10e9/L   Basic metabolic panel   Result Value Ref Range    Sodium 141 133 - 144 mmol/L    Potassium 4.7 3.4 - 5.3 mmol/L    Chloride 105 94 - 109 mmol/L    Carbon Dioxide 25 20 - 32 mmol/L    Anion Gap 11 3 - 14 mmol/L    Glucose 104 (H) 70 - 99 mg/dL    Urea Nitrogen 23 7 - 30 mg/dL    Creatinine 1.00 0.66 - 1.25 mg/dL    GFR Estimate 71 >60 mL/min/1.7m2    GFR Estimate If Black 86 >60 mL/min/1.7m2    Calcium 8.9 8.5 - 10.1 mg/dL   Troponin I   Result Value Ref Range    Troponin I ES 0.022 0.000 - 0.045 ug/L                 Assessments & Plan (with Medical Decision Making)     I have reviewed the nursing notes.    I have reviewed the findings, diagnosis, plan and need for follow up with the patient.  84-year-old with symptoms consistent with acute coronary syndrome with anterolateral ST depression.  His symptoms have progressively improved.  Now he is 1-2 out of 10 for his pain level.  He had been given sublingual nitro and aspirin which may have helped.  At this  point he is not in need of morphine.  His blood pressure systolic is 104 at this point so we will hold on a nitro drip.  I discussed with Dr. Jacques of cardiology at [place] who accepts the patient in transfer and agrees with ALS ground transfer given his symptoms are resolving.  He agrees with aspirin and nitro and to hold heparin given the patient takes apixaban already for his atrial fibrillation.  He is actually likely to arrive in [place] quicker using ground ALS then air ambulance at this point given the closest air care is 30 minutes away.    Discharge Medication List as of 3/3/2018  7:49 PM          Final diagnoses:   ACS (acute coronary syndrome) (H)     Plan:  Transfer to [facility], aspirin, nitro, n.p.o., cardiology consult on arrival.    This document serves as a record of services personally performed by Yousif Ghosh MD. It was created on their behalf by Alex Barrett, a trained medical scribe. The creation of this record is based on the provider's personal observations and the statements of the patient. This document has been checked and approved by the attending provider.    Note: Chart documentation done in part with Dragon Voice Recognition software. Although reviewed after completion, some word and grammatical errors may remain.    3/3/2018   [facility] EMERGENCY DEPARTMENT     Yousif Ghosh MD  03/03/18 [id number]       Yousif Ghosh MD  03/17/18 [id number]

## 2018-03-05 ENCOUNTER — TRANSFERRED RECORDS (OUTPATIENT)
Dept: HEALTH INFORMATION MANAGEMENT | Facility: CLINIC | Age: 83
End: 2018-03-05

## 2018-03-06 ENCOUNTER — TRANSFERRED RECORDS (OUTPATIENT)
Dept: HEALTH INFORMATION MANAGEMENT | Facility: CLINIC | Age: 83
End: 2018-03-06

## 2018-03-17 ENCOUNTER — TRANSFERRED RECORDS (OUTPATIENT)
Dept: HEALTH INFORMATION MANAGEMENT | Facility: CLINIC | Age: 83
End: 2018-03-17

## 2018-03-23 ENCOUNTER — RADIANT APPOINTMENT (OUTPATIENT)
Dept: GENERAL RADIOLOGY | Facility: OTHER | Age: 83
End: 2018-03-23
Attending: INTERNAL MEDICINE
Payer: COMMERCIAL

## 2018-03-23 ENCOUNTER — OFFICE VISIT (OUTPATIENT)
Dept: FAMILY MEDICINE | Facility: OTHER | Age: 83
End: 2018-03-23
Payer: COMMERCIAL

## 2018-03-23 ENCOUNTER — TELEPHONE (OUTPATIENT)
Dept: FAMILY MEDICINE | Facility: OTHER | Age: 83
End: 2018-03-23

## 2018-03-23 VITALS
WEIGHT: 156.9 LBS | HEART RATE: 64 BPM | BODY MASS INDEX: 21.25 KG/M2 | HEIGHT: 72 IN | SYSTOLIC BLOOD PRESSURE: 110 MMHG | TEMPERATURE: 97.6 F | OXYGEN SATURATION: 96 % | DIASTOLIC BLOOD PRESSURE: 62 MMHG

## 2018-03-23 DIAGNOSIS — R05.9 COUGH: Primary | ICD-10-CM

## 2018-03-23 DIAGNOSIS — R05.9 COUGH: ICD-10-CM

## 2018-03-23 DIAGNOSIS — Z90.3 S/P TOTAL GASTRECTOMY AND ROUX-EN-Y ESOPHAGOJEJUNAL ANASTOMOSIS: ICD-10-CM

## 2018-03-23 DIAGNOSIS — Z98.0 S/P TOTAL GASTRECTOMY AND ROUX-EN-Y ESOPHAGOJEJUNAL ANASTOMOSIS: ICD-10-CM

## 2018-03-23 DIAGNOSIS — R73.9 ELEVATED BLOOD SUGAR: ICD-10-CM

## 2018-03-23 DIAGNOSIS — I71.40 ABDOMINAL AORTIC ANEURYSM (AAA) WITHOUT RUPTURE (H): ICD-10-CM

## 2018-03-23 DIAGNOSIS — I25.10 CORONARY ARTERY DISEASE INVOLVING NATIVE CORONARY ARTERY OF NATIVE HEART WITHOUT ANGINA PECTORIS: Primary | ICD-10-CM

## 2018-03-23 DIAGNOSIS — Z95.1 S/P CABG (CORONARY ARTERY BYPASS GRAFT): ICD-10-CM

## 2018-03-23 LAB
ANION GAP SERPL CALCULATED.3IONS-SCNC: 6 MMOL/L (ref 3–14)
BUN SERPL-MCNC: 29 MG/DL (ref 7–30)
CALCIUM SERPL-MCNC: 8.4 MG/DL (ref 8.5–10.1)
CHLORIDE SERPL-SCNC: 105 MMOL/L (ref 94–109)
CO2 SERPL-SCNC: 28 MMOL/L (ref 20–32)
CREAT SERPL-MCNC: 1.33 MG/DL (ref 0.66–1.25)
ERYTHROCYTE [DISTWIDTH] IN BLOOD BY AUTOMATED COUNT: 14 % (ref 10–15)
GFR SERPL CREATININE-BSD FRML MDRD: 51 ML/MIN/1.7M2
GLUCOSE SERPL-MCNC: 104 MG/DL (ref 70–99)
HBA1C MFR BLD: 5.7 % (ref 4.3–6)
HCT VFR BLD AUTO: 37.2 % (ref 40–53)
HGB BLD-MCNC: 11.9 G/DL (ref 13.3–17.7)
MCH RBC QN AUTO: 30.7 PG (ref 26.5–33)
MCHC RBC AUTO-ENTMCNC: 32 G/DL (ref 31.5–36.5)
MCV RBC AUTO: 96 FL (ref 78–100)
PLATELET # BLD AUTO: 416 10E9/L (ref 150–450)
POTASSIUM SERPL-SCNC: 4.6 MMOL/L (ref 3.4–5.3)
RBC # BLD AUTO: 3.87 10E12/L (ref 4.4–5.9)
SODIUM SERPL-SCNC: 139 MMOL/L (ref 133–144)
WBC # BLD AUTO: 6.1 10E9/L (ref 4–11)

## 2018-03-23 PROCEDURE — 80048 BASIC METABOLIC PNL TOTAL CA: CPT | Performed by: NURSE PRACTITIONER

## 2018-03-23 PROCEDURE — 83036 HEMOGLOBIN GLYCOSYLATED A1C: CPT | Performed by: NURSE PRACTITIONER

## 2018-03-23 PROCEDURE — 36415 COLL VENOUS BLD VENIPUNCTURE: CPT | Performed by: NURSE PRACTITIONER

## 2018-03-23 PROCEDURE — 71046 X-RAY EXAM CHEST 2 VIEWS: CPT | Mod: FY

## 2018-03-23 PROCEDURE — 85027 COMPLETE CBC AUTOMATED: CPT | Performed by: NURSE PRACTITIONER

## 2018-03-23 PROCEDURE — 99214 OFFICE O/P EST MOD 30 MIN: CPT | Performed by: INTERNAL MEDICINE

## 2018-03-23 RX ORDER — TRAMADOL HYDROCHLORIDE 50 MG/1
50 TABLET ORAL EVERY 6 HOURS PRN
Qty: 30 TABLET | Refills: 0 | Status: SHIPPED | OUTPATIENT
Start: 2018-03-23 | End: 2018-10-29

## 2018-03-23 NOTE — MR AVS SNAPSHOT
After Visit Summary   3/23/2018    Remigio Holly    MRN: [id number]           Patient Information     Date Of Birth          11/6/1933        Visit Information        Provider Department      3/23/2018 2:00 PM Harrison Tse DO [facility]        Today's Diagnoses     Coronary artery disease involving native coronary artery of native heart without angina pectoris    -  1    Elevated blood sugar        Cough        Status post total left knee replacement using cement        S/P CABG (coronary artery bypass graft)           Follow-ups after your visit        Your next 10 appointments already scheduled     Apr 04, 2018 12:00 PM CDT   Cardiac Evaluation with WANDY Alcala   [facility] ([facility])    [address] [place] [address] [phone]              Who to contact     If you have questions or need follow up information about today's clinic visit or your schedule please contact [facility] directly at [phone].  Normal or non-critical lab and imaging results will be communicated to you by [employer], letter or phone within 4 business days after the clinic has received the results. If you do not hear from us within 7 days, please contact the clinic through [employer] or phone. If you have a critical or abnormal lab result, we will notify you by phone as soon as possible.  Submit refill requests through [employer] or call your pharmacy and they will forward the refill request to us. Please allow 3 business days for your refill to be completed.          Additional Information About Your Visit        MyChart Information     [employer] gives you secure access to your electronic health record. If you see a primary care provider, you can also send messages to your care team and make appointments. If you have questions, please call your primary care clinic.  If you do not have a primary care provider, please call  [phone] and they will assist you.        Care EveryWhere ID     This is your Care EveryWhere ID. This could be used by other organizations to access your [facility] medical records  [id number]        Your Vitals Were     Pulse Temperature Height Pulse Oximetry BMI (Body Mass Index)       64 97.6  F (36.4  C) (Tympanic) 6' (1.829 m) 96% 21.28 kg/m2        Blood Pressure from Last 3 Encounters:   03/23/18 110/62   03/03/18 109/76   09/20/17 102/68    Weight from Last 3 Encounters:   03/23/18 156 lb 14.4 oz (71.2 kg)   03/03/18 160 lb (72.6 kg)   11/09/17 163 lb 4.8 oz (74.1 kg)              We Performed the Following     Basic metabolic panel     CBC with platelets     Hemoglobin A1c          Today's Medication Changes          These changes are accurate as of 3/23/18  2:48 PM.  If you have any questions, ask your nurse or doctor.               Start taking these medicines.        Dose/Directions    traMADol 50 MG tablet   Commonly known as:  ULTRAM   Used for:  S/P CABG (coronary artery bypass graft)   Started by:  Harrison Tse DO        Dose:  50 mg   Take 1 tablet (50 mg) by mouth every 6 hours as needed for severe pain   Quantity:  30 tablet   Refills:  0         Stop taking these medicines if you haven't already. Please contact your care team if you have questions.     oxyCODONE IR 5 MG tablet   Commonly known as:  ROXICODONE   Stopped by:  Harrison Tse DO                Where to get your medicines      Some of these will need a paper prescription and others can be bought over the counter.  Ask your nurse if you have questions.     Bring a paper prescription for each of these medications     traMADol 50 MG tablet                Primary Care Provider Office Phone # Fax #    Moy Celis [phone]       [address] [place] [address]        Equal Access to Services     ERICA RUSSELL AH: ashley Batres, kate mosher,  tray mcfarlanecorinna wise'aan ah. So [facility] [phone].    ATENCIÓN: Si habla melody, tiene a tabares disposición servicios gratuitos de asistencia lingüística. Edith rosenthal [phone].    We comply with applicable federal civil rights laws and [place] laws. We do not discriminate on the basis of race, color, national origin, age, disability, sex, sexual orientation, or gender identity.            Thank you!     Thank you for choosing [facility]  for your care. Our goal is always to provide you with excellent care. Hearing back from our patients is one way we can continue to improve our services. Please take a few minutes to complete the written survey that you may receive in the mail after your visit with us. Thank you!             Your Updated Medication List - Protect others around you: Learn how to safely use, store and throw away your medicines at www.disposemymeds.org.          This list is accurate as of 3/23/18  2:48 PM.  Always use your most recent med list.                   Brand Name Dispense Instructions for use Diagnosis    acetaminophen 500 MG tablet    TYLENOL     Take 2 tablets (1,000 mg) by mouth every 8 hours    Status post total left knee replacement using cement       BIOFREEZE EX      Externally apply topically 3 times daily as needed        blood glucose monitoring lancets     1 Box    Use to test blood sugar 1 time daily or as directed.    Hypoglycemia       blood glucose monitoring test strip    ROBERT CONTOUR NEXT    100 each    Use to test blood sugar 1 times daily or as directed.    Hypoglycemia       cyanocobalamin 1000 MCG/ML injection    VITAMIN B12    1 mL    Inject 1 mL (1,000 mcg) into the muscle every 30 days 1 year of injections approved through 4/19/2017 per Dr. Ayon.    Pernicious anemia       MELATONIN PO      Take 5 mg by mouth At Bedtime        metoprolol tartrate 25 MG tablet    LOPRESSOR    180 tablet    Take 1 tablet (25 mg) by mouth 2 times daily     Aortic root dilatation (H)       polyethylene glycol powder    MIRALAX/GLYCOLAX     Take 17 g by mouth daily as needed for constipation        rivaroxaban ANTICOAGULANT 15 MG Tabs tablet    XARELTO    90 tablet    Take 1 tablet (15 mg) by mouth daily    Paroxysmal atrial fibrillation (H)       tamsulosin 0.4 MG capsule    FLOMAX     Take 0.4 mg by mouth daily        traMADol 50 MG tablet    ULTRAM    30 tablet    Take 1 tablet (50 mg) by mouth every 6 hours as needed for severe pain    S/P CABG (coronary artery bypass graft)

## 2018-03-23 NOTE — PROGRESS NOTES
SUBJECTIVE:   Remigio Holly is a 84 year old male who presents to clinic today for the following health issues:          Hospital Follow-up Visit:    Hospital/Nursing Home/ Rehab Facility: [facility] then transferred to [place]  Date of Admission: 03/03/2018  Date of Discharge: 3/17/2018  Reason(s) for Admission: acute coronary syndrome with subsequent minimally invasive coronary artery bypass grafting.            Problems taking medications regularly:  None       Medication changes since discharge: None       Problems adhering to non-medication therapy:  None    Summary of hospitalization:  [facility] discharge summary reviewed  CareEverywhere information obtained and reviewed  Diagnostic Tests/Treatments reviewed.  Follow up needed: none  Other Healthcare Providers Involved in Patient s Care:         None  Update since discharge: improved.     Post Discharge Medication Reconciliation: discharge medications reconciled, continue medications without change.  Plan of care communicated with patient     Coding guidelines for this visit:  Type of Medical   Decision Making Face-to-Face Visit       within 7 Days of discharge Face-to-Face Visit        within 14 days of discharge   Moderate Complexity 28101 [id number]   High Complexity [id number]                  Problem list and histories reviewed & adjusted, as indicated.  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     Postsurgical dumping syndrome     Primary osteoarthritis of both knees     Bilateral knee pain     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/17)     Near syncope     Thoracic aortic aneurysm without rupture (H) - 4.7 cm on 8/18/17 (4.5 cm in 8/15)     Orthostatic hypotension     Urinary retention with incomplete bladder emptying     Past Surgical History:   Procedure Laterality Date     ABDOMEN SURGERY       ARTHROPLASTY KNEE Left 8/15/2017    Procedure: ARTHROPLASTY KNEE;  Left total knee replacement;  Surgeon: Jonathan Cardona DO;  Location: [facility] OR     C EXCIS STOMACH ULCER,LESN;LOCAL       CHOLECYSTECTOMY, LAPOROSCOPIC  10/6/2008    Cholecystectomy, Laparoscopic     COLONOSCOPY  1/14/10    [facility]     COLONOSCOPY  05/25/10     CYSTOSCOPY, RETROGRADES, EXTRACT STONE, INSERT STENT, COMBINED  12/25/2012    Procedure: COMBINED CYSTOSCOPY, RETROGRADES, EXTRACT STONE, INSERT STENT;  Cystoscopy, Left Stent Placement, left retrograde pylogram, uc;  Surgeon: Amador Sanchez MD;  Location: [facility] OR     ESOPHAGOSCOPY, GASTROSCOPY, DUODENOSCOPY (EGD), COMBINED  8/16/2011    Procedure:COMBINED ESOPHAGOSCOPY, GASTROSCOPY, DUODENOSCOPY (EGD), BIOPSY SINGLE OR MULTIPLE; Surgeon:TONY GARCIA; Location:[facility]     GENERAL SURGERY                           DATE:  07/07/10    Gastrectomy      COLONOSCOPY W/WO BRUSH/WASH  01/31/2006      REPAIR ROTATOR CUFF,ACUTE  3/21/2005    Right      UGI ENDOSCOPY, SIMPLE EXAM  1/13/10    [facility] UGI ENDOSCOPY, SIMPLE EXAM  05/25/10     LASER HOLMIUM LITHOTRIPSY URETER(S), INSERT STENT, COMBINED  1/5/2013    Procedure: COMBINED CYSTOSCOPY, URETEROSCOPY, LASER HOLMIUM LITHOTRIPSY URETER(S), INSERT STENT;  Bilateral  Cystoscopy, Bilateral Ureteroscopy, Bilateral retrogrades and  placement of ureteral stent right side. Laser Holmium Lithotripsy  and Exchange  of stent left side;   Surgeon: Tone Morejon MD;  Location: [facility]     LASER HOLMIUM LITHOTRIPSY URETER(S), INSERT STENT, COMBINED  1/30/2013    Procedure: COMBINED CYSTOSCOPY, URETEROSCOPY, LASER HOLMIUM LITHOTRIPSY URETER(S), INSERT STENT;  Right Ureteroscopy; Stone basketing; Right Stent exchange and  removal of left stent.;  Surgeon: Tone Morejon MD;  Location: [facility] OR     VIDEO CAPSULE ENDOSCOPY  01/15/10    [facility]       Social History   Substance Use Topics     Smoking status: Current Some Day Smoker     Packs/day: 0.00     Years: 20.00     Types: Pipe, Cigars     Last attempt to quit: 1/1/1998     Smokeless tobacco: Never Used      Comment: Rare pipe and cigar smoking     Alcohol use No     Family History   Problem Relation Age of Onset     Alzheimer Disease Father      Cardiovascular Mother      C.A.D. Mother      HEART DISEASE Mother      Cardiovascular Brother      HEART DISEASE Brother      C.A.D. Brother          Current Outpatient Prescriptions   Medication Sig Dispense Refill     traMADol (ULTRAM) 50 MG tablet Take 1 tablet (50 mg) by mouth every 6 hours as needed for severe pain 30 tablet 0     rivaroxaban ANTICOAGULANT (XARELTO) 15 MG TABS tablet Take 1 tablet (15 mg) by mouth daily 90 tablet 3     MELATONIN PO Take 5 mg by mouth At Bedtime       tamsulosin (FLOMAX) 0.4 MG capsule Take 0.4 mg by mouth daily       Menthol, Topical Analgesic, (BIOFREEZE EX) Externally apply topically 3 times daily as needed       polyethylene glycol (MIRALAX/GLYCOLAX) powder Take 17 g by mouth daily as needed for constipation       metoprolol (LOPRESSOR) 25 MG tablet Take 1 tablet (25 mg) by mouth 2 times daily 180 tablet 3     acetaminophen (TYLENOL) 500 MG tablet Take 2 tablets (1,000 mg) by mouth every 8 hours       cyanocobalamin (VITAMIN B12) 1000 MCG/ML injection Inject 1 mL (1,000 mcg) into the muscle every 30 days 1 year of injections approved through 4/19/2017 per Dr. Ayon. 1 mL 11     blood glucose  monitoring (ROBERT CONTOUR NEXT) test strip Use to test blood sugar 1 times daily or as directed. 100 each 3     blood glucose monitoring (ROBERT MICROLET) lancets Use to test blood sugar 1 time daily or as directed. 1 Box 2     Allergies   Allergen Reactions     Mobic [Meloxicam] Nausea and Diarrhea     Diarrhea, nausea, flu like symptoms since start of use     Recent Labs   Lab Test  03/23/18   1427  03/03/18   1850   08/01/17   1409  01/31/17   0956  07/29/16   1053  06/12/16   1211  10/15/15   0949  10/12/15   0207   09/05/13   0836   08/16/12   0951   06/13/11   0914   09/05/10   0538   A1C  5.7   --    --   6.1*  6.2*   --    --    --    --    --    --    --    --    --    --    --    --    LDL   --    --    --    --    --    --    --   135*   --    --   130*   --   139*   --   111   --    --    HDL   --    --    --    --    --    --    --    --    --    --   42   --   42   --   35*   --    --    TRIG   --    --    --    --    --    --    --    --    --    --   92   --   116   --   107   < >   --    ALT   --    --    --    --    --   25  25   --   30   < >   --    < >   --    < >   --    < >   --    CR  1.33*  1.00   < >   --    --   1.08  1.14   --   1.30*   < >   --    < >   --    < >  1.00   < >  0.75   GFRESTIMATED  51*  71   < >   --    --   65  61   --   53*   < >   --    < >   --    < >  72   < >  >90   GFRESTBLACK  62  86   < >   --    --   79  74   --   64   < >   --    < >   --    < >  88   < >  >90   POTASSIUM  4.6  4.7   < >   --    --   4.8  4.5   --   4.4   < >   --    < >   --    < >  4.6   < >  4.1   TSH   --    --    --    --    --   3.55   --    --    --    --    --    --    --    --    --    --   2.24    < > = values in this interval not displayed.        Reviewed and updated as needed this visit by clinical staff       Reviewed and updated as needed this visit by Provider         ROS:  CONSTITUTIONAL: NEGATIVE for fever, chills, change in weight  INTEGUMENTARY/SKIN: NEGATIVE for worrisome  rashes, moles or lesions  EYES: NEGATIVE for vision changes or irritation  ENT/MOUTH: NEGATIVE for ear, mouth and throat problems  RESP: NEGATIVE for significant cough or SOB  CV: NEGATIVE for chest pain, palpitations or peripheral edema  GI: NEGATIVE for nausea, abdominal pain, heartburn, or change in bowel habits  : NEGATIVE for frequency, dysuria, or hematuria  MUSCULOSKELETAL: NEGATIVE for significant arthralgias or myalgia  NEURO: NEGATIVE for weakness, dizziness or paresthesias  ENDOCRINE: NEGATIVE for temperature intolerance, skin/hair changes  HEME: NEGATIVE for bleeding problems  PSYCHIATRIC: NEGATIVE for changes in mood or affect    OBJECTIVE:     /62 (Cuff Size: Adult Regular)  Pulse 64  Temp 97.6  F (36.4  C) (Tympanic)  Ht 6' (1.829 m)  Wt 156 lb 14.4 oz (71.2 kg)  SpO2 96%  BMI 21.28 kg/m2  Body mass index is 21.28 kg/(m^2).  GENERAL: healthy, alert and no distress  EYES: Eyes grossly normal to inspection, PERRL and conjunctivae and sclerae normal  HENT: ear canals and TM's normal, nose and mouth without ulcers or lesions  NECK: no adenopathy, no asymmetry, masses, or scars and thyroid normal to palpation  RESP: lungs clear to auscultation - no rales, rhonchi or wheezes  CV: regular rate and rhythm, normal S1 S2, no S3 or S4, no murmur, click or rub, no peripheral edema and peripheral pulses strong  ABDOMEN: soft, nontender, no hepatosplenomegaly, no masses and bowel sounds normal  MS: no gross musculoskeletal defects noted, no edema  SKIN: no suspicious lesions or rashes  NEURO: Normal strength and tone, mentation intact and speech normal  PSYCH: mentation appears normal, affect normal/bright    Diagnostic Test Results:  Pending in [facility]    ASSESSMENT/PLAN:         ICD-10-CM    1. Coronary artery disease involving native coronary artery of native heart without angina pectoris I25.10    2. S/P CABG (coronary artery bypass graft) Z95.1 traMADol (ULTRAM) 50 MG tablet   3. Elevated blood  sugar R73.9 Hemoglobin A1c   4. Cough R05 CBC with platelets     Basic metabolic panel   5. 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 Z90.3     Z98.0    6. Abdominal aortic aneurysm (AAA) without rupture (H) I71.4                                   FOLLOW UP    I have asked the patient to make an appointment for follow up with me in one month        I have carefully explained the diagnosis and treatment options with the patient. The patient has displayed an understanding of the above, and all subsequent questions were answered.     Harrison Tse, [facility]

## 2018-03-25 NOTE — PROGRESS NOTES
Dear Remigio, your recent test results are attached.  The chemistry panel shows a minimally elevated blood sugar 104.  The kidney function has decreased slightly.  The hemoglobin is decreased from 14.5-11.9.  This is consistent with your recent surgery.  Feel free to contact me via the office or My Chart if you have any questions regarding the above.  Sincerely,  Harrison Tse,  FACOI

## 2018-03-26 ENCOUNTER — ALLIED HEALTH/NURSE VISIT (OUTPATIENT)
Dept: FAMILY MEDICINE | Facility: OTHER | Age: 83
End: 2018-03-26
Payer: COMMERCIAL

## 2018-03-26 DIAGNOSIS — D51.0 PERNICIOUS ANEMIA: Primary | ICD-10-CM

## 2018-03-26 PROCEDURE — 96372 THER/PROPH/DIAG INJ SC/IM: CPT

## 2018-04-04 ENCOUNTER — OFFICE VISIT (OUTPATIENT)
Dept: FAMILY MEDICINE | Facility: OTHER | Age: 83
End: 2018-04-04
Payer: COMMERCIAL

## 2018-04-04 ENCOUNTER — TELEPHONE (OUTPATIENT)
Dept: FAMILY MEDICINE | Facility: OTHER | Age: 83
End: 2018-04-04

## 2018-04-04 VITALS
DIASTOLIC BLOOD PRESSURE: 66 MMHG | SYSTOLIC BLOOD PRESSURE: 111 MMHG | BODY MASS INDEX: 21.16 KG/M2 | OXYGEN SATURATION: 97 % | HEART RATE: 68 BPM | WEIGHT: 156 LBS | TEMPERATURE: 96.1 F | RESPIRATION RATE: 16 BRPM

## 2018-04-04 DIAGNOSIS — I48.0 PAROXYSMAL ATRIAL FIBRILLATION (H): Primary | ICD-10-CM

## 2018-04-04 DIAGNOSIS — R31.9 BLOOD IN URINE: Primary | ICD-10-CM

## 2018-04-04 DIAGNOSIS — I25.10 ASCVD (ARTERIOSCLEROTIC CARDIOVASCULAR DISEASE): ICD-10-CM

## 2018-04-04 DIAGNOSIS — R31.0 GROSS HEMATURIA: ICD-10-CM

## 2018-04-04 LAB
ALBUMIN UR-MCNC: >=300 MG/DL
APPEARANCE UR: ABNORMAL
BILIRUB UR QL STRIP: ABNORMAL
COLOR UR AUTO: ABNORMAL
GLUCOSE UR STRIP-MCNC: NEGATIVE MG/DL
HGB BLD-MCNC: 12.1 G/DL (ref 13.3–17.7)
HGB UR QL STRIP: ABNORMAL
KETONES UR STRIP-MCNC: ABNORMAL MG/DL
LEUKOCYTE ESTERASE UR QL STRIP: NEGATIVE
NITRATE UR QL: POSITIVE
PH UR STRIP: 5.5 PH (ref 5–7)
RBC #/AREA URNS AUTO: >100 /HPF
SOURCE: ABNORMAL
SP GR UR STRIP: 1.02 (ref 1–1.03)
UROBILINOGEN UR STRIP-ACNC: 1 EU/DL (ref 0.2–1)
WBC #/AREA URNS AUTO: ABNORMAL /HPF

## 2018-04-04 PROCEDURE — 85018 HEMOGLOBIN: CPT | Performed by: INTERNAL MEDICINE

## 2018-04-04 PROCEDURE — 36415 COLL VENOUS BLD VENIPUNCTURE: CPT | Performed by: INTERNAL MEDICINE

## 2018-04-04 PROCEDURE — 81001 URINALYSIS AUTO W/SCOPE: CPT | Performed by: INTERNAL MEDICINE

## 2018-04-04 PROCEDURE — 99214 OFFICE O/P EST MOD 30 MIN: CPT | Performed by: INTERNAL MEDICINE

## 2018-04-04 NOTE — NURSING NOTE
Chief Complaint   Patient presents with     Hematuria     1 day       Initial /66 (BP Location: Left arm, Patient Position: Chair, Cuff Size: Adult Regular)  Pulse 68  Temp 96.1  F (35.6  C) (Tympanic)  Resp 16  Wt 156 lb (70.8 kg)  SpO2 97%  BMI 21.16 kg/m2 Estimated body mass index is 21.16 kg/(m^2) as calculated from the following:    Height as of 3/23/18: 6' (1.829 m).    Weight as of this encounter: 156 lb (70.8 kg).  Medication Reconciliation: complete Nella Trejo MA     4/4/2018

## 2018-04-04 NOTE — MR AVS SNAPSHOT
After Visit Summary   4/4/2018    Remigio Holly    MRN: [id number]           Patient Information     Date Of Birth          11/6/1933        Visit Information        Provider Department      4/4/2018 11:00 AM Harrison Tse DO [facility]        Today's Diagnoses     Paroxysmal atrial fibrillation (H)    -  1    Gross hematuria        ASCVD (arteriosclerotic cardiovascular disease)           Follow-ups after your visit        Your next 10 appointments already scheduled     Apr 18, 2018  8:30 AM CDT   Cardiac Evaluation with KAYLA Comer   [facility]    [address] [place] [address] [phone]              Who to contact     If you have questions or need follow up information about today's clinic visit or your schedule please contact [facility] directly at [phone].  Normal or non-critical lab and imaging results will be communicated to you by MyChart, letter or phone within 4 business days after the clinic has received the results. If you do not hear from us within 7 days, please contact the clinic through MyChart or phone. If you have a critical or abnormal lab result, we will notify you by phone as soon as possible.  Submit refill requests through [employer] or call your pharmacy and they will forward the refill request to us. Please allow 3 business days for your refill to be completed.          Additional Information About Your Visit        MyChart Information     [employer] gives you secure access to your electronic health record. If you see a primary care provider, you can also send messages to your care team and make appointments. If you have questions, please call your primary care clinic.  If you do not have a primary care provider, please call [phone] and they will assist you.        Care EveryWhere ID     This is your Care EveryWhere ID. This could be used by  other organizations to access your [facility] medical records  [id number]        Your Vitals Were     Pulse Temperature Respirations Pulse Oximetry BMI (Body Mass Index)       68 96.1  F (35.6  C) (Tympanic) 16 97% 21.16 kg/m2        Blood Pressure from Last 3 Encounters:   04/04/18 111/66   03/23/18 110/62   03/03/18 109/76    Weight from Last 3 Encounters:   04/04/18 156 lb (70.8 kg)   03/23/18 156 lb 14.4 oz (71.2 kg)   03/03/18 160 lb (72.6 kg)              We Performed the Following     **UA reflex to Microscopic FUTURE 14d     Hemoglobin     Urine Microscopic        Primary Care Provider Office Phone # Fax #    Moy Celis [phone]       [address] [facility] [address]        Equal Access to Services     ERICA RUSSELL : Hadcary Tiwari, waaxmel luqadaha, qaybta kaalmada vidhi, tray haider . So [facility] [phone].    ATENCIÓN: Si habla español, tiene a tabares disposición servicios gratuitos de asistencia lingüística. Edith al [phone].    We comply with applicable federal civil rights laws and [place] laws. We do not discriminate on the basis of race, color, national origin, age, disability, sex, sexual orientation, or gender identity.            Thank you!     Thank you for choosing [facility]  for your care. Our goal is always to provide you with excellent care. Hearing back from our patients is one way we can continue to improve our services. Please take a few minutes to complete the written survey that you may receive in the mail after your visit with us. Thank you!             Your Updated Medication List - Protect others around you: Learn how to safely use, store and throw away your medicines at www.disposemymeds.org.          This list is accurate as of 4/4/18 11:59 PM.  Always use your most recent med list.                   Brand Name Dispense Instructions for use Diagnosis    acetaminophen 500 MG tablet    TYLENOL      Take 2 tablets (1,000 mg) by mouth every 8 hours    Status post total left knee replacement using cement       BIOFREEZE EX      Externally apply topically 3 times daily as needed        blood glucose monitoring lancets     1 Box    Use to test blood sugar 1 time daily or as directed.    Hypoglycemia       blood glucose monitoring test strip    ROBERT CONTOUR NEXT    100 each    Use to test blood sugar 1 times daily or as directed.    Hypoglycemia       cyanocobalamin 1000 MCG/ML injection    VITAMIN B12    1 mL    Inject 1 mL (1,000 mcg) into the muscle every 30 days 1 year of injections approved through 4/19/2017 per Dr. Ayon.    Pernicious anemia       MELATONIN PO      Take 5 mg by mouth At Bedtime        metoprolol tartrate 25 MG tablet    LOPRESSOR    180 tablet    Take 1 tablet (25 mg) by mouth 2 times daily    Aortic root dilatation (H)       polyethylene glycol powder    MIRALAX/GLYCOLAX     Take 17 g by mouth daily as needed for constipation        rivaroxaban ANTICOAGULANT 15 MG Tabs tablet    XARELTO    90 tablet    Take 1 tablet (15 mg) by mouth daily    Paroxysmal atrial fibrillation (H)       tamsulosin 0.4 MG capsule    FLOMAX     Take 0.4 mg by mouth daily        traMADol 50 MG tablet    ULTRAM    30 tablet    Take 1 tablet (50 mg) by mouth every 6 hours as needed for severe pain    S/P CABG (coronary artery bypass graft)

## 2018-04-04 NOTE — PROGRESS NOTES
SUBJECTIVE:   Remigio Holly is a 84 year old male who presents to clinic today for the following health issues:      Genitourinary - Male  Onset: 1 day    Description:   Dysuria (painful urination): no   Hematuria (blood in urine): YES  Frequency: YES   Are you urinating at night : no   Hesitancy (delay in urine): no   Retention (unable to empty): no   Decrease in urinary flow: no   Incontinence: no     Progression of Symptoms:  worsening    Accompanying Signs & Symptoms:  Fever: no   Back/Flank pain: no   Urethral discharge: no   Testicle lumps/masses/pain: no   Nausea and/or vomiting: no   Abdominal pain: no     History:   History of frequent UTI's: no   History of kidney stones: no   History of hernias: no   Personal or Family history of Prostate problems: no  Sexually active: no     Precipitating factors:   none    Alleviating factors:  none    CHIEF COMPLAINT:    The patient is a pleasant 84-year-old gentleman who recently had a coronary artery bypass graft. He is now on Xeralto as he does have underlying paroxysmal atrial fibrillation. He started experiencing significant hematuria with dark red urine yesterday. He notes that there were some clots in the urine. He's also had some frequency of urination. He notes no flank pain and has minimal suprapubic tenderness. He has been doing quite well since his bypass. He takes his medications compliantly and has had no problems with the medications. He's had no previous history of hematuria in the past. He did have a urinary catheter during his hospitalization.                           PAST, FAMILY,SOCIAL HISTORY:     Medical  History:   has a past medical history of Blood transfusion; Chronic gastric ulcer without mention of hemorrhage, perforation, without mention of obstruction; Gastric cancer (H); Hemorrhage of gastrointestinal tract, unspecified (01/13/10); Hypoglycemia, unspecified (1/26/2015); Measles without mention of complication; Mixed  hyperlipidemia; Mumps without mention of complication; Pulmonary embolism (H) (Sept 4, 2010); Urinary calculus, unspecified; and Varicella without mention of complication.     Surgical History:   has a past surgical history that includes EXCIS STOMACH ULCER,LESN;LOCAL; REPAIR ROTATOR CUFF,ACUTE (3/21/2005); Colonoscopy w/wo Jelm **Performed** (01/31/2006); cholecystectomy, laporoscopic (10/6/2008); UPPER GI ENDOSCOPY,EXAM (1/13/10); colonoscopy (1/14/10); video capsule endoscopy (01/15/10); colonoscopy (05/25/10); UPPER GI ENDOSCOPY,EXAM (05/25/10); general surgery                           date: (07/07/10); Abdomen surgery; Esophagoscopy, gastroscopy, duodenoscopy (EGD), combined (8/16/2011); Cystoscopy, retrogrades, extract stone, insert stent, combined (12/25/2012); Laser holmium lithotripsy ureter(s), insert stent, combined (1/5/2013); Laser holmium lithotripsy ureter(s), insert stent, combined (1/30/2013); and Arthroplasty knee (Left, 8/15/2017).     Social History:   reports that he has been smoking Pipe and Cigars.  He has been smoking about 0.00 packs per day for the past 20.00 years. He has never used smokeless tobacco. He reports that he does not drink alcohol or use illicit drugs.     Family History:  family history includes Alzheimer Disease in his father; C.A.D. in his brother and mother; Cardiovascular in his brother and mother; HEART DISEASE in his brother and mother.            MEDICATIONS  Current Outpatient Prescriptions   Medication Sig Dispense Refill     traMADol (ULTRAM) 50 MG tablet Take 1 tablet (50 mg) by mouth every 6 hours as needed for severe pain 30 tablet 0     rivaroxaban ANTICOAGULANT (XARELTO) 15 MG TABS tablet Take 1 tablet (15 mg) by mouth daily 90 tablet 3     MELATONIN PO Take 5 mg by mouth At Bedtime       tamsulosin (FLOMAX) 0.4 MG capsule Take 0.4 mg by mouth daily       Menthol, Topical Analgesic, (BIOFREEZE EX) Externally apply topically 3 times daily as needed        polyethylene glycol (MIRALAX/GLYCOLAX) powder Take 17 g by mouth daily as needed for constipation       metoprolol (LOPRESSOR) 25 MG tablet Take 1 tablet (25 mg) by mouth 2 times daily 180 tablet 3     acetaminophen (TYLENOL) 500 MG tablet Take 2 tablets (1,000 mg) by mouth every 8 hours       cyanocobalamin (VITAMIN B12) 1000 MCG/ML injection Inject 1 mL (1,000 mcg) into the muscle every 30 days 1 year of injections approved through 4/19/2017 per Dr. Ayon. 1 mL 11     blood glucose monitoring (ROBERT CONTOUR NEXT) test strip Use to test blood sugar 1 times daily or as directed. 100 each 3     blood glucose monitoring (ROBERT MICROLET) lancets Use to test blood sugar 1 time daily or as directed. 1 Box 2         --------------------------------------------------------------------------------------------------------------------                          REVIEW OF SYSTEMS:         LUNGS: Pt denies: cough,excess sputum, hemoptysis, or shortness of breath.   HEART: Pt denies: chest pain, arrythmia, syncope, tachy or bradyarrhythmia or excess edema.   GI: Pt denies: nausea, vomitting, diarrhea, constipation, melena, or hematochezia.   NEURO: Pt denies: seizures, strokes, diplopia, weakness, paraesthesias, or paralysis.   SKIN: Pt denies: itching, rashes, discoloration, or specific lesions of concern. Denies recent hair loss.                          EXAMINATION:         /66 (BP Location: Left arm, Patient Position: Chair, Cuff Size: Adult Regular)  Pulse 68  Temp 96.1  F (35.6  C) (Tympanic)  Resp 16  Wt 156 lb (70.8 kg)  SpO2 97%  BMI 21.16 kg/m2   Constitutional: The patient appears to be in no acute distress. The patient appears to be adequately hydrated. No acute respiratory or hemodynamic distress is noted at this time.   LUNGS: clear bilaterally, airflow is brisk, no intercostal retraction or stridor is noted. No coughing is noted during visit.   HEART:  regular without rubs, clicks, gallops, or  murmurs. PMI is nondisplaced. Upstrokes are brisk. S1,S2 are heard.   GI: Abdomen is soft, without rebound, guarding or tenderness. Bowel sounds are appropriate. No renal bruits are heard.    URINARY: Brad's punch is negative. No suprapubic tenderness is noted with palpation.                        DECISION MAKIN. Gross hematuria  Will check urinalysis to rule out infection.  Stop the Xarelto for the next week  If symptoms of hematuria continued, workup including kidney evaluation and probable referral to urology will be suggested  - **UA reflex to Microscopic FUTURE 14d  - Urine Microscopic  - Hemoglobin    2. Paroxysmal atrial fibrillation (H)  Hold Xarelto for now  Discussed statistical unlikelihood of CVA secondary to short-term discontinuation versus certainty of continued bleeding    3. ASCVD (arteriosclerotic cardiovascular disease)  Stable post bypass without complication.                           FOLLOW UP    I have asked the patient to make an appointment for follow up with me early next week or sooner as needed        I have carefully explained the diagnosis and treatment options with the patient. The patient has displayed an understanding of the above, and all subsequent questions were answered.         DO ELAINE Powell    Portions of this note were produced using [employer]  Although every attempt at real-time proof reading has been made, occasional grammar/syntax errors may have been missed.

## 2018-04-04 NOTE — TELEPHONE ENCOUNTER
Reason for Call:  Other wants to talk to doctor    Detailed comments: patient had open heart surgery a couple weeks ago and is having blood in his urine.  He is wanting Dr. Tse to call him to discuss. Declined triage.    Phone Number Patient can be reached at: Home number on file [phone] (home)    Best Time: any    Can we leave a detailed message on this number? YES    Call taken on 4/4/2018 at 8:02 AM by Adin Aguilar

## 2018-04-04 NOTE — TELEPHONE ENCOUNTER
Patient states he is loosing a lot of blood. His urine is straight red. Patient will come right in. Luz WORRELL

## 2018-04-05 NOTE — PROGRESS NOTES
Dear Remigio, your recent test results are attached.  The hemoglobin has gone up from 11.9 up to 12.1.  The urine sample demonstrates a large quantity of blood.    Feel free to contact me via the office or My Chart if you have any questions regarding the above.  Sincerely,  Harrison Tse, DO RODRIGUEZOI

## 2018-04-06 NOTE — TELEPHONE ENCOUNTER
Patient calling to add to message form other day. Patient is not having anymore symptoms, no red urine. He does not need a call back just an FYI for Dr. Dedrick Singh  Reception/ Scheduling

## 2018-04-09 ENCOUNTER — TRANSFERRED RECORDS (OUTPATIENT)
Dept: HEALTH INFORMATION MANAGEMENT | Facility: CLINIC | Age: 83
End: 2018-04-09

## 2018-04-09 ENCOUNTER — TELEPHONE (OUTPATIENT)
Dept: FAMILY MEDICINE | Facility: OTHER | Age: 83
End: 2018-04-09

## 2018-04-09 DIAGNOSIS — I25.10 CORONARY ARTERY DISEASE INVOLVING NATIVE CORONARY ARTERY OF NATIVE HEART WITHOUT ANGINA PECTORIS: Primary | ICD-10-CM

## 2018-04-18 ENCOUNTER — HOSPITAL ENCOUNTER (OUTPATIENT)
Dept: CARDIAC REHAB | Facility: CLINIC | Age: 83
End: 2018-04-18
Attending: THORACIC SURGERY (CARDIOTHORACIC VASCULAR SURGERY)
Payer: MEDICARE

## 2018-04-18 PROCEDURE — 40000116 ZZH STATISTIC OP CR VISIT

## 2018-04-18 PROCEDURE — 93798 PHYS/QHP OP CAR RHAB W/ECG: CPT

## 2018-04-23 ENCOUNTER — HOSPITAL ENCOUNTER (OUTPATIENT)
Dept: CARDIAC REHAB | Facility: CLINIC | Age: 83
End: 2018-04-23
Attending: THORACIC SURGERY (CARDIOTHORACIC VASCULAR SURGERY)
Payer: MEDICARE

## 2018-04-23 PROCEDURE — 40000116 ZZH STATISTIC OP CR VISIT: Performed by: REHABILITATION PRACTITIONER

## 2018-04-23 PROCEDURE — 93798 PHYS/QHP OP CAR RHAB W/ECG: CPT | Performed by: REHABILITATION PRACTITIONER

## 2018-04-25 ENCOUNTER — HOSPITAL ENCOUNTER (OUTPATIENT)
Dept: CARDIAC REHAB | Facility: CLINIC | Age: 83
End: 2018-04-25
Attending: THORACIC SURGERY (CARDIOTHORACIC VASCULAR SURGERY)
Payer: MEDICARE

## 2018-04-25 PROCEDURE — 93798 PHYS/QHP OP CAR RHAB W/ECG: CPT

## 2018-04-25 PROCEDURE — 40000116 ZZH STATISTIC OP CR VISIT

## 2018-04-30 ENCOUNTER — HOSPITAL ENCOUNTER (OUTPATIENT)
Dept: CARDIAC REHAB | Facility: CLINIC | Age: 83
End: 2018-04-30
Attending: THORACIC SURGERY (CARDIOTHORACIC VASCULAR SURGERY)
Payer: MEDICARE

## 2018-04-30 PROCEDURE — 93798 PHYS/QHP OP CAR RHAB W/ECG: CPT | Performed by: REHABILITATION PRACTITIONER

## 2018-04-30 PROCEDURE — 40000116 ZZH STATISTIC OP CR VISIT: Performed by: REHABILITATION PRACTITIONER

## 2018-05-01 ENCOUNTER — ALLIED HEALTH/NURSE VISIT (OUTPATIENT)
Dept: FAMILY MEDICINE | Facility: OTHER | Age: 83
End: 2018-05-01
Payer: COMMERCIAL

## 2018-05-01 DIAGNOSIS — D51.0 PERNICIOUS ANEMIA: Primary | ICD-10-CM

## 2018-05-01 PROCEDURE — 96372 THER/PROPH/DIAG INJ SC/IM: CPT

## 2018-05-01 PROCEDURE — 99207 ZZC NO CHARGE NURSE ONLY: CPT

## 2018-05-01 RX ORDER — CYANOCOBALAMIN 1000 UG/ML
1 INJECTION, SOLUTION INTRAMUSCULAR; SUBCUTANEOUS
Qty: 1 ML | Refills: 11 | COMMUNITY
Start: 2018-05-01

## 2018-05-01 NOTE — NURSING NOTE
The following medication was given:     MEDICATION: Vitamin B12  1000mcg  See medication note.  Patient instructed to remain in clinic for 20 minutes afterwards, and to report any adverse reaction to me immediately.  Prior to injection verified patient identity using patient's name and date of birth.  Nella Nath MA     5/1/2018

## 2018-05-01 NOTE — MR AVS SNAPSHOT
After Visit Summary   5/1/2018    Remigio Holly    MRN: [id number]           Patient Information     Date Of Birth          11/6/1933        Visit Information        Provider Department      5/1/2018 9:00 AM KIRK STEPHENS [facility]        Today's Diagnoses     Pernicious anemia    -  1       Follow-ups after your visit        Your next 10 appointments already scheduled     May 02, 2018  9:00 AM CDT   Cardiac Treatment with Yolie Salvador, EP   [facility] ([facility])    [address] [place] [address]   [phone]            May 07, 2018  9:00 AM CDT   Cardiac Treatment with Yolie Salvador, EP   [facility] ([facility])    [address] [place] [address]   [phone]            May 09, 2018  9:00 AM CDT   Cardiac Treatment with Yolie Salvador, EP   [facility] ([facility])    [address] [place] [address]   [phone]            May 14, 2018  9:00 AM CDT   Cardiac Treatment with Yolie Salvador, EP   [facility] ([facility])    [address] [place] [address]   [phone]            May 16, 2018  9:00 AM CDT   Cardiac Treatment with Yolie Salvador, EP   [facility] ([facility])    [address] [place] [address]   [phone]            May 21, 2018  9:00 AM CDT   Cardiac Treatment with Yolie Salvador, EP   [facility] ([facility])    [address] [place] [address]   [phone]            May 23, 2018  9:00 AM CDT   Cardiac Treatment with Yolie Salvador, EP   [facility] ([facility])    [address] [place] [address]   [phone]            May 30, 2018  9:00 AM CDT   Cardiac Treatment with Yolie Salvador, EP   [facility]  ([facility])    [address] [place] [address]   [phone]            Jun 04, 2018  9:00 AM CDT   Cardiac Treatment with KAYLA Comer   [facility] ([facility])    [address] [place] [address]   [phone]              Who to contact     If you have questions or need follow up information about today's clinic visit or your schedule please contact [facility] directly at [phone].  Normal or non-critical lab and imaging results will be communicated to you by [employer], letter or phone within 4 business days after the clinic has received the results. If you do not hear from us within 7 days, please contact the clinic through [employer] or phone. If you have a critical or abnormal lab result, we will notify you by phone as soon as possible.  Submit refill requests through [employer] or call your pharmacy and they will forward the refill request to us. Please allow 3 business days for your refill to be completed.          Additional Information About Your Visit        MyChart Information     [employer] gives you secure access to your electronic health record. If you see a primary care provider, you can also send messages to your care team and make appointments. If you have questions, please call your primary care clinic.  If you do not have a primary care provider, please call [phone] and they will assist you.        Care EveryWhere ID     This is your Care EveryWhere ID. This could be used by other organizations to access your [place] medical records  [id number]         Blood Pressure from Last 3 Encounters:   04/04/18 111/66   03/23/18 110/62   03/03/18 109/76    Weight from Last 3 Encounters:   04/04/18 156 lb (70.8 kg)   03/23/18 156 lb 14.4 oz (71.2 kg)   03/03/18 160 lb (72.6 kg)              We Performed the Following     INJECTION INTRAMUSCULAR OR SUB-Q     VITAMIN B12 INJ /1000MCG          Today's  Medication Changes          These changes are accurate as of 5/1/18  9:43 AM.  If you have any questions, ask your nurse or doctor.               These medicines have changed or have updated prescriptions.        Dose/Directions    cyanocobalamin 1000 MCG/ML injection   Commonly known as:  VITAMIN B12   This may have changed:  additional instructions   Used for:  Pernicious anemia        Dose:  1 mL   Inject 1 mL (1,000 mcg) into the muscle every 30 days   Quantity:  1 mL   Refills:  11                Primary Care Provider Office Phone # Fax #    Harrison Rhoadesmarcus,  [phone]       [address] [place] [address]        Equal Access to Services     [facility]: Hadii suzy carolina hadashosmar Soelpidio, waaxda luqadaha, qaybta kaalmada vidhi, tray haider . So [facility] [phone].    ATENCIÓN: Si habla español, tiene a tabares disposición servicios gratuitos de asistencia lingüística. [facility] [phone].    We comply with applicable federal civil rights laws and [place] laws. We do not discriminate on the basis of race, color, national origin, age, disability, sex, sexual orientation, or gender identity.            Thank you!     Thank you for choosing [facility]  for your care. Our goal is always to provide you with excellent care. Hearing back from our patients is one way we can continue to improve our services. Please take a few minutes to complete the written survey that you may receive in the mail after your visit with us. Thank you!             Your Updated Medication List - Protect others around you: Learn how to safely use, store and throw away your medicines at www.disposemymeds.org.          This list is accurate as of 5/1/18  9:43 AM.  Always use your most recent med list.                   Brand Name Dispense Instructions for use Diagnosis    acetaminophen 500 MG tablet    TYLENOL     Take 2 tablets (1,000 mg) by mouth every 8 hours     Status post total left knee replacement using cement       BIOFREEZE EX      Externally apply topically 3 times daily as needed        blood glucose monitoring lancets     1 Box    Use to test blood sugar 1 time daily or as directed.    Hypoglycemia       blood glucose monitoring test strip    ROBERT CONTOUR NEXT    100 each    Use to test blood sugar 1 times daily or as directed.    Hypoglycemia       cyanocobalamin 1000 MCG/ML injection    VITAMIN B12    1 mL    Inject 1 mL (1,000 mcg) into the muscle every 30 days    Pernicious anemia       MELATONIN PO      Take 5 mg by mouth At Bedtime        metoprolol tartrate 25 MG tablet    LOPRESSOR    180 tablet    Take 1 tablet (25 mg) by mouth 2 times daily    Aortic root dilatation (H)       polyethylene glycol powder    MIRALAX/GLYCOLAX     Take 17 g by mouth daily as needed for constipation        rivaroxaban ANTICOAGULANT 15 MG Tabs tablet    XARELTO    90 tablet    Take 1 tablet (15 mg) by mouth daily    Paroxysmal atrial fibrillation (H)       tamsulosin 0.4 MG capsule    FLOMAX     Take 0.4 mg by mouth daily        traMADol 50 MG tablet    ULTRAM    30 tablet    Take 1 tablet (50 mg) by mouth every 6 hours as needed for severe pain    S/P CABG (coronary artery bypass graft)

## 2018-05-02 ENCOUNTER — HOSPITAL ENCOUNTER (OUTPATIENT)
Dept: CARDIAC REHAB | Facility: CLINIC | Age: 83
End: 2018-05-02
Attending: THORACIC SURGERY (CARDIOTHORACIC VASCULAR SURGERY)
Payer: MEDICARE

## 2018-05-02 PROCEDURE — 40000116 ZZH STATISTIC OP CR VISIT: Performed by: REHABILITATION PRACTITIONER

## 2018-05-02 PROCEDURE — 93798 PHYS/QHP OP CAR RHAB W/ECG: CPT | Performed by: REHABILITATION PRACTITIONER

## 2018-05-02 PROCEDURE — 93797 PHYS/QHP OP CAR RHAB WO ECG: CPT | Mod: XU | Performed by: REHABILITATION PRACTITIONER

## 2018-05-02 PROCEDURE — 40000575 ZZH STATISTIC OP CARDIAC VISIT #2: Performed by: REHABILITATION PRACTITIONER

## 2018-05-04 ENCOUNTER — HOSPITAL ENCOUNTER (OUTPATIENT)
Dept: CARDIAC REHAB | Facility: CLINIC | Age: 83
End: 2018-05-04
Attending: THORACIC SURGERY (CARDIOTHORACIC VASCULAR SURGERY)
Payer: MEDICARE

## 2018-05-04 PROCEDURE — 93798 PHYS/QHP OP CAR RHAB W/ECG: CPT | Performed by: REHABILITATION PRACTITIONER

## 2018-05-04 PROCEDURE — 40000116 ZZH STATISTIC OP CR VISIT: Performed by: REHABILITATION PRACTITIONER

## 2018-05-07 ENCOUNTER — HOSPITAL ENCOUNTER (OUTPATIENT)
Dept: CARDIAC REHAB | Facility: CLINIC | Age: 83
End: 2018-05-07
Attending: THORACIC SURGERY (CARDIOTHORACIC VASCULAR SURGERY)
Payer: MEDICARE

## 2018-05-07 PROCEDURE — 40000116 ZZH STATISTIC OP CR VISIT: Performed by: REHABILITATION PRACTITIONER

## 2018-05-07 PROCEDURE — 93798 PHYS/QHP OP CAR RHAB W/ECG: CPT | Performed by: REHABILITATION PRACTITIONER

## 2018-05-09 ENCOUNTER — OFFICE VISIT (OUTPATIENT)
Dept: FAMILY MEDICINE | Facility: OTHER | Age: 83
End: 2018-05-09
Payer: COMMERCIAL

## 2018-05-09 ENCOUNTER — HOSPITAL ENCOUNTER (OUTPATIENT)
Dept: CARDIAC REHAB | Facility: CLINIC | Age: 83
End: 2018-05-09
Attending: THORACIC SURGERY (CARDIOTHORACIC VASCULAR SURGERY)
Payer: MEDICARE

## 2018-05-09 VITALS
WEIGHT: 164 LBS | DIASTOLIC BLOOD PRESSURE: 62 MMHG | OXYGEN SATURATION: 96 % | BODY MASS INDEX: 22.24 KG/M2 | SYSTOLIC BLOOD PRESSURE: 112 MMHG | HEART RATE: 57 BPM | TEMPERATURE: 96.9 F

## 2018-05-09 DIAGNOSIS — I71.40 ABDOMINAL AORTIC ANEURYSM (AAA) WITHOUT RUPTURE (H): ICD-10-CM

## 2018-05-09 DIAGNOSIS — I48.0 PAROXYSMAL ATRIAL FIBRILLATION (H): Primary | ICD-10-CM

## 2018-05-09 PROCEDURE — 93797 PHYS/QHP OP CAR RHAB WO ECG: CPT | Mod: XU | Performed by: REHABILITATION PRACTITIONER

## 2018-05-09 PROCEDURE — 99213 OFFICE O/P EST LOW 20 MIN: CPT | Performed by: INTERNAL MEDICINE

## 2018-05-09 PROCEDURE — 40000116 ZZH STATISTIC OP CR VISIT: Performed by: REHABILITATION PRACTITIONER

## 2018-05-09 PROCEDURE — 93798 PHYS/QHP OP CAR RHAB W/ECG: CPT | Performed by: REHABILITATION PRACTITIONER

## 2018-05-09 PROCEDURE — 40000575 ZZH STATISTIC OP CARDIAC VISIT #2: Performed by: REHABILITATION PRACTITIONER

## 2018-05-09 ASSESSMENT — PAIN SCALES - GENERAL: PAINLEVEL: NO PAIN (0)

## 2018-05-09 NOTE — PROGRESS NOTES
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 About a year or so ago, he had a total joint arthroplasty and it was serendipitously found to have atrial fibrillation. He was subsequently placed on anticoagulants and has done well. Couple weeks back he started having some hematuria and was taken off of the Zaroxolyn. The hematuria resolved immediately the next day. He now has questions regarding the need and efficacy of the anticoagulant. He is in a normal sinus rhythm and EKG is performed in the interim since last year showed the same. He's had no chest pain. He has no prosthetic valves etc. We have discussed the risks and benefits of chronic anticoagulation with respect atrial fibrillation. Unfortunately, there are no significant benefits if you don't actually have the fibrillation. At that point, the patient decided that he would like to stop the anticoagulation. I would agree with this given his propensity toward farm work and potential injury. Additionally, he has a history of an abdominal aortic aneurysm. Recent CAT scan demonstrates no advancement in the chronic minimal dilation.                         PAST, FAMILY,SOCIAL HISTORY:     Medical  History:   has a past medical history of Blood transfusion; Chronic gastric ulcer without mention of hemorrhage, perforation, without mention of obstruction; Gastric cancer (H); Hemorrhage of gastrointestinal tract, unspecified (01/13/10); Hypoglycemia, unspecified (1/26/2015); Measles without mention of complication; Mixed hyperlipidemia; Mumps without mention of complication; Pulmonary embolism (H) (Sept 4, 2010); Urinary calculus, unspecified; and Varicella without mention of complication.     Surgical History:   has a past surgical history that includes EXCIS STOMACH ULCER,LESN;LOCAL; REPAIR ROTATOR CUFF,ACUTE (3/21/2005); Colonoscopy w/wo  Brush **Performed** (01/31/2006); cholecystectomy, laporoscopic (10/6/2008); UPPER GI ENDOSCOPY,EXAM (1/13/10); colonoscopy (1/14/10); video capsule endoscopy (01/15/10); colonoscopy (05/25/10); UPPER GI ENDOSCOPY,EXAM (05/25/10); general surgery                           date: (07/07/10); Abdomen surgery; Esophagoscopy, gastroscopy, duodenoscopy (EGD), combined (8/16/2011); Cystoscopy, retrogrades, extract stone, insert stent, combined (12/25/2012); Laser holmium lithotripsy ureter(s), insert stent, combined (1/5/2013); Laser holmium lithotripsy ureter(s), insert stent, combined (1/30/2013); and Arthroplasty knee (Left, 8/15/2017).     Social History:   reports that he has been smoking Pipe and Cigars.  He has been smoking about 0.00 packs per day for the past 20.00 years. He has never used smokeless tobacco. He reports that he does not drink alcohol or use illicit drugs.     Family History:  family history includes Alzheimer Disease in his father; C.A.D. in his brother and mother; Cardiovascular in his brother and mother; HEART DISEASE in his brother and mother.            MEDICATIONS  Current Outpatient Prescriptions   Medication Sig Dispense Refill     acetaminophen (TYLENOL) 500 MG tablet Take 2 tablets (1,000 mg) by mouth every 8 hours       blood glucose monitoring (ROBERT CONTOUR NEXT) test strip Use to test blood sugar 1 times daily or as directed. 100 each 3     blood glucose monitoring (ROBERT MICROLET) lancets Use to test blood sugar 1 time daily or as directed. 1 Box 2     cyanocobalamin (VITAMIN B12) 1000 MCG/ML injection Inject 1 mL (1,000 mcg) into the muscle every 30 days 1 mL 11     MELATONIN PO Take 5 mg by mouth At Bedtime       Menthol, Topical Analgesic, (BIOFREEZE EX) Externally apply topically 3 times daily as needed       metoprolol (LOPRESSOR) 25 MG tablet Take 1 tablet (25 mg) by mouth 2 times daily 180 tablet 3     polyethylene glycol (MIRALAX/GLYCOLAX) powder Take 17 g by mouth daily as  needed for constipation       tamsulosin (FLOMAX) 0.4 MG capsule Take 0.4 mg by mouth daily       traMADol (ULTRAM) 50 MG tablet Take 1 tablet (50 mg) by mouth every 6 hours as needed for severe pain 30 tablet 0         --------------------------------------------------------------------------------------------------------------------                          REVIEW OF SYSTEMS:         LUNGS: Pt denies: cough,excess sputum, hemoptysis, or shortness of breath.   HEART: Pt denies: chest pain, arrythmia, syncope, tachy or bradyarrhythmia or excess edema.   GI: Pt denies: nausea, vomitting, diarrhea, constipation, melena, or hematochezia.   NEURO: Pt denies: seizures, strokes, diplopia, weakness, paraesthesias, or paralysis.   SKIN: Pt denies: itching, rashes, discoloration, or specific lesions of concern. Denies recent hair loss.                          EXAMINATION:         /62 (BP Location: Left arm, Patient Position: Chair, Cuff Size: Adult Regular)  Pulse 57  Temp 96.9  F (36.1  C) (Temporal)  Wt 164 lb (74.4 kg)  SpO2 96%  BMI 22.24 kg/m2   Constitutional: The patient appears to be in no acute distress. The patient appears to be adequately hydrated. No acute respiratory or hemodynamic distress is noted at this time.   LUNGS: clear bilaterally, airflow is brisk, no intercostal retraction or stridor is noted. No coughing is noted during visit.   HEART:  regular without rubs, clicks, gallops, or murmurs. PMI is nondisplaced. Upstrokes are brisk. S1,S2 are heard.   GI: Abdomen is soft, without rebound, guarding or tenderness. Bowel sounds are appropriate. No renal bruits are heard.    NEURO: Pt is alert and appropriate. No neurologic lateralization is noted. Cranial nerves 2-12 are intact. Peripheral sensory and motor function are grossly normal                        DECISION MAKIN. Paroxysmal atrial fibrillation (H)  Currently in sinus rhythm. Recommend discontinuing anticoagulation.  Patient  instructed on how to check his pulse daily for regularity.  Would also recommend an automated blood pressure cuff that we do the same thing audibly.    2. Abdominal aortic aneurysm (AAA) without rupture (H)  Stable.                               FOLLOW UP    I have asked the patient to make an appointment for follow up with me in 6 months or as needed.            I have carefully explained the diagnosis and treatment options with the patient. The patient has displayed an understanding of the above, and all subsequent questions were answered.             DO ELAINE Powell    Portions of this note were produced using [employer]  Although every attempt at real-time proof reading has been made, occasional grammar/syntax errors may have been missed.

## 2018-05-09 NOTE — MR AVS SNAPSHOT
After Visit Summary   5/9/2018    Remigio oHlly    MRN: [id number]           Patient Information     Date Of Birth          11/6/1933        Visit Information        Provider Department      5/9/2018 2:20 PM Harrison Tse DO [facility]        Today's Diagnoses     Paroxysmal atrial fibrillation (H)    -  1    Abdominal aortic aneurysm (AAA) without rupture (H)           Follow-ups after your visit        Follow-up notes from your care team     Return in about 6 months (around 11/9/2018).      Your next 10 appointments already scheduled     May 16, 2018  9:00 AM CDT   Cardiac Treatment with Yolie Franco, WANDY   [facility] ([facility])    [address] [place] [address] [place] [address]   [phone]            May 18, 2018  9:00 AM CDT   Cardiac Treatment with Veronica Moya, [facility] ([facility])    [address] [place] [address] [address] [place] [address]   [phone]            May 21, 2018  9:00 AM CDT   Cardiac Treatment with Yolie Franco, EP   [facility] ([facility])    [address] [place] [address]   [phone]            May 23, 2018  9:00 AM CDT   Cardiac Treatment with Yolie Franco, EP   [facility] ([facility])    [address] [place] [address]   [phone]            May 25, 2018  9:00 AM CDT   Cardiac Treatment with Veronica Moya [facility] ([facility])    [address] [place] [address]   [phone]            May 30, 2018  9:00 AM CDT   Cardiac Treatment with Yolie Franco, WANDY   [facility] ([facility])    [address] [place] [address]   [phone]            Jun 04, 2018  9:00 AM CDT   Cardiac Treatment with Veronica Moya, [facility]  ([facility])    [address] [place] [id number] [phone]              Who to contact     If you have questions or need follow up information about today's clinic visit or your schedule please contact [facility] directly at [phone].  Normal or non-critical lab and imaging results will be communicated to you by MyChart, letter or phone within 4 business days after the clinic has received the results. If you do not hear from us within 7 days, please contact the clinic through [employer] or phone. If you have a critical or abnormal lab result, we will notify you by phone as soon as possible.  Submit refill requests through [employer] or call your pharmacy and they will forward the refill request to us. Please allow 3 business days for your refill to be completed.          Additional Information About Your Visit        MyChart Information     [employer] gives you secure access to your electronic health record. If you see a primary care provider, you can also send messages to your care team and make appointments. If you have questions, please call your primary care clinic.  If you do not have a primary care provider, please call [phone] and they will assist you.        Care EveryWhere ID     This is your Care EveryWhere ID. This could be used by other organizations to access your [place] medical records  [id number]        Your Vitals Were     Pulse Temperature Pulse Oximetry BMI (Body Mass Index)          57 96.9  F (36.1  C) (Temporal) 96% 22.24 kg/m2         Blood Pressure from Last 3 Encounters:   05/09/18 112/62   04/04/18 111/66   03/23/18 110/62    Weight from Last 3 Encounters:   05/09/18 164 lb (74.4 kg)   04/04/18 156 lb (70.8 kg)   03/23/18 156 lb 14.4 oz (71.2 kg)              Today, you had the following     No orders found for display         Today's Medication Changes          These changes are accurate as of 5/9/18 11:59 PM.  If you have any questions, ask  your nurse or doctor.               Stop taking these medicines if you haven't already. Please contact your care team if you have questions.     rivaroxaban ANTICOAGULANT 15 MG Tabs tablet   Commonly known as:  XARELTO   Stopped by:  Harrison Tse DO                    Primary Care Provider Office Phone # Fax #    Harrison Tse  [phone]       [address] [place] [address]        Equal Access to Services     [facility]: Hadii aad ku hadasho Soomaali, waaxda luqadaha, qaybta kaalmada adeegyada, waxay idiin hayaan adeeg kharash la'aan . So [facility] [phone].    ATENCIÓN: Si habla español, tiene a tabares disposición servicios gratuitos de asistencia lingüística. [facility] [phone].    We comply with applicable federal civil rights laws and [place] laws. We do not discriminate on the basis of race, color, national origin, age, disability, sex, sexual orientation, or gender identity.            Thank you!     Thank you for choosing [facility]  for your care. Our goal is always to provide you with excellent care. Hearing back from our patients is one way we can continue to improve our services. Please take a few minutes to complete the written survey that you may receive in the mail after your visit with us. Thank you!             Your Updated Medication List - Protect others around you: Learn how to safely use, store and throw away your medicines at www.disposemymeds.org.          This list is accurate as of 5/9/18 11:59 PM.  Always use your most recent med list.                   Brand Name Dispense Instructions for use Diagnosis    acetaminophen 500 MG tablet    TYLENOL     Take 2 tablets (1,000 mg) by mouth every 8 hours    Status post total left knee replacement using cement       BIOFREEZE EX      Externally apply topically 3 times daily as needed        blood glucose monitoring lancets     1 Box    Use to test blood sugar 1 time daily or as  directed.    Hypoglycemia       blood glucose monitoring test strip    ROBERT CONTOUR NEXT    100 each    Use to test blood sugar 1 times daily or as directed.    Hypoglycemia       cyanocobalamin 1000 MCG/ML injection    VITAMIN B12    1 mL    Inject 1 mL (1,000 mcg) into the muscle every 30 days    Pernicious anemia       MELATONIN PO      Take 5 mg by mouth At Bedtime        metoprolol tartrate 25 MG tablet    LOPRESSOR    180 tablet    Take 1 tablet (25 mg) by mouth 2 times daily    Aortic root dilatation (H)       polyethylene glycol powder    MIRALAX/GLYCOLAX     Take 17 g by mouth daily as needed for constipation        tamsulosin 0.4 MG capsule    FLOMAX     Take 0.4 mg by mouth daily        traMADol 50 MG tablet    ULTRAM    30 tablet    Take 1 tablet (50 mg) by mouth every 6 hours as needed for severe pain    S/P CABG (coronary artery bypass graft)

## 2018-05-11 ENCOUNTER — HOSPITAL ENCOUNTER (OUTPATIENT)
Dept: CARDIAC REHAB | Facility: CLINIC | Age: 83
End: 2018-05-11
Attending: THORACIC SURGERY (CARDIOTHORACIC VASCULAR SURGERY)
Payer: MEDICARE

## 2018-05-11 PROCEDURE — 93798 PHYS/QHP OP CAR RHAB W/ECG: CPT

## 2018-05-11 PROCEDURE — 40000116 ZZH STATISTIC OP CR VISIT

## 2018-05-14 ENCOUNTER — HOSPITAL ENCOUNTER (OUTPATIENT)
Dept: CARDIAC REHAB | Facility: CLINIC | Age: 83
End: 2018-05-14
Attending: THORACIC SURGERY (CARDIOTHORACIC VASCULAR SURGERY)
Payer: MEDICARE

## 2018-05-14 PROCEDURE — 40000116 ZZH STATISTIC OP CR VISIT: Performed by: REHABILITATION PRACTITIONER

## 2018-05-14 PROCEDURE — 93798 PHYS/QHP OP CAR RHAB W/ECG: CPT | Performed by: REHABILITATION PRACTITIONER

## 2018-05-16 ENCOUNTER — HOSPITAL ENCOUNTER (OUTPATIENT)
Dept: CARDIAC REHAB | Facility: CLINIC | Age: 83
End: 2018-05-16
Attending: THORACIC SURGERY (CARDIOTHORACIC VASCULAR SURGERY)
Payer: MEDICARE

## 2018-05-16 PROCEDURE — 40000575 ZZH STATISTIC OP CARDIAC VISIT #2: Performed by: REHABILITATION PRACTITIONER

## 2018-05-16 PROCEDURE — 40000116 ZZH STATISTIC OP CR VISIT: Performed by: REHABILITATION PRACTITIONER

## 2018-05-16 PROCEDURE — 93797 PHYS/QHP OP CAR RHAB WO ECG: CPT | Performed by: REHABILITATION PRACTITIONER

## 2018-05-16 PROCEDURE — 93798 PHYS/QHP OP CAR RHAB W/ECG: CPT | Performed by: REHABILITATION PRACTITIONER

## 2018-05-18 ENCOUNTER — HOSPITAL ENCOUNTER (OUTPATIENT)
Dept: CARDIAC REHAB | Facility: CLINIC | Age: 83
End: 2018-05-18
Attending: THORACIC SURGERY (CARDIOTHORACIC VASCULAR SURGERY)
Payer: MEDICARE

## 2018-05-18 PROCEDURE — 93798 PHYS/QHP OP CAR RHAB W/ECG: CPT

## 2018-05-18 PROCEDURE — 40000116 ZZH STATISTIC OP CR VISIT

## 2018-05-21 ENCOUNTER — HOSPITAL ENCOUNTER (OUTPATIENT)
Dept: CARDIAC REHAB | Facility: CLINIC | Age: 83
End: 2018-05-21
Attending: THORACIC SURGERY (CARDIOTHORACIC VASCULAR SURGERY)
Payer: MEDICARE

## 2018-05-21 PROCEDURE — 40000116 ZZH STATISTIC OP CR VISIT: Performed by: REHABILITATION PRACTITIONER

## 2018-05-21 PROCEDURE — 93798 PHYS/QHP OP CAR RHAB W/ECG: CPT | Performed by: REHABILITATION PRACTITIONER

## 2018-05-23 ENCOUNTER — HOSPITAL ENCOUNTER (OUTPATIENT)
Dept: CARDIAC REHAB | Facility: CLINIC | Age: 83
End: 2018-05-23
Attending: THORACIC SURGERY (CARDIOTHORACIC VASCULAR SURGERY)
Payer: MEDICARE

## 2018-05-23 PROCEDURE — 93798 PHYS/QHP OP CAR RHAB W/ECG: CPT

## 2018-05-23 PROCEDURE — 40000575 ZZH STATISTIC OP CARDIAC VISIT #2

## 2018-05-23 PROCEDURE — 93797 PHYS/QHP OP CAR RHAB WO ECG: CPT

## 2018-05-23 PROCEDURE — 40000116 ZZH STATISTIC OP CR VISIT

## 2018-05-25 ENCOUNTER — HOSPITAL ENCOUNTER (OUTPATIENT)
Dept: CARDIAC REHAB | Facility: CLINIC | Age: 83
End: 2018-05-25
Attending: THORACIC SURGERY (CARDIOTHORACIC VASCULAR SURGERY)
Payer: MEDICARE

## 2018-05-25 PROCEDURE — 40000116 ZZH STATISTIC OP CR VISIT

## 2018-05-25 PROCEDURE — 93798 PHYS/QHP OP CAR RHAB W/ECG: CPT

## 2018-05-30 ENCOUNTER — HOSPITAL ENCOUNTER (OUTPATIENT)
Dept: CARDIAC REHAB | Facility: CLINIC | Age: 83
End: 2018-05-30
Attending: THORACIC SURGERY (CARDIOTHORACIC VASCULAR SURGERY)
Payer: MEDICARE

## 2018-05-30 PROCEDURE — 93798 PHYS/QHP OP CAR RHAB W/ECG: CPT | Performed by: REHABILITATION PRACTITIONER

## 2018-05-30 PROCEDURE — 40000116 ZZH STATISTIC OP CR VISIT: Performed by: REHABILITATION PRACTITIONER

## 2018-06-04 ENCOUNTER — HOSPITAL ENCOUNTER (OUTPATIENT)
Dept: CARDIAC REHAB | Facility: CLINIC | Age: 83
End: 2018-06-04
Attending: THORACIC SURGERY (CARDIOTHORACIC VASCULAR SURGERY)
Payer: MEDICARE

## 2018-06-04 PROCEDURE — 40000116 ZZH STATISTIC OP CR VISIT

## 2018-06-04 PROCEDURE — 93798 PHYS/QHP OP CAR RHAB W/ECG: CPT

## 2018-06-06 ENCOUNTER — HOSPITAL ENCOUNTER (OUTPATIENT)
Dept: CARDIAC REHAB | Facility: CLINIC | Age: 83
End: 2018-06-06
Attending: THORACIC SURGERY (CARDIOTHORACIC VASCULAR SURGERY)
Payer: MEDICARE

## 2018-06-06 PROCEDURE — 40000116 ZZH STATISTIC OP CR VISIT

## 2018-06-06 PROCEDURE — 93798 PHYS/QHP OP CAR RHAB W/ECG: CPT

## 2018-06-08 ENCOUNTER — HOSPITAL ENCOUNTER (OUTPATIENT)
Dept: CARDIAC REHAB | Facility: CLINIC | Age: 83
End: 2018-06-08
Attending: THORACIC SURGERY (CARDIOTHORACIC VASCULAR SURGERY)
Payer: MEDICARE

## 2018-06-08 PROCEDURE — 40000116 ZZH STATISTIC OP CR VISIT

## 2018-06-08 PROCEDURE — 93798 PHYS/QHP OP CAR RHAB W/ECG: CPT

## 2018-06-11 ENCOUNTER — HOSPITAL ENCOUNTER (OUTPATIENT)
Dept: CARDIAC REHAB | Facility: CLINIC | Age: 83
End: 2018-06-11
Attending: THORACIC SURGERY (CARDIOTHORACIC VASCULAR SURGERY)
Payer: MEDICARE

## 2018-06-11 PROCEDURE — 40000116 ZZH STATISTIC OP CR VISIT

## 2018-06-11 PROCEDURE — 93798 PHYS/QHP OP CAR RHAB W/ECG: CPT

## 2018-06-12 ENCOUNTER — ALLIED HEALTH/NURSE VISIT (OUTPATIENT)
Dept: FAMILY MEDICINE | Facility: OTHER | Age: 83
End: 2018-06-12
Payer: COMMERCIAL

## 2018-06-12 DIAGNOSIS — D51.0 PERNICIOUS ANEMIA: Primary | ICD-10-CM

## 2018-06-12 PROCEDURE — 96372 THER/PROPH/DIAG INJ SC/IM: CPT

## 2018-06-12 NOTE — NURSING NOTE
The following medication was given:     MEDICATION: Vitamin B12  1000mcg  See medication note.  Patient instructed to remain in clinic for 20 minutes afterwards, and to report any adverse reaction to me immediately.  Prior to injection verified patient identity using patient's name and date of birth.  Nella Nath MA     6/12/2018

## 2018-06-12 NOTE — MR AVS SNAPSHOT
After Visit Summary   6/12/2018    Remigio Holly    MRN: [id number]           Patient Information     Date Of Birth          11/6/1933        Visit Information        Provider Department      6/12/2018 11:00 AM KIRK STEPHENS MA [facility]        Today's Diagnoses     Pernicious anemia    -  1       Follow-ups after your visit        Your next 10 appointments already scheduled     Darrin 15, 2018  8:30 AM CDT   Cardiac Discharge with KAYLA Comer   [facility] ([facility])    [address] [place] [address] [phone]              Who to contact     If you have questions or need follow up information about today's clinic visit or your schedule please contact [facility] directly at [phone].  Normal or non-critical lab and imaging results will be communicated to you by MyChart, letter or phone within 4 business days after the clinic has received the results. If you do not hear from us within 7 days, please contact the clinic through MyChart or phone. If you have a critical or abnormal lab result, we will notify you by phone as soon as possible.  Submit refill requests through [employer] or call your pharmacy and they will forward the refill request to us. Please allow 3 business days for your refill to be completed.          Additional Information About Your Visit        MyChart Information     [employer] gives you secure access to your electronic health record. If you see a primary care provider, you can also send messages to your care team and make appointments. If you have questions, please call your primary care clinic.  If you do not have a primary care provider, please call [phone] and they will assist you.        Care EveryWhere ID     This is your Care EveryWhere ID. This could be used by other organizations to access your [place] medical records  [id number]         Blood Pressure from Last 3  Encounters:   05/09/18 112/62   04/04/18 111/66   03/23/18 110/62    Weight from Last 3 Encounters:   05/09/18 164 lb (74.4 kg)   04/04/18 156 lb (70.8 kg)   03/23/18 156 lb 14.4 oz (71.2 kg)              We Performed the Following     INJECTION INTRAMUSCULAR OR SUB-Q     VITAMIN B12 INJ /1000MCG        Primary Care Provider Office Phone # Fax #    Harrisonmanish Tse,  [phone]       [address] [place] [address]        Equal Access to Services     PATTIE RUSSELL : Hadii suzy carolina hadashosmar Soelpidio, waaxda luqadaha, qaybta kaalmada vidhi, tray haider . So [facility] [phone].    ATENCIÓN: Si habla español, tiene a tabares disposición servicios gratuitos de asistencia lingüística. [facility] [phone].    We comply with applicable federal civil rights laws and [place] laws. We do not discriminate on the basis of race, color, national origin, age, disability, sex, sexual orientation, or gender identity.            Thank you!     Thank you for choosing [facility]  for your care. Our goal is always to provide you with excellent care. Hearing back from our patients is one way we can continue to improve our services. Please take a few minutes to complete the written survey that you may receive in the mail after your visit with us. Thank you!             Your Updated Medication List - Protect others around you: Learn how to safely use, store and throw away your medicines at www.disposemymeds.org.          This list is accurate as of 6/12/18  1:03 PM.  Always use your most recent med list.                   Brand Name Dispense Instructions for use Diagnosis    acetaminophen 500 MG tablet    TYLENOL     Take 2 tablets (1,000 mg) by mouth every 8 hours    Status post total left knee replacement using cement       BIOFREEZE EX      Externally apply topically 3 times daily as needed        blood glucose monitoring lancets     1 Box    Use to test blood sugar  1 time daily or as directed.    Hypoglycemia       blood glucose monitoring test strip    ROBERT CONTOUR NEXT    100 each    Use to test blood sugar 1 times daily or as directed.    Hypoglycemia       cyanocobalamin 1000 MCG/ML injection    VITAMIN B12    1 mL    Inject 1 mL (1,000 mcg) into the muscle every 30 days    Pernicious anemia       MELATONIN PO      Take 5 mg by mouth At Bedtime        metoprolol tartrate 25 MG tablet    LOPRESSOR    180 tablet    Take 1 tablet (25 mg) by mouth 2 times daily    Aortic root dilatation (H)       polyethylene glycol powder    MIRALAX/GLYCOLAX     Take 17 g by mouth daily as needed for constipation        tamsulosin 0.4 MG capsule    FLOMAX     Take 0.4 mg by mouth daily        traMADol 50 MG tablet    ULTRAM    30 tablet    Take 1 tablet (50 mg) by mouth every 6 hours as needed for severe pain    S/P CABG (coronary artery bypass graft)

## 2018-06-13 ENCOUNTER — TRANSFERRED RECORDS (OUTPATIENT)
Dept: HEALTH INFORMATION MANAGEMENT | Facility: CLINIC | Age: 83
End: 2018-06-13

## 2018-06-15 ENCOUNTER — HOSPITAL ENCOUNTER (OUTPATIENT)
Dept: CARDIAC REHAB | Facility: CLINIC | Age: 83
End: 2018-06-15
Attending: THORACIC SURGERY (CARDIOTHORACIC VASCULAR SURGERY)
Payer: MEDICARE

## 2018-06-15 PROCEDURE — 40000116 ZZH STATISTIC OP CR VISIT

## 2018-06-15 PROCEDURE — 93798 PHYS/QHP OP CAR RHAB W/ECG: CPT

## 2018-07-13 ENCOUNTER — ALLIED HEALTH/NURSE VISIT (OUTPATIENT)
Dept: FAMILY MEDICINE | Facility: OTHER | Age: 83
End: 2018-07-13
Payer: COMMERCIAL

## 2018-07-13 DIAGNOSIS — D51.0 PA (PERNICIOUS ANEMIA): Primary | ICD-10-CM

## 2018-07-13 PROCEDURE — 96372 THER/PROPH/DIAG INJ SC/IM: CPT

## 2018-07-13 NOTE — MR AVS SNAPSHOT
After Visit Summary   7/13/2018    Remigio Holly    MRN: [id number]           Patient Information     Date Of Birth          11/6/1933        Visit Information        Provider Department      7/13/2018 9:45 AM KIRK STEPHENS MA [facility]        Today's Diagnoses     PA (pernicious anemia)    -  1       Follow-ups after your visit        Who to contact     If you have questions or need follow up information about today's clinic visit or your schedule please contact [facility] directly at [phone].  Normal or non-critical lab and imaging results will be communicated to you by MyChart, letter or phone within 4 business days after the clinic has received the results. If you do not hear from us within 7 days, please contact the clinic through Cigitalt or phone. If you have a critical or abnormal lab result, we will notify you by phone as soon as possible.  Submit refill requests through [employer] or call your pharmacy and they will forward the refill request to us. Please allow 3 business days for your refill to be completed.          Additional Information About Your Visit        MyChart Information     [employer] gives you secure access to your electronic health record. If you see a primary care provider, you can also send messages to your care team and make appointments. If you have questions, please call your primary care clinic.  If you do not have a primary care provider, please call [phone] and they will assist you.        Care EveryWhere ID     This is your Care EveryWhere ID. This could be used by other organizations to access your [place] medical records  [id number]         Blood Pressure from Last 3 Encounters:   05/09/18 112/62   04/04/18 111/66   03/23/18 110/62    Weight from Last 3 Encounters:   05/09/18 164 lb (74.4 kg)   04/04/18 156 lb (70.8 kg)   03/23/18 156 lb 14.4 oz (71.2 kg)              We Performed the Following     INJECTION INTRAMUSCULAR OR  SUB-Q     VITAMIN B12 INJ /1000MCG        Primary Care Provider Office Phone # Fax #    Harrisonmanish Tse, [phone]       [address] [place] [address]        Equal Access to Services     ERICA RUSSELL : Hadii suzy ku hadmayteo Soomaali, waaxda luqadaha, qaybta kaalmada adeegyada, waxluis pain haypalomon adeshreyas james laCharissegonzález mendez. So [facility] [phone].    ATENCIÓN: Si habla español, tiene a tabares disposición servicios gratuitos de asistencia lingüística. Llame al [phone].    We comply with applicable federal civil rights laws and [place] laws. We do not discriminate on the basis of race, color, national origin, age, disability, sex, sexual orientation, or gender identity.            Thank you!     Thank you for choosing [facility]  for your care. Our goal is always to provide you with excellent care. Hearing back from our patients is one way we can continue to improve our services. Please take a few minutes to complete the written survey that you may receive in the mail after your visit with us. Thank you!             Your Updated Medication List - Protect others around you: Learn how to safely use, store and throw away your medicines at www.disposemymeds.org.          This list is accurate as of 7/13/18  3:04 PM.  Always use your most recent med list.                   Brand Name Dispense Instructions for use Diagnosis    acetaminophen 500 MG tablet    TYLENOL     Take 2 tablets (1,000 mg) by mouth every 8 hours    Status post total left knee replacement using cement       BIOFREEZE EX      Externally apply topically 3 times daily as needed        blood glucose monitoring lancets     1 Box    Use to test blood sugar 1 time daily or as directed.    Hypoglycemia       blood glucose monitoring test strip    ROBERT CONTOUR NEXT    100 each    Use to test blood sugar 1 times daily or as directed.    Hypoglycemia       cyanocobalamin 1000 MCG/ML injection    VITAMIN B12    1 mL     Inject 1 mL (1,000 mcg) into the muscle every 30 days    Pernicious anemia       MELATONIN PO      Take 5 mg by mouth At Bedtime        metoprolol tartrate 25 MG tablet    LOPRESSOR    180 tablet    Take 1 tablet (25 mg) by mouth 2 times daily    Aortic root dilatation (H)       polyethylene glycol powder    MIRALAX/GLYCOLAX     Take 17 g by mouth daily as needed for constipation        tamsulosin 0.4 MG capsule    FLOMAX     Take 0.4 mg by mouth daily        traMADol 50 MG tablet    ULTRAM    30 tablet    Take 1 tablet (50 mg) by mouth every 6 hours as needed for severe pain    S/P CABG (coronary artery bypass graft)

## 2018-07-13 NOTE — NURSING NOTE
The following medication was given:     MEDICATION: Vitamin B12  1000mcg  See medication note.  Patient instructed to remain in clinic for 20 minutes afterwards, and to report any adverse reaction to me immediately.  Prior to injection verified patient identity using patient's name and date of birth.  Nella Nath MA     7/13/2018

## 2018-08-14 ENCOUNTER — ALLIED HEALTH/NURSE VISIT (OUTPATIENT)
Dept: FAMILY MEDICINE | Facility: OTHER | Age: 83
End: 2018-08-14
Payer: COMMERCIAL

## 2018-08-14 DIAGNOSIS — D51.0 PA (PERNICIOUS ANEMIA): Primary | ICD-10-CM

## 2018-08-14 PROCEDURE — 96372 THER/PROPH/DIAG INJ SC/IM: CPT

## 2018-08-14 NOTE — NURSING NOTE
The following medication was given:     MEDICATION: Vitamin B12  1000mcg  See medication note.  Patient instructed to remain in clinic for 20 minutes afterwards, and to report any adverse reaction to me immediately.  Prior to injection verified patient identity using patient's name and date of birth.  Nella Nath MA     8/14/2018

## 2018-08-14 NOTE — MR AVS SNAPSHOT
After Visit Summary   8/14/2018    Remigio Holly    MRN: [id number]           Patient Information     Date Of Birth          11/6/1933        Visit Information        Provider Department      8/14/2018 9:30 AM KIRK STEPHENS MA [facility]        Today's Diagnoses     PA (pernicious anemia)    -  1       Follow-ups after your visit        Who to contact     If you have questions or need follow up information about today's clinic visit or your schedule please contact [facility] directly at [phone].  Normal or non-critical lab and imaging results will be communicated to you by MyChart, letter or phone within 4 business days after the clinic has received the results. If you do not hear from us within 7 days, please contact the clinic through Zervantt or phone. If you have a critical or abnormal lab result, we will notify you by phone as soon as possible.  Submit refill requests through [employer] or call your pharmacy and they will forward the refill request to us. Please allow 3 business days for your refill to be completed.          Additional Information About Your Visit        MyChart Information     [employer] gives you secure access to your electronic health record. If you see a primary care provider, you can also send messages to your care team and make appointments. If you have questions, please call your primary care clinic.  If you do not have a primary care provider, please call [phone] and they will assist you.        Care EveryWhere ID     This is your Care EveryWhere ID. This could be used by other organizations to access your [place] medical records  [id number]         Blood Pressure from Last 3 Encounters:   05/09/18 112/62   04/04/18 111/66   03/23/18 110/62    Weight from Last 3 Encounters:   05/09/18 164 lb (74.4 kg)   04/04/18 156 lb (70.8 kg)   03/23/18 156 lb 14.4 oz (71.2 kg)              We Performed the Following     INJECTION INTRAMUSCULAR OR  SUB-Q     VITAMIN B12 INJ /1000MCG        Primary Care Provider Office Phone # Fax #    Harrisonmanish Tse, [phone]       [address] [place] [address]        Equal Access to Services     ERICA RUSSELL : Hadii suzy ku hadmayteo Soomaali, waaxda luqadaha, qaybta kaalmada adeegyada, waxluis pain haypalomon adeshreyas james laCharissegonzález mendez. So [facility] [phone].    ATENCIÓN: Si habla español, tiene a tabares disposición servicios gratuitos de asistencia lingüística. Llame al [phone].    We comply with applicable federal civil rights laws and [place] laws. We do not discriminate on the basis of race, color, national origin, age, disability, sex, sexual orientation, or gender identity.            Thank you!     Thank you for choosing [facility]  for your care. Our goal is always to provide you with excellent care. Hearing back from our patients is one way we can continue to improve our services. Please take a few minutes to complete the written survey that you may receive in the mail after your visit with us. Thank you!             Your Updated Medication List - Protect others around you: Learn how to safely use, store and throw away your medicines at www.disposemymeds.org.          This list is accurate as of 8/14/18  1:58 PM.  Always use your most recent med list.                   Brand Name Dispense Instructions for use Diagnosis    acetaminophen 500 MG tablet    TYLENOL     Take 2 tablets (1,000 mg) by mouth every 8 hours    Status post total left knee replacement using cement       BIOFREEZE EX      Externally apply topically 3 times daily as needed        blood glucose monitoring lancets     1 Box    Use to test blood sugar 1 time daily or as directed.    Hypoglycemia       blood glucose monitoring test strip    ROBERT CONTOUR NEXT    100 each    Use to test blood sugar 1 times daily or as directed.    Hypoglycemia       cyanocobalamin 1000 MCG/ML injection    VITAMIN B12    1 mL     Inject 1 mL (1,000 mcg) into the muscle every 30 days    Pernicious anemia       MELATONIN PO      Take 5 mg by mouth At Bedtime        metoprolol tartrate 25 MG tablet    LOPRESSOR    180 tablet    Take 1 tablet (25 mg) by mouth 2 times daily    Aortic root dilatation (H)       polyethylene glycol powder    MIRALAX/GLYCOLAX     Take 17 g by mouth daily as needed for constipation        tamsulosin 0.4 MG capsule    FLOMAX     Take 0.4 mg by mouth daily        traMADol 50 MG tablet    ULTRAM    30 tablet    Take 1 tablet (50 mg) by mouth every 6 hours as needed for severe pain    S/P CABG (coronary artery bypass graft)

## 2018-08-16 ENCOUNTER — TELEPHONE (OUTPATIENT)
Dept: ONCOLOGY | Facility: CLINIC | Age: 83
End: 2018-08-16

## 2018-08-16 NOTE — TELEPHONE ENCOUNTER
Tobacco Treatment Team at the [facility] attempted to reach Mr. Holly on 8/16/2018 regarding the tobacco cessation program to help Mr. Holly to quit smoking. We will attempt to reach Mr. Holly another time.

## 2018-08-24 NOTE — TELEPHONE ENCOUNTER
Tobacco Treatment Team at the [facility] attempted to reach Mr. Holly on 8/24/2018 regarding the tobacco cessation program to help Mr. Holly to quit smoking. We will attempt to reach Mr. Holly another time.

## 2018-09-06 NOTE — TELEPHONE ENCOUNTER
Called and spoke with Mr. Holly on 9/6/2018 regarding the tobacco cessation program and he declined to participate with the program.No further calls needed. Patient reports smoking a pipe on a rare occasion. Not interested.

## 2018-09-20 ENCOUNTER — ALLIED HEALTH/NURSE VISIT (OUTPATIENT)
Dept: FAMILY MEDICINE | Facility: OTHER | Age: 83
End: 2018-09-20
Payer: COMMERCIAL

## 2018-09-20 DIAGNOSIS — D51.0 PERNICIOUS ANEMIA: ICD-10-CM

## 2018-09-20 PROCEDURE — 96372 THER/PROPH/DIAG INJ SC/IM: CPT

## 2018-09-20 RX ORDER — CYANOCOBALAMIN 1000 UG/ML
1 INJECTION, SOLUTION INTRAMUSCULAR; SUBCUTANEOUS
Qty: 1 ML | Refills: 11 | Status: CANCELLED | OUTPATIENT
Start: 2018-09-20

## 2018-09-20 NOTE — MR AVS SNAPSHOT
After Visit Summary   9/20/2018    Remigio Holly    MRN: [id number]           Patient Information     Date Of Birth          11/6/1933        Visit Information        Provider Department      9/20/2018 10:00 AM KIRK STEPHENS MA [facility]        Today's Diagnoses     Pernicious anemia           Follow-ups after your visit        Who to contact     If you have questions or need follow up information about today's clinic visit or your schedule please contact [facility] directly at [phone].  Normal or non-critical lab and imaging results will be communicated to you by MyChart, letter or phone within 4 business days after the clinic has received the results. If you do not hear from us within 7 days, please contact the clinic through [employer] or phone. If you have a critical or abnormal lab result, we will notify you by phone as soon as possible.  Submit refill requests through [employer] or call your pharmacy and they will forward the refill request to us. Please allow 3 business days for your refill to be completed.          Additional Information About Your Visit        MyChart Information     [employer] gives you secure access to your electronic health record. If you see a primary care provider, you can also send messages to your care team and make appointments. If you have questions, please call your primary care clinic.  If you do not have a primary care provider, please call [phone] and they will assist you.        Care EveryWhere ID     This is your Care EveryWhere ID. This could be used by other organizations to access your [place] medical records  [id number]         Blood Pressure from Last 3 Encounters:   05/09/18 112/62   04/04/18 111/66   03/23/18 110/62    Weight from Last 3 Encounters:   05/09/18 164 lb (74.4 kg)   04/04/18 156 lb (70.8 kg)   03/23/18 156 lb 14.4 oz (71.2 kg)              We Performed the Following     INJECTION INTRAMUSCULAR OR SUB-Q      VITAMIN B12 INJ /1000MCG        Primary Care Provider Office Phone # Fax #    Harrison Ricky Tse,  [phone]       [address] [place] [address]        Equal Access to Services     ERICA RUSSELL : Hadii suzy ku hadmayteo Soomaali, waaxda luqadaha, qaybta kaalmada adeegyada, waxay idiin hayaan adeshreyas james laCharissegonzález mendez. So [facility] [phone].    ATENCIÓN: Si habla español, tiene a tabares disposición servicios gratuitos de asistencia lingüística. Llame al [phone].    We comply with applicable federal civil rights laws and [place] laws. We do not discriminate on the basis of race, color, national origin, age, disability, sex, sexual orientation, or gender identity.            Thank you!     Thank you for choosing [facility]  for your care. Our goal is always to provide you with excellent care. Hearing back from our patients is one way we can continue to improve our services. Please take a few minutes to complete the written survey that you may receive in the mail after your visit with us. Thank you!             Your Updated Medication List - Protect others around you: Learn how to safely use, store and throw away your medicines at www.disposemymeds.org.          This list is accurate as of 9/20/18 10:58 AM.  Always use your most recent med list.                   Brand Name Dispense Instructions for use Diagnosis    acetaminophen 500 MG tablet    TYLENOL     Take 2 tablets (1,000 mg) by mouth every 8 hours    Status post total left knee replacement using cement       BIOFREEZE EX      Externally apply topically 3 times daily as needed        blood glucose monitoring lancets     1 Box    Use to test blood sugar 1 time daily or as directed.    Hypoglycemia       blood glucose monitoring test strip    ROBERT CONTOUR NEXT    100 each    Use to test blood sugar 1 times daily or as directed.    Hypoglycemia       cyanocobalamin 1000 MCG/ML injection    VITAMIN B12    1 mL    Inject  1 mL (1,000 mcg) into the muscle every 30 days    Pernicious anemia       MELATONIN PO      Take 5 mg by mouth At Bedtime        metoprolol tartrate 25 MG tablet    LOPRESSOR    180 tablet    Take 1 tablet (25 mg) by mouth 2 times daily    Aortic root dilatation (H)       polyethylene glycol powder    MIRALAX/GLYCOLAX     Take 17 g by mouth daily as needed for constipation        tamsulosin 0.4 MG capsule    FLOMAX     Take 0.4 mg by mouth daily        traMADol 50 MG tablet    ULTRAM    30 tablet    Take 1 tablet (50 mg) by mouth every 6 hours as needed for severe pain    S/P CABG (coronary artery bypass graft)

## 2018-09-20 NOTE — NURSING NOTE
The following medication was given:     MEDICATION: Vitamin B12  1000mcg  ROUTE: IM  SITE: Deltoid - Right  DOSE: 1ml  LOT #: [id number]  :  ADARTIS  EXPIRATION DATE:  11/1/19  Fiorella Valdes MA    Prior to injection verified patient identity using patient's name and date of birth.  Due to injection administration, patient instructed to remain in clinic for 15 minutes  afterwards, and to report any adverse reaction to me immediately.

## 2018-10-09 ENCOUNTER — ALLIED HEALTH/NURSE VISIT (OUTPATIENT)
Dept: FAMILY MEDICINE | Facility: OTHER | Age: 83
End: 2018-10-09
Payer: COMMERCIAL

## 2018-10-09 DIAGNOSIS — Z23 NEED FOR PROPHYLACTIC VACCINATION AND INOCULATION AGAINST INFLUENZA: Primary | ICD-10-CM

## 2018-10-09 PROCEDURE — G0008 ADMIN INFLUENZA VIRUS VAC: HCPCS

## 2018-10-09 PROCEDURE — 90662 IIV NO PRSV INCREASED AG IM: CPT

## 2018-10-09 NOTE — MR AVS SNAPSHOT
After Visit Summary   10/9/2018    Remigio Holly    MRN: [id number]           Patient Information     Date Of Birth          11/6/1933        Visit Information        Provider Department      10/9/2018 9:15 AM KIRK STEPHENS MA [facility]        Today's Diagnoses     Need for prophylactic vaccination and inoculation against influenza    -  1       Follow-ups after your visit        Who to contact     If you have questions or need follow up information about today's clinic visit or your schedule please contact [facility] directly at [phone].  Normal or non-critical lab and imaging results will be communicated to you by [employer], letter or phone within 4 business days after the clinic has received the results. If you do not hear from us within 7 days, please contact the clinic through Tactilet or phone. If you have a critical or abnormal lab result, we will notify you by phone as soon as possible.  Submit refill requests through REES46 or call your pharmacy and they will forward the refill request to us. Please allow 3 business days for your refill to be completed.          Additional Information About Your Visit        MyChart Information     REES46 gives you secure access to your electronic health record. If you see a primary care provider, you can also send messages to your care team and make appointments. If you have questions, please call your primary care clinic.  If you do not have a primary care provider, please call [phone] and they will assist you.        Care EveryWhere ID     This is your Care EveryWhere ID. This could be used by other organizations to access your [place] medical records  [id number]         Blood Pressure from Last 3 Encounters:   05/09/18 112/62   04/04/18 111/66   03/23/18 110/62    Weight from Last 3 Encounters:   05/09/18 164 lb (74.4 kg)   04/04/18 156 lb (70.8 kg)   03/23/18 156 lb 14.4 oz (71.2 kg)              We Performed  the Following     ADMIN INFLUENZA (For MEDICARE Patients ONLY) []     FLU VACCINE, INCREASED ANTIGEN, PRESV FREE, AGE 65+ [80047]        Primary Care Provider Office Phone # Fax #    Harrison Tse,  [phone]       [address] [place] [address]        Equal Access to Services     PATTIE [facility] Center[facility] : Hadii aad ku hadasho Soomaali, waaxda luqadaha, qaybta kaalmada adeegyada, waxay idiin hayaan adeeg kharash lasachinn . So [facility] [phone].    ATENCIÓN: Si habla español, tiene a tabares disposición servicios gratuitos de asistencia lingüística. [facility] [phone].    We comply with applicable federal civil rights laws and [place] laws. We do not discriminate on the basis of race, color, national origin, age, disability, sex, sexual orientation, or gender identity.            Thank you!     Thank you for choosing [facility]  for your care. Our goal is always to provide you with excellent care. Hearing back from our patients is one way we can continue to improve our services. Please take a few minutes to complete the written survey that you may receive in the mail after your visit with us. Thank you!             Your Updated Medication List - Protect others around you: Learn how to safely use, store and throw away your medicines at www.disposemymeds.org.          This list is accurate as of 10/9/18  4:22 PM.  Always use your most recent med list.                   Brand Name Dispense Instructions for use Diagnosis    acetaminophen 500 MG tablet    TYLENOL     Take 2 tablets (1,000 mg) by mouth every 8 hours    Status post total left knee replacement using cement       BIOFREEZE EX      Externally apply topically 3 times daily as needed        blood glucose monitoring lancets     1 Box    Use to test blood sugar 1 time daily or as directed.    Hypoglycemia       blood glucose monitoring test strip    ROBERT CONTOUR NEXT    100 each    Use to test blood sugar 1 times daily  or as directed.    Hypoglycemia       cyanocobalamin 1000 MCG/ML injection    VITAMIN B12    1 mL    Inject 1 mL (1,000 mcg) into the muscle every 30 days    Pernicious anemia       MELATONIN PO      Take 5 mg by mouth At Bedtime        metoprolol tartrate 25 MG tablet    LOPRESSOR    180 tablet    Take 1 tablet (25 mg) by mouth 2 times daily    Aortic root dilatation (H)       polyethylene glycol powder    MIRALAX/GLYCOLAX     Take 17 g by mouth daily as needed for constipation        tamsulosin 0.4 MG capsule    FLOMAX     Take 0.4 mg by mouth daily        traMADol 50 MG tablet    ULTRAM    30 tablet    Take 1 tablet (50 mg) by mouth every 6 hours as needed for severe pain    S/P CABG (coronary artery bypass graft)

## 2018-10-09 NOTE — PROGRESS NOTES
Injectable Influenza Immunization Documentation    1.  Is the person to be vaccinated sick today?   No    2. Does the person to be vaccinated have an allergy to a component   of the vaccine?   No  Egg Allergy Algorithm Link    3. Has the person to be vaccinated ever had a serious reaction   to influenza vaccine in the past?   No    4. Has the person to be vaccinated ever had Guillain-Barré syndrome?   No    Form completed by Nella Nath MA     10/9/2018  Prior to injection verified patient identity using patient's name and date of birth.  Due to injection administration, patient instructed to remain in clinic for 15 minutes  afterwards, and to report any adverse reaction to me immediately.

## 2018-10-29 ENCOUNTER — OFFICE VISIT (OUTPATIENT)
Dept: FAMILY MEDICINE | Facility: OTHER | Age: 83
End: 2018-10-29
Payer: COMMERCIAL

## 2018-10-29 VITALS
BODY MASS INDEX: 22.57 KG/M2 | SYSTOLIC BLOOD PRESSURE: 112 MMHG | OXYGEN SATURATION: 97 % | RESPIRATION RATE: 12 BRPM | TEMPERATURE: 97.5 F | WEIGHT: 166.44 LBS | DIASTOLIC BLOOD PRESSURE: 74 MMHG | HEART RATE: 82 BPM

## 2018-10-29 DIAGNOSIS — M72.0 DUPUYTREN'S CONTRACTURE OF HAND: Primary | ICD-10-CM

## 2018-10-29 DIAGNOSIS — S76.212A GROIN STRAIN, LEFT, INITIAL ENCOUNTER: ICD-10-CM

## 2018-10-29 PROCEDURE — 99214 OFFICE O/P EST MOD 30 MIN: CPT | Performed by: INTERNAL MEDICINE

## 2018-10-29 RX ORDER — ATORVASTATIN CALCIUM 40 MG/1
40 TABLET, FILM COATED ORAL
Refills: 3 | COMMUNITY
Start: 2018-09-12

## 2018-10-29 ASSESSMENT — PAIN SCALES - GENERAL: PAINLEVEL: NO PAIN (0)

## 2018-10-29 NOTE — MR AVS SNAPSHOT
After Visit Summary   10/29/2018    Remigio Holly    MRN: [id number]           Patient Information     Date Of Birth          11/6/1933        Visit Information        Provider Department      10/29/2018 2:40 PM Harrison Tse DO [facility]        Today's Diagnoses     Dupuytren's contracture of hand    -  1       Follow-ups after your visit        Additional Services     ORTHO  REFERRAL       [facility] is referring you to the Orthopedic  Services at [facility].       The  Representative will assist you in the coordination of your Orthopedic and Musculoskeletal Care as prescribed by your physician.    The  Representative will call you within 1 business day to help schedule your appointment, or you may contact the  Representative at:    All areas ~ [phone]     Type of Referral : Surgical / Specialist       Timeframe requested: Routine    Coverage of these services is subject to the terms and limitations of your health insurance plan.  Please call member services at your health plan with any benefit or coverage questions.      If X-rays, CT or MRI's have been performed, please contact the facility where they were done to arrange for , prior to your scheduled appointment.  Please bring this referral request to your appointment and present it to your specialist.                  Your next 10 appointments already scheduled     Oct 31, 2018  9:10 AM CDT   New Visit with Lucio Omalley MD   [facility]    [address] [place] [id number] [phone]              Who to contact     If you have questions or need follow up information about today's clinic visit or your schedule please contact [facility] directly at [phone].  Normal or non-critical lab and imaging results will be communicated to  you by MyChart, letter or phone within 4 business days after the clinic has received the results. If you do not hear from us within 7 days, please contact the clinic through [employer] or phone. If you have a critical or abnormal lab result, we will notify you by phone as soon as possible.  Submit refill requests through [employer] or call your pharmacy and they will forward the refill request to us. Please allow 3 business days for your refill to be completed.          Additional Information About Your Visit        [employer] Information     Solidarium gives you secure access to your electronic health record. If you see a primary care provider, you can also send messages to your care team and make appointments. If you have questions, please call your primary care clinic.  If you do not have a primary care provider, please call [phone] and they will assist you.        Care EveryWhere ID     This is your Care EveryWhere ID. This could be used by other organizations to access your [place] medical records  [id number]        Your Vitals Were     Pulse Temperature Respirations Pulse Oximetry BMI (Body Mass Index)       82 97.5  F (36.4  C) (Temporal) 12 97% 22.57 kg/m2        Blood Pressure from Last 3 Encounters:   10/29/18 112/74   05/09/18 112/62   04/04/18 111/66    Weight from Last 3 Encounters:   10/29/18 166 lb 7 oz (75.5 kg)   05/09/18 164 lb (74.4 kg)   04/04/18 156 lb (70.8 kg)              We Performed the Following     ORTHO  REFERRAL        Primary Care Provider Office Phone # Fax #    Harrison Ricky Tse,  [phone]       [address] [place] [address]        Equal Access to Services     PATTIE RUSSELL : Hadii aad ku hadasho Soomaali, waaxda luqadaha, qaybta kaalmada tray mosher. So [facility] [phone].    ATENCIÓN: Si habla español, tiene a tabares disposición servicios gratuitos de asistencia lingüística. Llame al [phone].    We  comply with applicable federal civil rights laws and [place] laws. We do not discriminate on the basis of race, color, national origin, age, disability, sex, sexual orientation, or gender identity.            Thank you!     Thank you for choosing [facility]  for your care. Our goal is always to provide you with excellent care. Hearing back from our patients is one way we can continue to improve our services. Please take a few minutes to complete the written survey that you may receive in the mail after your visit with us. Thank you!             Your Updated Medication List - Protect others around you: Learn how to safely use, store and throw away your medicines at www.disposemymeds.org.          This list is accurate as of 10/29/18  3:35 PM.  Always use your most recent med list.                   Brand Name Dispense Instructions for use Diagnosis    acetaminophen 500 MG tablet    TYLENOL     Take 2 tablets (1,000 mg) by mouth every 8 hours    Status post total left knee replacement using cement       atorvastatin 40 MG tablet    LIPITOR     Take 40 mg by mouth        blood glucose monitoring lancets     1 Box    Use to test blood sugar 1 time daily or as directed.    Hypoglycemia       blood glucose monitoring test strip    ROBERT CONTOUR NEXT    100 each    Use to test blood sugar 1 times daily or as directed.    Hypoglycemia       cyanocobalamin 1000 MCG/ML injection    VITAMIN B12    1 mL    Inject 1 mL (1,000 mcg) into the muscle every 30 days    Pernicious anemia       MELATONIN PO      Take 5 mg by mouth At Bedtime        metoprolol tartrate 25 MG tablet    LOPRESSOR    180 tablet    Take 1 tablet (25 mg) by mouth 2 times daily    Aortic root dilatation (H)       tamsulosin 0.4 MG capsule    FLOMAX     Take 0.4 mg by mouth daily

## 2018-10-31 ENCOUNTER — OFFICE VISIT (OUTPATIENT)
Dept: ORTHOPEDICS | Facility: CLINIC | Age: 83
End: 2018-10-31
Payer: COMMERCIAL

## 2018-10-31 VITALS
BODY MASS INDEX: 22.48 KG/M2 | WEIGHT: 166 LBS | SYSTOLIC BLOOD PRESSURE: 122 MMHG | HEIGHT: 72 IN | DIASTOLIC BLOOD PRESSURE: 66 MMHG | HEART RATE: 53 BPM

## 2018-10-31 DIAGNOSIS — M72.0 DUPUYTREN'S CONTRACTURE OF LEFT HAND: Primary | ICD-10-CM

## 2018-10-31 PROCEDURE — 99213 OFFICE O/P EST LOW 20 MIN: CPT | Performed by: ORTHOPAEDIC SURGERY

## 2018-10-31 ASSESSMENT — PAIN SCALES - GENERAL: PAINLEVEL: NO PAIN (0)

## 2018-10-31 NOTE — PROGRESS NOTES
ORTHOPEDIC CLINIC CONSULT      Remigio Holly is a 84 year old male who is seen in consultation at the request of Ded[place].  History of Present illness:  Remigio presents for evaluation of:   1.) Dupuytrens contracture of Left ring finger     Onset:  many years ago    Symptoms brought on by Unknown.   Character:  swelling.    Progression of symptoms:  worse.    Previous similar pain: no .   Pain Level:  0/10.   Previous treatments:  nothing.  Currently on Blood thinners? No  Diagnosis of Diabetes? No      Orthopedic Consult    Remigio is a 84 year old male here today as a referral from Harrison Tse for    Patient presents with:  Consult: Dupuytrens contracture of Left ring finger      The above note was reviewed and verified.    His reason for assessing this now is that the left hand is gotten to the degree that the ring finger flexion is getting in the way of routine activities.  He does report that this symptom in his left hand just in the last year.  The flexion of the left ring finger is significantly increased.  He is accompanied by his wife.    Prior history of related problems:  Not applicable    Patient's past medical, surgical, social and family histories reviewed.     Past Medical History:   Diagnosis Date     Blood transfusion      Chronic gastric ulcer without mention of hemorrhage, perforation, without mention of obstruction     Ulcer, Gastric     Gastric cancer (H)      Hemorrhage of gastrointestinal tract, unspecified 01/13/10    [facility]     Hypoglycemia, unspecified 1/26/2015     Measles without mention of complication     Measles     Mixed hyperlipidemia     Hyperlipidemia     Mumps without mention of complication     Mumps     Pulmonary embolism (H) Sept 4, 2010     Urinary calculus, unspecified     Renal stones     Varicella without mention of complication     Chickenpox       Past Surgical History:   Procedure Laterality Date     ABDOMEN SURGERY       ARTHROPLASTY  KNEE Left 8/15/2017    Procedure: ARTHROPLASTY KNEE;  Left total knee replacement;  Surgeon: Jonathan Cardona DO;  Location: [facility] OR     C EXCIS STOMACH ULCER,LESN;LOCAL       CHOLECYSTECTOMY, LAPOROSCOPIC  10/6/2008    Cholecystectomy, Laparoscopic     COLONOSCOPY  1/14/10    [facility]     COLONOSCOPY  05/25/10     CYSTOSCOPY, RETROGRADES, EXTRACT STONE, INSERT STENT, COMBINED  12/25/2012    Procedure: COMBINED CYSTOSCOPY, RETROGRADES, EXTRACT STONE, INSERT STENT;  Cystoscopy, Left Stent Placement, left retrograde pylogram, uc;  Surgeon: Amador Sanchez MD;  Location: [facility] OR     ESOPHAGOSCOPY, GASTROSCOPY, DUODENOSCOPY (EGD), COMBINED  8/16/2011    Procedure:COMBINED ESOPHAGOSCOPY, GASTROSCOPY, DUODENOSCOPY (EGD), BIOPSY SINGLE OR MULTIPLE; Surgeon:TONY GARCIA; Location:[facility]     GENERAL SURGERY                           DATE:  07/07/10    Gastrectomy     HC COLONOSCOPY W/WO BRUSH/WASH  01/31/2006     HC REPAIR ROTATOR CUFF,ACUTE  3/21/2005    Right      UGI ENDOSCOPY, SIMPLE EXAM  1/13/10    [facility] UGI ENDOSCOPY, SIMPLE EXAM  05/25/10     LASER HOLMIUM LITHOTRIPSY URETER(S), INSERT STENT, COMBINED  1/5/2013    Procedure: COMBINED CYSTOSCOPY, URETEROSCOPY, LASER HOLMIUM LITHOTRIPSY URETER(S), INSERT STENT;  Bilateral  Cystoscopy, Bilateral Ureteroscopy, Bilateral retrogrades and  placement of ureteral stent right side. Laser Holmium Lithotripsy  and Exchange  of stent left side;  Surgeon: Tnoe Morejon MD;  Location: [facility] OR     LASER HOLMIUM LITHOTRIPSY URETER(S), INSERT STENT, COMBINED  1/30/2013    Procedure: COMBINED CYSTOSCOPY, URETEROSCOPY, LASER HOLMIUM LITHOTRIPSY URETER(S), INSERT STENT;  Right Ureteroscopy; Stone basketing; Right Stent exchange and  removal of left stent.;  Surgeon: Tone Morejon MD;  Location: [facility] OR     VIDEO CAPSULE ENDOSCOPY  01/15/10    [facility]       Medications:    Current Outpatient Prescriptions on File  Prior to Visit:  atorvastatin (LIPITOR) 40 MG tablet Take 40 mg by mouth   blood glucose monitoring (ROBERT CONTOUR NEXT) test strip Use to test blood sugar 1 times daily or as directed.   blood glucose monitoring (ROBERT MICROLET) lancets Use to test blood sugar 1 time daily or as directed.   cyanocobalamin (VITAMIN B12) 1000 MCG/ML injection Inject 1 mL (1,000 mcg) into the muscle every 30 days   MELATONIN PO Take 5 mg by mouth At Bedtime   metoprolol (LOPRESSOR) 25 MG tablet Take 1 tablet (25 mg) by mouth 2 times daily   tamsulosin (FLOMAX) 0.4 MG capsule Take 0.4 mg by mouth daily   acetaminophen (TYLENOL) 500 MG tablet Take 2 tablets (1,000 mg) by mouth every 8 hours (Patient not taking: Reported on 10/29/2018)     No current facility-administered medications on file prior to visit.     Allergies   Allergen Reactions     Mobic [Meloxicam] Nausea and Diarrhea     Diarrhea, nausea, flu like symptoms since start of use       Social History     Occupational History     Construction      Retired     Social History Main Topics     Smoking status: Current Some Day Smoker     Packs/day: 0.00     Years: 20.00     Types: Pipe, Cigars     Last attempt to quit: 1/1/1998     Smokeless tobacco: Never Used      Comment: Rare pipe and cigar smoking     Alcohol use No     Drug use: No     Sexual activity: Not Currently       Family History   Problem Relation Age of Onset     Alzheimer Disease Father      Cardiovascular Mother      C.A.D. Mother      HEART DISEASE Mother      Cardiovascular Brother      HEART DISEASE Brother      C.A.D. Brother        REVIEW OF SYSTEMS    General: negative for fever or fatigue    Psych:  negative for anxiety or depression     Ophthalmic:  Corrective lenses?  No    ENT:  Hearing difficulty? No    CV: negative for chest pain, venous insuffiencey     Endocrine:  negative for diabetes     Urology:  negative for kidney disease    Resp:  Normal respiratory effort     Skin: negative for cuts/sores or  redness    Musculoskeletal: as above    Neurologic:negative for numbness/tingling    Hematologic: negative for bleeding disorder, does not use of prescription anticoagulants     GI: Patient has a history of gastric cancer.        Physical Exam:    Vitals: /66 (BP Location: Left arm, Patient Position: Chair, Cuff Size: Adult Large)  Pulse 53  Ht 1.829 m (6')  Wt 75.3 kg (166 lb)  BMI 22.51 kg/m2  BMI= Body mass index is 22.51 kg/(m^2).    GENERAL APPEARANCE:  Healthy, alert, no distress    SKIN:  negative for suspicious lesions or rashes    NEURO: Normal strength and tone, mentation intact and speech normal    PSYCH:   Mentation appears Normal and affect normal/bright    RESPIRATORY: negative for respiratory distress.    EYES: negative for Conjunctivitis    Cardiovascular: no Edema                radial Present                Fingers warm and well perfused, brisk capillary refill.      GAIT: non-antalgic    JOINT/EXTREMITIES:    Inspection of both of his hands shows he does indeed have contractures and cords in both hands.    Right hand:    He has a cord in the palm of his right hand at the base of the ring finger.  By palpation this does not carry aggressively up into the finger itself.  The MP joint can still be brought near full extension.  PIP joint PIP joint of the finger can be brought very near full extension.    Left hand:    He has a significantly degree of contracture of the left hand.  The ring finger shows that the level of the PIP joint is contracted to 90 degrees.  The MP joint can come close to full extension.  He has more significant cords at the base of the ring finger.  The little finger shows some degree of contracture at the PIP joint.  Although it is hard to identify there appears to be a cord along the ulnar aspect of the little finger.  There is also a cascading cord from the base of the fourth finger towards the middle finger.  The middle finger shows no contractures  whatsoever.    He is able to actively flex his digits but cannot bring them to full extension.      Radiographs: X-rays have not been taken.         Impression:     ICD-10-CM    1. Dupuytren's contracture of left hand M72.0        Patient has advanced Dupuytren's disease of the left hand with significant contractures of the ring finger and specifically at the level of the PIP joint.  This is indeed to the degree that there would be functional impairment.  He furthermore it may also have some degree of zulema cords into the little finger of the left hand as well.    Right hand shows similar findings but to a far lesser degree in the right hand is not bothersome to him.            Plan:  The above was reviewed with Remigio    The nature of Dupuytren's disease was reviewed with him.  The fact that it can progress either very slowly or rapidly was presented as an explanation as to why things changed in his left hand.  Given the degree of his contracture if this were to be addressed it would have to be done surgically.  The surgical approach the risks of nerve injury and risks of failure to obtain full extension of the digits were carefully outlined.  It is also possible he could be left with some palmar sensitivity simply because of skin condition.  The projection was made that it may take up to 6 weeks for him to return to a functional level.    His question is whether or not it is worth investing in this approach at his age.  With respect to this only he can make that decision.    It appears as though he underwent a total knee replacement in 2017.    At the end of our discussion it appears as though he will probably commit to the surgical approach.  This would probably be after the first of the year.  He may be calling to have that scheduled.        Return to clinic CLIVE Omalley MD

## 2018-10-31 NOTE — LETTER
10/31/2018         RE: Remigio Holly  [address] [facility] [address]        Dear Colleague,    Thank you for referring your patient, Remigio Holly, to the [facility]. Please see a copy of my visit note below.    ORTHOPEDIC CLINIC CONSULT      Remigio Holly is a 84 year old male who is seen in consultation at the request of [place].  History of Present illness:  Remigio presents for evaluation of:   1.) Dupuytrens contracture of Left ring finger     Onset:  many years ago    Symptoms brought on by Unknown.   Character:  swelling.    Progression of symptoms:  worse.    Previous similar pain: no .   Pain Level:  0/10.   Previous treatments:  nothing.  Currently on Blood thinners? No  Diagnosis of Diabetes? No      Orthopedic Consult    Remigio is a 84 year old male here today as a referral from Harrison Tse for    Patient presents with:  Consult: Dupuytrens contracture of Left ring finger      The above note was reviewed and verified.    His reason for assessing this now is that the left hand is gotten to the degree that the ring finger flexion is getting in the way of routine activities.  He does report that this symptom in his left hand just in the last year.  The flexion of the left ring finger is significantly increased.  He is accompanied by his wife.    Prior history of related problems:  Not applicable    Patient's past medical, surgical, social and family histories reviewed.     Past Medical History:   Diagnosis Date     Blood transfusion      Chronic gastric ulcer without mention of hemorrhage, perforation, without mention of obstruction     Ulcer, Gastric     Gastric cancer (H)      Hemorrhage of gastrointestinal tract, unspecified 01/13/10    [facility]     Hypoglycemia, unspecified 1/26/2015     Measles without mention of complication     Measles     Mixed hyperlipidemia     Hyperlipidemia     Mumps without mention of  complication     Mumps     Pulmonary embolism (H) Sept 4, 2010     Urinary calculus, unspecified     Renal stones     Varicella without mention of complication     Chickenpox       Past Surgical History:   Procedure Laterality Date     ABDOMEN SURGERY       ARTHROPLASTY KNEE Left 8/15/2017    Procedure: ARTHROPLASTY KNEE;  Left total knee replacement;  Surgeon: Jonathan Cardona DO;  Location: [facility] OR     C EXCIS STOMACH ULCER,LESN;LOCAL       CHOLECYSTECTOMY, LAPOROSCOPIC  10/6/2008    Cholecystectomy, Laparoscopic     COLONOSCOPY  1/14/10    [facility]     COLONOSCOPY  05/25/10     CYSTOSCOPY, RETROGRADES, EXTRACT STONE, INSERT STENT, COMBINED  12/25/2012    Procedure: COMBINED CYSTOSCOPY, RETROGRADES, EXTRACT STONE, INSERT STENT;  Cystoscopy, Left Stent Placement, left retrograde pylogram, uc;  Surgeon: Amador Sanchez MD;  Location: [facility] OR     ESOPHAGOSCOPY, GASTROSCOPY, DUODENOSCOPY (EGD), COMBINED  8/16/2011    Procedure:COMBINED ESOPHAGOSCOPY, GASTROSCOPY, DUODENOSCOPY (EGD), BIOPSY SINGLE OR MULTIPLE; Surgeon:TONY GARCIA; Location:[facility]     GENERAL SURGERY                           DATE:  07/07/10    Gastrectomy      COLONOSCOPY W/WO BRUSH/WASH  01/31/2006      REPAIR ROTATOR CUFF,ACUTE  3/21/2005    Right      UGI ENDOSCOPY, SIMPLE EXAM  1/13/10    [facility] UGI ENDOSCOPY, SIMPLE EXAM  05/25/10     LASER HOLMIUM LITHOTRIPSY URETER(S), INSERT STENT, COMBINED  1/5/2013    Procedure: COMBINED CYSTOSCOPY, URETEROSCOPY, LASER HOLMIUM LITHOTRIPSY URETER(S), INSERT STENT;  Bilateral  Cystoscopy, Bilateral Ureteroscopy, Bilateral retrogrades and  placement of ureteral stent right side. Laser Holmium Lithotripsy  and Exchange  of stent left side;  Surgeon: Tone Morejon MD;  Location: [facility] OR     LASER HOLMIUM LITHOTRIPSY URETER(S), INSERT STENT, COMBINED  1/30/2013    Procedure: COMBINED CYSTOSCOPY, URETEROSCOPY, LASER HOLMIUM LITHOTRIPSY URETER(S), INSERT  STENT;  Right Ureteroscopy; Stone basketing; Right Stent exchange and  removal of left stent.;  Surgeon: Tone Morejon MD;  Location: [facility] OR     VIDEO CAPSULE ENDOSCOPY  01/15/10    [facility]       Medications:    Current Outpatient Prescriptions on File Prior to Visit:  atorvastatin (LIPITOR) 40 MG tablet Take 40 mg by mouth   blood glucose monitoring (ROBERT CONTOUR NEXT) test strip Use to test blood sugar 1 times daily or as directed.   blood glucose monitoring (ROBERT MICROLET) lancets Use to test blood sugar 1 time daily or as directed.   cyanocobalamin (VITAMIN B12) 1000 MCG/ML injection Inject 1 mL (1,000 mcg) into the muscle every 30 days   MELATONIN PO Take 5 mg by mouth At Bedtime   metoprolol (LOPRESSOR) 25 MG tablet Take 1 tablet (25 mg) by mouth 2 times daily   tamsulosin (FLOMAX) 0.4 MG capsule Take 0.4 mg by mouth daily   acetaminophen (TYLENOL) 500 MG tablet Take 2 tablets (1,000 mg) by mouth every 8 hours (Patient not taking: Reported on 10/29/2018)     No current facility-administered medications on file prior to visit.     Allergies   Allergen Reactions     Mobic [Meloxicam] Nausea and Diarrhea     Diarrhea, nausea, flu like symptoms since start of use       Social History     Occupational History     Construction      Retired     Social History Main Topics     Smoking status: Current Some Day Smoker     Packs/day: 0.00     Years: 20.00     Types: Pipe, Cigars     Last attempt to quit: 1/1/1998     Smokeless tobacco: Never Used      Comment: Rare pipe and cigar smoking     Alcohol use No     Drug use: No     Sexual activity: Not Currently       Family History   Problem Relation Age of Onset     Alzheimer Disease Father      Cardiovascular Mother      C.A.D. Mother      HEART DISEASE Mother      Cardiovascular Brother      HEART DISEASE Brother      C.A.D. Brother        REVIEW OF SYSTEMS    General: negative for fever or fatigue    Psych:  negative for anxiety or depression      Ophthalmic:  Corrective lenses?  No    ENT:  Hearing difficulty? No    CV: negative for chest pain, venous insuffiencey     Endocrine:  negative for diabetes     Urology:  negative for kidney disease    Resp:  Normal respiratory effort     Skin: negative for cuts/sores or redness    Musculoskeletal: as above    Neurologic:negative for numbness/tingling    Hematologic: negative for bleeding disorder, does not use of prescription anticoagulants     GI: Patient has a history of gastric cancer.        Physical Exam:    Vitals: /66 (BP Location: Left arm, Patient Position: Chair, Cuff Size: Adult Large)  Pulse 53  Ht 1.829 m (6')  Wt 75.3 kg (166 lb)  BMI 22.51 kg/m2  BMI= Body mass index is 22.51 kg/(m^2).    GENERAL APPEARANCE:  Healthy, alert, no distress    SKIN:  negative for suspicious lesions or rashes    NEURO: Normal strength and tone, mentation intact and speech normal    PSYCH:   Mentation appears Normal and affect normal/bright    RESPIRATORY: negative for respiratory distress.    EYES: negative for Conjunctivitis    Cardiovascular: no Edema                radial Present                Fingers warm and well perfused, brisk capillary refill.      GAIT: non-antalgic    JOINT/EXTREMITIES:    Inspection of both of his hands shows he does indeed have contractures and cords in both hands.    Right hand:    He has a cord in the palm of his right hand at the base of the ring finger.  By palpation this does not carry aggressively up into the finger itself.  The MP joint can still be brought near full extension.  PIP joint PIP joint of the finger can be brought very near full extension.    Left hand:    He has a significantly degree of contracture of the left hand.  The ring finger shows that the level of the PIP joint is contracted to 90 degrees.  The MP joint can come close to full extension.  He has more significant cords at the base of the ring finger.  The little finger shows some degree of  contracture at the PIP joint.  Although it is hard to identify there appears to be a cord along the ulnar aspect of the little finger.  There is also a cascading cord from the base of the fourth finger towards the middle finger.  The middle finger shows no contractures whatsoever.    He is able to actively flex his digits but cannot bring them to full extension.      Radiographs: X-rays have not been taken.         Impression:     ICD-10-CM    1. Dupuytren's contracture of left hand M72.0        Patient has advanced Dupuytren's disease of the left hand with significant contractures of the ring finger and specifically at the level of the PIP joint.  This is indeed to the degree that there would be functional impairment.  He furthermore it may also have some degree of zulema cords into the little finger of the left hand as well.    Right hand shows similar findings but to a far lesser degree in the right hand is not bothersome to him.            Plan:  The above was reviewed with Remigio    The nature of Dupuytren's disease was reviewed with him.  The fact that it can progress either very slowly or rapidly was presented as an explanation as to why things changed in his left hand.  Given the degree of his contracture if this were to be addressed it would have to be done surgically.  The surgical approach the risks of nerve injury and risks of failure to obtain full extension of the digits were carefully outlined.  It is also possible he could be left with some palmar sensitivity simply because of skin condition.  The projection was made that it may take up to 6 weeks for him to return to a functional level.    His question is whether or not it is worth investing in this approach at his age.  With respect to this only he can make that decision.    It appears as though he underwent a total knee replacement in 2017.    At the end of our discussion it appears as though he will probably commit to the surgical approach.   This would probably be after the first of the year.  He may be calling to have that scheduled.        Return to clinic PRN    Lucio Omalley MD                  Again, thank you for allowing me to participate in the care of your patient.        Sincerely,        Lucio Omalley MD

## 2018-10-31 NOTE — MR AVS SNAPSHOT
After Visit Summary   10/31/2018    Remigio Holly    MRN: [id number]           Patient Information     Date Of Birth          11/6/1933        Visit Information        Provider Department      10/31/2018 9:10 AM Lucio Omalley MD [facility]        Today's Diagnoses     Dupuytren's contracture of left hand    -  1       Follow-ups after your visit        Who to contact     If you have questions or need follow up information about today's clinic visit or your schedule please contact [facility] directly at [phone].  Normal or non-critical lab and imaging results will be communicated to you by [employer], letter or phone within 4 business days after the clinic has received the results. If you do not hear from us within 7 days, please contact the clinic through [employer] or phone. If you have a critical or abnormal lab result, we will notify you by phone as soon as possible.  Submit refill requests through [employer] or call your pharmacy and they will forward the refill request to us. Please allow 3 business days for your refill to be completed.          Additional Information About Your Visit        MyChart Information     [employer] gives you secure access to your electronic health record. If you see a primary care provider, you can also send messages to your care team and make appointments. If you have questions, please call your primary care clinic.  If you do not have a primary care provider, please call [phone] and they will assist you.        Care EveryWhere ID     This is your Care EveryWhere ID. This could be used by other organizations to access your [facility] medical records  [id number]        Your Vitals Were     Pulse Height BMI (Body Mass Index)             53 1.829 m (6') 22.51 kg/m2          Blood Pressure from Last 3 Encounters:   10/31/18 122/66   10/29/18 112/74   05/09/18 112/62    Weight from Last 3 Encounters:   10/31/18 75.3 kg (166  lb)   10/29/18 75.5 kg (166 lb 7 oz)   05/09/18 74.4 kg (164 lb)              Today, you had the following     No orders found for display       Primary Care Provider Office Phone # Fax #    Harrison Tse [phone]       [address] [place] [address]        Equal Access to Services     [facility]: Hadii aad ku hadasho Soomaali, waaxda luqadaha, qaybta kaalmada adeegyada, waxay idiin hayaan adeeg kharash la'aan ah. So [facility] [phone].    ATENCIÓN: Si habla español, tiene a tabares disposición servicios gratuitos de asistencia lingüística. Llame al [phone].    We comply with applicable federal civil rights laws and [place] laws. We do not discriminate on the basis of race, color, national origin, age, disability, sex, sexual orientation, or gender identity.            Thank you!     Thank you for choosing [facility]  for your care. Our goal is always to provide you with excellent care. Hearing back from our patients is one way we can continue to improve our services. Please take a few minutes to complete the written survey that you may receive in the mail after your visit with us. Thank you!             Your Updated Medication List - Protect others around you: Learn how to safely use, store and throw away your medicines at www.disposemymeds.org.          This list is accurate as of 10/31/18 10:22 AM.  Always use your most recent med list.                   Brand Name Dispense Instructions for use Diagnosis    acetaminophen 500 MG tablet    TYLENOL     Take 2 tablets (1,000 mg) by mouth every 8 hours    Status post total left knee replacement using cement       atorvastatin 40 MG tablet    LIPITOR     Take 40 mg by mouth        blood glucose monitoring lancets     1 Box    Use to test blood sugar 1 time daily or as directed.    Hypoglycemia       blood glucose monitoring test strip    ROBERT CONTOUR NEXT    100 each    Use to test blood sugar 1 times daily  or as directed.    Hypoglycemia       cyanocobalamin 1000 MCG/ML injection    VITAMIN B12    1 mL    Inject 1 mL (1,000 mcg) into the muscle every 30 days    Pernicious anemia       MELATONIN PO      Take 5 mg by mouth At Bedtime        metoprolol tartrate 25 MG tablet    LOPRESSOR    180 tablet    Take 1 tablet (25 mg) by mouth 2 times daily    Aortic root dilatation (H)       tamsulosin 0.4 MG capsule    FLOMAX     Take 0.4 mg by mouth daily

## 2018-11-12 ENCOUNTER — TELEPHONE (OUTPATIENT)
Dept: ORTHOPEDICS | Facility: CLINIC | Age: 83
End: 2018-11-12

## 2018-11-12 DIAGNOSIS — M72.0 DUPUYTREN'S CONTRACTURE OF LEFT HAND: Primary | ICD-10-CM

## 2018-11-12 NOTE — TELEPHONE ENCOUNTER
Patient called and would like to schedule surgery. Will you please place order? Do you want to see in clinic again?

## 2018-11-12 NOTE — LETTER
[address] [address]  [phone]        November 12, 2018    Remigio Holly  [address] [place] [id number]                         The med list shows the script was printed however it is not on the nurses station or up front for .

## 2018-11-12 NOTE — TELEPHONE ENCOUNTER
Type of surgery: release of left dupuytrens cintracture  Location of surgery: [facility]   Date of surgery: 11/26/18  Surgeon: Dr. Omalley  Pre-Op Appt Date: 11/20/18  Post-Op Appt Date: 11/28, 12/10   Packet sent out: Surgery packet mailed to patient's home address.   Pre-cert/Authorization completed: NA  Date: 11/12/2018    Veronica Cotter  Surgery Scheduler

## 2018-11-20 ENCOUNTER — OFFICE VISIT (OUTPATIENT)
Dept: FAMILY MEDICINE | Facility: OTHER | Age: 83
End: 2018-11-20
Payer: COMMERCIAL

## 2018-11-20 VITALS
RESPIRATION RATE: 16 BRPM | OXYGEN SATURATION: 98 % | DIASTOLIC BLOOD PRESSURE: 72 MMHG | WEIGHT: 165.5 LBS | SYSTOLIC BLOOD PRESSURE: 120 MMHG | BODY MASS INDEX: 22.45 KG/M2 | HEART RATE: 68 BPM | TEMPERATURE: 97.6 F

## 2018-11-20 DIAGNOSIS — M17.0 PRIMARY OSTEOARTHRITIS OF BOTH KNEES: ICD-10-CM

## 2018-11-20 DIAGNOSIS — M72.0 DUPUYTREN'S CONTRACTURE: ICD-10-CM

## 2018-11-20 DIAGNOSIS — I48.0 PAROXYSMAL ATRIAL FIBRILLATION (H): ICD-10-CM

## 2018-11-20 DIAGNOSIS — Z98.61 POSTSURGICAL PERCUTANEOUS TRANSLUMINAL CORONARY ANGIOPLASTY STATUS: ICD-10-CM

## 2018-11-20 DIAGNOSIS — C16.9 MALIGNANT NEOPLASM OF STOMACH, UNSPECIFIED LOCATION (H): ICD-10-CM

## 2018-11-20 DIAGNOSIS — Z01.818 PREOP GENERAL PHYSICAL EXAM: Primary | ICD-10-CM

## 2018-11-20 DIAGNOSIS — I71.40 ABDOMINAL AORTIC ANEURYSM (AAA) WITHOUT RUPTURE (H): ICD-10-CM

## 2018-11-20 LAB
ALBUMIN UR-MCNC: NEGATIVE MG/DL
ANION GAP SERPL CALCULATED.3IONS-SCNC: 7 MMOL/L (ref 3–14)
APPEARANCE UR: CLEAR
BILIRUB UR QL STRIP: NEGATIVE
BUN SERPL-MCNC: 26 MG/DL (ref 7–30)
CALCIUM SERPL-MCNC: 9.1 MG/DL (ref 8.5–10.1)
CHLORIDE SERPL-SCNC: 108 MMOL/L (ref 94–109)
CO2 SERPL-SCNC: 26 MMOL/L (ref 20–32)
COLOR UR AUTO: YELLOW
CREAT SERPL-MCNC: 1.1 MG/DL (ref 0.66–1.25)
ERYTHROCYTE [DISTWIDTH] IN BLOOD BY AUTOMATED COUNT: 15 % (ref 10–15)
GFR SERPL CREATININE-BSD FRML MDRD: 64 ML/MIN/1.7M2
GLUCOSE SERPL-MCNC: 33 MG/DL (ref 70–99)
GLUCOSE UR STRIP-MCNC: NEGATIVE MG/DL
HCT VFR BLD AUTO: 39.5 % (ref 40–53)
HGB BLD-MCNC: 12.4 G/DL (ref 13.3–17.7)
HGB UR QL STRIP: NEGATIVE
KETONES UR STRIP-MCNC: NEGATIVE MG/DL
LEUKOCYTE ESTERASE UR QL STRIP: NEGATIVE
MCH RBC QN AUTO: 28.1 PG (ref 26.5–33)
MCHC RBC AUTO-ENTMCNC: 31.4 G/DL (ref 31.5–36.5)
MCV RBC AUTO: 89 FL (ref 78–100)
NITRATE UR QL: NEGATIVE
PH UR STRIP: 5 PH (ref 5–7)
PLATELET # BLD AUTO: 245 10E9/L (ref 150–450)
POTASSIUM SERPL-SCNC: 4.3 MMOL/L (ref 3.4–5.3)
RBC # BLD AUTO: 4.42 10E12/L (ref 4.4–5.9)
SODIUM SERPL-SCNC: 141 MMOL/L (ref 133–144)
SOURCE: NORMAL
SP GR UR STRIP: 1.02 (ref 1–1.03)
UROBILINOGEN UR STRIP-ACNC: 0.2 EU/DL (ref 0.2–1)
WBC # BLD AUTO: 3.8 10E9/L (ref 4–11)

## 2018-11-20 PROCEDURE — 36415 COLL VENOUS BLD VENIPUNCTURE: CPT | Performed by: INTERNAL MEDICINE

## 2018-11-20 PROCEDURE — 80048 BASIC METABOLIC PNL TOTAL CA: CPT | Performed by: INTERNAL MEDICINE

## 2018-11-20 PROCEDURE — 93000 ELECTROCARDIOGRAM COMPLETE: CPT | Performed by: INTERNAL MEDICINE

## 2018-11-20 PROCEDURE — 99215 OFFICE O/P EST HI 40 MIN: CPT | Performed by: INTERNAL MEDICINE

## 2018-11-20 PROCEDURE — 81003 URINALYSIS AUTO W/O SCOPE: CPT | Performed by: INTERNAL MEDICINE

## 2018-11-20 PROCEDURE — 85027 COMPLETE CBC AUTOMATED: CPT | Performed by: INTERNAL MEDICINE

## 2018-11-20 RX ORDER — CLOPIDOGREL BISULFATE 75 MG/1
75 TABLET ORAL DAILY
Refills: 3 | COMMUNITY
Start: 2018-11-09 | End: 2021-05-14

## 2018-11-20 ASSESSMENT — PAIN SCALES - GENERAL: PAINLEVEL: NO PAIN (0)

## 2018-11-20 NOTE — PROGRESS NOTES
[facility]  [address] [facility] [address]  [phone]  Dept: [phone]    PRE-OP EVALUATION:  Today's date: 2018    Remigio Holly (: 1933) presents for pre-operative evaluation assessment as requested by Dr. Omalley.  He requires evaluation and anesthesia risk assessment prior to undergoing surgery/procedure for treatment of finger problem .    Proposed Surgery/ Procedure: Release of left Dupuytrens contracture  Date of Surgery/ Procedure: 2018  Time of Surgery/ Procedure: 7:30 am  Hospital/Surgical Facility: [facility]  Fax number for surgical facility:   Primary Physician: Harrison Tse  Type of Anesthesia Anticipated: General    Patient has a Health Care Directive or Living Will:  NO    1. YES - DO YOU HAVE A HISTORY OF HEART ATTACK, STROKE, STENT, BYPASS OR SURGERY ON AN ARTERY IN THE HEAD, NECK, HEART OR LEG?   2. NO - Do you ever have any pain or discomfort in your chest?  3. NO - Do you have a history of  Heart Failure?  4. NO - Are you troubled by shortness of breath when: walking on the level, up a slight hill or at night?  5. NO - Do you currently have a cold, bronchitis or other respiratory infection?  6. NO - Do you have a cough, shortness of breath or wheezing?  7. NO - Do you sometimes get pains in the calves of your legs when you walk?  8. NO - Do you or anyone in your family have previous history of blood clots?  9. NO - Do you or does anyone in your family have a serious bleeding problem such as prolonged bleeding following surgeries or cuts?  10. NO - Have you ever had problems with anemia or been told to take iron pills?  11. NO - Have you had any abnormal blood loss such as black, tarry or bloody stools, or abnormal vaginal bleeding?  12. NO - Have you ever had a blood transfusion?  13. NO - Have you or any of your relatives ever had problems with anesthesia?  14. NO - Do you have sleep apnea, excessive snoring or  daytime drowsiness?  15. NO - Do you have any prosthetic heart valves?  16. NO - Do you have prosthetic joints?  17. NO - Is there any chance that you may be pregnant?      HPI:     HPI related to upcoming procedure: Patient has no perfusion defect involving the left hand. Involves the ring finger specifically has substantially decreases ability to maintain his normal activities due to simply being in the way and snagging on everything. 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      See problem list for active medical problems.  Problems all longstanding and stable, except as noted/documented.  See ROS for pertinent symptoms related to these conditions.                                                                                                                                                          .    MEDICAL HISTORY:     Patient Active Problem List    Diagnosis Date Noted     Nephrolithiasis 12/25/2012     Priority: High     Urinary retention with incomplete bladder emptying 08/20/2017     Priority: Medium     Orthostatic hypotension 08/19/2017     Priority: Medium     Thoracic aortic aneurysm without rupture (H) - 4.7 cm on 8/18/17 (4.5 cm in 8/15) 08/18/2017     Priority: Medium     Near syncope 08/17/2017     Priority: Medium     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 Provider to review       CKD (chronic kidney disease) stage 3, GFR 30-59 ml/min (H) 05/05/2013      Priority: Medium     Pain 12/25/2012     Priority: Medium     Advance Care Planning 08/16/2012     Priority: Medium     Advance Care Planning: Receipt of ACP document:  Received: Health Care Directive which was witnessed or notarized on 3-12-13.  Document previously scanned on 3-13-13.  Validation form completed and  scanned.  Code Status reflects choices in most recent ACP document.  Confirmed/documented designated decision maker(s). See permanent comments section of demographics in clinical tab. View document(s) and details by clicking on code status. Added by Sayra Maguire on 8/25/2014.  .Patient states has Advance Directive and will bring in a copy to clinic. 8/16/2012          HYPERLIPIDEMIA LDL GOAL <100 10/31/2010     Priority: Medium     GERD (gastroesophageal reflux disease) 03/26/2010     Priority: Medium     Mixed hyperlipidemia 03/24/2010     Priority: Medium     Closed fracture of calcaneus 01/06/2007     Priority: Medium     Calculus of gallbladder 01/09/2003     Priority: Medium     Problem list name updated by automated process. Provider to review       Abdominal aortic aneurysm (H) 10/22/2002     Priority: Medium     Problem list name updated by automated process. Provider to review       Chest pain      Priority: Medium     Problem list name updated by automated process. Provider to review       Pain in joint, shoulder region      Priority: Medium     Paroxysmal atrial fibrillation (H) 08/26/2010     Priority: Low     Gastric cancer (H) 08/05/2010     Priority: Low     (Problem list name updated by automated process. Provider to review and confirm.)        Past Medical History:   Diagnosis Date     Blood transfusion      Chronic gastric ulcer without mention of hemorrhage, perforation, without mention of obstruction     Ulcer, Gastric     Gastric cancer (H)      Hemorrhage of gastrointestinal tract, unspecified 01/13/10    [facility]     Hypoglycemia, unspecified 1/26/2015     Measles without  mention of complication     Measles     Mixed hyperlipidemia     Hyperlipidemia     Mumps without mention of complication     Mumps     Pulmonary embolism (H) Sept 4, 2010     Urinary calculus, unspecified     Renal stones     Varicella without mention of complication     Chickenpox     Past Surgical History:   Procedure Laterality Date     ABDOMEN SURGERY       ARTHROPLASTY KNEE Left 8/15/2017    Procedure: ARTHROPLASTY KNEE;  Left total knee replacement;  Surgeon: Jonathan Cardona DO;  Location: [facility] OR     C EXCIS STOMACH ULCER,LESN;LOCAL       CHOLECYSTECTOMY, LAPOROSCOPIC  10/6/2008    Cholecystectomy, Laparoscopic     COLONOSCOPY  1/14/10    [facility]     COLONOSCOPY  05/25/10     CYSTOSCOPY, RETROGRADES, EXTRACT STONE, INSERT STENT, COMBINED  12/25/2012    Procedure: COMBINED CYSTOSCOPY, RETROGRADES, EXTRACT STONE, INSERT STENT;  Cystoscopy, Left Stent Placement, left retrograde pylogram, uc;  Surgeon: Amador Sanchez MD;  Location: [facility] OR     ESOPHAGOSCOPY, GASTROSCOPY, DUODENOSCOPY (EGD), COMBINED  8/16/2011    Procedure:COMBINED ESOPHAGOSCOPY, GASTROSCOPY, DUODENOSCOPY (EGD), BIOPSY SINGLE OR MULTIPLE; Surgeon:TONY GARCIA; Location:[facility]     GENERAL SURGERY                           DATE:  07/07/10    Gastrectomy      COLONOSCOPY W/WO BRUSH/WASH  01/31/2006      REPAIR ROTATOR CUFF,ACUTE  3/21/2005    Right      UGI ENDOSCOPY, SIMPLE EXAM  1/13/10    [facility] UGI ENDOSCOPY, SIMPLE EXAM  05/25/10     LASER HOLMIUM LITHOTRIPSY URETER(S), INSERT STENT, COMBINED  1/5/2013    Procedure: COMBINED CYSTOSCOPY, URETEROSCOPY, LASER HOLMIUM LITHOTRIPSY URETER(S), INSERT STENT;  Bilateral  Cystoscopy, Bilateral Ureteroscopy, Bilateral retrogrades and  placement of ureteral stent right side. Laser Holmium Lithotripsy  and Exchange  of stent left side;  Surgeon: Tone Morejon MD;  Location: [facility] OR     LASER HOLMIUM LITHOTRIPSY URETER(S), INSERT STENT,  COMBINED  1/30/2013    Procedure: COMBINED CYSTOSCOPY, URETEROSCOPY, LASER HOLMIUM LITHOTRIPSY URETER(S), INSERT STENT;  Right Ureteroscopy; Stone basketing; Right Stent exchange and  removal of left stent.;  Surgeon: Tone Morejon MD;  Location: [facility] OR     VIDEO CAPSULE ENDOSCOPY  01/15/10    [facility]     Current Outpatient Prescriptions   Medication Sig Dispense Refill     acetaminophen (TYLENOL) 500 MG tablet Take 2 tablets (1,000 mg) by mouth every 8 hours       atorvastatin (LIPITOR) 40 MG tablet Take 40 mg by mouth  3     clopidogrel (PLAVIX) 75 MG tablet Take 75 mg by mouth daily  3     cyanocobalamin (VITAMIN B12) 1000 MCG/ML injection Inject 1 mL (1,000 mcg) into the muscle every 30 days 1 mL 11     MELATONIN PO Take 5 mg by mouth At Bedtime       blood glucose monitoring (ROBERT CONTOUR NEXT) test strip Use to test blood sugar 1 times daily or as directed. 100 each 3     blood glucose monitoring (ROBERT MICROLET) lancets Use to test blood sugar 1 time daily or as directed. 1 Box 2     metoprolol (LOPRESSOR) 25 MG tablet Take 1 tablet (25 mg) by mouth 2 times daily (Patient not taking: Reported on 11/20/2018) 180 tablet 3     tamsulosin (FLOMAX) 0.4 MG capsule Take 0.4 mg by mouth daily       OTC products: None, except as noted above    Allergies   Allergen Reactions     Mobic [Meloxicam] Nausea and Diarrhea     Diarrhea, nausea, flu like symptoms since start of use      Latex Allergy: NO    Social History   Substance Use Topics     Smoking status: Current Some Day Smoker     Packs/day: 0.00     Years: 20.00     Types: Pipe, Cigars     Last attempt to quit: 1/1/1998     Smokeless tobacco: Never Used      Comment: Rare pipe and cigar smoking     Alcohol use No     History   Drug Use No       REVIEW OF SYSTEMS:   CONSTITUTIONAL: NEGATIVE for fever, chills, change in weight  INTEGUMENTARY/SKIN: NEGATIVE for worrisome rashes, moles or lesions  EYES: NEGATIVE for vision changes or  irritation  ENT/MOUTH: NEGATIVE for ear, mouth and throat problems  RESP: NEGATIVE for significant cough or SOB  CV: NEGATIVE for chest pain, palpitations or peripheral edema  GI: NEGATIVE for nausea, abdominal pain, heartburn, or change in bowel habits  : NEGATIVE for frequency, dysuria, or hematuria  MUSCULOSKELETAL: Patient has diffuse arthritis which is controlled. Does have a contracture of the hand specifically the fourth finger of the left hand.  NEURO: NEGATIVE for weakness, dizziness or paresthesias  ENDOCRINE: NEGATIVE for temperature intolerance, skin/hair changes  HEME: NEGATIVE for bleeding problems  PSYCHIATRIC: NEGATIVE for changes in mood or affect    EXAM:   Pulse 68  Resp 16  Wt 165 lb 8 oz (75.1 kg)  SpO2 98%  BMI 22.45 kg/m2    GENERAL APPEARANCE: healthy, alert and no distress     EYES: EOMI,  PERRL     HENT: ear canals and TM's normal and nose and mouth without ulcers or lesions     NECK: no adenopathy, no asymmetry, masses, or scars and thyroid normal to palpation     RESP: lungs clear to auscultation - no rales, rhonchi or wheezes     CV: regular rates and rhythm, normal S1 S2, no S3 or S4 and no murmur, click or rub     ABDOMEN:  soft, nontender, no HSM or masses and bowel sounds normal     MS: extremities normal- no gross deformities noted, no evidence of inflammation in joints, FROM in all extremities.     SKIN: no suspicious lesions or rashes     NEURO: Normal strength and tone, sensory exam grossly normal, mentation intact and speech normal     PSYCH: mentation appears normal. and affect normal/bright     LYMPHATICS: No cervical adenopathy    DIAGNOSTICS:   EKG demonstrates a normal sinus rhythm with rare PVC. Nonspecific ST-T wave changes are noted in the inferior leads.    Laboratory work is pending at this time and will be available in the EMR  IMPRESSION:   Reason for surgery/procedure: Patient has progressive Dupuytren's contracture involving the left hand. Requires surgical  intervention. He has discusses with his orthopedic surgeon and has decided to pursue this with full consent.  Diagnosis/reason for consult: Perioperative risk assessment in a pleasant 85-year-old gentleman with:   1. Dupuytren's contracture    2. Preop general physical exam  - CBC with platelets  - Basic metabolic panel  - *UA reflex to Microscopic and Culture ([facility] (except [place] and [place])  - EKG 12-lead complete w/read - Clinics    3. Paroxysmal atrial fibrillation (H)    4. Abdominal aortic aneurysm (AAA) without rupture (H)    5. Primary osteoarthritis of both knees    6. Malignant neoplasm of stomach, unspecified location (H)    7. Postsurgical percutaneous transluminal coronary angioplasty status        The proposed surgical procedure is considered INTERMEDIATE risk.    REVISED CARDIAC RISK INDEX  The patient has the following serious cardiovascular risks for perioperative complications such as (MI, PE, VFib and 3  AV Block):  No serious cardiac risks  INTERPRETATION: 1 risks: Class II (low risk - 0.9% complication rate)    The patient has the following additional risks for perioperative complications:  No identified additional risks      ICD-10-CM    1. Preop general physical exam Z01.818        RECOMMENDATIONS:         --Patient is to take all scheduled medications on the day of surgery EXCEPT for modifications listed below.    Anticoagulant or Antiplatelet Medication Use  PLAVIX: No contraindication to stopping plavix.  Stop 7-10 days prior to surgery        APPROVAL GIVEN to proceed with proposed procedure, without further diagnostic evaluation       Signed Electronically by: Harrison Tse DO    Copy of this evaluation report is provided to requesting physician.    [facility] Preop Guidelines    Revised Cardiac Risk Index

## 2018-11-20 NOTE — MR AVS SNAPSHOT
After Visit Summary   11/20/2018    Remigio Holly    MRN: [id number]           Patient Information     Date Of Birth          11/6/1933        Visit Information        Provider Department      11/20/2018 8:40 AM Harrison Tse DO [facility]        Today's Diagnoses     Preop general physical exam    -  1    Dupuytren's contracture        Paroxysmal atrial fibrillation (H)        Abdominal aortic aneurysm (AAA) without rupture (H)        Primary osteoarthritis of both knees        Malignant neoplasm of stomach, unspecified location (H)        Postsurgical percutaneous transluminal coronary angioplasty status          Care Instructions      Before Your Surgery      Call your surgeon if there is any change in your health. This includes signs of a cold or flu (such as a sore throat, runny nose, cough, rash or fever).    Do not smoke, drink alcohol or take over the counter medicine (unless your surgeon or primary care doctor tells you to) for the 24 hours before and after surgery.    If you take prescribed drugs: Follow your doctor s orders about which medicines to take and which to stop until after surgery.    Eating and drinking prior to surgery: follow the instructions from your surgeon    Take a shower or bath the night before surgery. Use the soap your surgeon gave you to gently clean your skin. If you do not have soap from your surgeon, use your regular soap. Do not shave or scrub the surgery site.  Wear clean pajamas and have clean sheets on your bed.           Follow-ups after your visit        Follow-up notes from your care team     Return in about 4 months (around 3/20/2019) for follow up.      Your next 10 appointments already scheduled     Nov 26, 2018   Procedure with Lucio Omalley MD   [facility] ([facility])    [address] [address]   [phone]           From [address]: Exit at [address] on [address] side of [place]. Turn right on [address]. Turn left at stoplight on [address]. [facility] will be in view two blocks ahead            Nov 28, 2018  9:30 AM CST   [facility]    [address] [place] [address]   [phone]            Dec 10, 2018  1:30 PM CST   Return Visit with [facility]   [facility]    [address] [place] [address]   [phone]              Who to contact     If you have questions or need follow up information about today's clinic visit or your schedule please contact [facility] directly at [phone].  Normal or non-critical lab and imaging results will be communicated to you by DeskActivehart, letter or phone within 4 business days after the clinic has received the results. If you do not hear from us within 7 days, please contact the clinic through DeskActivehart or phone. If you have a critical or abnormal lab result, we will notify you by phone as soon as possible.  Submit refill requests through [employer] or call your pharmacy and they will forward the refill request to us. Please allow 3 business days for your refill to be completed.          Additional Information About Your Visit        M[employer] Information     [employer] gives you secure access to your electronic health record. If you see a primary care provider, you can also send messages to your care team and make appointments. If you have questions, please call your primary care clinic.  If you do not have a primary care provider, please call [phone] and they will assist you.        Care EveryWhere ID     This is your Care EveryWhere ID. This could be used by other organizations to access your [facility] medical records  [id number]        Your Vitals Were     Pulse Temperature Respirations Pulse Oximetry BMI (Body Mass Index)        68 97.6  F (36.4  C) (Temporal) 16 98% 22.45 kg/m2        Blood Pressure from Last 3 Encounters:   11/20/18 120/72   10/31/18 122/66   10/29/18 112/74    Weight from Last 3 Encounters:   11/20/18 165 lb 8 oz (75.1 kg)   10/31/18 166 lb (75.3 kg)   10/29/18 166 lb 7 oz (75.5 kg)              We Performed the Following     *UA reflex to Microscopic and Culture ([place] [facility] (except [place] and [place])     Basic metabolic panel     CBC with platelets     EKG 12-lead complete w/read - Clinics        Primary Care Provider Office Phone # Fax #    Harrison Ricky Tse, [phone]       [address] [place] [address]        Equal Access to Services     ERICA RUSSELL : Hadii suzy carolina hadasho Soomaali, waaxda luqadaha, qaybta kaalmada adeegyada, tray haider . So [facility] [phone].    ATENCIÓN: Si habla español, tiene a tabares disposición servicios gratuitos de asistencia lingüística. Edith al [phone].    We comply with applicable federal civil rights laws and [place] laws. We do not discriminate on the basis of race, color, national origin, age, disability, sex, sexual orientation, or gender identity.            Thank you!     Thank you for choosing [facility]  for your care. Our goal is always to provide you with excellent care. Hearing back from our patients is one way we can continue to improve our services. Please take a few minutes to complete the written survey that you may receive in the mail after your visit with us. Thank you!             Your Updated Medication List - Protect others around you: Learn how to safely use, store and throw away your medicines at www.disposemymeds.org.          This list is accurate as of 11/20/18 11:42 AM.  Always use your most recent med list.                   Brand Name Dispense Instructions for use Diagnosis    acetaminophen 500 MG tablet    TYLENOL     Take 2 tablets (1,000 mg) by mouth  every 8 hours    Status post total left knee replacement using cement       atorvastatin 40 MG tablet    LIPITOR     Take 40 mg by mouth        blood glucose monitoring lancets     1 Box    Use to test blood sugar 1 time daily or as directed.    Hypoglycemia       blood glucose monitoring test strip    ROBERT CONTOUR NEXT    100 each    Use to test blood sugar 1 times daily or as directed.    Hypoglycemia       clopidogrel 75 MG tablet    PLAVIX     Take 75 mg by mouth daily        cyanocobalamin 1000 MCG/ML injection    VITAMIN B12    1 mL    Inject 1 mL (1,000 mcg) into the muscle every 30 days    Pernicious anemia       MELATONIN PO      Take 5 mg by mouth At Bedtime        metoprolol tartrate 25 MG tablet    LOPRESSOR    180 tablet    Take 1 tablet (25 mg) by mouth 2 times daily    Aortic root dilatation (H)       tamsulosin 0.4 MG capsule    FLOMAX     Take 0.4 mg by mouth daily

## 2018-11-20 NOTE — NURSING NOTE
Per Dr. Tse, I have performed an EKG on pt. Results given to provider. Pt tolerated well. Kristin Acosta CMA ([facility])

## 2018-11-23 NOTE — H&P (VIEW-ONLY)
[facility]  [address] [place] [facility] [address]  [phone]  Dept: [phone]    PRE-OP EVALUATION:  Today's date: 2018    Remigio Holly (: 1933) presents for pre-operative evaluation assessment as requested by Dr. Omalley.  He requires evaluation and anesthesia risk assessment prior to undergoing surgery/procedure for treatment of finger problem .    Proposed Surgery/ Procedure: Release of left Dupuytrens contracture  Date of Surgery/ Procedure: 2018  Time of Surgery/ Procedure: 7:30 am  Hospital/Surgical Facility: [facility]  Fax number for surgical facility:   Primary Physician: Harrison Tse  Type of Anesthesia Anticipated: General    Patient has a Health Care Directive or Living Will:  NO    1. YES - DO YOU HAVE A HISTORY OF HEART ATTACK, STROKE, STENT, BYPASS OR SURGERY ON AN ARTERY IN THE HEAD, NECK, HEART OR LEG?   2. NO - Do you ever have any pain or discomfort in your chest?  3. NO - Do you have a history of  Heart Failure?  4. NO - Are you troubled by shortness of breath when: walking on the level, up a slight hill or at night?  5. NO - Do you currently have a cold, bronchitis or other respiratory infection?  6. NO - Do you have a cough, shortness of breath or wheezing?  7. NO - Do you sometimes get pains in the calves of your legs when you walk?  8. NO - Do you or anyone in your family have previous history of blood clots?  9. NO - Do you or does anyone in your family have a serious bleeding problem such as prolonged bleeding following surgeries or cuts?  10. NO - Have you ever had problems with anemia or been told to take iron pills?  11. NO - Have you had any abnormal blood loss such as black, tarry or bloody stools, or abnormal vaginal bleeding?  12. NO - Have you ever had a blood transfusion?  13. NO - Have you or any of your relatives ever had problems with anesthesia?  14. NO - Do you have sleep apnea, excessive snoring or  daytime drowsiness?  15. NO - Do you have any prosthetic heart valves?  16. NO - Do you have prosthetic joints?  17. NO - Is there any chance that you may be pregnant?      HPI:     HPI related to upcoming procedure: Patient has no perfusion defect involving the left hand. Involves the ring finger specifically has substantially decreases ability to maintain his normal activities due to simply being in the way and snagging on everything. 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      See problem list for active medical problems.  Problems all longstanding and stable, except as noted/documented.  See ROS for pertinent symptoms related to these conditions.                                                                                                                                                          .    MEDICAL HISTORY:     Patient Active Problem List    Diagnosis Date Noted     Nephrolithiasis 12/25/2012     Priority: High     Urinary retention with incomplete bladder emptying 08/20/2017     Priority: Medium     Orthostatic hypotension 08/19/2017     Priority: Medium     Thoracic aortic aneurysm without rupture (H) - 4.7 cm on 8/18/17 (4.5 cm in 8/15) 08/18/2017     Priority: Medium     Near syncope 08/17/2017     Priority: Medium     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 Provider to review       CKD (chronic kidney disease) stage 3, GFR 30-59 ml/min (H) 05/05/2013      Priority: Medium     Pain 12/25/2012     Priority: Medium     Advance Care Planning 08/16/2012     Priority: Medium     Advance Care Planning: Receipt of ACP document:  Received: Health Care Directive which was witnessed or notarized on 3-12-13.  Document previously scanned on 3-13-13.  Validation form completed and  scanned.  Code Status reflects choices in most recent ACP document.  Confirmed/documented designated decision maker(s). See permanent comments section of demographics in clinical tab. View document(s) and details by clicking on code status. Added by Sayra Maguire on 8/25/2014.  .Patient states has Advance Directive and will bring in a copy to clinic. 8/16/2012          HYPERLIPIDEMIA LDL GOAL <100 10/31/2010     Priority: Medium     GERD (gastroesophageal reflux disease) 03/26/2010     Priority: Medium     Mixed hyperlipidemia 03/24/2010     Priority: Medium     Closed fracture of calcaneus 01/06/2007     Priority: Medium     Calculus of gallbladder 01/09/2003     Priority: Medium     Problem list name updated by automated process. Provider to review       Abdominal aortic aneurysm (H) 10/22/2002     Priority: Medium     Problem list name updated by automated process. Provider to review       Chest pain      Priority: Medium     Problem list name updated by automated process. Provider to review       Pain in joint, shoulder region      Priority: Medium     Paroxysmal atrial fibrillation (H) 08/26/2010     Priority: Low     Gastric cancer (H) 08/05/2010     Priority: Low     (Problem list name updated by automated process. Provider to review and confirm.)        Past Medical History:   Diagnosis Date     Blood transfusion      Chronic gastric ulcer without mention of hemorrhage, perforation, without mention of obstruction     Ulcer, Gastric     Gastric cancer (H)      Hemorrhage of gastrointestinal tract, unspecified 01/13/10    [facility]     Hypoglycemia, unspecified 1/26/2015     Measles without  mention of complication     Measles     Mixed hyperlipidemia     Hyperlipidemia     Mumps without mention of complication     Mumps     Pulmonary embolism (H) Sept 4, 2010     Urinary calculus, unspecified     Renal stones     Varicella without mention of complication     Chickenpox     Past Surgical History:   Procedure Laterality Date     ABDOMEN SURGERY       ARTHROPLASTY KNEE Left 8/15/2017    Procedure: ARTHROPLASTY KNEE;  Left total knee replacement;  Surgeon: Jonathan Cardona DO;  Location: [facility] OR     C EXCIS STOMACH ULCER,LESN;LOCAL       CHOLECYSTECTOMY, LAPOROSCOPIC  10/6/2008    Cholecystectomy, Laparoscopic     COLONOSCOPY  1/14/10    [facility]     COLONOSCOPY  05/25/10     CYSTOSCOPY, RETROGRADES, EXTRACT STONE, INSERT STENT, COMBINED  12/25/2012    Procedure: COMBINED CYSTOSCOPY, RETROGRADES, EXTRACT STONE, INSERT STENT;  Cystoscopy, Left Stent Placement, left retrograde pylogram, uc;  Surgeon: Amador Sanchez MD;  Location: [facility] OR     ESOPHAGOSCOPY, GASTROSCOPY, DUODENOSCOPY (EGD), COMBINED  8/16/2011    Procedure:COMBINED ESOPHAGOSCOPY, GASTROSCOPY, DUODENOSCOPY (EGD), BIOPSY SINGLE OR MULTIPLE; Surgeon:TONY GARCIA; Location:[facility]     GENERAL SURGERY                           DATE:  07/07/10    Gastrectomy      COLONOSCOPY W/WO BRUSH/WASH  01/31/2006      REPAIR ROTATOR CUFF,ACUTE  3/21/2005    Right      UGI ENDOSCOPY, SIMPLE EXAM  1/13/10    [facility] UGI ENDOSCOPY, SIMPLE EXAM  05/25/10     LASER HOLMIUM LITHOTRIPSY URETER(S), INSERT STENT, COMBINED  1/5/2013    Procedure: COMBINED CYSTOSCOPY, URETEROSCOPY, LASER HOLMIUM LITHOTRIPSY URETER(S), INSERT STENT;  Bilateral  Cystoscopy, Bilateral Ureteroscopy, Bilateral retrogrades and  placement of ureteral stent right side. Laser Holmium Lithotripsy  and Exchange  of stent left side;  Surgeon: Tone Morejon MD;  Location: [facility] OR     LASER HOLMIUM LITHOTRIPSY URETER(S), INSERT STENT,  COMBINED  1/30/2013    Procedure: COMBINED CYSTOSCOPY, URETEROSCOPY, LASER HOLMIUM LITHOTRIPSY URETER(S), INSERT STENT;  Right Ureteroscopy; Stone basketing; Right Stent exchange and  removal of left stent.;  Surgeon: Tone Morejon MD;  Location: [facility] OR     VIDEO CAPSULE ENDOSCOPY  01/15/10    [facility]     Current Outpatient Prescriptions   Medication Sig Dispense Refill     acetaminophen (TYLENOL) 500 MG tablet Take 2 tablets (1,000 mg) by mouth every 8 hours       atorvastatin (LIPITOR) 40 MG tablet Take 40 mg by mouth  3     clopidogrel (PLAVIX) 75 MG tablet Take 75 mg by mouth daily  3     cyanocobalamin (VITAMIN B12) 1000 MCG/ML injection Inject 1 mL (1,000 mcg) into the muscle every 30 days 1 mL 11     MELATONIN PO Take 5 mg by mouth At Bedtime       blood glucose monitoring (ROBERT CONTOUR NEXT) test strip Use to test blood sugar 1 times daily or as directed. 100 each 3     blood glucose monitoring (ROBERT MICROLET) lancets Use to test blood sugar 1 time daily or as directed. 1 Box 2     metoprolol (LOPRESSOR) 25 MG tablet Take 1 tablet (25 mg) by mouth 2 times daily (Patient not taking: Reported on 11/20/2018) 180 tablet 3     tamsulosin (FLOMAX) 0.4 MG capsule Take 0.4 mg by mouth daily       OTC products: None, except as noted above    Allergies   Allergen Reactions     Mobic [Meloxicam] Nausea and Diarrhea     Diarrhea, nausea, flu like symptoms since start of use      Latex Allergy: NO    Social History   Substance Use Topics     Smoking status: Current Some Day Smoker     Packs/day: 0.00     Years: 20.00     Types: Pipe, Cigars     Last attempt to quit: 1/1/1998     Smokeless tobacco: Never Used      Comment: Rare pipe and cigar smoking     Alcohol use No     History   Drug Use No       REVIEW OF SYSTEMS:   CONSTITUTIONAL: NEGATIVE for fever, chills, change in weight  INTEGUMENTARY/SKIN: NEGATIVE for worrisome rashes, moles or lesions  EYES: NEGATIVE for vision changes or  irritation  ENT/MOUTH: NEGATIVE for ear, mouth and throat problems  RESP: NEGATIVE for significant cough or SOB  CV: NEGATIVE for chest pain, palpitations or peripheral edema  GI: NEGATIVE for nausea, abdominal pain, heartburn, or change in bowel habits  : NEGATIVE for frequency, dysuria, or hematuria  MUSCULOSKELETAL: Patient has diffuse arthritis which is controlled. Does have a contracture of the hand specifically the fourth finger of the left hand.  NEURO: NEGATIVE for weakness, dizziness or paresthesias  ENDOCRINE: NEGATIVE for temperature intolerance, skin/hair changes  HEME: NEGATIVE for bleeding problems  PSYCHIATRIC: NEGATIVE for changes in mood or affect    EXAM:   Pulse 68  Resp 16  Wt 165 lb 8 oz (75.1 kg)  SpO2 98%  BMI 22.45 kg/m2    GENERAL APPEARANCE: healthy, alert and no distress     EYES: EOMI,  PERRL     HENT: ear canals and TM's normal and nose and mouth without ulcers or lesions     NECK: no adenopathy, no asymmetry, masses, or scars and thyroid normal to palpation     RESP: lungs clear to auscultation - no rales, rhonchi or wheezes     CV: regular rates and rhythm, normal S1 S2, no S3 or S4 and no murmur, click or rub     ABDOMEN:  soft, nontender, no HSM or masses and bowel sounds normal     MS: extremities normal- no gross deformities noted, no evidence of inflammation in joints, FROM in all extremities.     SKIN: no suspicious lesions or rashes     NEURO: Normal strength and tone, sensory exam grossly normal, mentation intact and speech normal     PSYCH: mentation appears normal. and affect normal/bright     LYMPHATICS: No cervical adenopathy    DIAGNOSTICS:   EKG demonstrates a normal sinus rhythm with rare PVC. Nonspecific ST-T wave changes are noted in the inferior leads.    Laboratory work is pending at this time and will be available in the EMR  IMPRESSION:   Reason for surgery/procedure: Patient has progressive Dupuytren's contracture involving the left hand. Requires surgical  intervention. He has discusses with his orthopedic surgeon and has decided to pursue this with full consent.  Diagnosis/reason for consult: Perioperative risk assessment in a pleasant 85-year-old gentleman with:   1. Dupuytren's contracture    2. Preop general physical exam  - CBC with platelets  - Basic metabolic panel  - *UA reflex to Microscopic and Culture ([facility] (except [place] and [place])  - EKG 12-lead complete w/read - Clinics    3. Paroxysmal atrial fibrillation (H)    4. Abdominal aortic aneurysm (AAA) without rupture (H)    5. Primary osteoarthritis of both knees    6. Malignant neoplasm of stomach, unspecified location (H)    7. Postsurgical percutaneous transluminal coronary angioplasty status        The proposed surgical procedure is considered INTERMEDIATE risk.    REVISED CARDIAC RISK INDEX  The patient has the following serious cardiovascular risks for perioperative complications such as (MI, PE, VFib and 3  AV Block):  No serious cardiac risks  INTERPRETATION: 1 risks: Class II (low risk - 0.9% complication rate)    The patient has the following additional risks for perioperative complications:  No identified additional risks      ICD-10-CM    1. Preop general physical exam Z01.818        RECOMMENDATIONS:         --Patient is to take all scheduled medications on the day of surgery EXCEPT for modifications listed below.    Anticoagulant or Antiplatelet Medication Use  PLAVIX: No contraindication to stopping plavix.  Stop 7-10 days prior to surgery        APPROVAL GIVEN to proceed with proposed procedure, without further diagnostic evaluation       Signed Electronically by: Harrison Tse DO    Copy of this evaluation report is provided to requesting physician.    [place] Preop Guidelines    Revised Cardiac Risk Index

## 2018-11-25 ENCOUNTER — ANESTHESIA EVENT (OUTPATIENT)
Dept: SURGERY | Facility: CLINIC | Age: 83
End: 2018-11-25
Payer: MEDICARE

## 2018-11-25 ASSESSMENT — LIFESTYLE VARIABLES: TOBACCO_USE: 1

## 2018-11-25 ASSESSMENT — ENCOUNTER SYMPTOMS: SEIZURES: 0

## 2018-11-26 ENCOUNTER — HOSPITAL ENCOUNTER (OUTPATIENT)
Facility: CLINIC | Age: 83
Discharge: HOME | End: 2018-11-26
Attending: ORTHOPAEDIC SURGERY | Admitting: ORTHOPAEDIC SURGERY
Payer: MEDICARE

## 2018-11-26 ENCOUNTER — ANESTHESIA (OUTPATIENT)
Dept: SURGERY | Facility: CLINIC | Age: 83
End: 2018-11-26
Payer: MEDICARE

## 2018-11-26 VITALS
OXYGEN SATURATION: 94 % | DIASTOLIC BLOOD PRESSURE: 66 MMHG | RESPIRATION RATE: 16 BRPM | SYSTOLIC BLOOD PRESSURE: 103 MMHG | TEMPERATURE: 96.8 F

## 2018-11-26 DIAGNOSIS — G89.18 POST-OP PAIN: Primary | ICD-10-CM

## 2018-11-26 LAB
GLUCOSE BLDC GLUCOMTR-MCNC: 139 MG/DL (ref 70–99)
GLUCOSE BLDC GLUCOMTR-MCNC: 50 MG/DL (ref 70–99)
GLUCOSE BLDC GLUCOMTR-MCNC: 86 MG/DL (ref 70–99)

## 2018-11-26 PROCEDURE — 36000050 ZZH SURGERY LEVEL 2 1ST 30 MIN: Performed by: ORTHOPAEDIC SURGERY

## 2018-11-26 PROCEDURE — 88304 TISSUE EXAM BY PATHOLOGIST: CPT | Mod: 26 | Performed by: ORTHOPAEDIC SURGERY

## 2018-11-26 PROCEDURE — 25000128 H RX IP 250 OP 636: Performed by: NURSE ANESTHETIST, CERTIFIED REGISTERED

## 2018-11-26 PROCEDURE — 25000566 ZZH SEVOFLURANE, EA 15 MIN: Performed by: ORTHOPAEDIC SURGERY

## 2018-11-26 PROCEDURE — 25000128 H RX IP 250 OP 636: Performed by: ORTHOPAEDIC SURGERY

## 2018-11-26 PROCEDURE — 88304 TISSUE EXAM BY PATHOLOGIST: CPT | Performed by: ORTHOPAEDIC SURGERY

## 2018-11-26 PROCEDURE — 82962 GLUCOSE BLOOD TEST: CPT

## 2018-11-26 PROCEDURE — 37000009 ZZH ANESTHESIA TECHNICAL FEE, EACH ADDTL 15 MIN: Performed by: ORTHOPAEDIC SURGERY

## 2018-11-26 PROCEDURE — 25000125 ZZHC RX 250: Performed by: NURSE ANESTHETIST, CERTIFIED REGISTERED

## 2018-11-26 PROCEDURE — 37000008 ZZH ANESTHESIA TECHNICAL FEE, 1ST 30 MIN: Performed by: ORTHOPAEDIC SURGERY

## 2018-11-26 PROCEDURE — 26125 RELEASE PALM CONTRACTURE: CPT | Mod: F4 | Performed by: ORTHOPAEDIC SURGERY

## 2018-11-26 PROCEDURE — 26123 RELEASE PALM CONTRACTURE: CPT | Mod: F3 | Performed by: ORTHOPAEDIC SURGERY

## 2018-11-26 PROCEDURE — 27210794 ZZH OR GENERAL SUPPLY STERILE: Performed by: ORTHOPAEDIC SURGERY

## 2018-11-26 PROCEDURE — 71000027 ZZH RECOVERY PHASE 2 EACH 15 MINS: Performed by: ORTHOPAEDIC SURGERY

## 2018-11-26 PROCEDURE — 36000052 ZZH SURGERY LEVEL 2 EA 15 ADDTL MIN: Performed by: ORTHOPAEDIC SURGERY

## 2018-11-26 PROCEDURE — 40000306 ZZH STATISTIC PRE PROC ASSESS II: Performed by: ORTHOPAEDIC SURGERY

## 2018-11-26 PROCEDURE — A9270 NON-COVERED ITEM OR SERVICE: HCPCS | Performed by: ORTHOPAEDIC SURGERY

## 2018-11-26 PROCEDURE — 71000014 ZZH RECOVERY PHASE 1 LEVEL 2 FIRST HR: Performed by: ORTHOPAEDIC SURGERY

## 2018-11-26 PROCEDURE — 25000132 ZZH RX MED GY IP 250 OP 250 PS 637: Performed by: ORTHOPAEDIC SURGERY

## 2018-11-26 RX ORDER — BUPIVACAINE HYDROCHLORIDE 5 MG/ML
INJECTION, SOLUTION PERINEURAL PRN
Status: DISCONTINUED | OUTPATIENT
Start: 2018-11-26 | End: 2018-11-26 | Stop reason: HOSPADM

## 2018-11-26 RX ORDER — CEFAZOLIN SODIUM 2 G/100ML
2 INJECTION, SOLUTION INTRAVENOUS
Status: DISCONTINUED | OUTPATIENT
Start: 2018-11-26 | End: 2018-11-26 | Stop reason: HOSPADM

## 2018-11-26 RX ORDER — ONDANSETRON 4 MG/1
4 TABLET, ORALLY DISINTEGRATING ORAL EVERY 30 MIN PRN
Status: DISCONTINUED | OUTPATIENT
Start: 2018-11-26 | End: 2018-11-26 | Stop reason: HOSPADM

## 2018-11-26 RX ORDER — MEPERIDINE HYDROCHLORIDE 25 MG/ML
12.5 INJECTION INTRAMUSCULAR; INTRAVENOUS; SUBCUTANEOUS
Status: DISCONTINUED | OUTPATIENT
Start: 2018-11-26 | End: 2018-11-26 | Stop reason: HOSPADM

## 2018-11-26 RX ORDER — NALOXONE HYDROCHLORIDE 0.4 MG/ML
.1-.4 INJECTION, SOLUTION INTRAMUSCULAR; INTRAVENOUS; SUBCUTANEOUS
Status: DISCONTINUED | OUTPATIENT
Start: 2018-11-26 | End: 2018-11-26 | Stop reason: HOSPADM

## 2018-11-26 RX ORDER — HYDROCODONE BITARTRATE AND ACETAMINOPHEN 5; 325 MG/1; MG/1
1-2 TABLET ORAL EVERY 4 HOURS PRN
Qty: 15 TABLET | Refills: 0 | Status: SHIPPED | OUTPATIENT
Start: 2018-11-26 | End: 2018-11-28

## 2018-11-26 RX ORDER — SODIUM CHLORIDE, SODIUM LACTATE, POTASSIUM CHLORIDE, CALCIUM CHLORIDE 600; 310; 30; 20 MG/100ML; MG/100ML; MG/100ML; MG/100ML
INJECTION, SOLUTION INTRAVENOUS CONTINUOUS
Status: DISCONTINUED | OUTPATIENT
Start: 2018-11-26 | End: 2018-11-26 | Stop reason: HOSPADM

## 2018-11-26 RX ORDER — EPHEDRINE SULFATE 50 MG/ML
INJECTION, SOLUTION INTRAMUSCULAR; INTRAVENOUS; SUBCUTANEOUS PRN
Status: DISCONTINUED | OUTPATIENT
Start: 2018-11-26 | End: 2018-11-26

## 2018-11-26 RX ORDER — LIDOCAINE HYDROCHLORIDE 20 MG/ML
INJECTION, SOLUTION INFILTRATION; PERINEURAL PRN
Status: DISCONTINUED | OUTPATIENT
Start: 2018-11-26 | End: 2018-11-26

## 2018-11-26 RX ORDER — PROPOFOL 10 MG/ML
INJECTION, EMULSION INTRAVENOUS PRN
Status: DISCONTINUED | OUTPATIENT
Start: 2018-11-26 | End: 2018-11-26

## 2018-11-26 RX ORDER — AMOXICILLIN 250 MG
1-2 CAPSULE ORAL 2 TIMES DAILY
Qty: 30 TABLET | Refills: 0 | Status: SHIPPED | OUTPATIENT
Start: 2018-11-26

## 2018-11-26 RX ORDER — ONDANSETRON 2 MG/ML
4 INJECTION INTRAMUSCULAR; INTRAVENOUS EVERY 30 MIN PRN
Status: DISCONTINUED | OUTPATIENT
Start: 2018-11-26 | End: 2018-11-26 | Stop reason: HOSPADM

## 2018-11-26 RX ORDER — DIMENHYDRINATE 50 MG/ML
25 INJECTION, SOLUTION INTRAMUSCULAR; INTRAVENOUS
Status: DISCONTINUED | OUTPATIENT
Start: 2018-11-26 | End: 2018-11-26 | Stop reason: HOSPADM

## 2018-11-26 RX ORDER — FENTANYL CITRATE 50 UG/ML
25-50 INJECTION, SOLUTION INTRAMUSCULAR; INTRAVENOUS
Status: DISCONTINUED | OUTPATIENT
Start: 2018-11-26 | End: 2018-11-26 | Stop reason: HOSPADM

## 2018-11-26 RX ORDER — ONDANSETRON 2 MG/ML
INJECTION INTRAMUSCULAR; INTRAVENOUS PRN
Status: DISCONTINUED | OUTPATIENT
Start: 2018-11-26 | End: 2018-11-26

## 2018-11-26 RX ORDER — CEFAZOLIN SODIUM 1 G/3ML
1 INJECTION, POWDER, FOR SOLUTION INTRAMUSCULAR; INTRAVENOUS SEE ADMIN INSTRUCTIONS
Status: DISCONTINUED | OUTPATIENT
Start: 2018-11-26 | End: 2018-11-26 | Stop reason: HOSPADM

## 2018-11-26 RX ORDER — FENTANYL CITRATE 50 UG/ML
INJECTION, SOLUTION INTRAMUSCULAR; INTRAVENOUS PRN
Status: DISCONTINUED | OUTPATIENT
Start: 2018-11-26 | End: 2018-11-26

## 2018-11-26 RX ORDER — HYDROCODONE BITARTRATE AND ACETAMINOPHEN 5; 325 MG/1; MG/1
1-2 TABLET ORAL EVERY 4 HOURS PRN
Status: DISCONTINUED | OUTPATIENT
Start: 2018-11-26 | End: 2018-11-26 | Stop reason: HOSPADM

## 2018-11-26 RX ORDER — LIDOCAINE 40 MG/G
CREAM TOPICAL
Status: DISCONTINUED | OUTPATIENT
Start: 2018-11-26 | End: 2018-11-26 | Stop reason: HOSPADM

## 2018-11-26 RX ADMIN — LIDOCAINE HYDROCHLORIDE 0.1 ML: 10 INJECTION, SOLUTION EPIDURAL; INFILTRATION; INTRACAUDAL; PERINEURAL at 07:01

## 2018-11-26 RX ADMIN — PHENYLEPHRINE HYDROCHLORIDE 100 MCG: 10 INJECTION, SOLUTION INTRAMUSCULAR; INTRAVENOUS; SUBCUTANEOUS at 08:49

## 2018-11-26 RX ADMIN — ONDANSETRON 4 MG: 2 INJECTION INTRAMUSCULAR; INTRAVENOUS at 09:01

## 2018-11-26 RX ADMIN — FENTANYL CITRATE 25 MCG: 50 INJECTION INTRAMUSCULAR; INTRAVENOUS at 10:14

## 2018-11-26 RX ADMIN — SODIUM CHLORIDE, POTASSIUM CHLORIDE, SODIUM LACTATE AND CALCIUM CHLORIDE: 600; 310; 30; 20 INJECTION, SOLUTION INTRAVENOUS at 07:02

## 2018-11-26 RX ADMIN — PHENYLEPHRINE HYDROCHLORIDE 50 MCG: 10 INJECTION, SOLUTION INTRAMUSCULAR; INTRAVENOUS; SUBCUTANEOUS at 08:26

## 2018-11-26 RX ADMIN — FENTANYL CITRATE 50 MCG: 50 INJECTION, SOLUTION INTRAMUSCULAR; INTRAVENOUS at 07:44

## 2018-11-26 RX ADMIN — FENTANYL CITRATE 25 MCG: 50 INJECTION, SOLUTION INTRAMUSCULAR; INTRAVENOUS at 08:06

## 2018-11-26 RX ADMIN — FENTANYL CITRATE 50 MCG: 50 INJECTION INTRAMUSCULAR; INTRAVENOUS at 09:57

## 2018-11-26 RX ADMIN — PHENYLEPHRINE HYDROCHLORIDE 100 MCG: 10 INJECTION, SOLUTION INTRAMUSCULAR; INTRAVENOUS; SUBCUTANEOUS at 08:53

## 2018-11-26 RX ADMIN — PHENYLEPHRINE HYDROCHLORIDE 50 MCG: 10 INJECTION, SOLUTION INTRAMUSCULAR; INTRAVENOUS; SUBCUTANEOUS at 08:28

## 2018-11-26 RX ADMIN — FENTANYL CITRATE 25 MCG: 50 INJECTION INTRAMUSCULAR; INTRAVENOUS at 10:22

## 2018-11-26 RX ADMIN — HYDROCODONE BITARTRATE AND ACETAMINOPHEN 2 TABLET: 5; 325 TABLET ORAL at 10:43

## 2018-11-26 RX ADMIN — PHENYLEPHRINE HYDROCHLORIDE 100 MCG: 10 INJECTION, SOLUTION INTRAMUSCULAR; INTRAVENOUS; SUBCUTANEOUS at 08:31

## 2018-11-26 RX ADMIN — SODIUM CHLORIDE, POTASSIUM CHLORIDE, SODIUM LACTATE AND CALCIUM CHLORIDE: 600; 310; 30; 20 INJECTION, SOLUTION INTRAVENOUS at 10:03

## 2018-11-26 RX ADMIN — Medication 5 MG: at 08:58

## 2018-11-26 RX ADMIN — LIDOCAINE HYDROCHLORIDE 40 MG: 20 INJECTION, SOLUTION INFILTRATION; PERINEURAL at 07:53

## 2018-11-26 RX ADMIN — PROPOFOL 90 MG: 10 INJECTION, EMULSION INTRAVENOUS at 07:53

## 2018-11-26 RX ADMIN — FENTANYL CITRATE 25 MCG: 50 INJECTION, SOLUTION INTRAMUSCULAR; INTRAVENOUS at 08:41

## 2018-11-26 RX ADMIN — PHENYLEPHRINE HYDROCHLORIDE 100 MCG: 10 INJECTION, SOLUTION INTRAMUSCULAR; INTRAVENOUS; SUBCUTANEOUS at 08:37

## 2018-11-26 NOTE — IP AVS SNAPSHOT
MRN:[id number]                      After Visit Summary   11/26/2018    Remigio Holly    MRN: [id number]           Thank you!     Thank you for choosing [facility] for your care. Our goal is always to provide you with excellent care. Hearing back from our patients is one way we can continue to improve our services. Please take a few minutes to complete the written survey that you may receive in the mail after you visit with us. Thank you!        Patient Information     Date Of Birth          11/6/1933        About your hospital stay     You were admitted on:  November 26, 2018 You last received care in the:  [facility]    You were discharged on:  November 26, 2018       Who to Call     For medical emergencies, please call 911.  For non-urgent questions about your medical care, please call your primary care provider or clinic, [phone]  For questions related to your surgery, please call your surgery clinic        Attending Provider     Provider Specialty    Lucio Omalley MD Orthopedics       Primary Care Provider Office Phone # Fax #    Harrison Ricky Rhoadesmarcus,  [phone]      After Care Instructions     Discharge Instructions       Review outpatient procedure discharge instructions with patient as directed by Provider            Return to clinic       Return to clinic in 2 days.                  Your next 10 appointments already scheduled     Nov 28, 2018  9:30 AM CST   Return Visit with Lucio Omalley MD   [facility] ([address] [id number] [phone]            Dec 10, 2018  1:30 PM CST   Return Visit with Lucio Omalley MD   [facility] ([address] [phone] [phone]              Further instructions from your care team       [facility]  Orthopedic Discharge Instructions For Wrist Fracture Surgery    Call the Bone and Joint Service Line for after-surgery issues:    [phone]  Pain Control: New home prescriptions   Norco 5/375 tablets:  Take one or two tablets every 4-6 hours as needed.  This has tylenol in it!!      Senakot s:   Stool softener.  Take as directed.       Take your pain medications as prescribed. These medications may make you sleepy. Do not drive, operate equipment, or drink alcohol when taking these.  You may take Tylenol (Generic name is acetaminophen) as directed on the bottle for additional relief or in place of the prescribed pain medications as your pain gets better. Ibuprofen,and Aleve can be used in supplement to the pain prescription and tylenol if you need . If the medications cause a reaction such as nausea or skin rash, stop taking them and contact your doctor. Please plan accordingly, pain medications will not be re-filled on the weekends or at night. Call the office during the day if you need more medications.   General Care After surgery you may feel tired/sleepy. This is normal. Please have someone stay with you for 24 hours after surgery. You should avoid driving for 1-2 days after surgery, as your reaction time may be slow. You should not drive at all if you have had surgery on your arms, right leg and/or are taking narcotic pain medications until released by your doctor. If you have any question along the way please contact the office. If you feel it is an issue cannot wait for normal office hours, contact the on-call physician.   Normal Findings after Surgery Your fingers may be slightly swollen.  You will have some difficulty making a fist.  Some minor tingling may be present from local anesthetic injected at surgery.  This should improve.  Low grade fevers less than 100.5 degrees Fahrenheit are normal.      Wound Care/Dressings Leave thedressings in place.    Call the office if you have questions.   Bathing  You cannot get the dressings wet.  Cover the dressings carefully if you are going to shower.   Diet Start with non-alcoholic liquids at first, particularly water or sports drinks after surgery. Progress to bland foods such as crackers and bread and finally to your normal diet if you have no problems.    Activity Keep your hand elevated above your heart as much as you can.  I encourage you to move your fingers.  You can use the hand for light activities.   Braces    Physical Therapy This will be discussed in the future.   Additional Instructions    Follow up Appointment We need to see you in 2 days for a dressing change.       Same-Day Surgery   Adult Discharge Orders & Instructions     For 24 hours after surgery    1. Get plenty of rest.  A responsible adult must stay with you for at least 24 hours after you leave the hospital.   2. Do not drive or use heavy equipment.  If you have weakness or tingling, don't drive or use heavy equipment until this feeling goes away.  3. Do not drink alcohol.  4. Avoid strenuous or risky activities.  Ask for help when climbing stairs.   5. You may feel lightheaded.  If so, sit for a few minutes before standing.  Have someone help you get up.   6. You may have a slight fever. Call the doctor if your fever is over 100 F (37.7 C) (taken under the tongue) or lasts longer than 24 hours.  7. You may have a dry mouth, a sore throat, muscle aches or trouble sleeping.  These should go away after 24 hours.  8. Do not make important or legal decisions.  Based on the surgery/procedure that you had today, we do not expect that you will have any problems.  However, we want you to know what to do if you have pain, nausea, bleeding,or infection:  To control pain:  Take medicines your physician has prescribed or or over-the counter medicine he or she advises.  Ice packs and periods of rest are often helpful.  For surgery on an arm or leg, raise it on a pillow to ease swelling.  If your pain is not  managed with the above methods, contact your physician.  To control nausea:  Take anti-nausea medicine approved by your physician.  Drink clear liquids such as apple juice, ginger ale, broth or 7-Up. Be sure to drink enough fluids.  Move to a regular diet as you feel able.  Rest may also help.  Bleeding:  You may see a little blood on your dressing, about the size of a quarter in the first 24 hours.  If you see this, there is no reason to be alarmed.  However, if this continues to increase in size, apply pressure if able, and notify your physician.  Infection: Please contact your physician if you have any of the following signs:  redness, swelling, heat, increasing pain or foul-smelling drainage at your surgery site, fever or chills.  Call your doctor for any of the followin.  It has been over 8 to 10 hours since surgery and you are still not able to urinate (pass water).    Nurse advice line: [phone] (24 hour)           Pending Results     Date and Time Order Name Status Description    2018 0828 Surgical pathology exam In process             Admission Information     Date & Time Provider Department Dept. Phone    2018 Lucio Omalley MD [facility] [phone]      Your Vitals Were     Blood Pressure Temperature Respirations Pulse Oximetry          130/73 96.8  F (36  C) (Skin) 16 95%        MyChart Information     MyChart gives you secure access to your electronic health record. If you see a primary care provider, you can also send messages to your care team and make appointments. If you have questions, please call your primary care clinic.  If you do not have a primary care provider, please call [phone] and they will assist you.        Care EveryWhere ID     This is your Care EveryWhere ID. This could be used by other organizations to access your [facility] medical records  [id number]        Equal Access to Services     ERICA REGAN: Andrew carolina  bradley Tiwari, waaxda luqadaha, qaybta kaalmada vidhi, tray lopezcorinna joan Hoang [facility] [phone].    ATENCIÓN: Si raizala melody, tiene a tabares disposición servicios gratuitos de asistencia lingüística. Edith al [phone].    We comply with applicable federal civil rights laws and [place] laws. We do not discriminate on the basis of race, color, national origin, age, disability, sex, sexual orientation, or gender identity.               Review of your medicines      START taking        Dose / Directions    HYDROcodone-acetaminophen 5-325 MG tablet   Commonly known as:  NORCO   Used for:  Post-op pain        Dose:  1-2 tablet   Take 1-2 tablets by mouth every 4 hours as needed for moderate to severe pain   Quantity:  15 tablet   Refills:  0       senna-docusate 8.6-50 MG per tablet   Commonly known as:  SENOKOT-S;PERICOLACE   Used for:  Post-op pain        Dose:  1-2 tablet   Take 1-2 tablets by mouth 2 times daily   Quantity:  30 tablet   Refills:  0         CONTINUE these medicines which have NOT CHANGED        Dose / Directions    acetaminophen 500 MG tablet   Commonly known as:  TYLENOL   Used for:  Status post total left knee replacement using cement        Dose:  1000 mg   Take 2 tablets (1,000 mg) by mouth every 8 hours   Refills:  0       atorvastatin 40 MG tablet   Commonly known as:  LIPITOR        Dose:  40 mg   Take 40 mg by mouth   Refills:  3       blood glucose monitoring lancets   Used for:  Hypoglycemia        Use to test blood sugar 1 time daily or as directed.   Quantity:  1 Box   Refills:  2       blood glucose monitoring test strip   Commonly known as:  ROBERT CONTOUR NEXT   Used for:  Hypoglycemia        Use to test blood sugar 1 times daily or as directed.   Quantity:  100 each   Refills:  3       clopidogrel 75 MG tablet   Commonly known as:  PLAVIX        Dose:  75 mg   Take 75 mg by mouth daily   Refills:  3       cyanocobalamin 1000 MCG/ML injection   Commonly  known as:  VITAMIN B12   Used for:  Pernicious anemia        Dose:  1 mL   Inject 1 mL (1,000 mcg) into the muscle every 30 days   Quantity:  1 mL   Refills:  11       MELATONIN PO        Dose:  5 mg   Take 5 mg by mouth At Bedtime   Refills:  0       metoprolol tartrate 25 MG tablet   Commonly known as:  LOPRESSOR   Used for:  Aortic root dilatation (H)        Dose:  25 mg   Take 1 tablet (25 mg) by mouth 2 times daily   Quantity:  180 tablet   Refills:  3       tamsulosin 0.4 MG capsule   Commonly known as:  FLOMAX        Dose:  0.4 mg   Take 0.4 mg by mouth daily   Refills:  0            Where to get your medicines      Some of these will need a paper prescription and others can be bought over the counter. Ask your nurse if you have questions.     Bring a paper prescription for each of these medications     HYDROcodone-acetaminophen 5-325 MG tablet    senna-docusate 8.6-50 MG per tablet                Protect others around you: Learn how to safely use, store and throw away your medicines at www.disposemymeds.org.        Information about OPIOIDS     PRESCRIPTION OPIOIDS: WHAT YOU NEED TO KNOW   We gave you an opioid (narcotic) pain medicine. It is important to manage your pain, but opioids are not always the best choice. You should first try all the other options your care team gave you. Take this medicine for as short a time (and as few doses) as possible.    Some activities can increase your pain, such as bandage changes or therapy sessions. It may help to take your pain medicine 30 to 60 minutes before these activities. Reduce your stress by getting enough sleep, working on hobbies you enjoy and practicing relaxation or meditation. Talk to your care team about ways to manage your pain beyond prescription opioids.    These medicines have risks:    DO NOT drive when on new or higher doses of pain medicine. These medicines can affect your alertness and reaction times, and you could be arrested for driving under  the influence (DUI). If you need to use opioids long-term, talk to your care team about driving.    DO NOT operate heavy machinery    DO NOT do any other dangerous activities while taking these medicines.    DO NOT drink any alcohol while taking these medicines.     If the opioid prescribed includes acetaminophen, DO NOT take with any other medicines that contain acetaminophen. Read all labels carefully. Look for the word  acetaminophen  or  Tylenol.  Ask your pharmacist if you have questions or are unsure.    You can get addicted to pain medicines, especially if you have a history of addiction (chemical, alcohol or substance dependence). Talk to your care team about ways to reduce this risk.    All opioids tend to cause constipation. Drink plenty of water and eat foods that have a lot of fiber, such as fruits, vegetables, prune juice, apple juice and high-fiber cereal. Take a laxative (Miralax, milk of magnesia, Colace, Senna) if you don t move your bowels at least every other day. Other side effects include upset stomach, sleepiness, dizziness, throwing up, tolerance (needing more of the medicine to have the same effect), physical dependence and slowed breathing.    Store your pills in a secure place, locked if possible. We will not replace any lost or stolen medicine. If you don t finish your medicine, please throw away (dispose) as directed by your pharmacist. The [place] Pollution [employer] has more information about safe disposal: https://www.pca.[facility].mn.us/living-green/managing-unwanted-medications             Medication List: This is a list of all your medications and when to take them. Check marks below indicate your daily home schedule. Keep this list as a reference.      Medications           Morning Afternoon Evening Bedtime As Needed    acetaminophen 500 MG tablet   Commonly known as:  TYLENOL   Take 2 tablets (1,000 mg) by mouth every 8 hours                                atorvastatin 40 MG  tablet   Commonly known as:  LIPITOR   Take 40 mg by mouth                                blood glucose monitoring lancets   Use to test blood sugar 1 time daily or as directed.                                blood glucose monitoring test strip   Commonly known as:  ROBERT CONTOUR NEXT   Use to test blood sugar 1 times daily or as directed.                                clopidogrel 75 MG tablet   Commonly known as:  PLAVIX   Take 75 mg by mouth daily                                cyanocobalamin 1000 MCG/ML injection   Commonly known as:  VITAMIN B12   Inject 1 mL (1,000 mcg) into the muscle every 30 days                                HYDROcodone-acetaminophen 5-325 MG tablet   Commonly known as:  NORCO   Take 1-2 tablets by mouth every 4 hours as needed for moderate to severe pain   Next Dose Due:  2:45 pm                                MELATONIN PO   Take 5 mg by mouth At Bedtime                                metoprolol tartrate 25 MG tablet   Commonly known as:  LOPRESSOR   Take 1 tablet (25 mg) by mouth 2 times daily                                senna-docusate 8.6-50 MG per tablet   Commonly known as:  SENOKOT-S;PERICOLACE   Take 1-2 tablets by mouth 2 times daily                                tamsulosin 0.4 MG capsule   Commonly known as:  FLOMAX   Take 0.4 mg by mouth daily

## 2018-11-26 NOTE — DISCHARGE INSTRUCTIONS
[facility] Orthopedic Discharge Instructions For Wrist Fracture Surgery    Call the Bone and Joint Service Line for after-surgery issues:    [phone]  Pain Control: New home prescriptions   Norco 5/375 tablets:  Take one or two tablets every 4-6 hours as needed.  This has tylenol in it!!      Senakot s:   Stool softener.  Take as directed.       Take your pain medications as prescribed. These medications may make you sleepy. Do not drive, operate equipment, or drink alcohol when taking these.  You may take Tylenol (Generic name is acetaminophen) as directed on the bottle for additional relief or in place of the prescribed pain medications as your pain gets better. Ibuprofen,and Aleve can be used in supplement to the pain prescription and tylenol if you need . If the medications cause a reaction such as nausea or skin rash, stop taking them and contact your doctor. Please plan accordingly, pain medications will not be re-filled on the weekends or at night. Call the office during the day if you need more medications.   General Care After surgery you may feel tired/sleepy. This is normal. Please have someone stay with you for 24 hours after surgery. You should avoid driving for 1-2 days after surgery, as your reaction time may be slow. You should not drive at all if you have had surgery on your arms, right leg and/or are taking narcotic pain medications until released by your doctor. If you have any question along the way please contact the office. If you feel it is an issue cannot wait for normal office hours, contact the on-call physician.   Normal Findings after Surgery Your fingers may be slightly swollen.  You will have some difficulty making a fist.  Some minor tingling may be present from local anesthetic injected at surgery.  This should improve.  Low grade fevers less than 100.5 degrees Fahrenheit are normal.      Wound Care/Dressings Leave thedressings in place.    Call the office  if you have questions.   Bathing You cannot get the dressings wet.  Cover the dressings carefully if you are going to shower.   Diet Start with non-alcoholic liquids at first, particularly water or sports drinks after surgery. Progress to bland foods such as crackers and bread and finally to your normal diet if you have no problems.    Activity Keep your hand elevated above your heart as much as you can.  I encourage you to move your fingers.  You can use the hand for light activities.   Braces    Physical Therapy This will be discussed in the future.   Additional Instructions    Follow up Appointment We need to see you in 2 days for a dressing change.       Same-Day Surgery   Adult Discharge Orders & Instructions     For 24 hours after surgery    1. Get plenty of rest.  A responsible adult must stay with you for at least 24 hours after you leave the hospital.   2. Do not drive or use heavy equipment.  If you have weakness or tingling, don't drive or use heavy equipment until this feeling goes away.  3. Do not drink alcohol.  4. Avoid strenuous or risky activities.  Ask for help when climbing stairs.   5. You may feel lightheaded.  If so, sit for a few minutes before standing.  Have someone help you get up.   6. You may have a slight fever. Call the doctor if your fever is over 100 F (37.7 C) (taken under the tongue) or lasts longer than 24 hours.  7. You may have a dry mouth, a sore throat, muscle aches or trouble sleeping.  These should go away after 24 hours.  8. Do not make important or legal decisions.  Based on the surgery/procedure that you had today, we do not expect that you will have any problems.  However, we want you to know what to do if you have pain, nausea, bleeding,or infection:  To control pain:  Take medicines your physician has prescribed or or over-the counter medicine he or she advises.  Ice packs and periods of rest are often helpful.  For surgery on an arm or leg, raise it on a pillow to ease  swelling.  If your pain is not managed with the above methods, contact your physician.  To control nausea:  Take anti-nausea medicine approved by your physician.  Drink clear liquids such as apple juice, ginger ale, broth or 7-Up. Be sure to drink enough fluids.  Move to a regular diet as you feel able.  Rest may also help.  Bleeding:  You may see a little blood on your dressing, about the size of a quarter in the first 24 hours.  If you see this, there is no reason to be alarmed.  However, if this continues to increase in size, apply pressure if able, and notify your physician.  Infection: Please contact your physician if you have any of the following signs:  redness, swelling, heat, increasing pain or foul-smelling drainage at your surgery site, fever or chills.  Call your doctor for any of the followin.  It has been over 8 to 10 hours since surgery and you are still not able to urinate (pass water).    Nurse advice line: [phone] (24 hour)

## 2018-11-26 NOTE — ANESTHESIA PREPROCEDURE EVALUATION
Anesthesia Evaluation     . Pt has had prior anesthetic. Type: General and MAC           ROS/MED HX    ENT/Pulmonary:     (+)tobacco use, Current use Rare pipe use packs/day  , . .    Neurologic:  - neg neurologic ROS    (-) seizures   Cardiovascular: Comment: Afib converted to NSR with beta blocker therapy    (+) Dyslipidemia, ----. : . . . :. Irregular Heartbeat/Palpitations, . Previous cardiac testing date:results:date: results:ECG reviewed date:11/20/18 results:SR with occasional PVC's date: results:          METS/Exercise Tolerance:  >4 METS   Hematologic: Comments: Pt as a history of pulmonary embolism. Pernicious Anemia    (+) History of blood clots pt is not anticoagulated, Anemia, History of Transfusion no previous transfusion reaction -      Musculoskeletal: Comment: Osteoarthritis of bilateral knees.  (+) arthritis, , , -       GI/Hepatic:     (+) GERD Asymptomatic on medication, Other GI/Hepatic hx of gastric cancer s/p total gastrectomy and steven-en-y esophagojejunal anastomosis.       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      Psychiatric:  - neg psychiatric ROS       Infectious Disease:  - neg infectious disease ROS       Malignancy:   (+) Malignancy History of GI  GI CA  Remission status post Surgery, Chemo and Radiation, Pt had surgery for GI CA in 2010        Other:    - neg other ROS                 Physical Exam  Normal systems: cardiovascular and pulmonary    Airway   Mallampati: II  TM distance: >3 FB  Neck ROM: full    Dental   (+) lower dentures    Cardiovascular   Rhythm and rate: regular and normal      Pulmonary    breath sounds clear to auscultation                         Anesthesia Plan      History & Physical Review  History and physical reviewed and following examination; no interval change.    ASA Status:  3 .    NPO Status:  > 8 hours    Plan for General and LMA with Intravenous and Propofol induction. Maintenance will be Balanced.    PONV prophylaxis:  Ondansetron (or other 5HT-3) and Dexamethasone or Solumedrol       Postoperative Care  Postoperative pain management:  IV analgesics and Oral pain medications.      Consents  Anesthetic plan, risks, benefits and alternatives discussed with:  Patient.  Use of blood products discussed: No .   .                          .

## 2018-11-26 NOTE — BRIEF OP NOTE
[facility] Orthopedic Brief Operative Note    Pre-operative diagnosis: left Dupuytrens contracture   Post-operative diagnosis: Same   Procedure: Procedure(s):  Release Left Hand Dupuytrens Contracture   Surgeon: Lucio Omalley MD   Assistant(s): Mila Patel   Anesthesia: General endotracheal anesthesia and Local anesthesia   Estimated blood loss: Less than 50 ml   Total IV fluids: (See anesthesia record)   Drains: None   Specimens: None   Implants: See op note   Findings: Digital nerves all found.  All fingers with good capillary refill.   Complications: None   Weight bearing status: Non-weight bearing   Comments: See dictated operative report for full details

## 2018-11-26 NOTE — ANESTHESIA POSTPROCEDURE EVALUATION
Patient: Remigio Holly    Procedure(s):  Release Left Hand Dupuytrens Contracture    Diagnosis:left Dupuytrens contracture  Diagnosis Additional Information: No value filed.    Anesthesia Type:  General, LMA    Note:  Anesthesia Post Evaluation    Patient location during evaluation: Phase 2 and Bedside  Patient participation: Able to fully participate in evaluation  Level of consciousness: awake  Pain management: adequate  Airway patency: patent  Cardiovascular status: acceptable and hemodynamically stable  Respiratory status: acceptable, room air and nonlabored ventilation  Hydration status: stable  PONV: none     Anesthetic complications: None    Comments: Patient was happy with the anesthesia care received and no anesthesia related complications were noted.  I will follow up with the patient again if it is needed.        Last vitals:  Vitals:    11/26/18 1045 11/26/18 1100 11/26/18 1115   BP: 125/82 114/63 103/66   Resp: 14 16    Temp:      SpO2: 94% 95% 94%         Electronically Signed By: DISHA Sellers CRNA  November 26, 2018  12:19 PM

## 2018-11-26 NOTE — ANESTHESIA CARE TRANSFER NOTE
Patient: Remigio Holly    Procedure(s):  Release Left Hand Dupuytrens Contracture    Diagnosis: left Dupuytrens contracture  Diagnosis Additional Information: No value filed.    Anesthesia Type:   General, LMA     Note:  Airway :Nasal Cannula  Patient transferred to:PACU  Handoff Report: Identifed the Patient, Identified the Reponsible Provider, Reviewed the pertinent medical history, Discussed the surgical course, Reviewed Intra-OP anesthesia mangement and issues during anesthesia, Set expectations for post-procedure period and Allowed opportunity for questions and acknowledgement of understanding      Vitals: (Last set prior to Anesthesia Care Transfer)    CRNA VITALS  11/26/2018 0905 - 11/26/2018 0940      11/26/2018             Pulse: 87    SpO2: 99 %                Electronically Signed By: DISHA Sellers CRNA  November 26, 2018  9:40 AM

## 2018-11-26 NOTE — IP AVS SNAPSHOT
[facility]    [address] [place] [address]    Phone:  [phone]                                       After Visit Summary   11/26/2018    Remigio Holly    MRN: [id number]           After Visit Summary Signature Page     I have received my discharge instructions, and my questions have been answered. I have discussed any challenges I see with this plan with the nurse or doctor.    ..........................................................................................................................................  Patient/Patient Representative Signature      ..........................................................................................................................................  Patient Representative Print Name and Relationship to Patient    ..................................................               ................................................  Date                                   Time    ..........................................................................................................................................  Reviewed by Signature/Title    ...................................................              ..............................................  Date                                               Time          22EPIC Rev 08/18

## 2018-11-27 NOTE — OP NOTE
Procedure Date: 11/26/2018      PREOPERATIVE DIAGNOSIS:  Dupuytrens contractures involving left ring and little fingers.      POSTOPERATIVE DIAGNOSIS:  Dupuytrens contractures involving left ring and little fingers.      PROCEDURE:  Excision of Dupuytren's cords, both left middle finger and left little finger.  This included removal of tissue from the palm to the level of the DIP joint to the ring finger and the palm to the level of the PIP joint of the ring finger.      SURGEON:  Lucio Omalley MD      ANESTHESIA:  General with local infiltration of the carpal tunnel and region of the Guyon's canal with local anesthesia.      ASSISTANT:  JERZY Rodriguez, aided with the retraction, exposure and closure.      INDICATIONS:  Mr. Holly is an 85-year-old gentleman who is still quite active.  He presented with contractures of the ring and little fingers to the left hand.  This resulted in a 90-degree flexion contracture of the PIP joint and slightly less than that of the PIP joint of the little finger.  There are cords with adherence to the skin that carried down to the midpalmar region.  This was making it difficult for him to grasp objects.  There had been actually been a worsening of the contracture just recently making his function much more difficult.  He had again explained the rationale for treatments.  He was explained the risks of surgical dissection in the form of incomplete resolution of the contractures as well as potential for nerve injury.  Nevertheless, he wished to have this surgically addressed with the best chance of improvement of hand function.  He was seen in the preoperative hold area with his wife present.  We reviewed the above.  We marked the extremity and consent was obtained.      DETAILS OF PROCEDURE:  The patient was brought to the operating room, placed upon table and general anesthesia was induced.  Left upper arm tourniquet was applied and now the left arm was prepped and draped in our  standard fashion.  Timeout protocol was followed.      We exsanguinated the hand with an Esmarch and the tourniquet was inflated.      We placed his hand on to the lead hand which allowed us to place the fingers in maximal extension.  In the beginning this was quite limited.  We planned our surgical incisions in the midpalmar region for the ring finger, recognizing this is the one with the most limited range of motion.  We made zigzag incisions in the midpalmar region and identified the cord at the base of the ring finger.  The cord was dissected proximally and distally and  from deeper structures.  This cord was then released at its proximal most extent.  It was raised and  from deeper structures and mobilized distally.  We carried the dissection radially towards the base of the middle finger and released attachments to the palmar aponeurosis at this level.  We also released attachments towards the little finger.  With each of these releases, we were able to extend the finger slightly.  We then carried the incision again, recreating the zigzag effect into the base of the finger at this time.  We continued the dissection of this cord radially, ulnarly and distally.  At this point, we were able to identify the digital nerves along the radial aspect of the finger.  Again, the skin incisions were carried down in a zigzag fashion into the finger itself.  This dissection of the cord was carried out, being careful to isolate the radial sensory nerve as it was in most close contact of the cord.  The nerve was finally dissected free of the cord.  The cord carried to the dorsal radial aspect of the digit.  This dissection was carried out,  the cord extension from the skin.  This actually was carried dorsally such that we had begun to identify the dorsal sensory nerve.  This too was carefully isolated and protected.  This dissection was carried distally all the way to the level of the PIP joint.   Again, at the level of the PIP joint extensions were noted to carry dorsally.  We released the dorsal extension of these attachments and pulled the cord into the central portion of the digit.  I followed the sensory nerve and noted that it was still intact distally.  We carried the dissection down along the more ulnar aspect of the digit carefully  it from the sensory nerve.  We finally transected the cord at the level of the DIP joint.  With this accomplished, the middle finger could be brought nearly to full extension with the limitations of full extension being at the PIP joint.  With this dissection of the ring finger having been accomplished, we then addressed the little finger.  The cords along the middle finger seemed to be more along its ulnar aspect.  So we began a zigzag incision along the base of the little finger, carrying it more towards the ulnar side of the digit to create a large enough bridge of skin radially.  We were able to dissect directly down to the cord which was  from superficial skin.  The cord was dissected proximally and released proximally.  With this, the little finger could now be extended further.  We carried the dissection distally, focusing on the ulnar aspect of the cord.  Again, we identified the ulnar digital nerve of the little finger and followed it distally.  This nerve was intimately in contact with the cord throughout its more distal extension.  We again carefully dissected and  the nerve from the cord.  Cord was carried out to the level of the PIP joint.  Because it did not seem to carry to the radial side of the digit we focused and released this cord and then finally transected it at the level of the PIP joint.  This gave us near full extension of the little finger in the same fashion as the ring finger.  The ulnar sensory nerve was intact.      At this point, we proceeded to remove smaller areas of contracted tissue.  We were then satisfied  that the MP joint could be fully extended and each of the finger could be nearly full extension with only minor limitations at the PIP joint.      The wounds were irrigated.  Tourniquet was released.      We held pressure over the incisions for a solid 3 minutes.  We then inspected and realized that any residual bleeding was all from small vessels.      With the wound reirrigated, we began closure of the entirety of the wounds, both the ring and little fingers.  This was able to be accomplished without creating undue tension on the skin.      With closure accomplished we infiltrated the carpal tunnel and the region of Guyon's canal with the lidocaine.  Dressings of Xeroform, 4x4, Kerlix sponge, Webril and Ace bandage were applied in order to create a boxing glove type dressing.      The patient was then awakened, transferred to the [facility] and taken to the recovery area, where stable vital signs were noted.        It should be offered that the digits upon release of the tourniquet immediately pinked up with good capillary refill.         YULIANA POPE MD             D: 2018   T: 2018   MT: RILEY      Name:     SIENNA KENNEDY   MRN:      [id number]        Account:        [id number]   :      1933           Procedure Date: 2018      Document: [id number]

## 2018-11-27 NOTE — OR NURSING
[facility] Same Day Surgery  Discharge Call Back  Remigio Holly  11/6/1933  MRN: [id number]  Home: [phone] (home)   PCP: Harrison Tse    We are calling to see how you're doing since your surgery/procedure with us?   Comments: as long as I take my pain pills I'm doing okay.  Clinical Questions  1. Have you had time to look at your discharge instructions? Do you have any questions in regards to the instructions?   Comment: yes, no  2. Do you feel your pain is being controlled with the regimen the surgeon sent you home on? (ie: prescription medications, over the counter pain medications, ice packs)   Comments: yes, yes  3. Have you noticed any drainage on your dressing? Do you know what to do if you have bleeding as a result of your procedure?   Comments: no, yes  4. Have you had any nausea/vomiting? Do you know how to treat this?   Comment: no, yes  5. Have you had any signs/symptoms of infection? (ie: fever, swelling, heat, drainage or redness) Do you know what to do if you have?   Comment: no, yes  6. Do you have a follow up appointment made with your surgeon? Do you have a number to contact them at if you need it?   Comment: yes, yes  Retained Foreign Object (INGRID, Hemovac, Penrose, Wound Packing, Vaginal Packing, Nasal Splints, Urethral Stents, Perez Catheter)  1. Do you still have - in place?   2. If the item is still in place, can you review the plan for removal with me? -  Recognition  Is there anyone from your care team that you would like to recognize?   Comments: no  Improvement  Our goal is to be the best. Do you have any suggestions for things that we could improve on?   Comments: no  You may be randomly selected to fill out a [facility] survey. We would appreciate you taking the time to fill this out. It is important to us if you would answer all of the questions on the survey.

## 2018-11-28 ENCOUNTER — OFFICE VISIT (OUTPATIENT)
Dept: ORTHOPEDICS | Facility: CLINIC | Age: 83
End: 2018-11-28
Payer: COMMERCIAL

## 2018-11-28 VITALS — WEIGHT: 165.5 LBS | BODY MASS INDEX: 22.42 KG/M2 | HEIGHT: 72 IN

## 2018-11-28 DIAGNOSIS — M72.0 DUPUYTREN'S CONTRACTURE OF LEFT HAND: Primary | ICD-10-CM

## 2018-11-28 DIAGNOSIS — Z01.818 PREOPERATIVE EXAMINATION: ICD-10-CM

## 2018-11-28 DIAGNOSIS — G89.18 POST-OP PAIN: ICD-10-CM

## 2018-11-28 PROCEDURE — 99024 POSTOP FOLLOW-UP VISIT: CPT | Performed by: ORTHOPAEDIC SURGERY

## 2018-11-28 RX ORDER — HYDROCODONE BITARTRATE AND ACETAMINOPHEN 5; 325 MG/1; MG/1
1-2 TABLET ORAL EVERY 4 HOURS PRN
Qty: 30 TABLET | Refills: 0 | Status: SHIPPED | OUTPATIENT
Start: 2018-11-28 | End: 2020-03-13

## 2018-11-28 ASSESSMENT — PAIN SCALES - GENERAL: PAINLEVEL: MILD PAIN (3)

## 2018-11-28 NOTE — MR AVS SNAPSHOT
After Visit Summary   11/28/2018    Remigio Holly    MRN: [id number]           Patient Information     Date Of Birth          11/6/1933        Visit Information        Provider Department      11/28/2018 9:30 AM Lucio Omalley MD [facility]        Today's Diagnoses     Dupuytren's contracture of left hand    -  1    Preoperative examination        Post-op pain           Follow-ups after your visit        Your next 10 appointments already scheduled     Dec 04, 2018  8:20 AM CST   Return Visit with Lucio Omalley MD   [facility] ([address] [id number] [phone]            Dec 10, 2018  1:30 PM CST   Return Visit with Lucio Omalley MD   [facility] ([address] [phone]              Who to contact     If you have questions or need follow up information about today's clinic visit or your schedule please contact [facility] directly at [phone].  Normal or non-critical lab and imaging results will be communicated to you by Babel Streethart, letter or phone within 4 business days after the clinic has received the results. If you do not hear from us within 7 days, please contact the clinic through IP Ghostert or phone. If you have a critical or abnormal lab result, we will notify you by phone as soon as possible.  Submit refill requests through [email or web address] or call your pharmacy and they will forward the refill request to us. Please allow 3 business days for your refill to be completed.          Additional Information About Your Visit        Babel Streethart Information     [email or web address] gives you secure access to your electronic health record. If you see a primary care provider, you can also send messages to your care team and make appointments. If you have questions, please call your primary care clinic.  If you do not  have a primary care provider, please call [phone] and they will assist you.        Care EveryWhere ID     This is your Care EveryWhere ID. This could be used by other organizations to access your [facility] medical records  [id number]        Your Vitals Were     Height BMI (Body Mass Index)                1.829 m (6') 22.45 kg/m2           Blood Pressure from Last 3 Encounters:   11/26/18 103/66   11/20/18 120/72   10/31/18 122/66    Weight from Last 3 Encounters:   11/28/18 75.1 kg (165 lb 8 oz)   11/20/18 75.1 kg (165 lb 8 oz)   10/31/18 75.3 kg (166 lb)              Today, you had the following     No orders found for display         Where to get your medicines      Some of these will need a paper prescription and others can be bought over the counter.  Ask your nurse if you have questions.     Bring a paper prescription for each of these medications     HYDROcodone-acetaminophen 5-325 MG tablet         Information about OPIOIDS     PRESCRIPTION OPIOIDS: WHAT YOU NEED TO KNOW   We gave you an opioid (narcotic) pain medicine. It is important to manage your pain, but opioids are not always the best choice. You should first try all the other options your care team gave you. Take this medicine for as short a time (and as few doses) as possible.    Some activities can increase your pain, such as bandage changes or therapy sessions. It may help to take your pain medicine 30 to 60 minutes before these activities. Reduce your stress by getting enough sleep, working on hobbies you enjoy and practicing relaxation or meditation. Talk to your care team about ways to manage your pain beyond prescription opioids.    These medicines have risks:    DO NOT drive when on new or higher doses of pain medicine. These medicines can affect your alertness and reaction times, and you could be arrested for driving under the influence (DUI). If you need to use opioids long-term, talk to your care team about driving.    DO NOT operate  heavy machinery    DO NOT do any other dangerous activities while taking these medicines.    DO NOT drink any alcohol while taking these medicines.     If the opioid prescribed includes acetaminophen, DO NOT take with any other medicines that contain acetaminophen. Read all labels carefully. Look for the word  acetaminophen  or  Tylenol.  Ask your pharmacist if you have questions or are unsure.    You can get addicted to pain medicines, especially if you have a history of addiction (chemical, alcohol or substance dependence). Talk to your care team about ways to reduce this risk.    All opioids tend to cause constipation. Drink plenty of water and eat foods that have a lot of fiber, such as fruits, vegetables, prune juice, apple juice and high-fiber cereal. Take a laxative (Miralax, milk of magnesia, Colace, Senna) if you don t move your bowels at least every other day. Other side effects include upset stomach, sleepiness, dizziness, throwing up, tolerance (needing more of the medicine to have the same effect), physical dependence and slowed breathing.    Store your pills in a secure place, locked if possible. We will not replace any lost or stolen medicine. If you don t finish your medicine, please throw away (dispose) as directed by your pharmacist. The [place] [employer]ution [employer] has more information about safe disposal: https://www.pca.[facility].[place].us/living-green/managing-unwanted-medications         Primary Care Provider Office Phone # Fax #    Harrison Ricky Tse [phone]       [address] [place] [address]        Equal Access to Services     ERICA RUSSELL : Hadii aad ku hadasho Soomaali, waaxda luqadaha, qaybta kaalmada tray mosher. So [place] [phone].    ATENCIÓN: Si habla español, tiene a tabares disposición servicios gratuitos de asistencia lingüística. Llame al [phone].    We comply with applicable federal civil rights laws  and [place] laws. We do not discriminate on the basis of race, color, national origin, age, disability, sex, sexual orientation, or gender identity.            Thank you!     Thank you for choosing [facility]  for your care. Our goal is always to provide you with excellent care. Hearing back from our patients is one way we can continue to improve our services. Please take a few minutes to complete the written survey that you may receive in the mail after your visit with us. Thank you!             Your Updated Medication List - Protect others around you: Learn how to safely use, store and throw away your medicines at www.disposemymeds.org.          This list is accurate as of 11/28/18 10:32 AM.  Always use your most recent med list.                   Brand Name Dispense Instructions for use Diagnosis    acetaminophen 500 MG tablet    TYLENOL     Take 2 tablets (1,000 mg) by mouth every 8 hours    Status post total left knee replacement using cement       atorvastatin 40 MG tablet    LIPITOR     Take 40 mg by mouth        blood glucose monitoring lancets     1 Box    Use to test blood sugar 1 time daily or as directed.    Hypoglycemia       blood glucose monitoring test strip    ROBERT CONTOUR NEXT    100 each    Use to test blood sugar 1 times daily or as directed.    Hypoglycemia       clopidogrel 75 MG tablet    PLAVIX     Take 75 mg by mouth daily        HYDROcodone-acetaminophen 5-325 MG tablet    NORCO    30 tablet    Take 1-2 tablets by mouth every 4 hours as needed for moderate to severe pain    Post-op pain       MELATONIN PO      Take 5 mg by mouth At Bedtime        metoprolol tartrate 25 MG tablet    LOPRESSOR    180 tablet    Take 1 tablet (25 mg) by mouth 2 times daily    Aortic root dilatation (H)       senna-docusate 8.6-50 MG tablet    SENOKOT-S/PERICOLACE    30 tablet    Take 1-2 tablets by mouth 2 times daily    Post-op pain       tamsulosin 0.4 MG capsule    FLOMAX     Take 0.4 mg  by mouth daily        vitamin B-12 1000 MCG/ML injection    CYANOCOBALAMIN    1 mL    Inject 1 mL (1,000 mcg) into the muscle every 30 days    Pernicious anemia

## 2018-11-28 NOTE — PROGRESS NOTES
Dear Remigio, your recent test results are attached.  The urine sample is normal.  Chemistry panel was normal with the exception of a blood sugar of 33. .  Please be on the alert for low blood sugar.  Oftentimes, this is an artifact of the blood drawing process.    Feel free to contact me via the office or My Chart if you have any questions regarding the above.  Sincerely,  Harrison Tse, DO RODRIGUEZOI

## 2018-11-28 NOTE — LETTER
11/28/2018         RE: Remigio Holly  [address] [facility] [address]        Dear Colleague,    Thank you for referring your patient, Remigio Holly, to the [facility]. Please see a copy of my visit note below.    HISTORY OF PRESENT ILLNESS:    Remigio Holly is a 85 year old male who is seen in follow up for   Chief Complaint   Patient presents with     RECHECK     Release Left Hand Dupuytrens Contracture.  DOS: 11/26/18 (2 days postop)     Surgical Followup     PREOPERATIVE/POSTOPERATIVE  DIAGNOSIS:  Dupuytrens contractures involving left ring and little fingers. PROCEDURE:  Excision of Dupuytren's cords, both left middle finger and left little finger.  This included removal of tissue from the palm to the level of the DIP joint to the ring finger and the palm to the level of the PIP joint of the ring finger.      Present symptoms: He has had a moderate amount of discomfort.  Treatments tried to this point: He is only 2 days postop and is here for dressing change.    PHYSICAL EXAM:  Ht 1.829 m (6')  Wt 75.1 kg (165 lb 8 oz)  BMI 22.45 kg/m2  Body mass index is 22.45 kg/(m^2).       Physical Exam:  Vitals: Ht 1.829 m (6')  Wt 75.1 kg (165 lb 8 oz)  BMI 22.45 kg/m2  BMI= Body mass index is 22.45 kg/(m^2).  Constitutional: healthy, alert and no acute distress   Psychiatric: mentation appears normal and affect normal/bright    JOINT/EXTREMITIES:  NEURO: no focal deficits  Affected extremity pulses are easily palpable.    The dressing was removed today.    SKIN: no excoriation or erythema. No signs of infection.    Sutures were not removed      Swelling: He does have swelling about the hand but this is at an appropriate level.  Bruising: He has early bruising.  ROM: The fingers already are postured in a much more straight fashion.  Passively the hand to be improved upon but this is uncomfortable at this time.  There is some maceration about the wounds.   However this again is what I would have expected.  The suture lines are closed.  Does have sensation over the tips of both of the little and ring fingers.    IMAGING INTERPRETATION: No x-rays were taken.     ASSESSMENT:    ICD-10-CM    1. Dupuytren's contracture of left hand M72.0    2. Preoperative examination Z01.818    3. Post-op pain G89.18 HYDROcodone-acetaminophen (NORCO) 5-325 MG tablet       Wound looks as it would expect today.    PLAN:        A new dressing was applied today.  We applied bacitracin to each of the wounds and used Telfa pads to cover the wounds.  We then applied a boxer's type dressing once again.  Instructed to try and leave this in place.  We will see him again in 1 week's time.  Suture removal will depend on the appearance of the wound.  I would anticipate at that time we will be leaving the sutures in place.  Hopefully in a week we can start a little bit more aggressive passive stretch.    Return to clinic 1, weeks    Lucio Omalley MD            Again, thank you for allowing me to participate in the care of your patient.        Sincerely,        Lucio Omalley MD

## 2018-11-28 NOTE — PROGRESS NOTES
HISTORY OF PRESENT ILLNESS:    Remigio Holly is a 85 year old male who is seen in follow up for   Chief Complaint   Patient presents with     RECHECK     Release Left Hand Dupuytrens Contracture.  DOS: 11/26/18 (2 days postop)     Surgical Followup     PREOPERATIVE/POSTOPERATIVE  DIAGNOSIS:  Dupuytrens contractures involving left ring and little fingers. PROCEDURE:  Excision of Dupuytren's cords, both left middle finger and left little finger.  This included removal of tissue from the palm to the level of the DIP joint to the ring finger and the palm to the level of the PIP joint of the ring finger.      Present symptoms: He has had a moderate amount of discomfort.  Treatments tried to this point: He is only 2 days postop and is here for dressing change.    PHYSICAL EXAM:  Ht 1.829 m (6')  Wt 75.1 kg (165 lb 8 oz)  BMI 22.45 kg/m2  Body mass index is 22.45 kg/(m^2).       Physical Exam:  Vitals: Ht 1.829 m (6')  Wt 75.1 kg (165 lb 8 oz)  BMI 22.45 kg/m2  BMI= Body mass index is 22.45 kg/(m^2).  Constitutional: healthy, alert and no acute distress   Psychiatric: mentation appears normal and affect normal/bright    JOINT/EXTREMITIES:  NEURO: no focal deficits  Affected extremity pulses are easily palpable.    The dressing was removed today.    SKIN: no excoriation or erythema. No signs of infection.    Sutures were not removed      Swelling: He does have swelling about the hand but this is at an appropriate level.  Bruising: He has early bruising.  ROM: The fingers already are postured in a much more straight fashion.  Passively the hand to be improved upon but this is uncomfortable at this time.  There is some maceration about the wounds.  However this again is what I would have expected.  The suture lines are closed.  Does have sensation over the tips of both of the little and ring fingers.    IMAGING INTERPRETATION: No x-rays were taken.     ASSESSMENT:    ICD-10-CM    1. Dupuytren's contracture of  left hand M72.0    2. Preoperative examination Z01.818    3. Post-op pain G89.18 HYDROcodone-acetaminophen (NORCO) 5-325 MG tablet       Wound looks as it would expect today.    PLAN:        A new dressing was applied today.  We applied bacitracin to each of the wounds and used Telfa pads to cover the wounds.  We then applied a boxer's type dressing once again.  Instructed to try and leave this in place.  We will see him again in 1 week's time.  Suture removal will depend on the appearance of the wound.  I would anticipate at that time we will be leaving the sutures in place.  Hopefully in a week we can start a little bit more aggressive passive stretch.    Return to clinic 1, weeks    Lucio Omalley MD

## 2018-11-30 LAB — COPATH REPORT: NORMAL

## 2018-12-04 ENCOUNTER — OFFICE VISIT (OUTPATIENT)
Dept: ORTHOPEDICS | Facility: CLINIC | Age: 83
End: 2018-12-04
Payer: COMMERCIAL

## 2018-12-04 VITALS
HEIGHT: 72 IN | BODY MASS INDEX: 22.35 KG/M2 | HEART RATE: 76 BPM | DIASTOLIC BLOOD PRESSURE: 81 MMHG | WEIGHT: 165 LBS | SYSTOLIC BLOOD PRESSURE: 127 MMHG

## 2018-12-04 DIAGNOSIS — R55 SYNCOPE, UNSPECIFIED SYNCOPE TYPE: Primary | ICD-10-CM

## 2018-12-04 DIAGNOSIS — M72.0 DUPUYTREN'S CONTRACTURE OF LEFT HAND: ICD-10-CM

## 2018-12-04 LAB — GLUCOSE SERPL-MCNC: 87 MG/DL (ref 70–99)

## 2018-12-04 PROCEDURE — 99024 POSTOP FOLLOW-UP VISIT: CPT | Performed by: PHYSICIAN ASSISTANT

## 2018-12-04 PROCEDURE — 82947 ASSAY GLUCOSE BLOOD QUANT: CPT | Performed by: PHYSICIAN ASSISTANT

## 2018-12-04 PROCEDURE — 36415 COLL VENOUS BLD VENIPUNCTURE: CPT | Performed by: PHYSICIAN ASSISTANT

## 2018-12-04 ASSESSMENT — PAIN SCALES - GENERAL: PAINLEVEL: NO PAIN (0)

## 2018-12-04 NOTE — LETTER
12/4/2018         RE: Remigio Holly  [address] [facility] [address]        Dear Colleague,    Thank you for referring your patient, Remigio Holly, to the [facility]. Please see a copy of my visit note below.    HISTORY OF PRESENT ILLNESS:    Remigio Holly is a 85 year old male who is seen in follow up for   Chief Complaint   Patient presents with     RECHECK     Release Left Hand Dupuytrens Contracture.  DOS: 11/26/18 (8 days postop)     Surgical Followup     PREOPERATIVE/POSTOPERATIVE  DIAGNOSIS:  Dupuytrens contractures involving left ring and little fingers. PROCEDURE:  Excision of Dupuytren's cords, both left middle finger and left little finger.  This included removal of tissue from the palm to the level of the DIP joint to the ring finger and the palm to the level of the PIP joint of the ring finger.    Interval: Patient has been dressed in boxing glove dressing to this point.  Patient with minimal pain.  Patient is here for dressing change.  Family present: Wife  Patient evaluation done with Dr. Omalley    Physical Exam:  Vitals: /81  Pulse 76  Ht 1.829 m (6')  Wt 74.8 kg (165 lb)  BMI 22.38 kg/m2  BMI= Body mass index is 22.38 kg/(m^2).  Constitutional: healthy, alert and no acute distress   Psychiatric: mentation appears normal and affect normal/bright  NEURO: no focal deficits  Location of surgery: Left palmar surface and the ring finger and includes the pinky.  There is a small open area at the radial middle of the ring finger.  Sutures are all intact and the incisions remain closed.  While gathering supplies for dressing change, when I returned to the room patient was holding his head in his hands stating he did not feel well.  As I gathered more dressings from the cupboards within the room, patient leaned back against the wall and was unresponsive to verbal requests at this time.  Patient's eyes did roll so only a whites can be  seen.  He did appear to be slightly nodding back and forth with his head.  A rapid response was called.  At this time patient appeared more alert but did require some assistance to lie down on the table.  Patient states he does not remember my return to the room.  Unknown what patient's blood pressure was at Jacob from cardiology was monitoring this.  He stated it was within normal limits.  Very similar to his intake which was 127/81.  Patient pulse however was 113 comparatively to his pulse of 76 on intake.  Patient was followed by Ly in cardiology with blood pressures at 128/69 and heart rate of 75 at 9:00 AM.  At 9:10 AM patient's blood pressure was 138/80 with pulse of 76.  A glucose was obtained significant time after the initial incident after which patient did have 2 dandy crackers and orange juice.  Glucose after this was 87.  On 11/26/2018, patient obtained some glucose readings with glucose levels at 943 at 50 and increased to 139 at 1005.  While patient was lying down on the bed, Patient's hand was cleaned off with hydrogen peroxide.  The incision was then patted dry.  Small Telfa was wrapped around the ring finger and the little finger.  A larger Telfa was placed along the palmar aspect.  Hand was wrapped with a 1 inch Ace.  This will still allow the patient to mobilize his finger more freely.  Range of motion:Patient is already able to extend his ring finger to approximately 80%.  Patient is near full extension with the pinky finger.     ASSESSMENT:    Chief Complaint   Patient presents with     RECHECK     Release Left Hand Dupuytrens Contracture.  DOS: 11/26/18 (8 days postop)     Surgical Followup     PREOPERATIVE/POSTOPERATIVE  DIAGNOSIS:  Dupuytrens contractures involving left ring and little fingers. PROCEDURE:  Excision of Dupuytren's cords, both left middle finger and left little finger.  This included removal of tissue from the palm to the level of the DIP joint to the ring finger and the  palm to the level of the PIP joint of the ring finger.        ICD-10-CM    1. Syncope, unspecified syncope type R55 Glucose   2. Dupuytren's contracture of left hand M72.0      Patient is 8 days post left hand Dupuytren's contracture release at the ring finger and pinky finger.  Patient's incision is looking well with only a small open area.  Patient also had syncopal event likely related to decreased blood sugar.  Patient's blood sugar levels were obtained after he had had dandy crackers and orange juice which was 87.    Plan:   Patient did undergo rapid response team intervention.  He did quickly arouse.  Patient is given instructions to daily changes dressings.  Additional dressings/supplies were given to the patient and his wife  Regarding patient's syncopal event, patient and his wife are advised that this information will be given to Dr. Tse and his primary care provider.  Sutures will remain for 1 more week.  Next week we will remove the sutures.  Patient is advised that he can wash his hands prior to each dressing change.  He was also given hydrogen peroxide to help with the scabbed areas.  Return to clinic 1 week    BP Readings from Last 1 Encounters:   12/04/18 127/81       BP noted to be well controlled today in office.     Delma Patel PA-C   12/4/2018  12:02 PM      I attest I have seen and evaluated the patient.  I agree with above impression and plan.    Lucio Omalley MD    Again, thank you for allowing me to participate in the care of your patient.        Sincerely,        Lucio Omalley MD

## 2018-12-04 NOTE — PROGRESS NOTES
HISTORY OF PRESENT ILLNESS:    Remigio Holly is a 85 year old male who is seen in follow up for   Chief Complaint   Patient presents with     RECHECK     Release Left Hand Dupuytrens Contracture.  DOS: 11/26/18 (8 days postop)     Surgical Followup     PREOPERATIVE/POSTOPERATIVE  DIAGNOSIS:  Dupuytrens contractures involving left ring and little fingers. PROCEDURE:  Excision of Dupuytren's cords, both left middle finger and left little finger.  This included removal of tissue from the palm to the level of the DIP joint to the ring finger and the palm to the level of the PIP joint of the ring finger.    Interval: Patient has been dressed in boxing glove dressing to this point.  Patient with minimal pain.  Patient is here for dressing change.  Family present: Wife  Patient evaluation done with Dr. Omalley    Physical Exam:  Vitals: /81  Pulse 76  Ht 1.829 m (6')  Wt 74.8 kg (165 lb)  BMI 22.38 kg/m2  BMI= Body mass index is 22.38 kg/(m^2).  Constitutional: healthy, alert and no acute distress   Psychiatric: mentation appears normal and affect normal/bright  NEURO: no focal deficits  Location of surgery: Left palmar surface and the ring finger and includes the pinky.  There is a small open area at the radial middle of the ring finger.  Sutures are all intact and the incisions remain closed.  While gathering supplies for dressing change, when I returned to the room patient was holding his head in his hands stating he did not feel well.  As I gathered more dressings from the cupboards within the room, patient leaned back against the wall and was unresponsive to verbal requests at this time.  Patient's eyes did roll so only a whites can be seen.  He did appear to be slightly nodding back and forth with his head.  A rapid response was called.  At this time patient appeared more alert but did require some assistance to lie down on the table.  Patient states he does not remember my return to the room.   Unknown what patient's blood pressure was at Jacob from cardiology was monitoring this.  He stated it was within normal limits.  Very similar to his intake which was 127/81.  Patient pulse however was 113 comparatively to his pulse of 76 on intake.  Patient was followed by Ly in cardiology with blood pressures at 128/69 and heart rate of 75 at 9:00 AM.  At 9:10 AM patient's blood pressure was 138/80 with pulse of 76.  A glucose was obtained significant time after the initial incident after which patient did have 2 dandy crackers and orange juice.  Glucose after this was 87.  On 11/26/2018, patient obtained some glucose readings with glucose levels at 943 at 50 and increased to 139 at 1005.  While patient was lying down on the bed, Patient's hand was cleaned off with hydrogen peroxide.  The incision was then patted dry.  Small Telfa was wrapped around the ring finger and the little finger.  A larger Telfa was placed along the palmar aspect.  Hand was wrapped with a 1 inch Ace.  This will still allow the patient to mobilize his finger more freely.  Range of motion:Patient is already able to extend his ring finger to approximately 80%.  Patient is near full extension with the pinky finger.     ASSESSMENT:    Chief Complaint   Patient presents with     RECHECK     Release Left Hand Dupuytrens Contracture.  DOS: 11/26/18 (8 days postop)     Surgical Followup     PREOPERATIVE/POSTOPERATIVE  DIAGNOSIS:  Dupuytrens contractures involving left ring and little fingers. PROCEDURE:  Excision of Dupuytren's cords, both left middle finger and left little finger.  This included removal of tissue from the palm to the level of the DIP joint to the ring finger and the palm to the level of the PIP joint of the ring finger.        ICD-10-CM    1. Syncope, unspecified syncope type R55 Glucose   2. Dupuytren's contracture of left hand M72.0      Patient is 8 days post left hand Dupuytren's contracture release at the ring finger and  pinky finger.  Patient's incision is looking well with only a small open area.  Patient also had syncopal event likely related to decreased blood sugar.  Patient's blood sugar levels were obtained after he had had dandy crackers and orange juice which was 87.    Plan:   Patient did undergo rapid response team intervention.  He did quickly arouse.  Patient is given instructions to daily changes dressings.  Additional dressings/supplies were given to the patient and his wife  Regarding patient's syncopal event, patient and his wife are advised that this information will be given to Dr. Tse and his primary care provider.  Sutures will remain for 1 more week.  Next week we will remove the sutures.  Patient is advised that he can wash his hands prior to each dressing change.  He was also given hydrogen peroxide to help with the scabbed areas.  Return to clinic 1 week    BP Readings from Last 1 Encounters:   12/04/18 127/81       BP noted to be well controlled today in office.     Delma Patel PA-C   12/4/2018  12:02 PM      I attest I have seen and evaluated the patient.  I agree with above impression and plan.    Lucio Omalley MD

## 2018-12-04 NOTE — MR AVS SNAPSHOT
After Visit Summary   12/4/2018    Remigio Holly    MRN: [id number]           Patient Information     Date Of Birth          11/6/1933        Visit Information        Provider Department      12/4/2018 8:20 AM Lucio Omalley MD [facility]        Today's Diagnoses     Syncope, unspecified syncope type    -  1    Dupuytren's contracture of left hand           Follow-ups after your visit        Your next 10 appointments already scheduled     Dec 11, 2018  9:50 AM CST   Return Visit with Lucio Omalley MD   [facility] ([facility]    [address] [place] [id number] [phone]              Who to contact     If you have questions or need follow up information about today's clinic visit or your schedule please contact [facility] directly at [phone].  Normal or non-critical lab and imaging results will be communicated to you by MyChart, letter or phone within 4 business days after the clinic has received the results. If you do not hear from us within 7 days, please contact the clinic through MyChart or phone. If you have a critical or abnormal lab result, we will notify you by phone as soon as possible.  Submit refill requests through [employer] or call your pharmacy and they will forward the refill request to us. Please allow 3 business days for your refill to be completed.          Additional Information About Your Visit        MyChart Information     [employer] gives you secure access to your electronic health record. If you see a primary care provider, you can also send messages to your care team and make appointments. If you have questions, please call your primary care clinic.  If you do not have a primary care provider, please call [phone] and they will assist you.        Care EveryWhere ID     This is your Care EveryWhere ID. This could be used by other organizations to access your [place]  medical records  [id number]        Your Vitals Were     Pulse Height BMI (Body Mass Index)             76 1.829 m (6') 22.38 kg/m2          Blood Pressure from Last 3 Encounters:   12/04/18 127/81   11/26/18 103/66   11/20/18 120/72    Weight from Last 3 Encounters:   12/04/18 74.8 kg (165 lb)   11/28/18 75.1 kg (165 lb 8 oz)   11/20/18 75.1 kg (165 lb 8 oz)              We Performed the Following     Glucose        Primary Care Provider Office Phone # Fax #    Harrison Rhoadesforestin, [phone] [address] [place] [address]        Equal Access to Services     ERICA RUSSELL : Hadii aad ku hadasho Soelpidio, waaxda luqadaha, qaybta kaalmada adeegyada, waxluis haider . So [facility] [phone].    ATENCIÓN: Si habla español, tiene a tabares disposición servicios gratuitos de asistencia lingüística. LlKindred Hospital Lima [phone].    We comply with applicable federal civil rights laws and [place] laws. We do not discriminate on the basis of race, color, national origin, age, disability, sex, sexual orientation, or gender identity.            Thank you!     Thank you for choosing [facility]  for your care. Our goal is always to provide you with excellent care. Hearing back from our patients is one way we can continue to improve our services. Please take a few minutes to complete the written survey that you may receive in the mail after your visit with us. Thank you!             Your Updated Medication List - Protect others around you: Learn how to safely use, store and throw away your medicines at www.disposemymeds.org.          This list is accurate as of 12/4/18 12:26 PM.  Always use your most recent med list.                   Brand Name Dispense Instructions for use Diagnosis    acetaminophen 500 MG tablet    TYLENOL     Take 2 tablets (1,000 mg) by mouth every 8 hours    Status post total left knee replacement using cement       atorvastatin 40 MG tablet     LIPITOR     Take 40 mg by mouth        blood glucose monitoring lancets     1 Box    Use to test blood sugar 1 time daily or as directed.    Hypoglycemia       blood glucose monitoring test strip    ROBERT CONTOUR NEXT    100 each    Use to test blood sugar 1 times daily or as directed.    Hypoglycemia       clopidogrel 75 MG tablet    PLAVIX     Take 75 mg by mouth daily        HYDROcodone-acetaminophen 5-325 MG tablet    NORCO    30 tablet    Take 1-2 tablets by mouth every 4 hours as needed for moderate to severe pain    Post-op pain       MELATONIN PO      Take 5 mg by mouth At Bedtime        metoprolol tartrate 25 MG tablet    LOPRESSOR    180 tablet    Take 1 tablet (25 mg) by mouth 2 times daily    Aortic root dilatation (H)       senna-docusate 8.6-50 MG tablet    SENOKOT-S/PERICOLACE    30 tablet    Take 1-2 tablets by mouth 2 times daily    Post-op pain       tamsulosin 0.4 MG capsule    FLOMAX     Take 0.4 mg by mouth daily        vitamin B-12 1000 MCG/ML injection    CYANOCOBALAMIN    1 mL    Inject 1 mL (1,000 mcg) into the muscle every 30 days    Pernicious anemia

## 2018-12-05 ENCOUNTER — ALLIED HEALTH/NURSE VISIT (OUTPATIENT)
Dept: FAMILY MEDICINE | Facility: OTHER | Age: 83
End: 2018-12-05
Payer: COMMERCIAL

## 2018-12-05 DIAGNOSIS — D51.0 PERNICIOUS ANEMIA: ICD-10-CM

## 2018-12-05 DIAGNOSIS — Z23 NEED FOR VACCINATION: Primary | ICD-10-CM

## 2018-12-05 PROCEDURE — 96372 THER/PROPH/DIAG INJ SC/IM: CPT

## 2018-12-05 PROCEDURE — 90670 PCV13 VACCINE IM: CPT

## 2018-12-05 PROCEDURE — G0009 ADMIN PNEUMOCOCCAL VACCINE: HCPCS

## 2018-12-05 NOTE — MR AVS SNAPSHOT
After Visit Summary   12/5/2018    Remigio Holly    MRN: [id number]           Patient Information     Date Of Birth          11/6/1933        Visit Information        Provider Department      12/5/2018 11:00 AM KIRK STEPHENS MA [facility]        Today's Diagnoses     Need for vaccination    -  1    Pernicious anemia           Follow-ups after your visit        Your next 10 appointments already scheduled     Dec 11, 2018  9:50 AM CST   [facility] with [facility]io [facility]   [facility]    [address] [place] [id number] [phone]              Who to contact     If you have questions or need follow up information about today's clinic visit or your schedule please contact [facility] directly at [phone].  Normal or non-critical lab and imaging results will be communicated to you by MyChart, letter or phone within 4 business days after the clinic has received the results. If you do not hear from us within 7 days, please contact the clinic through MyChart or phone. If you have a critical or abnormal lab result, we will notify you by phone as soon as possible.  Submit refill requests through [employer] or call your pharmacy and they will forward the refill request to us. Please allow 3 business days for your refill to be completed.          Additional Information About Your Visit        MyChart Information     [employer] gives you secure access to your electronic health record. If you see a primary care provider, you can also send messages to your care team and make appointments. If you have questions, please call your primary care clinic.  If you do not have a primary care provider, please call [phone] and they will assist you.        Care EveryWhere ID     This is your Care EveryWhere ID. This could be used by other organizations to access your [place] medical records  [id number]         Blood Pressure  from Last 3 Encounters:   12/04/18 127/81   11/26/18 103/66   11/20/18 120/72    Weight from Last 3 Encounters:   12/04/18 165 lb (74.8 kg)   11/28/18 165 lb 8 oz (75.1 kg)   11/20/18 165 lb 8 oz (75.1 kg)              We Performed the Following     ADMIN PNEUMOCOCCAL VACCINE (For MEDICARE Patients ONLY) []     B12 - 1000 MCG     INJECTION INTRAMUSCULAR OR SUB-Q     Pneumococcal vaccine 13 valent PCV13 IM (Prevnar) [[id number]]        Primary Care Provider Office Phone # Fax #    Harrison Tse,  [phone]       [address] [place] [address]        Equal Access to Services     ERICA RUSSELL : Andrew cliftono Soelpidio, waaxda luqadaha, qaybta kaalmada adeegyada, tray haider . So [facility] [phone].    ATENCIÓN: Si habla español, tiene a tabares disposición servicios gratuitos de asistencia lingüística. LlOhio[facility] Doctors [facility] [phone].    We comply with applicable federal civil rights laws and [place] laws. We do not discriminate on the basis of race, color, national origin, age, disability, sex, sexual orientation, or gender identity.            Thank you!     Thank you for choosing [facility]  for your care. Our goal is always to provide you with excellent care. Hearing back from our patients is one way we can continue to improve our services. Please take a few minutes to complete the written survey that you may receive in the mail after your visit with us. Thank you!             Your Updated Medication List - Protect others around you: Learn how to safely use, store and throw away your medicines at www.disposemymeds.org.          This list is accurate as of 12/5/18 11:21 AM.  Always use your most recent med list.                   Brand Name Dispense Instructions for use Diagnosis    acetaminophen 500 MG tablet    TYLENOL     Take 2 tablets (1,000 mg) by mouth every 8 hours    Status post total left knee replacement using cement        atorvastatin 40 MG tablet    LIPITOR     Take 40 mg by mouth        blood glucose monitoring lancets     1 Box    Use to test blood sugar 1 time daily or as directed.    Hypoglycemia       blood glucose monitoring test strip    ROBERT CONTOUR NEXT    100 each    Use to test blood sugar 1 times daily or as directed.    Hypoglycemia       clopidogrel 75 MG tablet    PLAVIX     Take 75 mg by mouth daily        HYDROcodone-acetaminophen 5-325 MG tablet    NORCO    30 tablet    Take 1-2 tablets by mouth every 4 hours as needed for moderate to severe pain    Post-op pain       MELATONIN PO      Take 5 mg by mouth At Bedtime        metoprolol tartrate 25 MG tablet    LOPRESSOR    180 tablet    Take 1 tablet (25 mg) by mouth 2 times daily    Aortic root dilatation (H)       senna-docusate 8.6-50 MG tablet    SENOKOT-S/PERICOLACE    30 tablet    Take 1-2 tablets by mouth 2 times daily    Post-op pain       tamsulosin 0.4 MG capsule    FLOMAX     Take 0.4 mg by mouth daily        vitamin B-12 1000 MCG/ML injection    CYANOCOBALAMIN    1 mL    Inject 1 mL (1,000 mcg) into the muscle every 30 days    Pernicious anemia

## 2018-12-05 NOTE — PROGRESS NOTES
Screening Questionnaire for Adult Immunization    Are you sick today?   No   Do you have allergies to medications, food, a vaccine component or latex?   No   Have you ever had a serious reaction after receiving a vaccination?   No   Do you have a long-term health problem with heart disease, lung disease, asthma, kidney disease, metabolic disease (e.g. diabetes), anemia, or other blood disorder?   No   Do you have cancer, leukemia, HIV/AIDS, or any other immune system problem?   No   In the past 3 months, have you taken medications that affect  your immune system, such as prednisone, other steroids, or anticancer drugs; drugs for the treatment of rheumatoid arthritis, Crohn s disease, or psoriasis; or have you had radiation treatments?   No   Have you had a seizure, or a brain or other nervous system problem?   No   During the past year, have you received a transfusion of blood or blood     products, or been given immune (gamma) globulin or antiviral drug?   No   For women: Are you pregnant or is there a chance you could become        pregnant during the next month?   No   Have you received any vaccinations in the past 4 weeks?   No     Immunization questionnaire answers were all negative.        Per orders of Dr. Tse , injection of PCV13 given by Ericka Ye. Patient instructed to remain in clinic for 15 minutes afterwards, and to report any adverse reaction to me immediately.     The following medication was given:     MEDICATION: Vitamin B12  1000mcg  ROUTE: IM  SITE: Deltoid - Left  DOSE: 1ml  LOT #: [id number]  :  [employer] [place]  EXPIRATION DATE:  11/2019  NDC#: 5073-9042-81     Screening performed by Ericka Ye on 12/5/2018 at 10:59 AM.

## 2018-12-11 ENCOUNTER — OFFICE VISIT (OUTPATIENT)
Dept: ORTHOPEDICS | Facility: CLINIC | Age: 83
End: 2018-12-11
Payer: COMMERCIAL

## 2018-12-11 VITALS
DIASTOLIC BLOOD PRESSURE: 76 MMHG | SYSTOLIC BLOOD PRESSURE: 131 MMHG | HEIGHT: 72 IN | WEIGHT: 165 LBS | BODY MASS INDEX: 22.35 KG/M2

## 2018-12-11 DIAGNOSIS — M72.0 DUPUYTREN'S CONTRACTURE OF LEFT HAND: Primary | ICD-10-CM

## 2018-12-11 PROCEDURE — 99024 POSTOP FOLLOW-UP VISIT: CPT | Performed by: ORTHOPAEDIC SURGERY

## 2018-12-11 ASSESSMENT — MIFFLIN-ST. JEOR: SCORE: 1471.44

## 2018-12-11 ASSESSMENT — PAIN SCALES - GENERAL: PAINLEVEL: NO PAIN (0)

## 2018-12-11 NOTE — LETTER
12/11/2018         RE: Remigio Holly  [address] [facility] [address]        Dear Colleague,    Thank you for referring your patient, Remigio Holly, to the [facility]. Please see a copy of my visit note below.    Office Visit-Follow up  HISTORY OF PRESENT ILLNESS:    Remigio Holly is a 85 year old male who is seen in follow up for   Chief Complaint   Patient presents with     RECHECK     Release Left Hand Dupuytrens Contracture.  DOS: 11/26/18 (16 days postop)     Surgical Followup     PREOPERATIVE/POSTOPERATIVE DIAGNOSIS:  Dupuytrens contractures involving left ring and little fingers. PROCEDURE:  Excision of Dupuytren's cords, both left middle finger and left little finger.  This included removal of tissue from the palm to the level of the DIP joint to the ring finger and the palm to the level of the PIP joint of the ring finger.        Patient presents with:  RECHECK: Release Left Hand Dupuytrens Contracture.  DOS: 11/26/18 (16 days postop)  Surgical Followup: PREOPERATIVE/POSTOPERATIVE DIAGNOSIS:  Dupuytrens contractures involving left ring and little fingers. PROCEDURE:  Excision of Dupuytren's cords, both left middle finger and left little finger.  This included removal of tissue from the palm to the level of the DIP joint to the ring finger and the palm to the level of the PIP joint of the ring finger.       She is accompanied by his wife.  Present symptoms: No unusual complaints.  He still has stiffness in his fingers which is improving.  Treatments tried to this point: Dressing changes.    REVIEW OF SYSTEMS    General: negative for, night sweats, dizziness, fatigue  Resp: No shortness of breath and no cough  CV: negative for chest pain, syncope or near-syncope  GI: negative for nausea, vomiting and diarrhea  : negative for dysuria and hematuria  Musculoskeletal: as above  Neurologic: negative for syncope   Hematologic: negative for bleeding  disorder    Physical Exam:    Vitals: /76   Ht 1.829 m (6')   Wt 74.8 kg (165 lb)   BMI 22.38 kg/m     BMI= Body mass index is 22.38 kg/m .  Constitutional: healthy, alert and no acute distress   Psychiatric: mentation appears normal and affect normal/bright  NEURO: no focal deficits  SKIN: no excoriation or erythema. No signs of infection.        JOINT/EXTREMITIES:     Sutures were removed today.  1 of the Z-plasty incisions in the palm of his hand there is a little bit of gapping but appears to be a stable flap.  Little finger is very near full extension.  Ring finger still so some degree of contraction but much better than preoperative status.    IMAGING INTERPRETATION: No x-rays taken       Impression:      ICD-10-CM    1. Dupuytren's contracture of left hand M72.0        Appears to be progressing well.  The wound area is not of concern given that this will close in secondarily    Plan:   The above was reviewed with Remigio and his wife.     I suggest he only put a Band-Aid over the mid palmar region until that has fully stabilized.  He can begin to use the hand for activities.  He is already stretching his digits which he may continue.      Return to clinic 2, weeks    Lucio Omalley MD    Again, thank you for allowing me to participate in the care of your patient.        Sincerely,        Lucio Omalley MD

## 2018-12-28 ENCOUNTER — OFFICE VISIT (OUTPATIENT)
Dept: ORTHOPEDICS | Facility: CLINIC | Age: 83
End: 2018-12-28
Payer: COMMERCIAL

## 2018-12-28 VITALS
BODY MASS INDEX: 22.35 KG/M2 | DIASTOLIC BLOOD PRESSURE: 72 MMHG | SYSTOLIC BLOOD PRESSURE: 118 MMHG | HEIGHT: 72 IN | WEIGHT: 165 LBS | TEMPERATURE: 96.6 F

## 2018-12-28 DIAGNOSIS — M72.0 DUPUYTREN'S CONTRACTURE OF LEFT HAND: Primary | ICD-10-CM

## 2018-12-28 DIAGNOSIS — Z01.818 PREOPERATIVE EXAMINATION: ICD-10-CM

## 2018-12-28 PROCEDURE — 99024 POSTOP FOLLOW-UP VISIT: CPT | Performed by: ORTHOPAEDIC SURGERY

## 2018-12-28 ASSESSMENT — MIFFLIN-ST. JEOR: SCORE: 1471.44

## 2018-12-28 NOTE — PROGRESS NOTES
HISTORY OF PRESENT ILLNESS:    Remigio Holly is a 85 year old male who is seen in follow up for   Chief Complaint   Patient presents with     RECHECK     Release Left Hand Dupuytrens Contracture.  DOS: 11/26/18 (5 weeks postop)     Surgical Followup     PREOPERATIVE/POSTOPERATIVE DIAGNOSIS:  Dupuytrens contractures involving left ring and little fingers. PROCEDURE:  Excision of Dupuytren's cords, both left middle finger and left little finger.  This included removal of tissue from the palm to the level of the DIP joint to the ring finger and the palm to the level of the PIP joint of the ring finger.      Treatments tried to this point: Home stretching and localized skin care.  Present symptoms: He is pleased with his progress.  The little finger is straightened out nicely the ring finger is lagging behind but it was more severe.  Sensation over the palmar region is still slightly altered.      PHYSICAL EXAM:  /72   Temp 96.6  F (35.9  C) (Temporal)   Ht 1.829 m (6')   Wt 74.8 kg (165 lb)   BMI 22.38 kg/m    Body mass index is 22.38 kg/m .       Physical Exam:  Vitals: /72   Temp 96.6  F (35.9  C) (Temporal)   Ht 1.829 m (6')   Wt 74.8 kg (165 lb)   BMI 22.38 kg/m    BMI= Body mass index is 22.38 kg/m .  Constitutional: healthy, alert and no acute distress   Psychiatric: mentation appears normal and affect normal/bright    JOINT/EXTREMITIES:  NEURO: no focal deficits  Affected extremity pulses are easily palpable.  SKIN: no excoriation or erythema. No signs of infection. Surgical wound is healthy.  He still has some small areas that have not fully closed.  The palmar skin is very thickened in areas.      Swelling: No unusual swelling about the hand.    ROM: The ring finger and still does not come to full extension.  The little finger is very near full extension.  He cannot quite make a tight fist with the ring finger but it is very close.    Strength: Appropriate power   present.      IMAGING INTERPRETATION: No x-rays taken     ASSESSMENT:    ICD-10-CM    1. Dupuytren's contracture of left hand M72.0    2. Preoperative examination Z01.818        Slowly making appropriate progress    PLAN:          He will continue to work on gentle stretching.  He will continue using skin softeners  He will slowly work his way back to activity    Return to clinic CLIVE Omalley MD

## 2018-12-28 NOTE — LETTER
12/28/2018         RE: Remigio Holly  [address] [facility] [address]        Dear Colleague,    Thank you for referring your patient, Remigio Holly, to the [facility]. Please see a copy of my visit note below.    HISTORY OF PRESENT ILLNESS:    Remigio Holly is a 85 year old male who is seen in follow up for   Chief Complaint   Patient presents with     RECHECK     Release Left Hand Dupuytrens Contracture.  DOS: 11/26/18 (5 weeks postop)     Surgical Followup     PREOPERATIVE/POSTOPERATIVE DIAGNOSIS:  Dupuytrens contractures involving left ring and little fingers. PROCEDURE:  Excision of Dupuytren's cords, both left middle finger and left little finger.  This included removal of tissue from the palm to the level of the DIP joint to the ring finger and the palm to the level of the PIP joint of the ring finger.      Treatments tried to this point: Home stretching and localized skin care.  Present symptoms: He is pleased with his progress.  The little finger is straightened out nicely the ring finger is lagging behind but it was more severe.  Sensation over the palmar region is still slightly altered.      PHYSICAL EXAM:  /72   Temp 96.6  F (35.9  C) (Temporal)   Ht 1.829 m (6')   Wt 74.8 kg (165 lb)   BMI 22.38 kg/m     Body mass index is 22.38 kg/m .       Physical Exam:  Vitals: /72   Temp 96.6  F (35.9  C) (Temporal)   Ht 1.829 m (6')   Wt 74.8 kg (165 lb)   BMI 22.38 kg/m     BMI= Body mass index is 22.38 kg/m .  Constitutional: healthy, alert and no acute distress   Psychiatric: mentation appears normal and affect normal/bright    JOINT/EXTREMITIES:  NEURO: no focal deficits  Affected extremity pulses are easily palpable.  SKIN: no excoriation or erythema. No signs of infection. Surgical wound is healthy.  He still has some small areas that have not fully closed.  The palmar skin is very thickened in areas.      Swelling: No  unusual swelling about the hand.    ROM: The ring finger and still does not come to full extension.  The little finger is very near full extension.  He cannot quite make a tight fist with the ring finger but it is very close.    Strength: Appropriate power  present.      IMAGING INTERPRETATION: No x-rays taken     ASSESSMENT:    ICD-10-CM    1. Dupuytren's contracture of left hand M72.0    2. Preoperative examination Z01.818        Slowly making appropriate progress    PLAN:          He will continue to work on gentle stretching.  He will continue using skin softeners  He will slowly work his way back to activity    Return to clinic PRN    Lucio Omalley MD              Again, thank you for allowing me to participate in the care of your patient.        Sincerely,        Lucio Omalley MD

## 2019-01-09 ENCOUNTER — ALLIED HEALTH/NURSE VISIT (OUTPATIENT)
Dept: FAMILY MEDICINE | Facility: OTHER | Age: 84
End: 2019-01-09
Payer: COMMERCIAL

## 2019-01-09 DIAGNOSIS — E78.2 MIXED HYPERLIPIDEMIA: Primary | ICD-10-CM

## 2019-01-09 DIAGNOSIS — D51.0 PERNICIOUS ANEMIA: ICD-10-CM

## 2019-01-09 DIAGNOSIS — D51.0 PERNICIOUS ANEMIA: Primary | ICD-10-CM

## 2019-01-09 DIAGNOSIS — N18.30 CKD (CHRONIC KIDNEY DISEASE) STAGE 3, GFR 30-59 ML/MIN (H): ICD-10-CM

## 2019-01-09 PROCEDURE — 96372 THER/PROPH/DIAG INJ SC/IM: CPT

## 2019-01-09 RX ORDER — CYANOCOBALAMIN 1000 UG/ML
1000 INJECTION, SOLUTION INTRAMUSCULAR; SUBCUTANEOUS
Status: ACTIVE | OUTPATIENT
Start: 2019-01-09 | End: 2019-07-08

## 2019-01-09 RX ADMIN — CYANOCOBALAMIN 1000 MCG: 1000 INJECTION, SOLUTION INTRAMUSCULAR; SUBCUTANEOUS at 13:39

## 2019-01-09 NOTE — PROGRESS NOTES
Prior to injection, verified patient identity using patient's name and date of birth.  Due to injection administration, patient instructed to remain in clinic for 15 minutes  afterwards, and to report any adverse reaction to me immediately.    Vitamin B 12    Drug Amount Wasted:  None.  Vial/Syringe: Single dose vial  Expiration Date:  04/2020    Spoke with patient and labs have been ordered per Health Maintenance Due.   Ericka Ye MA

## 2019-01-11 DIAGNOSIS — N18.30 CKD (CHRONIC KIDNEY DISEASE) STAGE 3, GFR 30-59 ML/MIN (H): ICD-10-CM

## 2019-01-11 DIAGNOSIS — E78.2 MIXED HYPERLIPIDEMIA: ICD-10-CM

## 2019-01-11 LAB
CHOLEST SERPL-MCNC: 116 MG/DL
CREAT UR-MCNC: 135 MG/DL
HDLC SERPL-MCNC: 53 MG/DL
LDLC SERPL CALC-MCNC: 52 MG/DL
MICROALBUMIN UR-MCNC: 20 MG/L
MICROALBUMIN/CREAT UR: 14.59 MG/G CR (ref 0–17)
NONHDLC SERPL-MCNC: 63 MG/DL
TRIGL SERPL-MCNC: 54 MG/DL

## 2019-01-11 PROCEDURE — 36415 COLL VENOUS BLD VENIPUNCTURE: CPT | Performed by: INTERNAL MEDICINE

## 2019-01-11 PROCEDURE — 82043 UR ALBUMIN QUANTITATIVE: CPT | Performed by: INTERNAL MEDICINE

## 2019-01-11 PROCEDURE — 80061 LIPID PANEL: CPT | Performed by: INTERNAL MEDICINE

## 2019-01-12 NOTE — RESULT ENCOUNTER NOTE
Dear Remigio, your recent test results are attached.  The microalbumin is normal.  Cholesterol is well controlled with an LDL of 52.    Feel free to contact me via the office or My Chart if you have any questions regarding the above.  Sincerely,  Harrison Tse,  FACOI

## 2019-02-18 DIAGNOSIS — D51.0 PERNICIOUS ANEMIA: Primary | ICD-10-CM

## 2019-02-18 RX ORDER — CYANOCOBALAMIN 1000 UG/ML
1000 INJECTION, SOLUTION INTRAMUSCULAR; SUBCUTANEOUS
Status: ACTIVE | OUTPATIENT
Start: 2019-02-19 | End: 2019-08-17

## 2019-02-19 ENCOUNTER — ALLIED HEALTH/NURSE VISIT (OUTPATIENT)
Dept: FAMILY MEDICINE | Facility: OTHER | Age: 84
End: 2019-02-19
Payer: COMMERCIAL

## 2019-02-19 DIAGNOSIS — D51.0 PERNICIOUS ANEMIA: Primary | ICD-10-CM

## 2019-02-19 PROCEDURE — 96372 THER/PROPH/DIAG INJ SC/IM: CPT

## 2019-02-19 RX ADMIN — CYANOCOBALAMIN 1000 MCG: 1000 INJECTION, SOLUTION INTRAMUSCULAR; SUBCUTANEOUS at 09:13

## 2019-02-19 NOTE — PROGRESS NOTES
Prior to injection, verified patient identity using patient's name and date of birth.  Due to injection administration, patient instructed to remain in clinic for 15 minutes  afterwards, and to report any adverse reaction to me immediately.    Vitamin B12    Drug Amount Wasted:  None.  Vial/Syringe: Single dose vial  Expiration Date:  04/2020    Ericka Ye MA

## 2019-03-04 DIAGNOSIS — E16.2 HYPOGLYCEMIA: ICD-10-CM

## 2019-03-06 NOTE — TELEPHONE ENCOUNTER
"Requested Prescriptions   Pending Prescriptions Disp Refills     CONTOUR NEXT TEST test strip [Pharmacy Med Name: CONTOUR NEXT TEST STRIP] 100 each 3    Last Written Prescription Date:  8/30/16  Last Fill Quantity: 100,  # refills: 3   Last office visit: 3/23/18  with prescribing provider:     Future Office Visit:     Sig: USE TO TEST BLOOD SUGAR 1 TIMES DAILY OR AS DIRECTED.    Diabetic Supplies Protocol Passed - 3/4/2019  1:53 PM       Passed - Medication is active on med list       Passed - Patient is 18 years of age or older       Passed - Recent (6 mo) or future (30 days) visit within the authorizing provider's specialty    Patient had office visit in the last 6 months or has a visit in the next 30 days with authorizing provider.  See \"Patient Info\" tab in inbasket, or \"Choose Columns\" in Meds & Orders section of the refill encounter.          Routing refill request to provider for review/approval because:  A break in refill    Erika Au RN            "

## 2019-04-25 ENCOUNTER — ALLIED HEALTH/NURSE VISIT (OUTPATIENT)
Dept: FAMILY MEDICINE | Facility: OTHER | Age: 84
End: 2019-04-25
Payer: COMMERCIAL

## 2019-04-25 DIAGNOSIS — D51.0 PERNICIOUS ANEMIA: Primary | ICD-10-CM

## 2019-04-25 PROCEDURE — 96372 THER/PROPH/DIAG INJ SC/IM: CPT

## 2019-04-25 RX ADMIN — CYANOCOBALAMIN 1000 MCG: 1000 INJECTION, SOLUTION INTRAMUSCULAR; SUBCUTANEOUS at 09:51

## 2019-04-25 NOTE — PROGRESS NOTES
Prior to injection, verified patient identity using patient's name and date of birth.  Due to injection administration, patient instructed to remain in clinic for 15 minutes  afterwards, and to report any adverse reaction to me immediately.    b-12    Drug Amount Wasted:  None.  Vial/Syringe: Single dose vial  Expiration Date:  09/2020  RD given   Ericka Ye MA

## 2019-05-28 ENCOUNTER — TELEPHONE (OUTPATIENT)
Dept: FAMILY MEDICINE | Facility: OTHER | Age: 84
End: 2019-05-28

## 2019-05-28 ENCOUNTER — ALLIED HEALTH/NURSE VISIT (OUTPATIENT)
Dept: FAMILY MEDICINE | Facility: OTHER | Age: 84
End: 2019-05-28
Payer: COMMERCIAL

## 2019-05-28 DIAGNOSIS — D51.0 PERNICIOUS ANEMIA: Primary | ICD-10-CM

## 2019-05-28 PROCEDURE — 96372 THER/PROPH/DIAG INJ SC/IM: CPT

## 2019-05-28 RX ADMIN — CYANOCOBALAMIN 1000 MCG: 1000 INJECTION, SOLUTION INTRAMUSCULAR; SUBCUTANEOUS at 09:51

## 2019-05-28 NOTE — TELEPHONE ENCOUNTER
Reason for Call:  Form, our goal is to have forms completed with 72 hours, however, some forms may require a visit or additional information.    Type of letter, form or note:  Loss of Consciousness or Voluntary Control    Who is the form from?: [place] [employer] (if other please explain)    Where did the form come from: Patient or family brought in       What clinic location was the form placed at?: [place]    Where the form was placed: Dr Tse's Box/Folder    What number is listed as a contact on the form?:        Additional comments: Please complete and notify patient when he can pick them up (patient included the form from last year)    Call taken on 5/28/2019 at 10:00 AM by Licha Constantino

## 2019-05-28 NOTE — PROGRESS NOTES
Prior to injection, verified patient identity using patient's name and date of birth.  Due to injection administration, patient instructed to remain in clinic for 15 minutes  afterwards, and to report any adverse reaction to me immediately.    Vitamin B12    Drug Amount Wasted:  None.  Vial/Syringe: Single dose vial  Expiration Date:  09/2020  Given in LD.   Ericka Ye MA

## 2019-06-18 ENCOUNTER — TRANSFERRED RECORDS (OUTPATIENT)
Dept: HEALTH INFORMATION MANAGEMENT | Facility: CLINIC | Age: 84
End: 2019-06-18

## 2019-07-22 ENCOUNTER — ALLIED HEALTH/NURSE VISIT (OUTPATIENT)
Dept: FAMILY MEDICINE | Facility: OTHER | Age: 84
End: 2019-07-22
Payer: COMMERCIAL

## 2019-07-22 DIAGNOSIS — D51.0 PERNICIOUS ANEMIA: Primary | ICD-10-CM

## 2019-07-22 PROCEDURE — 96372 THER/PROPH/DIAG INJ SC/IM: CPT

## 2019-07-22 RX ADMIN — CYANOCOBALAMIN 1000 MCG: 1000 INJECTION, SOLUTION INTRAMUSCULAR; SUBCUTANEOUS at 13:35

## 2019-07-22 NOTE — PROGRESS NOTES
The following medication was given:     MEDICATION: Vitamin B12  1,000mcg  ROUTE: IM  SITE: Arm - Left  DOSE: 1 mL  LOT #: [id number]  :  [employer] [place]  EXPIRATION DATE:  10/20  Fiorella Valdes MA

## 2019-08-29 ENCOUNTER — ALLIED HEALTH/NURSE VISIT (OUTPATIENT)
Dept: FAMILY MEDICINE | Facility: OTHER | Age: 84
End: 2019-08-29
Payer: COMMERCIAL

## 2019-08-29 DIAGNOSIS — D51.0 PERNICIOUS ANEMIA: Primary | ICD-10-CM

## 2019-08-29 PROCEDURE — 99207 ZZC NO CHARGE NURSE ONLY: CPT

## 2019-08-29 PROCEDURE — 96372 THER/PROPH/DIAG INJ SC/IM: CPT

## 2019-08-29 RX ORDER — CYANOCOBALAMIN 1000 UG/ML
1000 INJECTION, SOLUTION INTRAMUSCULAR; SUBCUTANEOUS
Status: COMPLETED | OUTPATIENT
Start: 2019-08-29 | End: 2019-08-29

## 2019-08-29 RX ADMIN — CYANOCOBALAMIN 1000 MCG: 1000 INJECTION, SOLUTION INTRAMUSCULAR; SUBCUTANEOUS at 14:58

## 2019-08-29 NOTE — PROGRESS NOTES
Clinic Administered Medication Documentation    MEDICATION LIST:   Injectable Medication Documentation    Patient was given Cyanocobalamin (B-12). Prior to medication administration, verified patients identity using patient s name and date of birth. Please see MAR and medication order for additional information. Patient instructed to remain in clinic for 15 minutes and report any adverse reaction to staff immediately .      Was entire vial of medication used? Yes  Vial/Syringe: Single dose vial  Expiration Date:  11/2020  Was this medication supplied by the patient? No     Given in RD.     Patient did not have an active CAM order. Per Laureen Bellamy she okay for 1 month dose of B12 and CAM was signed.     Ericka Ye MA

## 2019-09-16 ENCOUNTER — HOSPITAL ENCOUNTER (EMERGENCY)
Facility: CLINIC | Age: 84
Discharge: HOME | End: 2019-09-16
Attending: FAMILY MEDICINE | Admitting: FAMILY MEDICINE
Payer: MEDICARE

## 2019-09-16 ENCOUNTER — APPOINTMENT (OUTPATIENT)
Dept: CT IMAGING | Facility: CLINIC | Age: 84
End: 2019-09-16
Attending: FAMILY MEDICINE
Payer: MEDICARE

## 2019-09-16 VITALS
DIASTOLIC BLOOD PRESSURE: 78 MMHG | OXYGEN SATURATION: 98 % | RESPIRATION RATE: 18 BRPM | SYSTOLIC BLOOD PRESSURE: 110 MMHG | HEART RATE: 74 BPM | TEMPERATURE: 97.1 F

## 2019-09-16 DIAGNOSIS — K57.32 DIVERTICULITIS OF COLON: ICD-10-CM

## 2019-09-16 LAB
ALBUMIN SERPL-MCNC: 3.7 G/DL (ref 3.4–5)
ALBUMIN UR-MCNC: 30 MG/DL
ALP SERPL-CCNC: 88 U/L (ref 40–150)
ALT SERPL W P-5'-P-CCNC: 19 U/L (ref 0–70)
ANION GAP SERPL CALCULATED.3IONS-SCNC: 11 MMOL/L (ref 3–14)
APPEARANCE UR: ABNORMAL
AST SERPL W P-5'-P-CCNC: 25 U/L (ref 0–45)
BACTERIA #/AREA URNS HPF: ABNORMAL /HPF
BASOPHILS # BLD AUTO: 0 10E9/L (ref 0–0.2)
BASOPHILS NFR BLD AUTO: 0.3 %
BILIRUB SERPL-MCNC: 0.5 MG/DL (ref 0.2–1.3)
BILIRUB UR QL STRIP: NEGATIVE
BUN SERPL-MCNC: 28 MG/DL (ref 7–30)
CALCIUM SERPL-MCNC: 8.7 MG/DL (ref 8.5–10.1)
CHLORIDE SERPL-SCNC: 109 MMOL/L (ref 94–109)
CO2 SERPL-SCNC: 21 MMOL/L (ref 20–32)
COLOR UR AUTO: YELLOW
CREAT SERPL-MCNC: 1.11 MG/DL (ref 0.66–1.25)
DIFFERENTIAL METHOD BLD: ABNORMAL
EOSINOPHIL NFR BLD AUTO: 0.3 %
ERYTHROCYTE [DISTWIDTH] IN BLOOD BY AUTOMATED COUNT: 14.9 % (ref 10–15)
GFR SERPL CREATININE-BSD FRML MDRD: 60 ML/MIN/{1.73_M2}
GLUCOSE SERPL-MCNC: 126 MG/DL (ref 70–99)
GLUCOSE UR STRIP-MCNC: NEGATIVE MG/DL
HCT VFR BLD AUTO: 37.1 % (ref 40–53)
HGB BLD-MCNC: 11.8 G/DL (ref 13.3–17.7)
HGB UR QL STRIP: ABNORMAL
IMM GRANULOCYTES # BLD: 0 10E9/L (ref 0–0.4)
IMM GRANULOCYTES NFR BLD: 0.5 %
KETONES UR STRIP-MCNC: NEGATIVE MG/DL
LEUKOCYTE ESTERASE UR QL STRIP: NEGATIVE
LIPASE SERPL-CCNC: 68 U/L (ref 73–393)
LYMPHOCYTES # BLD AUTO: 0.6 10E9/L (ref 0.8–5.3)
LYMPHOCYTES NFR BLD AUTO: 9.6 %
MCH RBC QN AUTO: 28.4 PG (ref 26.5–33)
MCHC RBC AUTO-ENTMCNC: 31.8 G/DL (ref 31.5–36.5)
MCV RBC AUTO: 89 FL (ref 78–100)
MONOCYTES # BLD AUTO: 0.6 10E9/L (ref 0–1.3)
MONOCYTES NFR BLD AUTO: 9.9 %
MUCOUS THREADS #/AREA URNS LPF: PRESENT /LPF
NEUTROPHILS # BLD AUTO: 5 10E9/L (ref 1.6–8.3)
NEUTROPHILS NFR BLD AUTO: 79.4 %
NITRATE UR QL: NEGATIVE
NRBC # BLD AUTO: 0 10*3/UL
NRBC BLD AUTO-RTO: 0 /100
PH UR STRIP: 5 PH (ref 5–7)
PLATELET # BLD AUTO: 223 10E9/L (ref 150–450)
POTASSIUM SERPL-SCNC: 4.3 MMOL/L (ref 3.4–5.3)
PROT SERPL-MCNC: 6.8 G/DL (ref 6.8–8.8)
RBC # BLD AUTO: 4.15 10E12/L (ref 4.4–5.9)
RBC #/AREA URNS AUTO: >182 /HPF (ref 0–2)
SODIUM SERPL-SCNC: 141 MMOL/L (ref 133–144)
SOURCE: ABNORMAL
SP GR UR STRIP: 1.02 (ref 1–1.03)
SQUAMOUS #/AREA URNS AUTO: <1 /HPF (ref 0–1)
UROBILINOGEN UR STRIP-MCNC: 0 MG/DL (ref 0–2)
WBC # BLD AUTO: 6.4 10E9/L (ref 4–11)
WBC #/AREA URNS AUTO: 0 /HPF (ref 0–5)

## 2019-09-16 PROCEDURE — 83690 ASSAY OF LIPASE: CPT | Performed by: FAMILY MEDICINE

## 2019-09-16 PROCEDURE — 85025 COMPLETE CBC W/AUTO DIFF WBC: CPT | Performed by: FAMILY MEDICINE

## 2019-09-16 PROCEDURE — 74176 CT ABD & PELVIS W/O CONTRAST: CPT

## 2019-09-16 PROCEDURE — 99284 EMERGENCY DEPT VISIT MOD MDM: CPT | Mod: 25 | Performed by: EMERGENCY MEDICINE

## 2019-09-16 PROCEDURE — 81001 URINALYSIS AUTO W/SCOPE: CPT | Performed by: FAMILY MEDICINE

## 2019-09-16 PROCEDURE — 99284 EMERGENCY DEPT VISIT MOD MDM: CPT | Mod: Z6 | Performed by: EMERGENCY MEDICINE

## 2019-09-16 PROCEDURE — 80053 COMPREHEN METABOLIC PANEL: CPT | Performed by: FAMILY MEDICINE

## 2019-09-16 NOTE — ED TRIAGE NOTES
Pt presents with concerns of left lower abdominal pain.  Pt states that it started at 0300 this morning.  Pt states that it is improving at this time.  Denies issues with urination.

## 2019-09-16 NOTE — ED PROVIDER NOTES
History     Chief Complaint   Patient presents with     Abdominal Pain     HPI  Remigio Holly is a 85 year old male who presents with left-sided abdominal pain.  This started last night.  The pain is described as constant but actually is a little bit better currently.  Patient has not had pain like this before.  Nothing he does makes it worse but he did have a bowel movement earlier that may be made things a little bit better.  Patient denies any recent dysuria or hematuria.  Patient had a lot of chills last night.  Patient denies any back pain or chest pain.    Allergies:  Allergies   Allergen Reactions     Mobic [Meloxicam] Nausea and Diarrhea     Diarrhea, nausea, flu like symptoms since start of use       Problem List:    Patient Active Problem List    Diagnosis Date Noted     Nephrolithiasis 12/25/2012     Priority: High     Dupuytren's contracture of left hand 12/04/2018     Priority: Medium     Urinary retention with incomplete bladder emptying 08/20/2017     Priority: Medium     Orthostatic hypotension 08/19/2017     Priority: Medium     Thoracic aortic aneurysm without rupture (H) - 4.7 cm on 8/18/17 (4.5 cm in 8/15) 08/18/2017     Priority: Medium     Near syncope 08/17/2017     Priority: Medium     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 Provider to review       CKD (chronic kidney disease) stage 3, GFR 30-59 ml/min (H) 05/05/2013     Priority: Medium     Pain 12/25/2012     Priority: Medium      Advance Care Planning 08/16/2012     Priority: Medium     Advance Care Planning: Receipt of ACP document:  Received: Health Care Directive which was witnessed or notarized on 3-12-13.  Document previously scanned on 3-13-13.  Validation form completed and  scanned.  Code Status reflects choices in most recent ACP document.  Confirmed/documented designated decision maker(s). See permanent comments section of demographics in clinical tab. View document(s) and details by clicking on code status. Added by Sayra Maguire on 8/25/2014.  .Patient states has Advance Directive and will bring in a copy to clinic. 8/16/2012          HYPERLIPIDEMIA LDL GOAL <100 10/31/2010     Priority: Medium     GERD (gastroesophageal reflux disease) 03/26/2010     Priority: Medium     Mixed hyperlipidemia 03/24/2010     Priority: Medium     Closed fracture of calcaneus 01/06/2007     Priority: Medium     Calculus of gallbladder 01/09/2003     Priority: Medium     Problem list name updated by automated process. Provider to review       Abdominal aortic aneurysm (H) 10/22/2002     Priority: Medium     Problem list name updated by automated process. Provider to review       Chest pain      Priority: Medium     Problem list name updated by automated process. Provider to review       Pain in joint, shoulder region      Priority: Medium     Paroxysmal atrial fibrillation (H) 08/26/2010     Priority: Low     Gastric cancer (H) 08/05/2010     Priority: Low     (Problem list name updated by automated process. Provider to review and confirm.)          Past Medical History:    Past Medical History:   Diagnosis Date     Blood transfusion      Chronic gastric ulcer without mention of hemorrhage, perforation, without mention of obstruction      Gastric cancer (H)      Hemorrhage of gastrointestinal tract, unspecified 01/13/10     Hypoglycemia, unspecified 1/26/2015     Measles without mention of complication      Mixed hyperlipidemia      Mumps without  mention of complication      Pulmonary embolism (H) Sept 4, 2010     Urinary calculus, unspecified      Varicella without mention of complication        Past Surgical History:    Past Surgical History:   Procedure Laterality Date     ABDOMEN SURGERY       ARTHROPLASTY KNEE Left 8/15/2017    Procedure: ARTHROPLASTY KNEE;  Left total knee replacement;  Surgeon: Jonathan Cardona DO;  Location: [facility]     C EXCIS STOMACH ULCER,LESN;LOCAL       CHOLECYSTECTOMY, LAPOROSCOPIC  10/6/2008    Cholecystectomy, Laparoscopic     COLONOSCOPY  1/14/10    [facility]     COLONOSCOPY  05/25/10     CYSTOSCOPY, RETROGRADES, EXTRACT STONE, INSERT STENT, COMBINED  12/25/2012    Procedure: COMBINED CYSTOSCOPY, RETROGRADES, EXTRACT STONE, INSERT STENT;  Cystoscopy, Left Stent Placement, left retrograde pylogram, uc;  Surgeon: Amador Sanchez MD;  Location: [facility] OR     ESOPHAGOSCOPY, GASTROSCOPY, DUODENOSCOPY (EGD), COMBINED  8/16/2011    Procedure:COMBINED ESOPHAGOSCOPY, GASTROSCOPY, DUODENOSCOPY (EGD), BIOPSY SINGLE OR MULTIPLE; Surgeon:TONY GARCIA; Location:[facility]     GENERAL SURGERY                           DATE:  07/07/10    Gastrectomy      COLONOSCOPY W/WO BRUSH/WASH  01/31/2006      REPAIR ROTATOR CUFF,ACUTE  3/21/2005    Right      UGI ENDOSCOPY, SIMPLE EXAM  1/13/10    [facility] UGI ENDOSCOPY, SIMPLE EXAM  05/25/10     LASER HOLMIUM LITHOTRIPSY URETER(S), INSERT STENT, COMBINED  1/5/2013    Procedure: COMBINED CYSTOSCOPY, URETEROSCOPY, LASER HOLMIUM LITHOTRIPSY URETER(S), INSERT STENT;  Bilateral  Cystoscopy, Bilateral Ureteroscopy, Bilateral retrogrades and  placement of ureteral stent right side. Laser Holmium Lithotripsy  and Exchange  of stent left side;  Surgeon: Tone Morejon MD;  Location: [facility]     LASER HOLMIUM LITHOTRIPSY URETER(S), INSERT STENT, COMBINED  1/30/2013    Procedure: COMBINED CYSTOSCOPY, URETEROSCOPY, LASER HOLMIUM LITHOTRIPSY URETER(S), INSERT  STENT;  Right Ureteroscopy; Stone basketing; Right Stent exchange and  removal of left stent.;  Surgeon: Tone Morejon MD;  Location: [facility]     RELEASE DUPUYTRENS CONTRACTURE Left 2018    Procedure: Release Left Hand Dupuytrens Contracture;  Surgeon: Lucio Omalley MD;  Location: [facility]     VIDEO CAPSULE ENDOSCOPY  01/15/10    [facility]       Family History:    Family History   Problem Relation Age of Onset     Alzheimer Disease Father      Cardiovascular Mother      C.A.D. Mother      Heart Disease Mother      Cardiovascular Brother      Heart Disease Brother      C.A.D. Brother        Social History:  Marital Status:   [2]  Social History     Tobacco Use     Smoking status: Current Some Day Smoker     Packs/day: 0.00     Years: 20.00     Pack years: 0.00     Types: Pipe, Cigars     Last attempt to quit: 1998     Years since quittin.7     Smokeless tobacco: Never Used     Tobacco comment: Rare pipe and cigar smoking   Substance Use Topics     Alcohol use: No     Alcohol/week: 0.0 oz     Drug use: No        Medications:      acetaminophen (TYLENOL) 500 MG tablet   atorvastatin (LIPITOR) 40 MG tablet   blood glucose monitoring (ROBERT MICROLET) lancets   clopidogrel (PLAVIX) 75 MG tablet   CONTOUR NEXT TEST test strip   cyanocobalamin (VITAMIN B12) 1000 MCG/ML injection   HYDROcodone-acetaminophen (NORCO) 5-325 MG tablet   MELATONIN PO   metoprolol (LOPRESSOR) 25 MG tablet   senna-docusate (SENOKOT-S;PERICOLACE) 8.6-50 MG per tablet   tamsulosin (FLOMAX) 0.4 MG capsule         Review of Systems   All other systems reviewed and are negative.      Physical Exam   BP: 130/77  Heart Rate: 66  Temp: 97.1  F (36.2  C)  Resp: 18  SpO2: 98 %      Physical Exam   Constitutional: He is oriented to person, place, and time. He appears well-developed and well-nourished. No distress.   HENT:   Head: Normocephalic and atraumatic.   Nose: Nose normal.   Mouth/Throat: Oropharynx is clear and  moist. No oropharyngeal exudate.   Eyes: Pupils are equal, round, and reactive to light. Conjunctivae are normal. No scleral icterus.   Neck: Normal range of motion.   Cardiovascular: Normal rate, regular rhythm, normal heart sounds and intact distal pulses. Exam reveals no friction rub.   No murmur heard.  Pulmonary/Chest: Effort normal and breath sounds normal. No respiratory distress. He has no wheezes. He has no rales.   Abdominal: Soft. Bowel sounds are normal. He exhibits no distension and no mass. There is tenderness (llq,luq). There is no rebound and no guarding.   Musculoskeletal: Normal range of motion. He exhibits no edema or tenderness.   Neurological: He is alert and oriented to person, place, and time.   Skin: Skin is warm. No rash noted. He is not diaphoretic.   Psychiatric: Judgment normal.   Nursing note and vitals reviewed.      ED Course        Procedures           This is an 85-year-old male who presents with left-sided abdominal pain.  It is somewhat subsiding.  Vitals are unremarkable but on exam patient does have left lower quadrant tenderness.  Differential diagnosis includes: Diverticulitis, possible hernia, possible pancreatitis as he did have some alcohol recently.  We will check some labs and patient will be signed out at change of shift to Dr. Felix.    Assessments & Plan (with Medical Decision Making)  Left lower quadrant abdominal pain     I have reviewed the nursing notes.    I have reviewed the findings, diagnosis, plan and need for follow up with the patient.        9/16/2019   [facility] EMERGENCY DEPARTMENT     Stoney Torres MD  09/16/19 0647

## 2019-09-16 NOTE — ED NOTES
SIGN OUT NOTE    Patient signed out to me at 7 AM by Dr. Torres.  This is an 85-year-old male presenting with left lower quadrant abdominal pain.  It started at about 2 AM and currently he feels better after passing urine.  No blackness or blood in the stools.  Some associated chills and sweatiness.  Patient had urinalysis done which shows a large amount of blood.  Plan is to follow-up with labs and CT scan of the abdomen to assess for stone.       I spoke with the patient at 7:15 AM.  His pain is minimal.  He notes he does have a history of kidney stones in the past.    Results for orders placed or performed during the hospital encounter of 09/16/19   CT Abdomen Pelvis w/o Contrast    Narrative    CT ABDOMEN/PELVIS WITHOUT CONTRAST September 16, 2019 7:22 AM     HISTORY: Left lower quadrant abdomen pain, hematuria.    TECHNIQUE: Noncontrast CT abdomen and pelvis was performed. Radiation  dose for this scan was reduced using automated exposure control,  adjustment of the mA and/or kV according to patient size, or iterative  reconstruction technique.    COMPARISON: CT abdomen and pelvis performed on 10/12/2015.    FINDINGS: Mild subsegmental atelectasis seen in the bilateral lower  lobes. Partially visualized coronary artery calcifications.    Evaluation of the solid organs is limited due to absence of contrast  administration.    No focal hepatic lesion is seen. No intrahepatic or extrahepatic  biliary duct dilatation is seen. The gallbladder is surgically absent.    The spleen and adrenal glands are unremarkable. Marked atrophy of the  pancreas. Multiple cysts seen in the kidneys bilaterally, measuring up  to 1.9 cm along the superior pole of the right kidney scattered  cortical atrophy seen bilaterally in the kidneys, most prominently  within the superior pole of the left kidney. Nonobstructive calculi  are noted, measuring up to 4 mm in the superior pole of the right  kidney.    Stomach is surgically absent. Small  and large bowel loops are  nondilated. The appendix is normal. Multiple diverticula seen in the  sigmoid colon. Mild wall thickening is present in the distal sigmoid  colon extending into the rectum (series 2, image 76) with mild  adjacent pericolonic fat stranding. No definite adjacent fluid  collection is seen although evaluation is limited due to absence of  contrast administration.    Severe atherosclerotic calcification seen in the abdominal aorta and  its branches. No definite retroperitoneal, mesenteric or pelvic  lymphadenopathy is seen, however, evaluation is limited due to absence  of contrast administration.    Urinary bladder is mildly distended. Prostate gland is enlarged  measuring 6.5 cm in transverse diameter.    No suspicious osseous lesions are seen. Multilevel degenerative  changes are present in the spine.      Impression    IMPRESSION:  1.  Findings likely compatible with acute diverticulitis of the distal  sigmoid colon with inflammatory changes extending into the rectum. No  definite adjacent fluid collection is seen although evaluation is  limited due to absence of contrast administration.  2.  Enlarged prostate.    MARK ANTHONY MADDEN MD   Routine UA with microscopic   Result Value Ref Range    Color Urine Yellow     Appearance Urine Cloudy     Glucose Urine Negative NEG^Negative mg/dL    Bilirubin Urine Negative NEG^Negative    Ketones Urine Negative NEG^Negative mg/dL    Specific Gravity Urine 1.018 1.003 - 1.035    Blood Urine Large (A) NEG^Negative    pH Urine 5.0 5.0 - 7.0 pH    Protein Albumin Urine 30 (A) NEG^Negative mg/dL    Urobilinogen mg/dL 0.0 0.0 - 2.0 mg/dL    Nitrite Urine Negative NEG^Negative    Leukocyte Esterase Urine Negative NEG^Negative    Source Midstream Urine     WBC Urine 0 0 - 5 /HPF    RBC Urine >182 (H) 0 - 2 /HPF    Bacteria Urine Few (A) NEG^Negative /HPF    Squamous Epithelial /HPF Urine <1 0 - 1 /HPF    Mucous Urine Present (A) NEG^Negative /LPF   CBC with  platelets differential   Result Value Ref Range    WBC 6.4 4.0 - 11.0 10e9/L    RBC Count 4.15 (L) 4.4 - 5.9 10e12/L    Hemoglobin 11.8 (L) 13.3 - 17.7 g/dL    Hematocrit 37.1 (L) 40.0 - 53.0 %    MCV 89 78 - 100 fl    MCH 28.4 26.5 - 33.0 pg    MCHC 31.8 31.5 - 36.5 g/dL    RDW 14.9 10.0 - 15.0 %    Platelet Count 223 150 - 450 10e9/L    Diff Method Automated Method     % Neutrophils 79.4 %    % Lymphocytes 9.6 %    % Monocytes 9.9 %    % Eosinophils 0.3 %    % Basophils 0.3 %    % Immature Granulocytes 0.5 %    Nucleated RBCs 0 0 /100    Absolute Neutrophil 5.0 1.6 - 8.3 10e9/L    Absolute Lymphocytes 0.6 (L) 0.8 - 5.3 10e9/L    Absolute Monocytes 0.6 0.0 - 1.3 10e9/L    Absolute Basophils 0.0 0.0 - 0.2 10e9/L    Abs Immature Granulocytes 0.0 0 - 0.4 10e9/L    Absolute Nucleated RBC 0.0    Comprehensive metabolic panel   Result Value Ref Range    Sodium 141 133 - 144 mmol/L    Potassium 4.3 3.4 - 5.3 mmol/L    Chloride 109 94 - 109 mmol/L    Carbon Dioxide 21 20 - 32 mmol/L    Anion Gap 11 3 - 14 mmol/L    Glucose 126 (H) 70 - 99 mg/dL    Urea Nitrogen 28 7 - 30 mg/dL    Creatinine 1.11 0.66 - 1.25 mg/dL    GFR Estimate 60 (L) >60 mL/min/[1.73_m2]    GFR Estimate If Black 69 >60 mL/min/[1.73_m2]    Calcium 8.7 8.5 - 10.1 mg/dL    Bilirubin Total 0.5 0.2 - 1.3 mg/dL    Albumin 3.7 3.4 - 5.0 g/dL    Protein Total 6.8 6.8 - 8.8 g/dL    Alkaline Phosphatase 88 40 - 150 U/L    ALT 19 0 - 70 U/L    AST 25 0 - 45 U/L   Lipase   Result Value Ref Range    Lipase 68 (L) 73 - 393 U/L      Patient CT scan shows diverticulitis.  His labs are unremarkable    Results discussed at length with the patient and his wife.  I do not think he needs to be admitted or have IV antibiotics.  Plan to treat outpatient with amoxicillin.  I am going to refer him back to his primary care provider.  He eats yogurt every night so I recommend continuing this.  If he has worsening, changes or concerns return at any time at any time.    Clinical  impression:  Diverticulitis       Caterina Lovett MD  09/17/19 0016

## 2019-09-16 NOTE — ED AVS SNAPSHOT
[facility] Emergency Department  [address] [place] [address]  Phone:  [phone]  Fax:  [phone]                                    Remigio Holly   MRN: [id number]    Department:  [facility] Emergency Department   Date of Visit:  9/16/2019           After Visit Summary Signature Page    I have received my discharge instructions, and my questions have been answered. I have discussed any challenges I see with this plan with the nurse or doctor.    ..........................................................................................................................................  Patient/Patient Representative Signature      ..........................................................................................................................................  Patient Representative Print Name and Relationship to Patient    ..................................................               ................................................  Date                                   Time    ..........................................................................................................................................  Reviewed by Signature/Title    ...................................................              ..............................................  Date                                               Time          22EPIC Rev 08/18

## 2019-09-16 NOTE — DISCHARGE INSTRUCTIONS
Be sure to stay well-hydrated.    Antibiotics as prescribed.  Continue eating yogurt daily    As this may prevent any diarrhea symptoms.    Please make an appointment to follow-up with Dr. Sherwood in the next week or 2 for recheck.    If you have increased pain, fevers, any other worsening symptoms or concerns please return to the ED at any time.    I hope that this clears quickly and you enjoy the rest of your week!!

## 2019-10-02 ENCOUNTER — OFFICE VISIT (OUTPATIENT)
Dept: FAMILY MEDICINE | Facility: OTHER | Age: 84
End: 2019-10-02
Payer: COMMERCIAL

## 2019-10-02 VITALS
BODY MASS INDEX: 22.54 KG/M2 | OXYGEN SATURATION: 99 % | WEIGHT: 166.2 LBS | SYSTOLIC BLOOD PRESSURE: 112 MMHG | TEMPERATURE: 97.5 F | RESPIRATION RATE: 16 BRPM | HEART RATE: 74 BPM | DIASTOLIC BLOOD PRESSURE: 74 MMHG

## 2019-10-02 DIAGNOSIS — Z23 NEED FOR PROPHYLACTIC VACCINATION AND INOCULATION AGAINST INFLUENZA: Primary | ICD-10-CM

## 2019-10-02 DIAGNOSIS — K57.32 DIVERTICULITIS OF COLON: ICD-10-CM

## 2019-10-02 DIAGNOSIS — M72.0 DUPUYTREN'S CONTRACTURE: ICD-10-CM

## 2019-10-02 DIAGNOSIS — I71.40 ABDOMINAL AORTIC ANEURYSM (AAA) WITHOUT RUPTURE (H): ICD-10-CM

## 2019-10-02 DIAGNOSIS — C16.9 MALIGNANT NEOPLASM OF STOMACH, UNSPECIFIED LOCATION (H): ICD-10-CM

## 2019-10-02 DIAGNOSIS — I48.0 PAROXYSMAL ATRIAL FIBRILLATION (H): ICD-10-CM

## 2019-10-02 DIAGNOSIS — N18.30 CKD (CHRONIC KIDNEY DISEASE) STAGE 3, GFR 30-59 ML/MIN (H): ICD-10-CM

## 2019-10-02 DIAGNOSIS — E53.8 VITAMIN B12 DEFICIENCY (NON ANEMIC): ICD-10-CM

## 2019-10-02 PROCEDURE — 90662 IIV NO PRSV INCREASED AG IM: CPT | Performed by: INTERNAL MEDICINE

## 2019-10-02 PROCEDURE — 99214 OFFICE O/P EST MOD 30 MIN: CPT | Mod: 25 | Performed by: INTERNAL MEDICINE

## 2019-10-02 PROCEDURE — 96372 THER/PROPH/DIAG INJ SC/IM: CPT | Performed by: INTERNAL MEDICINE

## 2019-10-02 PROCEDURE — G0008 ADMIN INFLUENZA VIRUS VAC: HCPCS | Performed by: INTERNAL MEDICINE

## 2019-10-02 RX ORDER — CYANOCOBALAMIN 1000 UG/ML
1000 INJECTION, SOLUTION INTRAMUSCULAR; SUBCUTANEOUS
Status: ACTIVE | OUTPATIENT
Start: 2019-10-02 | End: 2020-10-26

## 2019-10-02 RX ADMIN — CYANOCOBALAMIN 1000 MCG: 1000 INJECTION, SOLUTION INTRAMUSCULAR; SUBCUTANEOUS at 16:13

## 2019-10-02 ASSESSMENT — PAIN SCALES - GENERAL: PAINLEVEL: NO PAIN (0)

## 2019-10-02 NOTE — PROGRESS NOTES
Subjective     Remigio Holly is a 85 year old male who presents to clinic today for the following health issues:    HPI   ED/UC Followup:    Facility:  [facility]  Date of visit: 9/16/19  Reason for visit: Diverticulitis   Current Status: better                     Chief Complaint         The patient is a pleasant 85-year-old gentleman who was recently seen in the emergency department with exacerbation of diverticulitis.  He had abdominal pain and chills as well as some ongoing diarrhea.  This is now improved significantly with use of antibiotics.  CAT scan was performed during the stay which demonstrated findings consistent with diverticulitis as well as enlarged prostate.  He has tolerated the antibiotics and is doing much better without any abdominal pain, fever or chills.                       PAST, FAMILY,SOCIAL HISTORY:     Medical  History:   has a past medical history of Blood transfusion, Chronic gastric ulcer without mention of hemorrhage, perforation, without mention of obstruction, Gastric cancer (H), Hemorrhage of gastrointestinal tract, unspecified (01/13/10), Hypoglycemia, unspecified (1/26/2015), Measles without mention of complication, Mixed hyperlipidemia, Mumps without mention of complication, Pulmonary embolism (H) (Sept 4, 2010), Urinary calculus, unspecified, and Varicella without mention of complication.     Surgical History:   has a past surgical history that includes EXCIS STOMACH ULCER,LESN;LOCAL; REPAIR ROTATOR CUFF,ACUTE (3/21/2005); Colonoscopy w/wo Ardara **Performed** (01/31/2006); cholecystectomy, laporoscopic (10/6/2008); UPPER GI ENDOSCOPY,EXAM (1/13/10); colonoscopy (1/14/10); video capsule endoscopy (01/15/10); colonoscopy (05/25/10); UPPER GI ENDOSCOPY,EXAM (05/25/10); general surgery                           date: (07/07/10); Abdomen surgery; Esophagoscopy, gastroscopy, duodenoscopy (EGD), combined (8/16/2011); Cystoscopy, retrogrades, extract stone, insert stent,  combined (12/25/2012); Laser holmium lithotripsy ureter(s), insert stent, combined (1/5/2013); Laser holmium lithotripsy ureter(s), insert stent, combined (1/30/2013); Arthroplasty knee (Left, 8/15/2017); and Release dupuytrens contracture (Left, 11/26/2018).     Social History:   reports that he has been smoking pipe. He has been smoking about 0.00 packs per day for the past 20.00 years. He has never used smokeless tobacco. He reports that he does not drink alcohol or use drugs.     Family History:  family history includes Alzheimer Disease in his father; C.A.D. in his brother and mother; Cardiovascular in his brother and mother; Heart Disease in his brother and mother.            MEDICATIONS  Current Outpatient Medications   Medication Sig Dispense Refill     acetaminophen (TYLENOL) 500 MG tablet Take 2 tablets (1,000 mg) by mouth every 8 hours       atorvastatin (LIPITOR) 40 MG tablet Take 40 mg by mouth  3     blood glucose monitoring (ROBERT MICROLET) lancets Use to test blood sugar 1 time daily or as directed. 1 Box 2     clopidogrel (PLAVIX) 75 MG tablet Take 75 mg by mouth daily  3     CONTOUR NEXT TEST test strip USE TO TEST BLOOD SUGAR 1 TIMES DAILY OR AS DIRECTED. 100 each 3     cyanocobalamin (VITAMIN B12) 1000 MCG/ML injection Inject 1 mL (1,000 mcg) into the muscle every 30 days 1 mL 11     HYDROcodone-acetaminophen (NORCO) 5-325 MG tablet Take 1-2 tablets by mouth every 4 hours as needed for moderate to severe pain 30 tablet 0     MELATONIN PO Take 5 mg by mouth At Bedtime       metoprolol (LOPRESSOR) 25 MG tablet Take 1 tablet (25 mg) by mouth 2 times daily 180 tablet 3     senna-docusate (SENOKOT-S;PERICOLACE) 8.6-50 MG per tablet Take 1-2 tablets by mouth 2 times daily 30 tablet 0     tamsulosin (FLOMAX) 0.4 MG capsule Take 0.4 mg by mouth daily           --------------------------------------------------------------------------------------------------------------------                               Review of Systems       LUNGS: Pt denies: cough, excess sputum, hemoptysis, or shortness of breath.   HEART: Pt denies: chest pain, arrhythmia, syncope, tachy or bradyarrhythmia.   GI: Pt denies: nausea, vomiting, diarrhea, constipation, melena, or hematochezia.   NEURO: Pt denies: seizures, strokes, diplopia, weakness, paraesthesias, or paralysis.   SKIN: Pt denies: itching, rashes, discoloration, or specific lesions of concern. Denies recent hair loss.   PSYCH: The patient denies significant depression, anxiety, mood imbalance. Specifically denies any suicidal ideation.   MS: Pt denies: significant joint pain, swelling.  Evidence of Dupuytren's contractures are noted.  Patient notes it is becoming more difficult to use his hands as a result of this.  Able to ambulate with little or no assistance.   GI: Pt denies: nausea, vomiting, diarrhea, constipation, melena, or hematochezia.                                     Examination      /74 (BP Location: Right arm, Patient Position: Sitting, Cuff Size: Adult Regular)   Pulse 74   Temp 97.5  F (36.4  C) (Temporal)   Resp 16   Wt 75.4 kg (166 lb 3.2 oz)   SpO2 99%   BMI 22.54 kg/m     Constitutional: The patient appears to be in no acute distress. The patient appears to be adequately hydrated. No acute respiratory or hemodynamic distress is noted at this time.   LUNGS: clear bilaterally, airflow is brisk, no intercostal retraction or stridor is noted. No coughing is noted during visit.   HEART:  regular without rubs, clicks, gallops, or murmurs. PMI is nondisplaced. Upstrokes are brisk. S1,S2 are heard.   MS: Minimal crepitance is noted in the extremities.  A right-sided ring finger contracture deformity is present. Muscle strength is appropriate and equal bilaterally. No acute joint erythema or swelling is present.   GI: Abdomen is soft, without rebound, guarding or tenderness. Bowel sounds are appropriate. No renal bruits are heard.                                            Decision Making    1. Diverticulitis of colon  Markedly improved.  Conclude antibiotic.  Discussed appropriate diet once again  Discussed fiber supplementation/Citrucel  2. Dupuytren's contracture  Scheduled orthopedic consultation  - ORTHOPEDICS ADULT REFERRAL    3. Need for prophylactic vaccination and inoculation against influenza  Given  - INFLUENZA (HIGH DOSE) 3 VALENT VACCINE [83859]  - ADMIN INFLUENZA (For MEDICARE Patients ONLY) []    4. Vitamin B12 deficiency (non anemic)  Continue B12 injections  - cyanocobalamin injection 1,000 mcg    5. Malignant neoplasm of stomach, unspecified location (H)  Stable    6. Abdominal aortic aneurysm (AAA) without rupture (H)  Recent CAT scan reviewed    7. Paroxysmal atrial fibrillation (H)  Currently not requiring anticoagulation    8. CKD (chronic kidney disease) stage 3, GFR 30-59 ml/min (H)  Stable and followed closely                         FOLLOW UP   I have asked the patient to make an appointment for followup with me preoperatively if indicated.  Otherwise in 4 months        I have carefully explained the diagnosis and treatment options to the patient.  The patient has displayed an understanding of the above, and all subsequent questions were answered.      DO ELAINE Powell    Portions of this note were produced using [employer]  Although every attempt at real-time proof reading has been made, occasional grammar/syntax errors may have been missed.

## 2019-10-02 NOTE — NURSING NOTE
Clinic Administered Medication Documentation    MEDICATION LIST:   Injectable Medication Documentation    Patient was given Cyanocobalamin (B-12). Prior to medication administration, verified patients identity using patient s name and date of birth. Please see MAR and medication order for additional information. Patient instructed to remain in clinic for 15 minutes and report any adverse reaction to staff immediately .    Given in right deltoid.  Was entire vial of medication used? Yes  Vial/Syringe: Single dose vial  Expiration Date:  1/2021  Was this medication supplied by the patient? No   Nella Nath MA     10/2/2019

## 2019-10-15 ENCOUNTER — OFFICE VISIT (OUTPATIENT)
Dept: ORTHOPEDICS | Facility: CLINIC | Age: 84
End: 2019-10-15
Attending: INTERNAL MEDICINE
Payer: COMMERCIAL

## 2019-10-15 ENCOUNTER — TELEPHONE (OUTPATIENT)
Dept: ORTHOPEDICS | Facility: CLINIC | Age: 84
End: 2019-10-15

## 2019-10-15 VITALS — WEIGHT: 166 LBS | HEIGHT: 72 IN | BODY MASS INDEX: 22.48 KG/M2

## 2019-10-15 DIAGNOSIS — M72.0 DUPUYTREN'S CONTRACTURE OF LEFT HAND: Primary | ICD-10-CM

## 2019-10-15 PROCEDURE — 99202 OFFICE O/P NEW SF 15 MIN: CPT | Performed by: PLASTIC SURGERY

## 2019-10-15 ASSESSMENT — MIFFLIN-ST. JEOR: SCORE: 1475.97

## 2019-10-15 ASSESSMENT — PAIN SCALES - GENERAL: PAINLEVEL: NO PAIN (0)

## 2019-10-15 NOTE — PATIENT INSTRUCTIONS
Thanks for coming today.  Ortho/Sports Medicine Clinic  [address] [place] [address]    To schedule future appointments in Ortho Clinic, you may call [phone].    To schedule ordered imaging by your provider:   Call Central Imaging [phone]    To schedule an injection ordered by your provider:  Call Central Imaging Injection scheduling line: [phone]  [employer] available online at:  [email or web address]    Please call if any further questions or concerns ([phone]).  Clinic hours 8 am to 5 pm.    Return to clinic (call) if symptoms worsen or fail to improve.

## 2019-10-15 NOTE — TELEPHONE ENCOUNTER
Financial Counselor Review:    Procedure to be performed (include CPT code):Yes  Initial appt: HC INJECTION ENZYME PALMAR FASCIAL CORD (18716)  2nd appt: HC MANIP PALMAR FASCIAL CORD POST INJ SINGLE CORD (71324)    Diagnosis code (include ICD-10 code):   Dupuytren's contracture (M72.0) right ring involving MCP    Medication order (include J code):Yes   One vial of Xiaflex (collagenase clostridium histolycticum) 0.9 mg Single-use  Vial:     Coverage and patient financial responsibility information:YES    Does patient need to be contacted by Financial Counselor:NO    Note: Do not use abbreviations and route encounter to [facility] ORTHOPEDICS KAYLA BOYLE [[id number]]    Kulwinder Christy RN

## 2019-10-15 NOTE — PROGRESS NOTES
CONSULT NOTE      REFERRING PHYSICIAN:  Harrison Tse DO      PRESENTING COMPLAINT:  Consultation for Dupuytren contracture.      HISTORY OF PRESENTING COMPLAINT:  Mr. Holly is 85 years old.  He is right-hand dominant.  He has been diagnosed with Dupuytren contracture for many years.  Has had left sided release done about a year ago.  His right hand ring finger is now giving him issues.  It is bent to a point he cannot wear gloves, put his hand in his pocket or wash his face without poking his eye.  He is here to have it looked at.  He has had symptoms for about 5-6 years.  It is slowly getting worse.      PAST MEDICAL HISTORY:  Chronic kidney disease, history of MI, atrial fibrillation.      PAST SURGICAL HISTORY:  Knee surgery, gastrectomy and Dupuytren release.      MEDICATIONS:  Atorvastatin, clopidogrel, metoprolol.      ALLERGIES:  Meloxicam.      SOCIAL HISTORY:  Smokes a pipe now and again.  Drinks socially.  Lives in [place].      REVIEW OF SYSTEMS:  Denies chest pain, shortness of breath.  Has had an MI, no CVA, DVT or PE.      PHYSICAL EXAMINATION:  Vital signs are stable.  He is afebrile, in no obvious distress.  He is 6 feet, 166 pounds, BMI 22 kg/m2.  On examination of his right hand, he has a pretendinous cord with a MP joint contracture of about 70 degrees.  PIP joint is straight.  Sensation is grossly intact.      ASSESSMENT AND PLAN:  Based on above findings, a diagnosis of Dupuytren contracture of right hand was made.  Discussed with the patient and his wife the findings.  I think he would be a good candidate for Xiaflex injections.  I explained to him how that would be done.  Explained to him the risks including pain, infection, bleeding, injury to deeper structures like nerves, vessels and tendons, and tendon rupture, requirement of further injections in the future, recurrence, DVT, PE, MI, CVA, and death.  He understood them all.  We will get approval and then proceed.  I look  forward to helping him out in the near future.      Total time spent with patient 20 minutes, more than half was counseling.      cc:   Harrison Tse MD   [address] [place]  [address]

## 2019-10-15 NOTE — LETTER
10/15/2019         RE: Remigio Holly  [address]  [facility] [address]        Dear Colleague,    Thank you for referring your patient, Remigio Holly, to the [facility]. Please see a copy of my visit note below.    CONSULT NOTE      REFERRING PHYSICIAN:  Harrison Tse DO      PRESENTING COMPLAINT:  Consultation for Dupuytren contracture.      HISTORY OF PRESENTING COMPLAINT:  Mr. Holly is 85 years old.  He is right-hand dominant.  He has been diagnosed with Dupuytren contracture for many years.  Has had left sided release done about a year ago.  His right hand ring finger is now giving him issues.  It is bent to a point he cannot wear gloves, put his hand in his pocket or wash his face without poking his eye.  He is here to have it looked at.  He has had symptoms for about 5-6 years.  It is slowly getting worse.      PAST MEDICAL HISTORY:  Chronic kidney disease, history of MI, atrial fibrillation.      PAST SURGICAL HISTORY:  Knee surgery, gastrectomy and Dupuytren release.      MEDICATIONS:  Atorvastatin, clopidogrel, metoprolol.      ALLERGIES:  Meloxicam.      SOCIAL HISTORY:  Smokes a pipe now and again.  Drinks socially.  Lives in [place].      REVIEW OF SYSTEMS:  Denies chest pain, shortness of breath.  Has had an MI, no CVA, DVT or PE.      PHYSICAL EXAMINATION:  Vital signs are stable.  He is afebrile, in no obvious distress.  He is 6 feet, 166 pounds, BMI 22 kg/m2.  On examination of his right hand, he has a pretendinous cord with a MP joint contracture of about 70 degrees.  PIP joint is straight.  Sensation is grossly intact.      ASSESSMENT AND PLAN:  Based on above findings, a diagnosis of Dupuytren contracture of right hand was made.  Discussed with the patient and his wife the findings.  I think he would be a good candidate for Xiaflex injections.  I explained to him how that would be done.  Explained to him the risks including pain,  infection, bleeding, injury to deeper structures like nerves, vessels and tendons, and tendon rupture, requirement of further injections in the future, recurrence, DVT, PE, MI, CVA, and death.  He understood them all.  We will get approval and then proceed.  I look forward to helping him out in the near future.      Total time spent with patient 20 minutes, more than half was counseling.      cc:   Harrison Tse MD   [address] [place]  [address]         Again, thank you for allowing me to participate in the care of your patient.        Sincerely,        MICKY Das MD

## 2019-10-15 NOTE — TELEPHONE ENCOUNTER
No PA required for patients following procedures and medications through med[employer] insurance:    HC INJECTION ENZYME PALMAR FASCIAL CORD ([id number])    2nd appt: HC MANIP PALMAR FASCIAL CORD POST INJ SINGLE CORD ([id number])     One vial of Xiaflex (collagenase clostridium histolycticum) 0.9 mg Single-use         Vial:     Patient is good to go for scheduling

## 2019-10-16 NOTE — TELEPHONE ENCOUNTER
Informed patient's wife that xiaflex requires a PA and that we may need to reschedule them. We will inform them towards the end of this week.

## 2019-10-16 NOTE — TELEPHONE ENCOUNTER
10/16 called and spoke to patent. He is currently scheduled for 10/22 and 10/29.    Kylah Saba   Procedure    Ortho/Sports Med/Ent/Eye   [facility]   [phone]

## 2019-10-16 NOTE — TELEPHONE ENCOUNTER
One vial of Xiaflex (collagenase clostridium histolycticum) 0.9 mg Single-use         Vial:     Hello, this actually requires a PA. We have sent it off today. Please hold off on scheduling.     My apologies for any inconvenience I may have created.

## 2019-10-21 NOTE — TELEPHONE ENCOUNTER
Informed patient that we have not received authorization from insurance yet for the xiaflex. He would like to reschedule until the PA comes back.

## 2019-10-21 NOTE — TELEPHONE ENCOUNTER
10/21 Please let me know when authorization comes back and I will call patient and get him scheduled.    Kylah Saba   Procedure    Ortho/Sports Med/Ent/Eye   [facility]   [phone]

## 2019-10-28 NOTE — TELEPHONE ENCOUNTER
Patient is covered for Xiaflex. There are no restrictions. Covered for buy and bill up to 2 visits or 180 units. Okay to schedule.

## 2019-10-29 ENCOUNTER — OFFICE VISIT (OUTPATIENT)
Dept: ORTHOPEDICS | Facility: CLINIC | Age: 84
End: 2019-10-29
Payer: COMMERCIAL

## 2019-10-29 VITALS — BODY MASS INDEX: 22.48 KG/M2 | HEIGHT: 72 IN | WEIGHT: 166 LBS

## 2019-10-29 DIAGNOSIS — M72.0 DUPUYTREN'S CONTRACTURE OF LEFT HAND: Primary | ICD-10-CM

## 2019-10-29 PROCEDURE — 20527 NJX NZM PALMAR FASCIAL CORD: CPT | Performed by: PLASTIC SURGERY

## 2019-10-29 ASSESSMENT — MIFFLIN-ST. JEOR: SCORE: 1475.97

## 2019-10-29 NOTE — PATIENT INSTRUCTIONS
Post Procedure instructions: Xiaflex    Let your doctor know if you have any bleeding disorders.    *Limit use of hand. Light activities only  *Monitor bandaid for any blood or drainage.  *Keep dressing dry and clean.  *Bruising and some pain is normal.    Keep hand above the level of your heart with finger tip pointing upwards              Take tylenol for discomfort              DO NOT ice hand at all until follow up appointment with MD.    -------------------------------------------------    Thanks for coming today.  Ortho/Sports Medicine Clinic  [address] [place] [address]    To schedule future appointments in Ortho Clinic, you may call [phone].    To schedule ordered imaging by your provider:   Call Central Imaging [phone]    To schedule an injection ordered by your provider:  Call Central Imaging Injection scheduling line: [phone]  [employer] available online at:  [email or web address]    Please call if any further questions or concerns ([phone]).  Clinic hours 8 am to 5 pm.    Return to clinic (call) if symptoms worsen or fail to improve.

## 2019-10-29 NOTE — PROGRESS NOTES
PRESENTING COMPLAINT:  Followup visit for Dupuytren's contracture.      HISTORY OF PRESENTING COMPLAINT:  Mr. Holly is 85 years old, here to have Xiaflex injection into his right ring finger Dupuytren's contracture of the MP joint pretendinous cord.        No change in history and physical exam.  All risks, benefits and alternatives of the procedure were explained.  He understood them all and wants to proceed.      PROCEDURE NOTE:  After informed consent was obtained from the patient, the proper site and procedure was ascertained with him and was appropriately marked.  In the procedure room, his right hand was prepped and draped in the standard surgical fashion.  One vial of Xiaflex was reconstituted according to 's recommendations and injected in 3 different areas in the pretendinous cord.  He tolerated the procedure well.      FOLLOWUP:  I will see him back in a week's time for manipulation and OT.  All questions were answered.  He was happy with his visit.        Total time spent with the patient was 15 minutes, more than half was counseling and procedure.

## 2019-10-29 NOTE — LETTER
10/29/2019         RE: Remigio Holly  [address] [facility] [address]        Dear Colleague,    Thank you for referring your patient, Remigio Holly, to the [facility]. Please see a copy of my visit note below.    PRESENTING COMPLAINT:  Followup visit for Dupuytren's contracture.      HISTORY OF PRESENTING COMPLAINT:  Mr. Holly is 85 years old, here to have Xiaflex injection into his right ring finger Dupuytren's contracture of the MP joint pretendinous cord.        No change in history and physical exam.  All risks, benefits and alternatives of the procedure were explained.  He understood them all and wants to proceed.      PROCEDURE NOTE:  After informed consent was obtained from the patient, the proper site and procedure was ascertained with him and was appropriately marked.  In the procedure room, his right hand was prepped and draped in the standard surgical fashion.  One vial of Xiaflex was reconstituted according to 's recommendations and injected in 3 different areas in the pretendinous cord.  He tolerated the procedure well.      FOLLOWUP:  I will see him back in a week's time for manipulation and OT.  All questions were answered.  He was happy with his visit.        Total time spent with the patient was 15 minutes, more than half was counseling and procedure.         Again, thank you for allowing me to participate in the care of your patient.        Sincerely,        MICKY Das MD

## 2019-11-05 ENCOUNTER — THERAPY VISIT (OUTPATIENT)
Dept: OCCUPATIONAL THERAPY | Facility: CLINIC | Age: 84
End: 2019-11-05
Payer: COMMERCIAL

## 2019-11-05 ENCOUNTER — OFFICE VISIT (OUTPATIENT)
Dept: ORTHOPEDICS | Facility: CLINIC | Age: 84
End: 2019-11-05
Payer: COMMERCIAL

## 2019-11-05 VITALS — HEART RATE: 77 BPM | OXYGEN SATURATION: 100 % | SYSTOLIC BLOOD PRESSURE: 126 MMHG | DIASTOLIC BLOOD PRESSURE: 68 MMHG

## 2019-11-05 DIAGNOSIS — M72.0 DUPUYTREN'S CONTRACTURE: ICD-10-CM

## 2019-11-05 DIAGNOSIS — M72.0 DUPUYTREN'S CONTRACTURE OF LEFT HAND: Primary | ICD-10-CM

## 2019-11-05 DIAGNOSIS — M79.641 PAIN OF RIGHT HAND: ICD-10-CM

## 2019-11-05 PROCEDURE — 99213 OFFICE O/P EST LOW 20 MIN: CPT | Performed by: PLASTIC SURGERY

## 2019-11-05 PROCEDURE — 97110 THERAPEUTIC EXERCISES: CPT | Mod: GO | Performed by: OCCUPATIONAL THERAPIST

## 2019-11-05 PROCEDURE — 97760 ORTHOTIC MGMT&TRAING 1ST ENC: CPT | Mod: GO | Performed by: OCCUPATIONAL THERAPIST

## 2019-11-05 PROCEDURE — 97165 OT EVAL LOW COMPLEX 30 MIN: CPT | Mod: GO | Performed by: OCCUPATIONAL THERAPIST

## 2019-11-05 NOTE — LETTER
11/5/2019         RE: Remigio Holly  [address] [facility] [address]        Dear Colleague,    Thank you for referring your patient, Remigio Holly, to the [facility]. Please see a copy of my visit note below.    FOLLOWUP NOTE      PRESENTING COMPLAINT:  Followup visit for Dupuytren contractures.      HISTORY OF PRESENTING COMPLAINT:  Mr. Holly is 85 years old.  He got an injection of 1 vial of Xiaflex last week in his right ring finger MP joint, pretendinous cord, here to have it manipulated.  No change in history or physical exam.  He tolerated the injection well.      PROCEDURE NOTE:  After informed consent was taken from the patient, the proper site and procedure was ascertained with him and he was appropriately marked, he was taken to the procedure room.  His right hand was prepped and draped in standard surgical fashion.  20 mL of 1% lidocaine was injected around the wrist and palm area to get him anesthetized and then I manipulated his finger completely.  There was no tear.  He tolerated the procedure well.      FOLLOWUP:  Plan now is to go to OT, get an extension splint that he will wear at night for 4 weeks.  I will see him back on a p.r.n. basis.         Again, thank you for allowing me to participate in the care of your patient.        Sincerely,        MICKY Das MD

## 2019-11-05 NOTE — PROGRESS NOTES
FOLLOWUP NOTE      PRESENTING COMPLAINT:  Followup visit for Dupuytren contractures.      HISTORY OF PRESENTING COMPLAINT:  Mr. Holly is 85 years old.  He got an injection of 1 vial of Xiaflex last week in his right ring finger MP joint, pretendinous cord, here to have it manipulated.  No change in history or physical exam.  He tolerated the injection well.      PROCEDURE NOTE:  After informed consent was taken from the patient, the proper site and procedure was ascertained with him and he was appropriately marked, he was taken to the procedure room.  His right hand was prepped and draped in standard surgical fashion.  20 mL of 1% lidocaine was injected around the wrist and palm area to get him anesthetized and then I manipulated his finger completely.  There was no tear.  He tolerated the procedure well.      FOLLOWUP:  Plan now is to go to OT, get an extension splint that he will wear at night for 4 weeks.  I will see him back on a p.r.n. basis.

## 2019-11-05 NOTE — PROGRESS NOTES
Hand Therapy Initial Evaluation    Current Date:  11/5/2019  Referring Physician: Dr. Das    Diagnosis: (R) RF Dupuytrens  DOS: 11/5/19 (manipulation following xiaflex injection)  Procedure:  manipulation following xiaflex injection on 10/29/19  Post:  0w 0d      Subjective:  Remigio Holly is a 85 year old right hand dominant male.    Patient reports symptoms of pain, stiffness/loss of motion, weakness/loss of strength and edema of the right hand which occurred due to the above stated diagnosis. Since onset symptoms are Gradually getting better.  Special tests:  none.  Previous treatment: none.    General health as reported by patient is good.  Pertinent medical history includes:Cancer, Heart Problems, Implated Device  Medical allergies:none.  Surgical history: cancer: 2010, heart: 2018.  Medication history: Cardiac.    Occupational Profile Information:  Current occupation is farmer  Currently working in normal job without restrictions  Job Tasks: Driving, Lifting, Carrying, Operating a Machine, Assembly, Pushing, Pulling  Prior functional level:  no limitations  Barriers include:none  Mobility: No difficulty  Transportation: drives  Leisure activities/hobbies: woodworking, classic car repair    Upper Extremity Functional Index Score:  SCORE:   Column Totals: /80: 76   (A lower score indicates greater disability.)    Objective:  Pain Level (Scale 0-10):   11/5/2019   At Rest 3-4/10   With Use 3-4/10     Pain Description:  Date 11/5/2019   Location hand   Pain Quality Aching   Frequency intermittent     Pain is worst  daytime   Exacerbated by  movement   Relieved by rest   Progression Gradually improving      ROM:    Ring Finger 11/5/19 11/5/19   AROM (PROM) Left Right   MCP -15/ -15/95   PIP -30/ -20/85   DIP / 0/45   ELLIOTT         Strength:  [x]                    Contraindicated    Edema:  []         None   [x]         Mild    []         Moderate      []         Severe                  Location: (R)  RF and volar palm     Color/Temperature:     [x]        Normal  []        Abnormal            Palpation:  []         Normal        [x]       Tender       []      Mild         []       Moderate [x]       Severe     Location: (R) RF volar flexor tendon area    Sensation: [x]                   WNL throughout all nerve distributions; per patient report    []                   Decreased  []        Median    []        Ulnar    []        Radial nerve distribution    Assessment:  Patient presents with symptoms consistent with diagnosis of Dupuytrens,  with non-surgical intervention.     Patient's limitations or Problem List includes:  Pain, Decreased ROM/motion, Increased edema, Tightness in musculature and Adherence in connective tissue of the right ring finger which interferes with the patient's ability to perform Self Care Tasks (dressing) and Recreational Activities as compared to previous level of function.    Rehab Potential:  Excellent - Return to full activity, no limitations    Patient will benefit from skilled Occupational Therapy to increase ROM, flexibility and overall strength and decrease pain to return to previous activity level and resume normal daily tasks and to reach their rehab potential.    Barriers to Learning:  No barrier    Communication Issues:  Patient appears to be able to clearly communicate and understand verbal and written communication and follow directions correctly.    Chart Review: Brief history including review of medical and/or therapy records relating to the presenting problem and Simple history review with patient    Identified Performance Deficits: dressing and home establishment and management    Assessment of Occupational Performance:  1-3 Performance Deficits    Clinical Decision Making (Complexity): Low complexity    Treatment Explanation:  The following has been discussed with the patient:  RX ordered/plan of care  Anticipated outcomes  Possible risks and side effects      Frequency:  1 X week, once daily  Duration:  for 4 weeks  Treatment Plan:  Therapeutic Exercise:  AROM, AAROM, PROM, Tendon Gliding and Extensor Tracking  Manual Techniques:  Myofascial release  Orthotic Fabrication:  Static orthosis and Hand based orthosis  Discharge Plan:  Achieve all LTG.  Independent in home treatment program.  Reach maximal therapeutic benefit.      Home Exercise Program:  Compliance with night time wear of orthosis  PROM of finger in extension  Extensor tracking  Tendon Glides    Next Visit:  No further visits scheduled at this time. The chart will be left open for one month to allow patient to schedule further appointments if problems develop. If no additional therapy sessions are scheduled, then the patient will be discharged and this will serve as a discharge note.

## 2019-11-05 NOTE — NURSING NOTE
Remigio Holly's chief complaint for this visit includes:  Chief Complaint   Patient presents with     RECHECK     Manipulation 1 wk s/p Xiaflex injection right 4th Dupuytren's contracture on 10/29/19     PCP: Harrison Tse    Referring Provider:  Harrison Tse DO  [address] [place] [address]    /68 (BP Location: Right arm, Patient Position: Sitting, Cuff Size: Adult Large)   Pulse 77   SpO2 100%   Data Unavailable     Do you need any medication refills at today's visit? Selin Morrison, CMA

## 2019-11-05 NOTE — PATIENT INSTRUCTIONS
Thanks for coming today.  [facility]  [address] [place] [address]    To schedule future appointments in Ortho Clinic, you may call [phone].    To schedule ordered imaging by your provider:   Call [facility] [phone]    To schedule an injection ordered by your provider:  Call [facility] Injection scheduling line: [phone]  [facility] available online at:  [email or web address]    Please call if any further questions or concerns ([phone]).  Clinic hours 8 am to 5 pm.    Return to clinic (call) if symptoms worsen or fail to improve.

## 2019-12-10 PROBLEM — M72.0 DUPUYTREN'S CONTRACTURE: Status: RESOLVED | Noted: 2019-11-05 | Resolved: 2019-12-10

## 2019-12-10 PROBLEM — M79.641 PAIN OF RIGHT HAND: Status: RESOLVED | Noted: 2019-11-05 | Resolved: 2019-12-10

## 2019-12-11 ENCOUNTER — ALLIED HEALTH/NURSE VISIT (OUTPATIENT)
Dept: FAMILY MEDICINE | Facility: OTHER | Age: 84
End: 2019-12-11
Payer: COMMERCIAL

## 2019-12-11 DIAGNOSIS — E53.8 VITAMIN B 12 DEFICIENCY: Primary | ICD-10-CM

## 2019-12-11 PROCEDURE — 99207 ZZC NO CHARGE NURSE ONLY: CPT

## 2019-12-11 PROCEDURE — 96372 THER/PROPH/DIAG INJ SC/IM: CPT

## 2019-12-11 RX ADMIN — CYANOCOBALAMIN 1000 MCG: 1000 INJECTION, SOLUTION INTRAMUSCULAR; SUBCUTANEOUS at 11:06

## 2019-12-11 NOTE — PROGRESS NOTES
Clinic Administered Medication Documentation    MEDICATION LIST:   Injectable Medication Documentation    Patient was given Cyanocobalamin (B-12). Prior to medication administration, verified patients identity using patient s name and date of birth. Please see MAR and medication order for additional information. Patient instructed to remain in clinic for 15 minutes and report any adverse reaction to staff immediately .      Was entire vial of medication used? Yes  Vial/Syringe: Single dose vial  Expiration Date:  2/2021  Administered ing left deltoid.     Sherlyn Martins LPN........12/11/2019 11:09 AM     Was this medication supplied by the patient? No

## 2020-01-23 ENCOUNTER — ALLIED HEALTH/NURSE VISIT (OUTPATIENT)
Dept: FAMILY MEDICINE | Facility: OTHER | Age: 85
End: 2020-01-23
Payer: COMMERCIAL

## 2020-01-23 VITALS — BODY MASS INDEX: 22.51 KG/M2 | HEIGHT: 72 IN

## 2020-01-23 DIAGNOSIS — D51.0 PERNICIOUS ANEMIA: Primary | ICD-10-CM

## 2020-01-23 PROCEDURE — 99207 ZZC NO CHARGE NURSE ONLY: CPT

## 2020-01-23 PROCEDURE — 96372 THER/PROPH/DIAG INJ SC/IM: CPT

## 2020-01-23 RX ADMIN — CYANOCOBALAMIN 1000 MCG: 1000 INJECTION, SOLUTION INTRAMUSCULAR; SUBCUTANEOUS at 10:45

## 2020-01-23 NOTE — PROGRESS NOTES
Clinic Administered Medication Documentation    MEDICATION LIST:   Injectable Medication Documentation    Patient was given Cyanocobalamin (B-12). Prior to medication administration, verified patients identity using patient s name and date of birth. Please see MAR and medication order for additional information. Patient instructed to remain in clinic for 15 minutes and report any adverse reaction to staff immediately .      Was entire vial of medication used? Yes  Vial/Syringe: Single dose vial  Expiration Date:  02/2021  Was this medication supplied by the patient? No     Given to patient in RD.     Ericka Ye MA

## 2020-02-24 NOTE — PROGRESS NOTES
Office Visit-Follow up  HISTORY OF PRESENT ILLNESS:    Remigio Holly is a 85 year old male who is seen in follow up for   Chief Complaint   Patient presents with     RECHECK     Release Left Hand Dupuytrens Contracture.  DOS: 11/26/18 (16 days postop)     Surgical Followup     PREOPERATIVE/POSTOPERATIVE DIAGNOSIS:  Dupuytrens contractures involving left ring and little fingers. PROCEDURE:  Excision of Dupuytren's cords, both left middle finger and left little finger.  This included removal of tissue from the palm to the level of the DIP joint to the ring finger and the palm to the level of the PIP joint of the ring finger.        Patient presents with:  RECHECK: Release Left Hand Dupuytrens Contracture.  DOS: 11/26/18 (16 days postop)  Surgical Followup: PREOPERATIVE/POSTOPERATIVE DIAGNOSIS:  Dupuytrens contractures involving left ring and little fingers. PROCEDURE:  Excision of Dupuytren's cords, both left middle finger and left little finger.  This included removal of tissue from the palm to the level of the DIP joint to the ring finger and the palm to the level of the PIP joint of the ring finger.       She is accompanied by his wife.  Present symptoms: No unusual complaints.  He still has stiffness in his fingers which is improving.  Treatments tried to this point: Dressing changes.    REVIEW OF SYSTEMS    General: negative for, night sweats, dizziness, fatigue  Resp: No shortness of breath and no cough  CV: negative for chest pain, syncope or near-syncope  GI: negative for nausea, vomiting and diarrhea  : negative for dysuria and hematuria  Musculoskeletal: as above  Neurologic: negative for syncope   Hematologic: negative for bleeding disorder    Physical Exam:    Vitals: /76   Ht 1.829 m (6')   Wt 74.8 kg (165 lb)   BMI 22.38 kg/m    BMI= Body mass index is 22.38 kg/m .  Constitutional: healthy, alert and no acute distress   Psychiatric: mentation appears normal and affect  normal/bright  NEURO: no focal deficits  SKIN: no excoriation or erythema. No signs of infection.        JOINT/EXTREMITIES:     Sutures were removed today.  1 of the Z-plasty incisions in the palm of his hand there is a little bit of gapping but appears to be a stable flap.  Little finger is very near full extension.  Ring finger still so some degree of contraction but much better than preoperative status.    IMAGING INTERPRETATION: No x-rays taken       Impression:      ICD-10-CM    1. Dupuytren's contracture of left hand M72.0        Appears to be progressing well.  The wound area is not of concern given that this will close in secondarily    Plan:   The above was reviewed with Remigio and his wife.     I suggest he only put a Band-Aid over the mid palmar region until that has fully stabilized.  He can begin to use the hand for activities.  He is already stretching his digits which he may continue.      Return to clinic 2, weeks    Lucio Omalley MD   EOMI; PERRL; no drainage or redness

## 2020-03-03 ENCOUNTER — ALLIED HEALTH/NURSE VISIT (OUTPATIENT)
Dept: FAMILY MEDICINE | Facility: OTHER | Age: 85
End: 2020-03-03
Payer: COMMERCIAL

## 2020-03-03 DIAGNOSIS — E53.8 VITAMIN B12 DEFICIENCY (NON ANEMIC): Primary | ICD-10-CM

## 2020-03-03 PROCEDURE — 96372 THER/PROPH/DIAG INJ SC/IM: CPT

## 2020-03-03 RX ADMIN — CYANOCOBALAMIN 1000 MCG: 1000 INJECTION, SOLUTION INTRAMUSCULAR; SUBCUTANEOUS at 09:27

## 2020-03-03 NOTE — PROGRESS NOTES
Clinic Administered Medication Documentation      Injectable Medication Documentation    Patient was given Cyanocobalamin (B-12). Prior to medication administration, verified patients identity using patient s name and date of birth. Please see MAR and medication order for additional information. Patient instructed to remain in clinic for 15 minutes and report any adverse reaction to staff immediately .      Was entire vial of medication used? Yes  Vial/Syringe: Single dose vial  Expiration Date:  02/2021  Was this medication supplied by the patient? No     Given in LD.     Health Maintenance Due   Topic Date Due     ZOSTER IMMUNIZATION (1 of 2) 11/06/1983     DTAP/TDAP/TD IMMUNIZATION (2 - Td) 06/29/2010     MEDICARE ANNUAL WELLNESS VISIT  10/15/2016     FALL RISK ASSESSMENT  10/29/2019     PHQ-2  01/01/2020     LIPID  01/11/2020     MICROALBUMIN  01/11/2020       Health Maintenance reviewed at today's visit patient asked to schedule/complete:   Routine Health Visit:  Patient agrees to schedule  Diabetes:  Patient agrees to schedule      Ericka Ye MA

## 2020-03-13 ENCOUNTER — HOSPITAL ENCOUNTER (EMERGENCY)
Facility: CLINIC | Age: 85
Discharge: HOME | End: 2020-03-13
Attending: FAMILY MEDICINE | Admitting: FAMILY MEDICINE
Payer: MEDICARE

## 2020-03-13 ENCOUNTER — APPOINTMENT (OUTPATIENT)
Dept: CT IMAGING | Facility: CLINIC | Age: 85
End: 2020-03-13
Attending: FAMILY MEDICINE
Payer: MEDICARE

## 2020-03-13 ENCOUNTER — OFFICE VISIT (OUTPATIENT)
Dept: FAMILY MEDICINE | Facility: OTHER | Age: 85
End: 2020-03-13
Payer: COMMERCIAL

## 2020-03-13 VITALS
SYSTOLIC BLOOD PRESSURE: 122 MMHG | RESPIRATION RATE: 16 BRPM | DIASTOLIC BLOOD PRESSURE: 74 MMHG | OXYGEN SATURATION: 94 % | HEIGHT: 72 IN | TEMPERATURE: 97 F | WEIGHT: 164.2 LBS | BODY MASS INDEX: 22.24 KG/M2 | HEART RATE: 68 BPM

## 2020-03-13 VITALS
SYSTOLIC BLOOD PRESSURE: 138 MMHG | RESPIRATION RATE: 20 BRPM | DIASTOLIC BLOOD PRESSURE: 99 MMHG | OXYGEN SATURATION: 97 % | TEMPERATURE: 97.5 F | HEART RATE: 58 BPM

## 2020-03-13 DIAGNOSIS — K40.20 NON-RECURRENT BILATERAL INGUINAL HERNIA WITHOUT OBSTRUCTION OR GANGRENE: ICD-10-CM

## 2020-03-13 DIAGNOSIS — S61.011A LACERATION OF RIGHT THUMB WITHOUT FOREIGN BODY WITHOUT DAMAGE TO NAIL, INITIAL ENCOUNTER: ICD-10-CM

## 2020-03-13 DIAGNOSIS — Z98.0 S/P TOTAL GASTRECTOMY AND ROUX-EN-Y ESOPHAGOJEJUNAL ANASTOMOSIS: ICD-10-CM

## 2020-03-13 DIAGNOSIS — I71.40 ABDOMINAL AORTIC ANEURYSM (AAA) WITHOUT RUPTURE (H): ICD-10-CM

## 2020-03-13 DIAGNOSIS — Z98.61 POSTSURGICAL PERCUTANEOUS TRANSLUMINAL CORONARY ANGIOPLASTY STATUS: ICD-10-CM

## 2020-03-13 DIAGNOSIS — K57.32 DIVERTICULITIS OF COLON: ICD-10-CM

## 2020-03-13 DIAGNOSIS — I25.10 CORONARY ARTERY DISEASE INVOLVING NATIVE CORONARY ARTERY OF NATIVE HEART WITHOUT ANGINA PECTORIS: ICD-10-CM

## 2020-03-13 DIAGNOSIS — I48.0 PAROXYSMAL ATRIAL FIBRILLATION (H): ICD-10-CM

## 2020-03-13 DIAGNOSIS — N18.30 CKD (CHRONIC KIDNEY DISEASE) STAGE 3, GFR 30-59 ML/MIN (H): ICD-10-CM

## 2020-03-13 DIAGNOSIS — C16.9 MALIGNANT NEOPLASM OF STOMACH, UNSPECIFIED LOCATION (H): ICD-10-CM

## 2020-03-13 DIAGNOSIS — D51.0 PA (PERNICIOUS ANEMIA): ICD-10-CM

## 2020-03-13 DIAGNOSIS — E78.2 MIXED HYPERLIPIDEMIA: Primary | ICD-10-CM

## 2020-03-13 DIAGNOSIS — S09.90XA CLOSED HEAD INJURY, INITIAL ENCOUNTER: ICD-10-CM

## 2020-03-13 DIAGNOSIS — Z00.00 MEDICARE ANNUAL WELLNESS VISIT, SUBSEQUENT: ICD-10-CM

## 2020-03-13 DIAGNOSIS — E53.8 VITAMIN B 12 DEFICIENCY: ICD-10-CM

## 2020-03-13 DIAGNOSIS — Z90.3 S/P TOTAL GASTRECTOMY AND ROUX-EN-Y ESOPHAGOJEJUNAL ANASTOMOSIS: ICD-10-CM

## 2020-03-13 DIAGNOSIS — Z12.5 SCREENING FOR PROSTATE CANCER: ICD-10-CM

## 2020-03-13 LAB
ALBUMIN UR-MCNC: NEGATIVE MG/DL
ANION GAP SERPL CALCULATED.3IONS-SCNC: 5 MMOL/L (ref 3–14)
APPEARANCE UR: CLEAR
BILIRUB UR QL STRIP: NEGATIVE
BUN SERPL-MCNC: 25 MG/DL (ref 7–30)
CALCIUM SERPL-MCNC: 9 MG/DL (ref 8.5–10.1)
CHLORIDE SERPL-SCNC: 110 MMOL/L (ref 94–109)
CHOLEST SERPL-MCNC: 126 MG/DL
CO2 SERPL-SCNC: 25 MMOL/L (ref 20–32)
COLOR UR AUTO: YELLOW
CREAT SERPL-MCNC: 1.06 MG/DL (ref 0.66–1.25)
CREAT UR-MCNC: 93 MG/DL
ERYTHROCYTE [DISTWIDTH] IN BLOOD BY AUTOMATED COUNT: 16.1 % (ref 10–15)
GFR SERPL CREATININE-BSD FRML MDRD: 63 ML/MIN/{1.73_M2}
GLUCOSE SERPL-MCNC: 95 MG/DL (ref 70–99)
GLUCOSE UR STRIP-MCNC: NEGATIVE MG/DL
HCT VFR BLD AUTO: 39.5 % (ref 40–53)
HDLC SERPL-MCNC: 48 MG/DL
HGB BLD-MCNC: 12.5 G/DL (ref 13.3–17.7)
HGB UR QL STRIP: NEGATIVE
KETONES UR STRIP-MCNC: NEGATIVE MG/DL
LDLC SERPL CALC-MCNC: 63 MG/DL
LEUKOCYTE ESTERASE UR QL STRIP: NEGATIVE
MCH RBC QN AUTO: 27.9 PG (ref 26.5–33)
MCHC RBC AUTO-ENTMCNC: 31.6 G/DL (ref 31.5–36.5)
MCV RBC AUTO: 88 FL (ref 78–100)
MICROALBUMIN UR-MCNC: 22 MG/L
MICROALBUMIN/CREAT UR: 23.98 MG/G CR (ref 0–17)
NITRATE UR QL: NEGATIVE
NONHDLC SERPL-MCNC: 78 MG/DL
PH UR STRIP: 5.5 PH (ref 5–7)
PLATELET # BLD AUTO: 249 10E9/L (ref 150–450)
POTASSIUM SERPL-SCNC: 4.4 MMOL/L (ref 3.4–5.3)
PSA SERPL-ACNC: 4.43 UG/L (ref 0–4)
RBC # BLD AUTO: 4.48 10E12/L (ref 4.4–5.9)
SODIUM SERPL-SCNC: 140 MMOL/L (ref 133–144)
SOURCE: NORMAL
SP GR UR STRIP: >1.03 (ref 1–1.03)
TRIGL SERPL-MCNC: 75 MG/DL
UROBILINOGEN UR STRIP-ACNC: 0.2 EU/DL (ref 0.2–1)
VIT B12 SERPL-MCNC: 360 PG/ML (ref 193–986)
WBC # BLD AUTO: 5.2 10E9/L (ref 4–11)

## 2020-03-13 PROCEDURE — 36415 COLL VENOUS BLD VENIPUNCTURE: CPT | Performed by: INTERNAL MEDICINE

## 2020-03-13 PROCEDURE — 80061 LIPID PANEL: CPT | Performed by: INTERNAL MEDICINE

## 2020-03-13 PROCEDURE — 82607 VITAMIN B-12: CPT | Performed by: INTERNAL MEDICINE

## 2020-03-13 PROCEDURE — 25000128 H RX IP 250 OP 636: Performed by: FAMILY MEDICINE

## 2020-03-13 PROCEDURE — 90714 TD VACC NO PRESV 7 YRS+ IM: CPT | Performed by: FAMILY MEDICINE

## 2020-03-13 PROCEDURE — 99284 EMERGENCY DEPT VISIT MOD MDM: CPT | Mod: 25 | Performed by: FAMILY MEDICINE

## 2020-03-13 PROCEDURE — 82043 UR ALBUMIN QUANTITATIVE: CPT | Performed by: INTERNAL MEDICINE

## 2020-03-13 PROCEDURE — 81003 URINALYSIS AUTO W/O SCOPE: CPT | Performed by: INTERNAL MEDICINE

## 2020-03-13 PROCEDURE — 99397 PER PM REEVAL EST PAT 65+ YR: CPT | Performed by: INTERNAL MEDICINE

## 2020-03-13 PROCEDURE — 80048 BASIC METABOLIC PNL TOTAL CA: CPT | Performed by: INTERNAL MEDICINE

## 2020-03-13 PROCEDURE — G0103 PSA SCREENING: HCPCS | Performed by: INTERNAL MEDICINE

## 2020-03-13 PROCEDURE — 70450 CT HEAD/BRAIN W/O DYE: CPT

## 2020-03-13 PROCEDURE — 85027 COMPLETE CBC AUTOMATED: CPT | Performed by: INTERNAL MEDICINE

## 2020-03-13 PROCEDURE — 12002 RPR S/N/AX/GEN/TRNK2.6-7.5CM: CPT | Performed by: FAMILY MEDICINE

## 2020-03-13 PROCEDURE — 99213 OFFICE O/P EST LOW 20 MIN: CPT | Mod: 25 | Performed by: INTERNAL MEDICINE

## 2020-03-13 PROCEDURE — 12002 RPR S/N/AX/GEN/TRNK2.6-7.5CM: CPT | Mod: Z6 | Performed by: FAMILY MEDICINE

## 2020-03-13 PROCEDURE — 90471 IMMUNIZATION ADMIN: CPT | Performed by: FAMILY MEDICINE

## 2020-03-13 RX ADMIN — CLOSTRIDIUM TETANI TOXOID ANTIGEN (FORMALDEHYDE INACTIVATED) AND CORYNEBACTERIUM DIPHTHERIAE TOXOID ANTIGEN (FORMALDEHYDE INACTIVATED) 0.5 ML: 5; 2 INJECTION, SUSPENSION INTRAMUSCULAR at 16:23

## 2020-03-13 ASSESSMENT — PAIN SCALES - GENERAL: PAINLEVEL: NO PAIN (0)

## 2020-03-13 ASSESSMENT — ACTIVITIES OF DAILY LIVING (ADL): CURRENT_FUNCTION: NO ASSISTANCE NEEDED

## 2020-03-13 ASSESSMENT — MIFFLIN-ST. JEOR: SCORE: 1462.81

## 2020-03-13 NOTE — ED TRIAGE NOTES
"Presents to ED via EMS for concerns of loss of consciousness. Patient was working in his shop and a board on the table saw slipped and went through his right thumb. Patient then saw the blood, reportedly lost consciousness, but patient does not know if he did. Patient's wife reportedly found him out cold on the floor was he was \"out for 3 minutes.\" Patient does take plavix.   "

## 2020-03-13 NOTE — ED AVS SNAPSHOT
[facility] Emergency Department  [address] [place] [address]  Phone:  [phone]  Fax:  [phone]                                    Remigio Holly   MRN: [id number]    Department:  [facility] Emergency Department   Date of Visit:  3/13/2020           After Visit Summary Signature Page    I have received my discharge instructions, and my questions have been answered. I have discussed any challenges I see with this plan with the nurse or doctor.    ..........................................................................................................................................  Patient/Patient Representative Signature      ..........................................................................................................................................  Patient Representative Print Name and Relationship to Patient    ..................................................               ................................................  Date                                   Time    ..........................................................................................................................................  Reviewed by Signature/Title    ...................................................              ..............................................  Date                                               Time          22EPIC Rev 08/18

## 2020-03-13 NOTE — PROGRESS NOTES
"SUBJECTIVE:   Remigio Holly is a 86 year old male who presents for Preventive Visit.  Are you in the first 12 months of your Medicare coverage?  No    Healthy Habits:     In general, how would you rate your overall health?  Fair    Frequency of exercise:  4-5 days/week    Duration of exercise:  Other    Do you usually eat at least 4 servings of fruit and vegetables a day, include whole grains    & fiber and avoid regularly eating high fat or \"junk\" foods?  Yes    Taking medications regularly:  Yes    Barriers to taking medications:  None    Medication side effects:  None    Ability to successfully perform activities of daily living:  No assistance needed    Home Safety:  No safety concerns identified    Hearing Impairment:  Difficulty understanding soft or whispered speech    In the past 6 months, have you been bothered by leaking of urine?  No    In general, how would you rate your overall mental or emotional health?  Good      PHQ-2 Total Score: 0    Additional concerns today:  No    Do you feel safe in your environment? Yes    Have you ever done Advance Care Planning? (For example, a Health Directive, POLST, or a discussion with a medical provider or your loved ones about your wishes): Yes, advance care planning is on file.      Fall risk  Fallen 2 or more times in the past year?: No  Any fall with injury in the past year?: No    Cognitive Screening   1) Repeat 3 items (Leader, Season, Table)    2) Clock draw: NORMAL  3) 3 item recall: Recalls 3 objects  Results: 3 items recalled: COGNITIVE IMPAIRMENT LESS LIKELY    Mini-CogTM Copyright RISSA Thomas. Licensed by the author for use in [facility] [place]; reprinted with permission (socat@.[facility]). All rights reserved.      Do you have sleep apnea, excessive snoring or daytime drowsiness?: no    Reviewed and updated as needed this visit by clinical staff  Tobacco  Allergies  Meds  Med Hx  Surg Hx  Fam Hx  Soc Hx        Reviewed and updated as " needed this visit by Provider        Social History     Tobacco Use     Smoking status: Current Some Day Smoker     Packs/day: 0.00     Years: 20.00     Pack years: 0.00     Types: Pipe     Last attempt to quit: 1998     Years since quittin.2     Smokeless tobacco: Never Used     Tobacco comment: Rare pipe   Substance Use Topics     Alcohol use: No     Alcohol/week: 0.0 standard drinks         Alcohol Use 10/15/2015   Prescreen: >3 drinks/day or >7 drinks/week? The patient does not drink >3 drinks per day nor >7 drinks per week.           -------------------------------------    Current providers sharing in care for this patient include:   Patient Care Team:  Harrison Tse DO as PCP - General (Internal Medicine)  Wade Larose MD as MD (Oncology)  Es Luna, RN as Nurse Coordinator (Oncology)  Harrison Tse DO as Assigned PCP    The following health maintenance items are reviewed in Epic and correct as of today:  Health Maintenance   Topic Date Due     ZOSTER IMMUNIZATION (1 of 2) 1983     DTAP/TDAP/TD IMMUNIZATION (2 - Td) 2010     MEDICARE ANNUAL WELLNESS VISIT  10/15/2016     LIPID  2020     MICROALBUMIN  2020     BMP  2020     FALL RISK ASSESSMENT  2021     ADVANCE CARE PLANNING  2023     PHQ-2  Completed     INFLUENZA VACCINE  Completed     PNEUMOCOCCAL IMMUNIZATION 65+ LOW/MEDIUM RISK  Completed     IPV IMMUNIZATION  Aged Out     MENINGITIS IMMUNIZATION  Aged Out     Lab work is in process  Labs reviewed in EPIC  BP Readings from Last 3 Encounters:   20 122/74   19 126/68   10/02/19 112/74    Wt Readings from Last 3 Encounters:   20 74.5 kg (164 lb 3.2 oz)   10/29/19 75.3 kg (166 lb)   10/15/19 75.3 kg (166 lb)                  Patient Active Problem List   Diagnosis     Chest pain     Pain in joint, shoulder region     Abdominal aortic aneurysm (H)     Calculus of gallbladder     Closed fracture  of calcaneus     Mixed hyperlipidemia     GERD (gastroesophageal reflux disease)     Gastric cancer (H)     Paroxysmal atrial fibrillation (H)     HYPERLIPIDEMIA LDL GOAL <100     Advance Care Planning     Pain     Nephrolithiasis     CKD (chronic kidney disease) stage 3, GFR 30-59 ml/min (H)     Hypoglycemia     Pernicious anemia     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     Postsurgical dumping syndrome     Primary osteoarthritis of both knees     Bilateral knee pain     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/17)     Near syncope     Thoracic aortic aneurysm without rupture (H) - 4.7 cm on 8/18/17 (4.5 cm in 8/15)     Orthostatic hypotension     Urinary retention with incomplete bladder emptying     Dupuytren's contracture of left hand     Past Surgical History:   Procedure Laterality Date     ABDOMEN SURGERY       ARTHROPLASTY KNEE Left 8/15/2017    Procedure: ARTHROPLASTY KNEE;  Left total knee replacement;  Surgeon: Jonahtan Cardona DO;  Location: [facility] OR     C EXCIS STOMACH ULCER,LESN;LOCAL       CHOLECYSTECTOMY, LAPOROSCOPIC  10/6/2008    Cholecystectomy, Laparoscopic     COLONOSCOPY  1/14/10    [facility]     COLONOSCOPY  05/25/10     CYSTOSCOPY, RETROGRADES, EXTRACT STONE, INSERT STENT, COMBINED  12/25/2012    Procedure: COMBINED CYSTOSCOPY, RETROGRADES, EXTRACT STONE, INSERT STENT;  Cystoscopy, Left Stent Placement, left retrograde pylogram, uc;  Surgeon: Amador Sanchez MD;  Location: [facility] OR     ESOPHAGOSCOPY, GASTROSCOPY, DUODENOSCOPY (EGD), COMBINED  8/16/2011    Procedure:COMBINED ESOPHAGOSCOPY, GASTROSCOPY, DUODENOSCOPY (EGD), BIOPSY SINGLE OR MULTIPLE; Surgeon:TONY GARCIA; Location:[facility]     GENERAL SURGERY                           DATE:  07/07/10    Gastrectomy     HC COLONOSCOPY W/WO BRUSH/WASH  01/31/2006     HC REPAIR ROTATOR CUFF,ACUTE  3/21/2005    Right      UGI ENDOSCOPY, SIMPLE EXAM  1/13/10    [facility] UGI ENDOSCOPY, SIMPLE EXAM  05/25/10     LASER HOLMIUM LITHOTRIPSY URETER(S), INSERT STENT, COMBINED  2013    Procedure: COMBINED CYSTOSCOPY, URETEROSCOPY, LASER HOLMIUM LITHOTRIPSY URETER(S), INSERT STENT;  Bilateral  Cystoscopy, Bilateral Ureteroscopy, Bilateral retrogrades and  placement of ureteral stent right side. Laser Holmium Lithotripsy  and Exchange  of stent left side;  Surgeon: Tone Morejon MD;  Location: [facility]     LASER HOLMIUM LITHOTRIPSY URETER(S), INSERT STENT, COMBINED  2013    Procedure: COMBINED CYSTOSCOPY, URETEROSCOPY, LASER HOLMIUM LITHOTRIPSY URETER(S), INSERT STENT;  Right Ureteroscopy; Stone basketing; Right Stent exchange and  removal of left stent.;  Surgeon: Tone Morejon MD;  Location: [facility]     RELEASE DUPUYTRENS CONTRACTURE Left 2018    Procedure: Release Left Hand Dupuytrens Contracture;  Surgeon: Lucio Omalley MD;  Location: [facility] OR     VIDEO CAPSULE ENDOSCOPY  01/15/10    [facility]       Social History     Tobacco Use     Smoking status: Current Some Day Smoker     Packs/day: 0.00     Years: 20.00     Pack years: 0.00     Types: Pipe     Last attempt to quit: 1998     Years since quittin.2     Smokeless tobacco: Never Used     Tobacco comment: Rare pipe   Substance Use Topics     Alcohol use: No     Alcohol/week: 0.0 standard drinks     Family History   Problem Relation Age of Onset     Alzheimer Disease Father      Cardiovascular Mother      C.A.D. Mother      Heart Disease Mother      Cardiovascular Brother      Heart Disease Brother      C.A.D. Brother          Current Outpatient Medications   Medication Sig Dispense Refill     atorvastatin (LIPITOR) 40 MG tablet Take 40 mg by mouth  3     clopidogrel (PLAVIX) 75 MG tablet Take 75 mg by mouth daily  3     CONTOUR NEXT TEST test strip USE TO TEST BLOOD SUGAR 1 TIMES DAILY OR AS DIRECTED. 100 each 3     cyanocobalamin (VITAMIN B12) 1000 MCG/ML injection Inject 1  mL (1,000 mcg) into the muscle every 30 days 1 mL 11     senna-docusate (SENOKOT-S;PERICOLACE) 8.6-50 MG per tablet Take 1-2 tablets by mouth 2 times daily 30 tablet 0     tamsulosin (FLOMAX) 0.4 MG capsule Take 0.4 mg by mouth daily       blood glucose monitoring (ROBERT MICROLET) lancets Use to test blood sugar 1 time daily or as directed. (Patient not taking: Reported on 3/13/2020) 1 Box 2     Allergies   Allergen Reactions     Mobic [Meloxicam] Nausea and Diarrhea     Diarrhea, nausea, flu like symptoms since start of use         Review of Systems  CONSTITUTIONAL: NEGATIVE for fever, chills, change in weight  INTEGUMENTARY/SKIN: NEGATIVE for worrisome rashes, moles or lesions  EYES: NEGATIVE for vision changes or irritation  ENT/MOUTH: NEGATIVE for ear, mouth and throat problems  RESP: NEGATIVE for significant cough or SOB  BREAST: NEGATIVE for masses, tenderness or discharge  CV: NEGATIVE for chest pain, palpitations or peripheral edema  GI: NEGATIVE for nausea, abdominal pain, heartburn, or change in bowel habits.  Patient does experience groin pain with any type of Valsalva maneuver or sitting up or getting out of a chair.  This causes pain into the primarily left femoral area and some sharp discomfort there as well.  The pain resolves immediately when he releases the Valsalva or stands up straight.  : NEGATIVE for frequency, dysuria, or hematuria  MUSCULOSKELETAL: NEGATIVE for significant arthralgias or myalgia  NEURO: NEGATIVE for weakness, dizziness or paresthesias  ENDOCRINE: NEGATIVE for temperature intolerance, skin/hair changes  HEME: NEGATIVE for bleeding problems  PSYCHIATRIC: NEGATIVE for changes in mood or affect    OBJECTIVE:   /74 (BP Location: Left arm, Patient Position: Sitting, Cuff Size: Adult Regular)   Pulse 68   Temp 97  F (36.1  C) (Temporal)   Resp 16   Ht 1.829 m (6')   Wt 74.5 kg (164 lb 3.2 oz)   SpO2 94%   BMI 22.27 kg/m   Estimated body mass index is 22.27 kg/m  as  calculated from the following:    Height as of this encounter: 1.829 m (6').    Weight as of this encounter: 74.5 kg (164 lb 3.2 oz).  Physical Exam  GENERAL: healthy, alert and no distress  EYES: Eyes grossly normal to inspection, PERRL and conjunctivae and sclerae normal  HENT: ear canals and TM's normal, nose and mouth without ulcers or lesions  NECK: no adenopathy, no asymmetry, masses, or scars and thyroid normal to palpation  RESP: lungs clear to auscultation - no rales, rhonchi or wheezes  CV: regular rate and rhythm, normal S1 S2, no S3 or S4, no murmur, click or rub, no peripheral edema and peripheral pulses strong  ABDOMEN: soft, nontender, no hepatosplenomegaly, no masses and bowel sounds normal.  Evidence of bilateral inguinal hernias are noted.  No evidence of strangulation present at this time.  They are reducible.  MS: no gross musculoskeletal defects noted, no edema  SKIN: no suspicious lesions or rashes  NEURO: Normal strength and tone, mentation intact and speech normal  PSYCH: mentation appears normal, affect normal/bright    Diagnostic Test Results:  No results found for this or any previous visit (from the past 24 hour(s)).    ASSESSMENT / PLAN:       ICD-10-CM    1. Mixed hyperlipidemia  E78.2 Lipid panel reflex to direct LDL Fasting   2. Non-recurrent bilateral inguinal hernia without obstruction or gangrene  K40.20 GENERAL SURG ADULT REFERRAL     OFFICE/OUTPT VISIT,EST,LEVL III   3. Abdominal aortic aneurysm (AAA) without rupture (H)  I71.4    4. Paroxysmal atrial fibrillation (H)  I48.0    5. CKD (chronic kidney disease) stage 3, GFR 30-59 ml/min (H)  N18.3 Albumin Random Urine Quantitative with Creat Ratio     Basic metabolic panel     *UA reflex to Microscopic and Culture ([facility] (except [place] and [place])   6. Coronary artery disease involving native coronary artery of native heart without angina pectoris  I25.10 CBC with platelets   7. Postsurgical percutaneous  transluminal coronary angioplasty status  Z98.61    8. Malignant neoplasm of stomach, unspecified location (H)  C16.9    9. 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 Vitamin B 12 deficiency  E53.8 Vitamin B12   11. PA (pernicious anemia)  D51.0    12. Diverticulitis of colon  K57.32    13. Screening for prostate cancer  Z12.5 PSA, screen   14. Medicare annual wellness visit, subsequent  Z00.00        COUNSELING:  Reviewed preventive health counseling, as reflected in patient instructions       Regular exercise       Healthy diet/nutrition       Vision screening       Hearing screening       Dental care       Bladder control    Estimated body mass index is 22.27 kg/m  as calculated from the following:    Height as of this encounter: 1.829 m (6').    Weight as of this encounter: 74.5 kg (164 lb 3.2 oz).         reports that he has been smoking pipe. He has been smoking about 0.00 packs per day for the past 20.00 years. He has never used smokeless tobacco.  Tobacco Cessation Action Plan: Information offered: Patient not interested at this time    Appropriate preventive services were discussed with this patient, including applicable screening as appropriate for cardiovascular disease, diabetes, osteopenia/osteoporosis, and glaucoma.  As appropriate for age/gender, discussed screening for colorectal cancer, prostate cancer, breast cancer, and cervical cancer. Checklist reviewing preventive services available has been given to the patient.    Reviewed patients plan of care and provided an AVS. The Basic Care Plan (routine screening as documented in Health Maintenance) for Remigio meets the Care Plan requirement. This Care Plan has been established and reviewed with the Patient.    Counseling Resources:  ATP IV Guidelines  Pooled Cohorts Equation Calculator  Breast Cancer Risk Calculator  FRAX Risk Assessment  ICSI Preventive Guidelines  Dietary Guidelines for Americans,  2010  USDA's MyPlate  ASA Prophylaxis  Lung CA Screening    Harrison Tse, DO  [facility]    Identified Health Risks:

## 2020-03-13 NOTE — ED PROVIDER NOTES
"  History     Chief Complaint   Patient presents with     Loss of Consciousness     The history is provided by the patient.     Remigio Holly is a 86 year old male who is presenting to the emergency department via EMS with concerns of loss of consciousness after sustaining a laceration. The patient states that he was working on a table saw when the board \"kicked back\", injuring his right thumb. He notes that the blade did not directly cut it. He was able to hold pressure on it and wrap it in paper towels. He says he is not used to seeing his own blood, so this caused him to feel lightheaded and he ended up having a syncopal episode once back inside. He is unsure whether or not he hit his head when he fell to the floor. He denies having any head or neck pain. The patient is on blood thinners.     Allergies:  Allergies   Allergen Reactions     Mobic [Meloxicam] Nausea and Diarrhea     Diarrhea, nausea, flu like symptoms since start of use       Problem List:    Patient Active Problem List    Diagnosis Date Noted     Nephrolithiasis 12/25/2012     Priority: High     Dupuytren's contracture of left hand 12/04/2018     Priority: Medium     Urinary retention with incomplete bladder emptying 08/20/2017     Priority: Medium     Orthostatic hypotension 08/19/2017     Priority: Medium     Thoracic aortic aneurysm without rupture (H) - 4.7 cm on 8/18/17 (4.5 cm in 8/15) 08/18/2017     Priority: Medium     Near syncope 08/17/2017     Priority: Medium     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/17) 08/15/2017     Priority: Medium     Primary osteoarthritis of both knees 07/26/2017     Priority: Medium     Bilateral knee pain 07/26/2017     Priority: Medium     Postsurgical dumping syndrome 12/19/2016     Priority: Medium     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 Provider to review       CKD (chronic kidney disease) stage 3, GFR 30-59 ml/min (H) 05/05/2013     Priority: Medium     Pain 12/25/2012     Priority: Medium     Advance Care Planning 08/16/2012     Priority: Medium     Advance Care Planning: Receipt of ACP document:  Received: Health Care Directive which was witnessed or notarized on 3-12-13.  Document previously scanned on 3-13-13.  Validation form completed and  scanned.  Code Status reflects choices in most recent ACP document.  Confirmed/documented designated decision maker(s). See permanent comments section of demographics in clinical tab. View document(s) and details by clicking on code status. Added by Sayra Maguire on 8/25/2014.  .Patient states has Advance Directive and will bring in a copy to clinic. 8/16/2012          HYPERLIPIDEMIA LDL GOAL <100 10/31/2010     Priority: Medium     GERD (gastroesophageal reflux disease) 03/26/2010     Priority: Medium     Mixed hyperlipidemia 03/24/2010     Priority: Medium     Closed fracture of calcaneus 01/06/2007     Priority: Medium     Calculus of gallbladder 01/09/2003     Priority: Medium     Problem list name updated by automated process. Provider to review       Abdominal aortic aneurysm (H) 10/22/2002     Priority: Medium     Problem list name updated by automated process. Provider to review       Chest pain      Priority: Medium     Problem list name updated by automated process. Provider to review       Pain in joint, shoulder region      Priority: Medium     Paroxysmal atrial fibrillation (H) 08/26/2010     Priority: Low     Gastric cancer (H) 08/05/2010     Priority: Low     (Problem list name updated by automated process. Provider to review and confirm.)          Past Medical History:    Past Medical History:   Diagnosis Date     Blood transfusion      Chronic gastric ulcer without mention of hemorrhage, perforation,  without mention of obstruction      Gastric cancer (H)      Hemorrhage of gastrointestinal tract, unspecified 01/13/10     Hypoglycemia, unspecified 1/26/2015     Measles without mention of complication      Mixed hyperlipidemia      Mumps without mention of complication      Pulmonary embolism (H) Sept 4, 2010     Urinary calculus, unspecified      Varicella without mention of complication        Past Surgical History:    Past Surgical History:   Procedure Laterality Date     ABDOMEN SURGERY       ARTHROPLASTY KNEE Left 8/15/2017    Procedure: ARTHROPLASTY KNEE;  Left total knee replacement;  Surgeon: Jonathan Cardona DO;  Location: [facility]     C EXCIS STOMACH ULCER,LESN;LOCAL       CHOLECYSTECTOMY, LAPOROSCOPIC  10/6/2008    Cholecystectomy, Laparoscopic     COLONOSCOPY  1/14/10    [facility]     COLONOSCOPY  05/25/10     CYSTOSCOPY, RETROGRADES, EXTRACT STONE, INSERT STENT, COMBINED  12/25/2012    Procedure: COMBINED CYSTOSCOPY, RETROGRADES, EXTRACT STONE, INSERT STENT;  Cystoscopy, Left Stent Placement, left retrograde pylogram, uc;  Surgeon: Amador Sanchez MD;  Location: [facility] OR     ESOPHAGOSCOPY, GASTROSCOPY, DUODENOSCOPY (EGD), COMBINED  8/16/2011    Procedure:COMBINED ESOPHAGOSCOPY, GASTROSCOPY, DUODENOSCOPY (EGD), BIOPSY SINGLE OR MULTIPLE; Surgeon:TONY GARCIA; Location:[facility]     GENERAL SURGERY                           DATE:  07/07/10    Gastrectomy      COLONOSCOPY W/WO BRUSH/WASH  01/31/2006      REPAIR ROTATOR CUFF,ACUTE  3/21/2005    Right      UGI ENDOSCOPY, SIMPLE EXAM  1/13/10    [facility] UGI ENDOSCOPY, SIMPLE EXAM  05/25/10     LASER HOLMIUM LITHOTRIPSY URETER(S), INSERT STENT, COMBINED  1/5/2013    Procedure: COMBINED CYSTOSCOPY, URETEROSCOPY, LASER HOLMIUM LITHOTRIPSY URETER(S), INSERT STENT;  Bilateral  Cystoscopy, Bilateral Ureteroscopy, Bilateral retrogrades and  placement of ureteral stent right side. Laser Holmium Lithotripsy  and  Exchange  of stent left side;  Surgeon: Tone Morejon MD;  Location: [facility]     LASER HOLMIUM LITHOTRIPSY URETER(S), INSERT STENT, COMBINED  2013    Procedure: COMBINED CYSTOSCOPY, URETEROSCOPY, LASER HOLMIUM LITHOTRIPSY URETER(S), INSERT STENT;  Right Ureteroscopy; Stone basketing; Right Stent exchange and  removal of left stent.;  Surgeon: Tone Morejon MD;  Location: [facility]     RELEASE DUPUYTRENS CONTRACTURE Left 2018    Procedure: Release Left Hand Dupuytrens Contracture;  Surgeon: Lucio Omalley MD;  Location: [facility] OR     VIDEO CAPSULE ENDOSCOPY  01/15/10    [facility]       Family History:    Family History   Problem Relation Age of Onset     Alzheimer Disease Father      Cardiovascular Mother      C.A.FRANCISCO JAVIER. Mother      Heart Disease Mother      Cardiovascular Brother      Heart Disease Brother      KALPESHASUSAN Brother        Social History:  Marital Status:   [2]  Social History     Tobacco Use     Smoking status: Current Some Day Smoker     Packs/day: 0.00     Years: 20.00     Pack years: 0.00     Types: Pipe     Last attempt to quit: 1998     Years since quittin.2     Smokeless tobacco: Never Used     Tobacco comment: Rare pipe   Substance Use Topics     Alcohol use: No     Alcohol/week: 0.0 standard drinks     Drug use: No        Medications:    atorvastatin (LIPITOR) 40 MG tablet  blood glucose monitoring (ROBERT MICROLET) lancets  clopidogrel (PLAVIX) 75 MG tablet  CONTOUR NEXT TEST test strip  cyanocobalamin (VITAMIN B12) 1000 MCG/ML injection  senna-docusate (SENOKOT-S;PERICOLACE) 8.6-50 MG per tablet  tamsulosin (FLOMAX) 0.4 MG capsule          Review of Systems   All other systems reviewed and are negative.      Physical Exam          Physical Exam  Vitals signs and nursing note reviewed.   Constitutional:       General: He is not in acute distress.     Appearance: Normal appearance. He is not diaphoretic.   HENT:      Head: Normocephalic and atraumatic.       Right Ear: Tympanic membrane and external ear normal.      Left Ear: Tympanic membrane and external ear normal.      Nose: Nose normal. No congestion or rhinorrhea.      Mouth/Throat:      Mouth: Mucous membranes are moist.      Pharynx: Oropharynx is clear. No oropharyngeal exudate or posterior oropharyngeal erythema.   Eyes:      General: No scleral icterus.     Extraocular Movements: Extraocular movements intact.      Conjunctiva/sclera: Conjunctivae normal.      Pupils: Pupils are equal, round, and reactive to light.   Neck:      Musculoskeletal: Normal range of motion and neck supple. No neck rigidity or muscular tenderness.   Cardiovascular:      Rate and Rhythm: Normal rate and regular rhythm.      Pulses: Normal pulses.      Heart sounds: Normal heart sounds. No murmur.   Pulmonary:      Effort: No respiratory distress.      Breath sounds: Normal breath sounds. No wheezing.   Chest:      Chest wall: No tenderness.   Abdominal:      General: Bowel sounds are normal.      Palpations: Abdomen is soft.      Tenderness: There is no abdominal tenderness. There is no guarding or rebound.   Musculoskeletal:         General: No tenderness or deformity.      Right hand: He exhibits decreased range of motion (Secondary to pain.) and laceration (4 cm laceration to dorsal aspect of his right thumb.).   Lymphadenopathy:      Cervical: No cervical adenopathy.   Skin:     General: Skin is warm and dry.      Capillary Refill: Capillary refill takes less than 2 seconds.      Coloration: Skin is not pale.      Findings: No rash.   Neurological:      General: No focal deficit present.      Mental Status: He is alert and oriented to person, place, and time.      Cranial Nerves: No cranial nerve deficit.      Sensory: No sensory deficit.   Psychiatric:         Thought Content: Thought content normal.         Judgment: Judgment normal.         ED Course        [facility]    -Laceration  Repair    Date/Time: 3/13/2020 4:57 PM  Performed by: Stoney Torres MD  Authorized by: Stoney Torres MD       ANESTHESIA (see MAR for exact dosages):     Anesthesia method:  Local infiltration    Local anesthetic:  Lidocaine 1% w/o epi  LACERATION DETAILS     Location:  Finger    Finger location:  R thumb    Length (cm):  4    REPAIR TYPE:     Repair type:  Simple      EXPLORATION:     Hemostasis achieved with:  Tourniquet    Wound exploration: wound explored through full range of motion and entire depth of wound probed and visualized      Wound extent: no tendon damage      Contaminated: no      TREATMENT:     Area cleansed with:  Saline    Amount of cleaning:  Standard    Irrigation solution:  Sterile saline    SKIN REPAIR     Repair method:  Sutures    Suture size:  4-0    Number of sutures:  11    APPROXIMATION     Approximation:  Close    POST-PROCEDURE DETAILS     Dressing:  Tube gauze      PROCEDURE   Patient Tolerance:  Patient tolerated the procedure well with no immediate complications                  Medications - No data to display     CT scan of the head was done because patient did pass out which is most likely a vasovagal reaction but did hit his head.  He does not have a headache.  CT scan was unremarkable.  Laceration was repaired as noted above.  At this point I think it is safe to discharge the patient home.  Sutures need to be removed in 7 to 10 days.    Assessments & Plan (with Medical Decision Making)  Right thumb laceration, closed head injury     I have reviewed the nursing notes.    I have reviewed the findings, diagnosis, plan and need for follow up with the patient.              This document serves as a record of services personally performed by Stoney Torres, *. It was created on their behalf by Violet De Oliveira, a trained medical scribe. The creation of this record is based on the provider's personal observations and the statements of the patient. This  document has been checked and approved by the attending provider.  Note: Chart documentation done in part with Dragon Voice Recognition software. Although reviewed after completion, some word and grammatical errors may remain.  3/13/2020   [facility] EMERGENCY DEPARTMENT     Stoney Torres MD  03/13/20 [id number]

## 2020-03-14 NOTE — RESULT ENCOUNTER NOTE
Dear Remigio, your recent test results are attached.  The microalbumin is essentially normal.  The urinary analysis is unremarkable.  The vitamin B12 level is normal.  The cholesterol is well controlled with an LDL of 63.  The PSA is not statistically significant with a value of 4.43.  It is inconsistent with prostate carcinoma at this time.  The chemistry panel shows normal blood sugar and kidney function.  The blood cell count shows improvement of your anemia.  You will be contacted with any outstanding results when they are available.  Feel free to contact me via the office or My Chart if you have any questions regarding the above.  Sincerely,  DO JENNIFER PowellOI

## 2020-03-25 ENCOUNTER — ALLIED HEALTH/NURSE VISIT (OUTPATIENT)
Dept: FAMILY MEDICINE | Facility: OTHER | Age: 85
End: 2020-03-25
Payer: COMMERCIAL

## 2020-03-25 VITALS — TEMPERATURE: 97.1 F

## 2020-03-25 DIAGNOSIS — Z48.02 VISIT FOR SUTURE REMOVAL: Primary | ICD-10-CM

## 2020-03-25 PROCEDURE — 99207 ZZC NO CHARGE NURSE ONLY: CPT

## 2020-03-25 NOTE — PROGRESS NOTES
Remigio Philip Holly presents to the clinic today for  removal of sutures.  The patient has had the sutures in place for 12 days.    There has been no history of infection or drainage.    O: 9 sutures are seen located on the right thumb.  The wound is healing well with no signs of infection.    Tetanus status is up to date.    A: Suture removal.    P:  All sutures were easily removed today.  Routine wound care discussed.  The patient will follow up as needed.    Area is slightly swollen and tender. RN had Dr. Tse come assess the area. He feels sutures can come out. No need for antibiotic at this point. Patient should watch for signs of infection and call the clinic with questions, concerns, or new/worsening symptoms.     GUNNAR Bell, RN  [facility]

## 2020-04-20 ENCOUNTER — ALLIED HEALTH/NURSE VISIT (OUTPATIENT)
Dept: FAMILY MEDICINE | Facility: CLINIC | Age: 85
End: 2020-04-20
Payer: COMMERCIAL

## 2020-04-20 DIAGNOSIS — E53.8 VITAMIN B 12 DEFICIENCY: Primary | ICD-10-CM

## 2020-04-20 PROCEDURE — 99207 ZZC NO CHARGE NURSE ONLY: CPT

## 2020-04-20 PROCEDURE — 96372 THER/PROPH/DIAG INJ SC/IM: CPT

## 2020-04-20 RX ADMIN — CYANOCOBALAMIN 1000 MCG: 1000 INJECTION, SOLUTION INTRAMUSCULAR; SUBCUTANEOUS at 10:23

## 2020-05-21 ENCOUNTER — ALLIED HEALTH/NURSE VISIT (OUTPATIENT)
Dept: FAMILY MEDICINE | Facility: CLINIC | Age: 85
End: 2020-05-21
Payer: COMMERCIAL

## 2020-05-21 DIAGNOSIS — E53.8 VITAMIN B 12 DEFICIENCY: Primary | ICD-10-CM

## 2020-05-21 PROCEDURE — 96372 THER/PROPH/DIAG INJ SC/IM: CPT

## 2020-05-21 RX ADMIN — CYANOCOBALAMIN 1000 MCG: 1000 INJECTION, SOLUTION INTRAMUSCULAR; SUBCUTANEOUS at 10:34

## 2020-05-21 NOTE — PROGRESS NOTES
Clinic Administered Medication Documentation      Injectable Medication Documentation    Patient was given Cyanocobalamin (B-12). Prior to medication administration, verified patients identity using patient s name and date of birth. Please see MAR and medication order for additional information. Patient instructed to remain in clinic for 15 minutes and report any adverse reaction to staff immediately .      Was entire vial of medication used? Yes  Vial/Syringe: Single dose vial  Expiration Date:  07/21   Was this medication supplied by the patient? No

## 2020-06-24 ENCOUNTER — ALLIED HEALTH/NURSE VISIT (OUTPATIENT)
Dept: FAMILY MEDICINE | Facility: CLINIC | Age: 85
End: 2020-06-24
Payer: COMMERCIAL

## 2020-06-24 DIAGNOSIS — E53.8 VITAMIN B 12 DEFICIENCY: Primary | ICD-10-CM

## 2020-06-24 PROCEDURE — 99207 ZZC NO CHARGE NURSE ONLY: CPT

## 2020-06-24 NOTE — NURSING NOTE
Clinic Administered Medication Documentation      Injectable Medication Documentation    Patient was given Cyanocobalamin (B-12). Prior to medication administration, verified patients identity using patient s name and date of birth. Please see MAR and medication order for additional information. Patient instructed to remain in clinic for 15 minutes, report any adverse reaction to staff immediately  and stay in clinic after the injection but patient declined.      Was entire vial of medication used? Yes  Vial/Syringe: Single dose vial  Expiration Date:  8/2021  Was this medication supplied by the patient? No     Administered in left Deltoid.     Sherlyn Martins LPN........6/24/2020 1:02 PM

## 2020-07-14 ENCOUNTER — TELEPHONE (OUTPATIENT)
Dept: FAMILY MEDICINE | Facility: OTHER | Age: 85
End: 2020-07-14

## 2020-07-14 NOTE — TELEPHONE ENCOUNTER
Form given to provider to complete, patient would like this mailed when completed. Nella Nath MA     7/14/2020

## 2020-07-17 NOTE — TELEPHONE ENCOUNTER
Patient is calling to check on form if it was completed and when completed there is a fax number on there to fax back and would like to have that faxed to the [employer] and a copy mailed to the patient when completed.  Please inform patient when this is done.  Thank you   Angelina SLOAN

## 2020-07-30 ENCOUNTER — ALLIED HEALTH/NURSE VISIT (OUTPATIENT)
Dept: FAMILY MEDICINE | Facility: CLINIC | Age: 85
End: 2020-07-30
Payer: COMMERCIAL

## 2020-07-30 DIAGNOSIS — E53.8 VITAMIN B 12 DEFICIENCY: Primary | ICD-10-CM

## 2020-07-30 PROCEDURE — 96372 THER/PROPH/DIAG INJ SC/IM: CPT

## 2020-07-30 PROCEDURE — 99207 ZZC NO CHARGE NURSE ONLY: CPT

## 2020-07-30 RX ADMIN — CYANOCOBALAMIN 1000 MCG: 1000 INJECTION, SOLUTION INTRAMUSCULAR; SUBCUTANEOUS at 11:03

## 2020-07-30 NOTE — NURSING NOTE
Clinic Administered Medication Documentation      Injectable Medication Documentation    Patient was given Cyanocobalamin (B-12). Prior to medication administration, verified patients identity using patient s name and date of birth. Please see MAR and medication order for additional information. Patient instructed to remain in clinic for 15 minutes and report any adverse reaction to staff immediately .      Was entire vial of medication used? Yes  Vial/Syringe: Single dose vial  Expiration Date:  7/31/2021  Was this medication supplied by the patient? No     Kristin Acosta CMA (AAMA)

## 2020-08-14 ENCOUNTER — APPOINTMENT (OUTPATIENT)
Dept: GENERAL RADIOLOGY | Facility: CLINIC | Age: 85
End: 2020-08-14
Attending: EMERGENCY MEDICINE
Payer: MEDICARE

## 2020-08-14 ENCOUNTER — HOSPITAL ENCOUNTER (EMERGENCY)
Facility: CLINIC | Age: 85
Discharge: HOME | End: 2020-08-14
Attending: EMERGENCY MEDICINE | Admitting: EMERGENCY MEDICINE
Payer: MEDICARE

## 2020-08-14 VITALS
TEMPERATURE: 98 F | HEART RATE: 74 BPM | DIASTOLIC BLOOD PRESSURE: 84 MMHG | OXYGEN SATURATION: 97 % | SYSTOLIC BLOOD PRESSURE: 134 MMHG | RESPIRATION RATE: 20 BRPM | BODY MASS INDEX: 21.43 KG/M2 | WEIGHT: 158 LBS

## 2020-08-14 DIAGNOSIS — M65.939 SYNOVITIS OF WRIST: ICD-10-CM

## 2020-08-14 PROCEDURE — 99284 EMERGENCY DEPT VISIT MOD MDM: CPT | Mod: Z6 | Performed by: EMERGENCY MEDICINE

## 2020-08-14 PROCEDURE — 73110 X-RAY EXAM OF WRIST: CPT | Mod: TC,LT

## 2020-08-14 PROCEDURE — 99283 EMERGENCY DEPT VISIT LOW MDM: CPT | Performed by: EMERGENCY MEDICINE

## 2020-08-14 RX ORDER — PREDNISONE 10 MG/1
TABLET ORAL
Qty: 30 TABLET | Refills: 0 | Status: SHIPPED | OUTPATIENT
Start: 2020-08-14 | End: 2020-09-15

## 2020-08-14 NOTE — ED AVS SNAPSHOT
[facility] Emergency Department  [address] [place] [address]  Phone:  [phone]  Fax:  [phone]                                    Remigio Holly   MRN: [id number]    Department:  [facility] Emergency Department   Date of Visit:  8/14/2020           After Visit Summary Signature Page    I have received my discharge instructions, and my questions have been answered. I have discussed any challenges I see with this plan with the nurse or doctor.    ..........................................................................................................................................  Patient/Patient Representative Signature      ..........................................................................................................................................  Patient Representative Print Name and Relationship to Patient    ..................................................               ................................................  Date                                   Time    ..........................................................................................................................................  Reviewed by Signature/Title    ...................................................              ..............................................  Date                                               Time          22EPIC Rev 08/18

## 2020-08-14 NOTE — ED PROVIDER NOTES
History     Chief Complaint   Patient presents with     Wrist Pain     HPI  Remigio Holly is a 86 year old male who presents with acute left wrist pain.  No known injury.  Is been bothersome for 2 weeks.  When wearing a wrist brace.  He is right-hand dominant.  He has had no prior known inflammatory arthritis is.  Does have osteoarthritis quite advanced in both knees with status post left total knee arthroplasty.  He states is too painful to go through a second time and therefore he is living with chronic pain in his right joint due to bone-on-bone conditions.  He has never had any wrist complaints.  The swelling and tenderness over the left wrist is also associate with slight warmth and limited redness over the joint.  He has had no redness extend up into the forearm.  No systemic symptoms such as fever or chills.  No other joint complaints.  He has reported intermittent soreness in his great toe but is never been told that he has gout.  Health history additionally has had some intolerance to Mobic but no other anti-inflammatories.  Does have CKD stage III.  Also on long-term antiplatelet therapy with Plavix.  Allergies:  Allergies   Allergen Reactions     Mobic [Meloxicam] Nausea and Diarrhea     Diarrhea, nausea, flu like symptoms since start of use       Problem List:    Patient Active Problem List    Diagnosis Date Noted     Nephrolithiasis 12/25/2012     Priority: High     Dupuytren's contracture of left hand 12/04/2018     Priority: Medium     Urinary retention with incomplete bladder emptying 08/20/2017     Priority: Medium     Orthostatic hypotension 08/19/2017     Priority: Medium     Thoracic aortic aneurysm without rupture (H) - 4.7 cm on 8/18/17 (4.5 cm in 8/15) 08/18/2017     Priority: Medium     Near syncope 08/17/2017     Priority: Medium     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/17) 08/15/2017     Priority: Medium     Primary osteoarthritis of both knees  07/26/2017     Priority: Medium     Bilateral knee pain 07/26/2017     Priority: Medium     Postsurgical dumping syndrome 12/19/2016     Priority: Medium     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 Provider to review       CKD (chronic kidney disease) stage 3, GFR 30-59 ml/min (H) 05/05/2013     Priority: Medium     Pain 12/25/2012     Priority: Medium     Advance Care Planning 08/16/2012     Priority: Medium     Advance Care Planning: Receipt of ACP document:  Received: Health Care Directive which was witnessed or notarized on 3-12-13.  Document previously scanned on 3-13-13.  Validation form completed and  scanned.  Code Status reflects choices in most recent ACP document.  Confirmed/documented designated decision maker(s). See permanent comments section of demographics in clinical tab. View document(s) and details by clicking on code status. Added by Sayra Maguire on 8/25/2014.  .Patient states has Advance Directive and will bring in a copy to clinic. 8/16/2012          HYPERLIPIDEMIA LDL GOAL <100 10/31/2010     Priority: Medium     GERD (gastroesophageal reflux disease) 03/26/2010     Priority: Medium     Mixed hyperlipidemia 03/24/2010     Priority: Medium     Closed fracture of calcaneus 01/06/2007     Priority: Medium     Calculus of gallbladder 01/09/2003     Priority: Medium     Problem list name updated by automated process. Provider to review       Abdominal aortic aneurysm (H) 10/22/2002     Priority: Medium     Problem list name updated by automated process. Provider to review       Chest pain      Priority: Medium     Problem list name updated by automated process. Provider to review       Pain in joint, shoulder region      Priority: Medium     Paroxysmal atrial fibrillation (H) 08/26/2010      Priority: Low     Gastric cancer (H) 08/05/2010     Priority: Low     (Problem list name updated by automated process. Provider to review and confirm.)          Past Medical History:    Past Medical History:   Diagnosis Date     Blood transfusion      Chronic gastric ulcer without mention of hemorrhage, perforation, without mention of obstruction      Gastric cancer (H)      Hemorrhage of gastrointestinal tract, unspecified 01/13/10     Hypoglycemia, unspecified 1/26/2015     Measles without mention of complication      Mixed hyperlipidemia      Mumps without mention of complication      Pulmonary embolism (H) Sept 4, 2010     Urinary calculus, unspecified      Varicella without mention of complication        Past Surgical History:    Past Surgical History:   Procedure Laterality Date     ABDOMEN SURGERY       ARTHROPLASTY KNEE Left 8/15/2017    Procedure: ARTHROPLASTY KNEE;  Left total knee replacement;  Surgeon: Jonathan Cardona DO;  Location: [facility] OR     C EXCIS STOMACH ULCER,LESN;LOCAL       CHOLECYSTECTOMY, LAPOROSCOPIC  10/6/2008    Cholecystectomy, Laparoscopic     COLONOSCOPY  1/14/10    [facility]     COLONOSCOPY  05/25/10     CYSTOSCOPY, RETROGRADES, EXTRACT STONE, INSERT STENT, COMBINED  12/25/2012    Procedure: COMBINED CYSTOSCOPY, RETROGRADES, EXTRACT STONE, INSERT STENT;  Cystoscopy, Left Stent Placement, left retrograde pylogram, uc;  Surgeon: Amador Sanchez MD;  Location: [facility] OR     ESOPHAGOSCOPY, GASTROSCOPY, DUODENOSCOPY (EGD), COMBINED  8/16/2011    Procedure:COMBINED ESOPHAGOSCOPY, GASTROSCOPY, DUODENOSCOPY (EGD), BIOPSY SINGLE OR MULTIPLE; Surgeon:TONY GARCIA; Location:[facility]     GENERAL SURGERY                           DATE:  07/07/10    Gastrectomy      COLONOSCOPY W/WO BRUSH/WASH  01/31/2006      REPAIR ROTATOR CUFF,ACUTE  3/21/2005    Right      UGI ENDOSCOPY, SIMPLE EXAM  1/13/10    [facility] UGI ENDOSCOPY, SIMPLE EXAM  05/25/10      LASER HOLMIUM LITHOTRIPSY URETER(S), INSERT STENT, COMBINED  2013    Procedure: COMBINED CYSTOSCOPY, URETEROSCOPY, LASER HOLMIUM LITHOTRIPSY URETER(S), INSERT STENT;  Bilateral  Cystoscopy, Bilateral Ureteroscopy, Bilateral retrogrades and  placement of ureteral stent right side. Laser Holmium Lithotripsy  and Exchange  of stent left side;  Surgeon: Tone Morejon MD;  Location: [facility]     LASER HOLMIUM LITHOTRIPSY URETER(S), INSERT STENT, COMBINED  2013    Procedure: COMBINED CYSTOSCOPY, URETEROSCOPY, LASER HOLMIUM LITHOTRIPSY URETER(S), INSERT STENT;  Right Ureteroscopy; Stone basketing; Right Stent exchange and  removal of left stent.;  Surgeon: Tone Morejon MD;  Location: [facility] OR     RELEASE DUPUYTRENS CONTRACTURE Left 2018    Procedure: Release Left Hand Dupuytrens Contracture;  Surgeon: Lucio Omalley MD;  Location: [facility]     VIDEO CAPSULE ENDOSCOPY  01/15/10    [facility]       Family History:    Family History   Problem Relation Age of Onset     Alzheimer Disease Father      Cardiovascular Mother      C.A.D. Mother      Heart Disease Mother      Cardiovascular Brother      Heart Disease Brother      C.A.D. Brother        Social History:  Marital Status:   [2]  Social History     Tobacco Use     Smoking status: Current Some Day Smoker     Packs/day: 0.00     Years: 20.00     Pack years: 0.00     Types: Pipe     Last attempt to quit: 1998     Years since quittin.6     Smokeless tobacco: Never Used     Tobacco comment: Rare pipe   Substance Use Topics     Alcohol use: No     Alcohol/week: 0.0 standard drinks     Drug use: No        Medications:    atorvastatin (LIPITOR) 40 MG tablet  blood glucose monitoring (ROBERT MICROLET) lancets  clopidogrel (PLAVIX) 75 MG tablet  CONTOUR NEXT TEST test strip  cyanocobalamin (VITAMIN B12) 1000 MCG/ML injection  senna-docusate (SENOKOT-S;PERICOLACE) 8.6-50 MG per tablet  tamsulosin (FLOMAX) 0.4 MG  capsule          Review of Systems   All other systems reviewed and are negative.      Physical Exam   BP: 134/84  Pulse: 74  Temp: 98  F (36.7  C)  Resp: 20  Weight: 71.7 kg (158 lb)  SpO2: 97 %      Physical Exam  Vitals signs and nursing note reviewed.   Eyes:      Conjunctiva/sclera: Conjunctivae normal.   Cardiovascular:      Rate and Rhythm: Normal rate.   Pulmonary:      Effort: Pulmonary effort is normal.   Musculoskeletal:      Comments: Left wrist: Soft tissue swelling present at the carpal radial interface with some warmth and erythema overlying the joint.  There is ballotable changes consistent with a joint effusion.  Movement is tender and he has significant restriction in both flexion extension of the joint due to discomfort.  The skin color and tone otherwise adjacent to that area is normal he has strong pulses both radial and ulnar.   strength is significantly reduced due to discomfort.   Skin:     General: Skin is warm.      Capillary Refill: Capillary refill takes less than 2 seconds.   Neurological:      General: No focal deficit present.      Mental Status: He is alert.   Psychiatric:         Mood and Affect: Mood normal.         ED Course        Procedures                   Results for orders placed or performed during the hospital encounter of 08/14/20 (from the past 24 hour(s))   XR Wrist Left G/E 3 Views    Narrative    WRIST LEFT THREE OR MORE VIEWS  8/14/2020 10:25 AM     HISTORY: Soft tissue swelling/erythema clinically consistent with  active synovitis/monoarticular.    COMPARISON: 7/25/2005.      Impression    IMPRESSION: No acute appearing bony abnormality. Interval progression  of osteoarthritis at the triscaphe joint. Moderate osteoarthritis at  the first carpometacarpal joint without significant change. Interval  development of subtle chondrocalcinosis. Soft tissue swelling about  the wrist is new since the prior exam.       Medications - No data to display    Assessments & Plan  (with Medical Decision Making)  86-year-old male presents with acute left wrist pain.  Duration 2 weeks.  No known injury.  Complaining of elbow joint discomfort.  He has noted some warmth in the joint now developing low-grade erythema becoming more locally swollen and tender.  Has not resolved with icing and immobilization using a Velcro wrist brace.  He knows of no injury.  He is right-hand dominant so most activities are with that extremity.  He has had no previous problems with this joint.  Does have underlying osteoarthritis for which she is undergone total knee arthroplasty on the left.  He has not had any known inflammatory arthritis is.  He has described occasional discomfort in his great toe but is never been diagnosed with gout.  On examination the patient displays active synovitis in the left wrist involving the  Proximal carpal row in the radius interface.  There is some warmth and erythema the tissues ballotable.  10:58 AM  Reviewed x-ray.  Appears to be primarily moderate osteoarthritis joint effusion.  I have elected to simply treat him with prednisone.  Not using NSAIDs because of his stage III renal disease, Plavix use and previous intolerance to Cloud 2 inhibitor.  If he has no resolution or a recurrence that limited they can consider mentality work-up.  He has a Velcro wrist brace that can be used as needed.  Patient be placed on prednisone taper.     I have reviewed the nursing notes.    I have reviewed the findings, diagnosis, plan and need for follow up with the patient.      New Prescriptions    No medications on file       Final diagnoses:   Synovitis of wrist       8/14/2020   [facility] EMERGENCY DEPARTMENT     Yousif Maguire,   08/14/20 [id number]

## 2020-08-14 NOTE — DISCHARGE INSTRUCTIONS
X-ray shows mild amount of osteoarthritis.  I suspect that there is active inflammation in the joint referred to his synovitis.  With use of Plavix and having stage III chronic kidney disease/slowing recommend not using any nonsteroidal anti-inflammatory medication.  Recommend starting prednisone taper.  You may continue using the wrist to tolerance.  Wear your Velcro wrist brace for comfort.  Follow-up with your primary clinic provider in 10 to 14 days.  If not improving or if it ever returns would then recommend a rheumatology work-up for other inflammatory arthritis conditions.

## 2020-09-01 ENCOUNTER — OFFICE VISIT (OUTPATIENT)
Dept: INTERNAL MEDICINE | Facility: CLINIC | Age: 85
End: 2020-09-01
Payer: COMMERCIAL

## 2020-09-01 ENCOUNTER — OFFICE VISIT (OUTPATIENT)
Dept: SURGERY | Facility: CLINIC | Age: 85
End: 2020-09-01
Payer: COMMERCIAL

## 2020-09-01 VITALS
HEART RATE: 72 BPM | RESPIRATION RATE: 18 BRPM | SYSTOLIC BLOOD PRESSURE: 132 MMHG | DIASTOLIC BLOOD PRESSURE: 68 MMHG | BODY MASS INDEX: 21.29 KG/M2 | TEMPERATURE: 96.2 F | WEIGHT: 157 LBS | OXYGEN SATURATION: 98 %

## 2020-09-01 VITALS
BODY MASS INDEX: 21.29 KG/M2 | WEIGHT: 157 LBS | SYSTOLIC BLOOD PRESSURE: 132 MMHG | DIASTOLIC BLOOD PRESSURE: 68 MMHG | HEART RATE: 72 BPM | TEMPERATURE: 96.2 F

## 2020-09-01 DIAGNOSIS — Z90.3 S/P TOTAL GASTRECTOMY AND ROUX-EN-Y ESOPHAGOJEJUNAL ANASTOMOSIS: ICD-10-CM

## 2020-09-01 DIAGNOSIS — N18.30 CKD (CHRONIC KIDNEY DISEASE) STAGE 3, GFR 30-59 ML/MIN (H): ICD-10-CM

## 2020-09-01 DIAGNOSIS — C16.9 MALIGNANT NEOPLASM OF STOMACH, UNSPECIFIED LOCATION (H): ICD-10-CM

## 2020-09-01 DIAGNOSIS — K40.20 NON-RECURRENT BILATERAL INGUINAL HERNIA WITHOUT OBSTRUCTION OR GANGRENE: Primary | ICD-10-CM

## 2020-09-01 DIAGNOSIS — Z86.711 HISTORY OF PULMONARY EMBOLISM: ICD-10-CM

## 2020-09-01 DIAGNOSIS — Z98.0 S/P TOTAL GASTRECTOMY AND ROUX-EN-Y ESOPHAGOJEJUNAL ANASTOMOSIS: ICD-10-CM

## 2020-09-01 DIAGNOSIS — I48.0 PAROXYSMAL ATRIAL FIBRILLATION (H): ICD-10-CM

## 2020-09-01 DIAGNOSIS — I71.40 ABDOMINAL AORTIC ANEURYSM (AAA) WITHOUT RUPTURE (H): ICD-10-CM

## 2020-09-01 DIAGNOSIS — M10.039 ACUTE IDIOPATHIC GOUT OF WRIST, UNSPECIFIED LATERALITY: ICD-10-CM

## 2020-09-01 DIAGNOSIS — I71.20 THORACIC AORTIC ANEURYSM WITHOUT RUPTURE (H): ICD-10-CM

## 2020-09-01 PROCEDURE — 99214 OFFICE O/P EST MOD 30 MIN: CPT | Mod: 25 | Performed by: INTERNAL MEDICINE

## 2020-09-01 PROCEDURE — 96372 THER/PROPH/DIAG INJ SC/IM: CPT | Performed by: INTERNAL MEDICINE

## 2020-09-01 PROCEDURE — 99204 OFFICE O/P NEW MOD 45 MIN: CPT | Performed by: SURGERY

## 2020-09-01 RX ADMIN — CYANOCOBALAMIN 1000 MCG: 1000 INJECTION, SOLUTION INTRAMUSCULAR; SUBCUTANEOUS at 13:16

## 2020-09-01 ASSESSMENT — PAIN SCALES - GENERAL: PAINLEVEL: NO PAIN (0)

## 2020-09-01 NOTE — PROGRESS NOTES
General Surgery Consultation    Remigio Holly MRN# [id number]   Age: 86 year old YOB: 1933     Reason for consult: Bilateral inguinal hernia                        Assessment and Plan:   I was asked to see this patient at the request of Dr. Tse for evaluation of bilateral inguinal hernias.  Remigio Holly is a 86 year old male who presented with history, exam, laboratory and imaging most consistent with:        ICD-10-CM    1. Non-recurrent bilateral inguinal hernia without obstruction or gangrene  K40.20 Case Request: Open left inguinal hernia repair with mesh     Case Request: Open left inguinal hernia repair with mesh   2. 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  Z90.3     Z98.0    3. CKD (chronic kidney disease) stage 3, GFR 30-59 ml/min (H)  N18.3    4. Paroxysmal atrial fibrillation (H)  I48.0    5. Thoracic aortic aneurysm without rupture (H) - 4.7 cm on 8/18/17 (4.5 cm in 8/15)  I71.2    6. Malignant neoplasm of stomach, unspecified location (H)  C16.9    7. History of pulmonary embolism  Z86.711        The patient was thoroughly counseled regarding Non-recurrent bilateral inguinal hernia without obstruction or gangrene [K40.20].     The patient was informed that the proposed procedure or medical intervention involves reduction and repair with or without mesh and does offer a very good likelihood of symptom relief. We also discussed the options of repairing the hernia laparoscopically, robotically, versus open.  With the patient's age, multiple comorbidities, history of multiple intra-abdominal surgery, I recommend that we do this procedure as an open hernia repair.  Would do the left hernia first as it is bothering him more so than the right.  We can do this under monitored anesthesia care along with local anesthetic which would yield great result.       The patient was made aware of the risks of the procedure, including but not limited  to:  nerve entrapment or injury, persistence of pain (10%), injury to the bowel/bladder, infertility, ischemic orchitis (rare), Injury to the vas deferens (rare), hematoma, mesh migration, mesh infection, cardiac or pulmonary complication and anesthesia related complications also that difficulties may be encountered during recovery to include: wound infection, recurrence (5-10%), seroma, hematoma and chronic pain.     In the course of the evaluation we did discuss other therapeutic options with the patient, including continued watchful waiting. The risks and benefits of these options were also discussed which include but are not limited to: incarceration and/or strangulation..     Also discussed were possible problems or difficulties the patient may encounter if treatment was not pursued at this time.     The patient was informed [facility] will be primarily responsible for the procedure. Assistance during the procedure and during hospitalization may also be provided by other physicians, nurses and technicians.     The patient was also informed that if exposure to the patient s blood or body fluids occurs during the procedure, HIV testing of the patient will occur unless they refuse at this time. Risk of blood transfusion is minimal.     The patient will be provided additional education resources by the support staff. If there are ever any questions regarding their diagnosis or the procedure, the patient is encouraged to ask.     All of the patient s or their legal representative s questions have been answered to their satisfaction and they have indicated a clear understanding of this discussion.   Remigio expressed understanding of risks, benefits and alternatives and wished to proceed.     All findings, test results, and diagnosis were discussed with the patient. Remigio  participated in the decision making process and agreed with the plan of care. Questions were sought and answered.     I thank   Dedrick for the opportunity to participate in the patient's care.           Chief Complaint:   Bilateral inguinal hernia     History is obtained from the patient         History of Present Illness:   This patient is a 86 year old  male with a significant past medical history of chronic kidney disease, coronary artery disease,MI (3x stents) hx of gastric cancer (s/p subtotal gastrectomy), PE, open partial gastrectomy for PUD,  and PAF who presents with Bilateral groin bulges.  Both bulges have increased in size.  The left groin will get more tender occasionally' some numbness tingling on the inner left thigh.  Stated he has had left groin pain ever since he had his groin access for his cardiac stent placement.  Stated both hernias are reducible as they completely disappears when he lays down.  No nausea; no vomiting; no GI issues.  History of subgastrectomy 2/2 to gastric cancer - now has esophagojejunostomy and had a jejunostomy feeding tube.  Hx of stent place and CABG; hx of PE (on plavix0); history of partial gastrectomy at first secondary to peptic ulcer disease.  Stated he had multiple kidney stones and lithotripsy.  [place]           Past Medical History:    has a past medical history of Blood transfusion, Chronic gastric ulcer without mention of hemorrhage, perforation, without mention of obstruction, Gastric cancer (H), Hemorrhage of gastrointestinal tract, unspecified (01/13/10), Hypoglycemia, unspecified (1/26/2015), Measles without mention of complication, Mixed hyperlipidemia, Mumps without mention of complication, Pulmonary embolism (H) (Sept 4, 2010), Urinary calculus, unspecified, and Varicella without mention of complication.          Past Surgical History:     Past Surgical History:   Procedure Laterality Date     ABDOMEN SURGERY       ARTHROPLASTY KNEE Left 8/15/2017    Procedure: ARTHROPLASTY KNEE;  Left total knee replacement;  Surgeon: Jonathan Cardona,  DO;  Location: [facility]     C EXCIS STOMACH ULCER,LESN;LOCAL       CHOLECYSTECTOMY, LAPOROSCOPIC  10/6/2008    Cholecystectomy, Laparoscopic     COLONOSCOPY  1/14/10    [facility]     COLONOSCOPY  05/25/10     CYSTOSCOPY, RETROGRADES, EXTRACT STONE, INSERT STENT, COMBINED  12/25/2012    Procedure: COMBINED CYSTOSCOPY, RETROGRADES, EXTRACT STONE, INSERT STENT;  Cystoscopy, Left Stent Placement, left retrograde pylogram, uc;  Surgeon: Amador Sanchez MD;  Location: [facility]     ESOPHAGOSCOPY, GASTROSCOPY, DUODENOSCOPY (EGD), COMBINED  8/16/2011    Procedure:COMBINED ESOPHAGOSCOPY, GASTROSCOPY, DUODENOSCOPY (EGD), BIOPSY SINGLE OR MULTIPLE; Surgeon:TONY GARCIA; Location:[facility]     GENERAL SURGERY                           DATE:  07/07/10    Gastrectomy     HC COLONOSCOPY W/WO BRUSH/WASH  01/31/2006      REPAIR ROTATOR CUFF,ACUTE  3/21/2005    Right      UGI ENDOSCOPY, SIMPLE EXAM  1/13/10    [facility]     HC UGI ENDOSCOPY, SIMPLE EXAM  05/25/10     LASER HOLMIUM LITHOTRIPSY URETER(S), INSERT STENT, COMBINED  1/5/2013    Procedure: COMBINED CYSTOSCOPY, URETEROSCOPY, LASER HOLMIUM LITHOTRIPSY URETER(S), INSERT STENT;  Bilateral  Cystoscopy, Bilateral Ureteroscopy, Bilateral retrogrades and  placement of ureteral stent right side. Laser Holmium Lithotripsy  and Exchange  of stent left side;  Surgeon: Tone Morejon MD;  Location: [facility]     LASER HOLMIUM LITHOTRIPSY URETER(S), INSERT STENT, COMBINED  1/30/2013    Procedure: COMBINED CYSTOSCOPY, URETEROSCOPY, LASER HOLMIUM LITHOTRIPSY URETER(S), INSERT STENT;  Right Ureteroscopy; Stone basketing; Right Stent exchange and  removal of left stent.;  Surgeon: Tone Morejon MD;  Location: [facility]     RELEASE DUPUYTRENS CONTRACTURE Left 11/26/2018    Procedure: Release Left Hand Dupuytrens Contracture;  Surgeon: Lucio Omalley MD;  Location: [facility]     VIDEO CAPSULE ENDOSCOPY  01/15/10    [facility]           Medications:    atorvastatin (LIPITOR) 40 MG tablet, Take 40 mg by mouth  blood glucose monitoring (ROBERT MICROLET) lancets, Use to test blood sugar 1 time daily or as directed.  clopidogrel (PLAVIX) 75 MG tablet, Take 75 mg by mouth daily  CONTOUR NEXT TEST test strip, USE TO TEST BLOOD SUGAR 1 TIMES DAILY OR AS DIRECTED.  cyanocobalamin (VITAMIN B12) 1000 MCG/ML injection, Inject 1 mL (1,000 mcg) into the muscle every 30 days  predniSONE (DELTASONE) 10 MG tablet, 4 tablets for 3 days, then 3 tablets for 3 days, then 2 tablets for 3 days, then 1 tablets for 3 days, then off.  senna-docusate (SENOKOT-S;PERICOLACE) 8.6-50 MG per tablet, Take 1-2 tablets by mouth 2 times daily  tamsulosin (FLOMAX) 0.4 MG capsule, Take 0.4 mg by mouth daily    collagenase clostrid histolyt (XIAFLEX) injection SOLR 0.58 mg  cyanocobalamin injection 1,000 mcg          Allergies:      Allergies   Allergen Reactions     Mobic [Meloxicam] Nausea and Diarrhea     Diarrhea, nausea, flu like symptoms since start of use            Social History:   Remigio Holly  reports that he has been smoking pipe. He has been smoking about 0.00 packs per day for the past 20.00 years. He has never used smokeless tobacco. He reports that he does not drink alcohol or use drugs.          Family History:   The patient has no family history of any bleeding, clotting or anesthesia problems.          Review of Systems:     Constitutional: Denies fever or chills   Eyes: Denies change in visual acuity   HENT: Denies nasal congestion or sore throat   Respiratory: Denies cough or shortness of breath   Cardiovascular: Denies chest pain or edema   GI: Denies abdominal pain, nausea, vomiting, bloody stools or diarrhea   : Denies dysuria   Musculoskeletal: Denies back pain or joint pain   Integument: Denies rash   Neurologic: Denies headache, focal weakness or sensory changes   Endocrine: Denies polyuria or polydipsia   Lymphatic: Denies swollen glands   Psychiatric:  Denies depression or anxiety          Physical Exam:     Vitals: /68   Pulse 72   Temp 96.2  F (35.7  C) (Temporal)   Wt 71.2 kg (157 lb)   BMI 21.29 kg/m    BMI= Body mass index is 21.29 kg/m .  Constitutional: Awake, alert, no acute distress.  Eyes:  No scleral icterus.  Conjunctiva are without injection.  ENMT: Mucous membranes moist, dentition and gums are intact.   Neck: Soft, supple, trachea midline.    Endocrine: n/a  Lymphatic: There is no cervical, submandibular, or supraclavicular adenopathy.  Respiratory: No audible wheezes, no acute distress  Cardiovascular: Regular; S1, S2    Abdomen: Non-distended, non-tender, normoactive bowel sounds present, No masses.  To pull cicatrix from numerous previous surgeries.  Bilateral groin hernia; both are reducible; both likely has bowel content and both have surpassed the external ring.  Left side more tender than the right.  Musculoskeletal: No spinal or CVA tenderness. Full range of motion in the upper and lower extremities.    Skin: No skin rashes or lesions to inspection.  No petechia.    Neurologic: Cranial nerves II through XII are grossly intact and symmetric.  Psychiatric: The patient is alert and oriented times 3.  The patient's affect is not blunted and mood is appropriate.          Data:   Vital Signs:  /68   Pulse 72   Temp 96.2  F (35.7  C) (Temporal)   Wt 71.2 kg (157 lb)   BMI 21.29 kg/m       WBC -   WBC   Date Value Ref Range Status   03/13/2020 5.2 4.0 - 11.0 10e9/L Final   ], HgB -   Hemoglobin   Date Value Ref Range Status   03/13/2020 12.5 (L) 13.3 - 17.7 g/dL Final   ]   Liver Function Studies -   Recent Labs   Lab Test 09/16/19  0658   PROTTOTAL 6.8   ALBUMIN 3.7   BILITOTAL 0.5   ALKPHOS 88   AST 25   ALT 19     Recent Results (from the past 744 hour(s))   XR Wrist Left G/E 3 Views    Narrative    WRIST LEFT THREE OR MORE VIEWS  8/14/2020 10:25 AM     HISTORY: Soft tissue swelling/erythema clinically consistent with  active  synovitis/monoarticular.    COMPARISON: 7/25/2005.      Impression    IMPRESSION: No acute appearing bony abnormality. Interval progression  of osteoarthritis at the triscaphe joint. Moderate osteoarthritis at  the first carpometacarpal joint without significant change. Interval  development of subtle chondrocalcinosis. Soft tissue swelling about  the wrist is new since the prior exam.    MARIO ALBERTO MALCOLM MD        [facility] Lorenzo, DO 9/1/2020 2:00 PM    Disclaimer: This note consists of words and symbols derived from keyboarding and dictation using voice recognition software.  As a result, there may be errors that have gone undetected.  Please consider this when interpreting information found in this note.

## 2020-09-01 NOTE — LETTER
9/1/2020         RE: Remigio Holly  [address] [facility] [id number]        Dear Colleague,    Thank you for referring your patient, Remigio Holly, to the [facility]. Please see a copy of my visit note below.    General Surgery Consultation    Remigio Holly MRN# [id number]   Age: 86 year old YOB: 1933     Reason for consult: Bilateral inguinal hernia                        Assessment and Plan:   I was asked to see this patient at the request of Dr. Tse for evaluation of bilateral inguinal hernias.  Remigio Holly is a 86 year old male who presented with history, exam, laboratory and imaging most consistent with:        ICD-10-CM    1. Non-recurrent bilateral inguinal hernia without obstruction or gangrene  K40.20 Case Request: Open left inguinal hernia repair with mesh     Case Request: Open left inguinal hernia repair with mesh   2. 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  Z90.3     Z98.0    3. CKD (chronic kidney disease) stage 3, GFR 30-59 ml/min (H)  N18.3    4. Paroxysmal atrial fibrillation (H)  I48.0    5. Thoracic aortic aneurysm without rupture (H) - 4.7 cm on 8/18/17 (4.5 cm in 8/15)  I71.2    6. Malignant neoplasm of stomach, unspecified location (H)  C16.9    7. History of pulmonary embolism  Z86.711        The patient was thoroughly counseled regarding Non-recurrent bilateral inguinal hernia without obstruction or gangrene [K40.20].     The patient was informed that the proposed procedure or medical intervention involves reduction and repair with or without mesh and does offer a very good likelihood of symptom relief. We also discussed the options of repairing the hernia laparoscopically, robotically, versus open.  With the patient's age, multiple comorbidities, history of multiple intra-abdominal surgery, I recommend that we do this procedure as an open hernia repair.  Would do the  left hernia first as it is bothering him more so than the right.  We can do this under monitored anesthesia care along with local anesthetic which would yield great result.       The patient was made aware of the risks of the procedure, including but not limited to:  nerve entrapment or injury, persistence of pain (10%), injury to the bowel/bladder, infertility, ischemic orchitis (rare), Injury to the vas deferens (rare), hematoma, mesh migration, mesh infection, cardiac or pulmonary complication and anesthesia related complications also that difficulties may be encountered during recovery to include: wound infection, recurrence (5-10%), seroma, hematoma and chronic pain.     In the course of the evaluation we did discuss other therapeutic options with the patient, including continued watchful waiting. The risks and benefits of these options were also discussed which include but are not limited to: incarceration and/or strangulation..     Also discussed were possible problems or difficulties the patient may encounter if treatment was not pursued at this time.     The patient was informed that [facility] MD Maura will be primarily responsible for the procedure. Assistance during the procedure and during hospitalization may also be provided by other physicians, nurses and technicians.     The patient was also informed that if exposure to the patient s blood or body fluids occurs during the procedure, HIV testing of the patient will occur unless they refuse at this time. Risk of blood transfusion is minimal.     The patient will be provided additional education resources by the support staff. If there are ever any questions regarding their diagnosis or the procedure, the patient is encouraged to ask.     All of the patient s or their legal representative s questions have been answered to their satisfaction and they have indicated a clear understanding of this discussion.   Remigio expressed understanding of risks,  benefits and alternatives and wished to proceed.     All findings, test results, and diagnosis were discussed with the patient. Remigio  participated in the decision making process and agreed with the plan of care. Questions were sought and answered.     I thank Dr. Tse for the opportunity to participate in the patient's care.           Chief Complaint:   Bilateral inguinal hernia     History is obtained from the patient         History of Present Illness:   This patient is a 86 year old  male with a significant past medical history of chronic kidney disease, coronary artery disease,MI (3x stents) hx of gastric cancer (s/p subtotal gastrectomy), PE, open partial gastrectomy for PUD,  and PAF who presents with Bilateral groin bulges.  Both bulges have increased in size.  The left groin will get more tender occasionally' some numbness tingling on the inner left thigh.  Stated he has had left groin pain ever since he had his groin access for his cardiac stent placement.  Stated both hernias are reducible as they completely disappears when he lays down.  No nausea; no vomiting; no GI issues.  History of subgastrectomy 2/2 to gastric cancer - now has esophagojejunostomy and had a jejunostomy feeding tube.  Hx of stent place and CABG; hx of PE (on plavix0); history of partial gastrectomy at first secondary to peptic ulcer disease.  Stated he had multiple kidney stones and lithotripsy.  [place]           Past Medical History:    has a past medical history of Blood transfusion, Chronic gastric ulcer without mention of hemorrhage, perforation, without mention of obstruction, Gastric cancer (H), Hemorrhage of gastrointestinal tract, unspecified (01/13/10), Hypoglycemia, unspecified (1/26/2015), Measles without mention of complication, Mixed hyperlipidemia, Mumps without mention of complication, Pulmonary embolism (H) (Sept 4, 2010), Urinary calculus, unspecified, and Varicella without  mention of complication.          Past Surgical History:     Past Surgical History:   Procedure Laterality Date     ABDOMEN SURGERY       ARTHROPLASTY KNEE Left 8/15/2017    Procedure: ARTHROPLASTY KNEE;  Left total knee replacement;  Surgeon: Jonathan Cardona DO;  Location: [facility] OR     C EXCIS STOMACH ULCER,LESN;LOCAL       CHOLECYSTECTOMY, LAPOROSCOPIC  10/6/2008    Cholecystectomy, Laparoscopic     COLONOSCOPY  1/14/10    [facility]     COLONOSCOPY  05/25/10     CYSTOSCOPY, RETROGRADES, EXTRACT STONE, INSERT STENT, COMBINED  12/25/2012    Procedure: COMBINED CYSTOSCOPY, RETROGRADES, EXTRACT STONE, INSERT STENT;  Cystoscopy, Left Stent Placement, left retrograde pylogram, uc;  Surgeon: Amador Sanchez MD;  Location: [facility] OR     ESOPHAGOSCOPY, GASTROSCOPY, DUODENOSCOPY (EGD), COMBINED  8/16/2011    Procedure:COMBINED ESOPHAGOSCOPY, GASTROSCOPY, DUODENOSCOPY (EGD), BIOPSY SINGLE OR MULTIPLE; Surgeon:TONY GARCIA; Location:[facility]     GENERAL SURGERY                           DATE:  07/07/10    Gastrectomy     HC COLONOSCOPY W/WO BRUSH/WASH  01/31/2006     HC REPAIR ROTATOR CUFF,ACUTE  3/21/2005    Right      UGI ENDOSCOPY, SIMPLE EXAM  1/13/10    [facility] UGI ENDOSCOPY, SIMPLE EXAM  05/25/10     LASER HOLMIUM LITHOTRIPSY URETER(S), INSERT STENT, COMBINED  1/5/2013    Procedure: COMBINED CYSTOSCOPY, URETEROSCOPY, LASER HOLMIUM LITHOTRIPSY URETER(S), INSERT STENT;  Bilateral  Cystoscopy, Bilateral Ureteroscopy, Bilateral retrogrades and  placement of ureteral stent right side. Laser Holmium Lithotripsy  and Exchange  of stent left side;  Surgeon: Tone Morejon MD;  Location: [facility] OR     LASER HOLMIUM LITHOTRIPSY URETER(S), INSERT STENT, COMBINED  1/30/2013    Procedure: COMBINED CYSTOSCOPY, URETEROSCOPY, LASER HOLMIUM LITHOTRIPSY URETER(S), INSERT STENT;  Right Ureteroscopy; Stone basketing; Right Stent exchange and  removal of left stent.;  Surgeon: Tone Morejon  MD Mimi;  Location: [facility] OR     RELEASE DUPUYTRENS CONTRACTURE Left 11/26/2018    Procedure: Release Left Hand Dupuytrens Contracture;  Surgeon: Lucio Omalley MD;  Location:  OR     VIDEO CAPSULE ENDOSCOPY  01/15/10    [facility]           Medications:   atorvastatin (LIPITOR) 40 MG tablet, Take 40 mg by mouth  blood glucose monitoring (ROBERT MICROLET) lancets, Use to test blood sugar 1 time daily or as directed.  clopidogrel (PLAVIX) 75 MG tablet, Take 75 mg by mouth daily  CONTOUR NEXT TEST test strip, USE TO TEST BLOOD SUGAR 1 TIMES DAILY OR AS DIRECTED.  cyanocobalamin (VITAMIN B12) 1000 MCG/ML injection, Inject 1 mL (1,000 mcg) into the muscle every 30 days  predniSONE (DELTASONE) 10 MG tablet, 4 tablets for 3 days, then 3 tablets for 3 days, then 2 tablets for 3 days, then 1 tablets for 3 days, then off.  senna-docusate (SENOKOT-S;PERICOLACE) 8.6-50 MG per tablet, Take 1-2 tablets by mouth 2 times daily  tamsulosin (FLOMAX) 0.4 MG capsule, Take 0.4 mg by mouth daily    collagenase clostrid histolyt (XIAFLEX) injection SOLR 0.58 mg  cyanocobalamin injection 1,000 mcg          Allergies:      Allergies   Allergen Reactions     Mobic [Meloxicam] Nausea and Diarrhea     Diarrhea, nausea, flu like symptoms since start of use            Social History:   Remigio Holly  reports that he has been smoking pipe. He has been smoking about 0.00 packs per day for the past 20.00 years. He has never used smokeless tobacco. He reports that he does not drink alcohol or use drugs.          Family History:   The patient has no family history of any bleeding, clotting or anesthesia problems.          Review of Systems:     Constitutional: Denies fever or chills   Eyes: Denies change in visual acuity   HENT: Denies nasal congestion or sore throat   Respiratory: Denies cough or shortness of breath   Cardiovascular: Denies chest pain or edema   GI: Denies abdominal pain, nausea, vomiting, bloody stools  or diarrhea   : Denies dysuria   Musculoskeletal: Denies back pain or joint pain   Integument: Denies rash   Neurologic: Denies headache, focal weakness or sensory changes   Endocrine: Denies polyuria or polydipsia   Lymphatic: Denies swollen glands   Psychiatric: Denies depression or anxiety          Physical Exam:     Vitals: /68   Pulse 72   Temp 96.2  F (35.7  C) (Temporal)   Wt 71.2 kg (157 lb)   BMI 21.29 kg/m    BMI= Body mass index is 21.29 kg/m .  Constitutional: Awake, alert, no acute distress.  Eyes:  No scleral icterus.  Conjunctiva are without injection.  ENMT: Mucous membranes moist, dentition and gums are intact.   Neck: Soft, supple, trachea midline.    Endocrine: n/a  Lymphatic: There is no cervical, submandibular, or supraclavicular adenopathy.  Respiratory: No audible wheezes, no acute distress  Cardiovascular: Regular; S1, S2    Abdomen: Non-distended, non-tender, normoactive bowel sounds present, No masses.  To pull cicatrix from numerous previous surgeries.  Bilateral groin hernia; both are reducible; both likely has bowel content and both have surpassed the external ring.  Left side more tender than the right.  Musculoskeletal: No spinal or CVA tenderness. Full range of motion in the upper and lower extremities.    Skin: No skin rashes or lesions to inspection.  No petechia.    Neurologic: Cranial nerves II through XII are grossly intact and symmetric.  Psychiatric: The patient is alert and oriented times 3.  The patient's affect is not blunted and mood is appropriate.          Data:   Vital Signs:  /68   Pulse 72   Temp 96.2  F (35.7  C) (Temporal)   Wt 71.2 kg (157 lb)   BMI 21.29 kg/m       WBC -   WBC   Date Value Ref Range Status   03/13/2020 5.2 4.0 - 11.0 10e9/L Final   ], HgB -   Hemoglobin   Date Value Ref Range Status   03/13/2020 12.5 (L) 13.3 - 17.7 g/dL Final   ]   Liver Function Studies -   Recent Labs   Lab Test 09/16/19  0658   PROTTOTAL 6.8   ALBUMIN 3.7    BILITOTAL 0.5   ALKPHOS 88   AST 25   ALT 19     Recent Results (from the past 744 hour(s))   XR Wrist Left G/E 3 Views    Narrative    WRIST LEFT THREE OR MORE VIEWS  8/14/2020 10:25 AM     HISTORY: Soft tissue swelling/erythema clinically consistent with  active synovitis/monoarticular.    COMPARISON: 7/25/2005.      Impression    IMPRESSION: No acute appearing bony abnormality. Interval progression  of osteoarthritis at the triscaphe joint. Moderate osteoarthritis at  the first carpometacarpal joint without significant change. Interval  development of subtle chondrocalcinosis. Soft tissue swelling about  the wrist is new since the prior exam.    MD Tanvir MCDOWELL,  9/1/2020 2:00 PM    Disclaimer: This note consists of words and symbols derived from keyboarding and dictation using voice recognition software.  As a result, there may be errors that have gone undetected.  Please consider this when interpreting information found in this note.       Again, thank you for allowing me to participate in the care of your patient.        Sincerely,        Tanvir Lorenzo MD

## 2020-09-01 NOTE — PROGRESS NOTES
Subjective     Remigio Holly is a 86 year old male who presents to clinic today for the following health issues:    HPI     Chief Complaint   Patient presents with     ER F/U     Hernia     ongoing for years        ED/UC Followup:    Facility:  [facility]   Date of visit: 8/14/2020  Reason for visit: swelling and painful left wrist  Current Status: improved                      Chief Complaint         The patient is a pleasant 86-year-old gentleman who recently had some pain and swelling in his left wrist.  He was seen in the emergency department where he was diagnosed as having probable gout.  He was placed on a decremental dosing of prednisone and notes that he is doing much better.  The redness is down, swelling is down, and the function has increased.  He also has bilateral inguinal discomfort from hernias that he has had for years.  Notes that the becoming more painful.  He notes no obstruction, strangulation or incarceration.                         PAST, FAMILY,SOCIAL HISTORY:     Medical  History:   has a past medical history of Blood transfusion, Chronic gastric ulcer without mention of hemorrhage, perforation, without mention of obstruction, Gastric cancer (H), Hemorrhage of gastrointestinal tract, unspecified (01/13/10), Hypoglycemia, unspecified (1/26/2015), Measles without mention of complication, Mixed hyperlipidemia, Mumps without mention of complication, Pulmonary embolism (H) (Sept 4, 2010), Urinary calculus, unspecified, and Varicella without mention of complication.     Surgical History:   has a past surgical history that includes EXCIS STOMACH ULCER,LESN;LOCAL; REPAIR ROTATOR CUFF,ACUTE (3/21/2005); Colonoscopy w/wo Erwin **Performed** (01/31/2006); cholecystectomy, laporoscopic (10/6/2008); UPPER GI ENDOSCOPY,EXAM (1/13/10); colonoscopy (1/14/10); video capsule endoscopy (01/15/10); colonoscopy (05/25/10); UPPER GI ENDOSCOPY,EXAM (05/25/10); general surgery                            date: (07/07/10); Abdomen surgery; Esophagoscopy, gastroscopy, duodenoscopy (EGD), combined (8/16/2011); Cystoscopy, retrogrades, extract stone, insert stent, combined (12/25/2012); Laser holmium lithotripsy ureter(s), insert stent, combined (1/5/2013); Laser holmium lithotripsy ureter(s), insert stent, combined (1/30/2013); Arthroplasty knee (Left, 8/15/2017); and Release dupuytrens contracture (Left, 11/26/2018).     Social History:   reports that he has been smoking pipe. He has been smoking about 0.00 packs per day for the past 20.00 years. He has never used smokeless tobacco. He reports that he does not drink alcohol or use drugs.     Family History:  family history includes Alzheimer Disease in his father; C.A.D. in his brother and mother; Cardiovascular in his brother and mother; Heart Disease in his brother and mother.            MEDICATIONS  Current Outpatient Medications   Medication Sig Dispense Refill     atorvastatin (LIPITOR) 40 MG tablet Take 40 mg by mouth  3     blood glucose monitoring (ROBERT MICROLET) lancets Use to test blood sugar 1 time daily or as directed. 1 Box 2     clopidogrel (PLAVIX) 75 MG tablet Take 75 mg by mouth daily  3     CONTOUR NEXT TEST test strip USE TO TEST BLOOD SUGAR 1 TIMES DAILY OR AS DIRECTED. 100 each 3     cyanocobalamin (VITAMIN B12) 1000 MCG/ML injection Inject 1 mL (1,000 mcg) into the muscle every 30 days 1 mL 11     predniSONE (DELTASONE) 10 MG tablet 4 tablets for 3 days, then 3 tablets for 3 days, then 2 tablets for 3 days, then 1 tablets for 3 days, then off. 30 tablet 0     senna-docusate (SENOKOT-S;PERICOLACE) 8.6-50 MG per tablet Take 1-2 tablets by mouth 2 times daily 30 tablet 0     tamsulosin (FLOMAX) 0.4 MG capsule Take 0.4 mg by mouth daily           --------------------------------------------------------------------------------------------------------------------                              Review of Systems       LUNGS: Pt denies: cough, excess  sputum, hemoptysis, or shortness of breath.   HEART: Pt denies: chest pain, arrhythmia, syncope, tachy or bradyarrhythmia.   GI: Pt denies: nausea, vomiting, diarrhea, constipation, melena, or hematochezia.   NEURO: Pt denies: seizures, strokes, diplopia, weakness, paraesthesias, or paralysis.   SKIN: Pt denies: itching, rashes, discoloration, or specific lesions of concern. Denies recent hair loss.   PSYCH: The patient denies significant depression, anxiety, mood imbalance. Specifically denies any suicidal ideation.        Examination    /68 (BP Location: Right arm, Patient Position: Sitting, Cuff Size: Adult Regular)   Pulse 72   Temp 96.2  F (35.7  C) (Temporal)   Resp 18   Wt 71.2 kg (157 lb)   SpO2 98%   BMI 21.29 kg/m      Constitutional: The patient appears to be in no acute distress. The patient appears to be adequately hydrated. No acute respiratory or hemodynamic distress is noted at this time.   LUNGS: clear bilaterally, airflow is brisk, no intercostal retraction or stridor is noted. No coughing is noted during visit.   HEART:  regular without rubs, clicks, gallops, or murmurs. PMI is nondisplaced. Upstrokes are brisk. S1,S2 are heard.   GI: Abdomen is soft, without rebound, guarding or tenderness. Bowel sounds are appropriate. No renal bruits are heard.  Scarring is consistent with history.  Evidence of bilateral inguinal hernias are noted.   NEURO: Pt is alert and appropriate. No neurologic lateralization is noted. Cranial nerves 2-12 are intact. Peripheral sensory and motor function are grossly normal.    SKIN:  warm and dry. No erythema, or rashes are noted. No specific lesions of concern are noted.    PSYCH: The patient appears grossly appropriate. Maintains good eye contact, does not have any jittery or atypical motion. Displays appropriate affect.   MS: Minimal crepitance is noted in the wrist.  Minimal inflammation is present at this time.. No deformity is present. Muscle strength is  appropriate and equal bilaterally. No acute joint erythema or swelling is present.         Decision Making       1. Acute idiopathic gout of wrist, unspecified laterality  Wean and discontinue prednisone.  If recurs, would recommend allopurinol.  If this does not work, then would recommend joint aspiration.    2. Non-recurrent bilateral inguinal hernia without obstruction or gangrene  We will request surgical consultation  - GENERAL SURG ADULT REFERRAL    3. Malignant neoplasm of stomach, unspecified location (H)  Historically without recurrence    4. 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  Some residual postsurgical dumping noted    5. Abdominal aortic aneurysm (AAA) without rupture (H)  Recent CT unremarkable.  Follow regularly                               FOLLOW UP   I have asked the patient to make an appointment for followup with me in 1 month or as needed            I have carefully explained the diagnosis and treatment options to the patient.  The patient has displayed an understanding of the above, and all subsequent questions were answered.      DO ELAINE Powell    Portions of this note were produced using [employer]  Although every attempt at real-time proof reading has been made, occasional grammar/syntax errors may have been missed.

## 2020-09-01 NOTE — NURSING NOTE
.Clinic Administered Medication Documentation      Injectable Medication Documentation    Patient was given Cyanocobalamin (B-12). Prior to medication administration, verified patients identity using patient s name and date of birth. Please see MAR and medication order for additional information. Patient instructed to remain in clinic for 15 minutes and report any adverse reaction to staff immediately .      Was entire vial of medication used? Yes  Vial/Syringe: Single dose vial  Expiration Date:  7/21  Was this medication supplied by the patient? No     Given in LD.  Nella Nath MA     9/1/2020

## 2020-09-02 ENCOUNTER — TELEPHONE (OUTPATIENT)
Dept: SURGERY | Facility: CLINIC | Age: 85
End: 2020-09-02

## 2020-09-02 DIAGNOSIS — Z11.59 ENCOUNTER FOR SCREENING FOR OTHER VIRAL DISEASES: Primary | ICD-10-CM

## 2020-09-02 PROBLEM — K40.20 NON-RECURRENT BILATERAL INGUINAL HERNIA WITHOUT OBSTRUCTION OR GANGRENE: Status: ACTIVE | Noted: 2020-09-02

## 2020-09-02 NOTE — TELEPHONE ENCOUNTER
Type of surgery: open left inguinal hernia repair with mesh  Location of surgery: [facility]   Date of surgery: 9/25/20  Surgeon: Dr. Lorenzo  Pre-Op Appt Date: 9/15/20  Post-Op Appt Date: 10/9/20   Packet sent out: Surgery packet mailed to patient's home address.   Pre-cert/Authorization completed: NA  Date: 9/2/2020    Veronica Cotter  Surgery Scheduler

## 2020-09-15 ENCOUNTER — OFFICE VISIT (OUTPATIENT)
Dept: INTERNAL MEDICINE | Facility: CLINIC | Age: 85
End: 2020-09-15
Payer: COMMERCIAL

## 2020-09-15 VITALS
TEMPERATURE: 97.6 F | BODY MASS INDEX: 21.16 KG/M2 | DIASTOLIC BLOOD PRESSURE: 60 MMHG | SYSTOLIC BLOOD PRESSURE: 128 MMHG | WEIGHT: 156 LBS | OXYGEN SATURATION: 97 % | HEART RATE: 74 BPM | RESPIRATION RATE: 16 BRPM

## 2020-09-15 DIAGNOSIS — Z90.3 S/P TOTAL GASTRECTOMY AND ROUX-EN-Y ESOPHAGOJEJUNAL ANASTOMOSIS: ICD-10-CM

## 2020-09-15 DIAGNOSIS — Z86.711 HISTORY OF PULMONARY EMBOLISM: ICD-10-CM

## 2020-09-15 DIAGNOSIS — Z01.818 PREOP GENERAL PHYSICAL EXAM: Primary | ICD-10-CM

## 2020-09-15 DIAGNOSIS — Z98.0 S/P TOTAL GASTRECTOMY AND ROUX-EN-Y ESOPHAGOJEJUNAL ANASTOMOSIS: ICD-10-CM

## 2020-09-15 DIAGNOSIS — I71.40 ABDOMINAL AORTIC ANEURYSM (AAA) WITHOUT RUPTURE (H): ICD-10-CM

## 2020-09-15 DIAGNOSIS — D51.0 PERNICIOUS ANEMIA: ICD-10-CM

## 2020-09-15 DIAGNOSIS — K40.20 NON-RECURRENT BILATERAL INGUINAL HERNIA WITHOUT OBSTRUCTION OR GANGRENE: ICD-10-CM

## 2020-09-15 LAB
ALBUMIN UR-MCNC: NEGATIVE MG/DL
ANION GAP SERPL CALCULATED.3IONS-SCNC: 4 MMOL/L (ref 3–14)
APPEARANCE UR: CLEAR
BASOPHILS # BLD AUTO: 0 10E9/L (ref 0–0.2)
BASOPHILS NFR BLD AUTO: 0.4 %
BILIRUB UR QL STRIP: NEGATIVE
BUN SERPL-MCNC: 24 MG/DL (ref 7–30)
CALCIUM SERPL-MCNC: 9.2 MG/DL (ref 8.5–10.1)
CHLORIDE SERPL-SCNC: 105 MMOL/L (ref 94–109)
CO2 SERPL-SCNC: 27 MMOL/L (ref 20–32)
COLOR UR AUTO: YELLOW
CREAT SERPL-MCNC: 1.19 MG/DL (ref 0.66–1.25)
DIFFERENTIAL METHOD BLD: ABNORMAL
EOSINOPHIL NFR BLD AUTO: 0.9 %
ERYTHROCYTE [DISTWIDTH] IN BLOOD BY AUTOMATED COUNT: 15.8 % (ref 10–15)
GFR SERPL CREATININE-BSD FRML MDRD: 55 ML/MIN/{1.73_M2}
GLUCOSE SERPL-MCNC: 105 MG/DL (ref 70–99)
GLUCOSE UR STRIP-MCNC: NEGATIVE MG/DL
HCT VFR BLD AUTO: 40.3 % (ref 40–53)
HGB BLD-MCNC: 12.4 G/DL (ref 13.3–17.7)
HGB UR QL STRIP: NEGATIVE
IMM GRANULOCYTES # BLD: 0 10E9/L (ref 0–0.4)
IMM GRANULOCYTES NFR BLD: 0.4 %
KETONES UR STRIP-MCNC: NEGATIVE MG/DL
LEUKOCYTE ESTERASE UR QL STRIP: NEGATIVE
LYMPHOCYTES # BLD AUTO: 0.8 10E9/L (ref 0.8–5.3)
LYMPHOCYTES NFR BLD AUTO: 14.5 %
MCH RBC QN AUTO: 27.9 PG (ref 26.5–33)
MCHC RBC AUTO-ENTMCNC: 30.8 G/DL (ref 31.5–36.5)
MCV RBC AUTO: 91 FL (ref 78–100)
MONOCYTES # BLD AUTO: 0.8 10E9/L (ref 0–1.3)
MONOCYTES NFR BLD AUTO: 14.2 %
NEUTROPHILS # BLD AUTO: 3.9 10E9/L (ref 1.6–8.3)
NEUTROPHILS NFR BLD AUTO: 69.6 %
NITRATE UR QL: NEGATIVE
NRBC # BLD AUTO: 0 10*3/UL
NRBC BLD AUTO-RTO: 0 /100
PH UR STRIP: 5 PH (ref 5–7)
PLATELET # BLD AUTO: 250 10E9/L (ref 150–450)
POTASSIUM SERPL-SCNC: 4.3 MMOL/L (ref 3.4–5.3)
RBC # BLD AUTO: 4.45 10E12/L (ref 4.4–5.9)
SODIUM SERPL-SCNC: 136 MMOL/L (ref 133–144)
SOURCE: NORMAL
SP GR UR STRIP: 1.02 (ref 1–1.03)
UROBILINOGEN UR STRIP-MCNC: 0 MG/DL (ref 0–2)
WBC # BLD AUTO: 5.6 10E9/L (ref 4–11)

## 2020-09-15 PROCEDURE — 36415 COLL VENOUS BLD VENIPUNCTURE: CPT | Performed by: INTERNAL MEDICINE

## 2020-09-15 PROCEDURE — 99215 OFFICE O/P EST HI 40 MIN: CPT | Performed by: INTERNAL MEDICINE

## 2020-09-15 PROCEDURE — 80048 BASIC METABOLIC PNL TOTAL CA: CPT | Performed by: INTERNAL MEDICINE

## 2020-09-15 PROCEDURE — 85025 COMPLETE CBC W/AUTO DIFF WBC: CPT | Performed by: INTERNAL MEDICINE

## 2020-09-15 PROCEDURE — 81003 URINALYSIS AUTO W/O SCOPE: CPT | Performed by: INTERNAL MEDICINE

## 2020-09-15 PROCEDURE — 93000 ELECTROCARDIOGRAM COMPLETE: CPT | Performed by: INTERNAL MEDICINE

## 2020-09-15 ASSESSMENT — PAIN SCALES - GENERAL: PAINLEVEL: NO PAIN (0)

## 2020-09-15 NOTE — PROGRESS NOTES
[address] [address]  [phone]  Dept: [phone]    PRE-OP EVALUATION:  Today's date: 9/15/2020    Remigio Holly (: 1933) presents for pre-operative evaluation assessment as requested by Dr. Lorenzo.  He requires evaluation and anesthesia risk assessment prior to undergoing surgery/procedure for treatment of hernia .    Proposed Surgery/ Procedure: Hernia repair   Date of Surgery/ Procedure: 2020  Time of Surgery/ Procedure: 7:30 AM  Hospital/Surgical Facility: [facility]   Surgery Fax Number: Note does not need to be faxed, will be available electronically in Epic.  Primary Physician: Harrison Tse  Type of Anesthesia Anticipated: General    Preoperative Questionnaire:   YES, HA - Have you ever had a heart attack or stroke?  No - Have you ever had surgery on your heart or blood vessels, such as a stent, coronary (heart) bypass, or surgery on an artery in the head, neck, heart, or legs?  No - Do you have chest pain when you are physically active?  No - Do you have a history of heart failure?  No - Do you currently have a cold, bronchitis, or symptoms of other respiratory (head and chest) infections?  No - Do you have a cough, shortness of breath, or wheezing?  No - Do you or anyone in your family have a history of blood clots?  No - Do you or anyone in your family have a serious bleeding problem, such as long-lasting bleeding after surgeries or cuts?  No - Have you ever had anemia or been told to take iron pills?  No - Have you had any abnormal blood loss such as black, tarry or bloody stools, or abnormal vaginal bleeding?  No - Have you ever had a blood transfusion?  Yes - Are you willing to have a blood transfusion if it is medically needed before, during, or after your surgery?  No - Have you or anyone in your family ever had problems with anesthesia (sedation for surgery)?  No - Do you have sleep apnea, excessive  snoring, or daytime drowsiness?   No - Do you have any artifical heart valves or other implanted medical devices, such as a pacemaker, defibrillator, or continuous glucose monitor?  YES, left knee - Do you have any artifical joints?  No - Are you allergic to latex?  No - Is there any chance that you may be pregnant?    Patient has a Health Care Directive or Living Will:  NO    HPI:     HPI related to upcoming procedure: Patient has a history of bilateral inguinal hernias.  Will be undergoing staged surgical correction by open technique.      See problem list for active medical problems.  Problems all longstanding and stable, except as noted/documented.  See ROS for pertinent symptoms related to these conditions.      MEDICAL HISTORY:     Patient Active Problem List    Diagnosis Date Noted     Nephrolithiasis 12/25/2012     Priority: High     Non-recurrent bilateral inguinal hernia without obstruction or gangrene 09/02/2020     Priority: Medium     Added automatically from request for surgery [id number]       History of pulmonary embolism 09/01/2020     Priority: Medium     Dupuytren's contracture of left hand 12/04/2018     Priority: Medium     Urinary retention with incomplete bladder emptying 08/20/2017     Priority: Medium     Orthostatic hypotension 08/19/2017     Priority: Medium     Thoracic aortic aneurysm without rupture (H) - 4.7 cm on 8/18/17 (4.5 cm in 8/15) 08/18/2017     Priority: Medium     Near syncope 08/17/2017     Priority: Medium     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/17) 08/15/2017     Priority: Medium     Primary osteoarthritis of both knees 07/26/2017     Priority: Medium     Bilateral knee pain 07/26/2017     Priority: Medium     Postsurgical dumping syndrome 12/19/2016     Priority: Medium     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 Provider to review       CKD (chronic kidney disease) stage 3, GFR 30-59 ml/min (H) 05/05/2013     Priority: Medium     Pain 12/25/2012     Priority: Medium     Advance Care Planning 08/16/2012     Priority: Medium     Advance Care Planning: Receipt of ACP document:  Received: Health Care Directive which was witnessed or notarized on 3-12-13.  Document previously scanned on 3-13-13.  Validation form completed and  scanned.  Code Status reflects choices in most recent ACP document.  Confirmed/documented designated decision maker(s). See permanent comments section of demographics in clinical tab. View document(s) and details by clicking on code status. Added by Sayra Maguire on 8/25/2014.  .Patient states has Advance Directive and will bring in a copy to clinic. 8/16/2012          HYPERLIPIDEMIA LDL GOAL <100 10/31/2010     Priority: Medium     GERD (gastroesophageal reflux disease) 03/26/2010     Priority: Medium     Mixed hyperlipidemia 03/24/2010     Priority: Medium     Closed fracture of calcaneus 01/06/2007     Priority: Medium     Calculus of gallbladder 01/09/2003     Priority: Medium     Problem list name updated by automated process. Provider to review       Abdominal aortic aneurysm (H) 10/22/2002     Priority: Medium     Problem list name updated by automated process. Provider to review       Chest pain      Priority: Medium     Problem list name updated by automated process. Provider to review       Pain in joint, shoulder region      Priority: Medium     Paroxysmal atrial fibrillation (H) 08/26/2010     Priority: Low     Gastric cancer (H) 08/05/2010     Priority: Low     (Problem list name updated by automated process. Provider to review and confirm.)        Past Medical History:   Diagnosis Date     Blood transfusion      Chronic gastric ulcer without mention of hemorrhage, perforation, without mention of obstruction     Ulcer,  Gastric     Gastric cancer (H)      Hemorrhage of gastrointestinal tract, unspecified 01/13/10    [facility]     Hypoglycemia, unspecified 1/26/2015     Measles without mention of complication     Measles     Mixed hyperlipidemia     Hyperlipidemia     Mumps without mention of complication     Mumps     Pulmonary embolism (H) Sept 4, 2010     Urinary calculus, unspecified     Renal stones     Varicella without mention of complication     Chickenpox     Past Surgical History:   Procedure Laterality Date     ABDOMEN SURGERY       ARTHROPLASTY KNEE Left 8/15/2017    Procedure: ARTHROPLASTY KNEE;  Left total knee replacement;  Surgeon: Jonathan Cardona DO;  Location: [facility] OR     C EXCIS STOMACH ULCER,LESN;LOCAL       CHOLECYSTECTOMY, LAPOROSCOPIC  10/6/2008    Cholecystectomy, Laparoscopic     COLONOSCOPY  1/14/10    [facility]     COLONOSCOPY  05/25/10     CYSTOSCOPY, RETROGRADES, EXTRACT STONE, INSERT STENT, COMBINED  12/25/2012    Procedure: COMBINED CYSTOSCOPY, RETROGRADES, EXTRACT STONE, INSERT STENT;  Cystoscopy, Left Stent Placement, left retrograde pylogram, uc;  Surgeon: Amador Sanchez MD;  Location: [facility] OR     ESOPHAGOSCOPY, GASTROSCOPY, DUODENOSCOPY (EGD), COMBINED  8/16/2011    Procedure:COMBINED ESOPHAGOSCOPY, GASTROSCOPY, DUODENOSCOPY (EGD), BIOPSY SINGLE OR MULTIPLE; Surgeon:TONY GARCIA; Location:[facility]     GENERAL SURGERY                           DATE:  07/07/10    Gastrectomy      COLONOSCOPY W/WO BRUSH/WASH  01/31/2006      REPAIR ROTATOR CUFF,ACUTE  3/21/2005    Right      UGI ENDOSCOPY, SIMPLE EXAM  1/13/10    [facility] UGI ENDOSCOPY, SIMPLE EXAM  05/25/10     LASER HOLMIUM LITHOTRIPSY URETER(S), INSERT STENT, COMBINED  1/5/2013    Procedure: COMBINED CYSTOSCOPY, URETEROSCOPY, LASER HOLMIUM LITHOTRIPSY URETER(S), INSERT STENT;  Bilateral  Cystoscopy, Bilateral Ureteroscopy, Bilateral retrogrades and  placement of ureteral stent right side.  Laser Holmium Lithotripsy  and Exchange  of stent left side;  Surgeon: Tone Morejon MD;  Location[facility]     LASER HOLMIUM LITHOTRIPSY URETER(S), INSERT STENT, COMBINED  2013    Procedure: COMBINED CYSTOSCOPY, URETEROSCOPY, LASER HOLMIUM LITHOTRIPSY URETER(S), INSERT STENT;  Right Ureteroscopy; Stone basketing; Right Stent exchange and  removal of left stent.;  Surgeon: Tone Morejon MD;  Location: [facility]     RELEASE DUPUYTRENS CONTRACTURE Left 2018    Procedure: Release Left Hand Dupuytrens Contracture;  Surgeon: Lucio Omalley MD;  Location: [facility]     VIDEO CAPSULE ENDOSCOPY  01/15/10    [facility]     Current Outpatient Medications   Medication Sig Dispense Refill     atorvastatin (LIPITOR) 40 MG tablet Take 40 mg by mouth  3     blood glucose monitoring (ROBERT MICROLET) lancets Use to test blood sugar 1 time daily or as directed. 1 Box 2     clopidogrel (PLAVIX) 75 MG tablet Take 75 mg by mouth daily  3     CONTOUR NEXT TEST test strip USE TO TEST BLOOD SUGAR 1 TIMES DAILY OR AS DIRECTED. 100 each 3     cyanocobalamin (VITAMIN B12) 1000 MCG/ML injection Inject 1 mL (1,000 mcg) into the muscle every 30 days 1 mL 11     senna-docusate (SENOKOT-S;PERICOLACE) 8.6-50 MG per tablet Take 1-2 tablets by mouth 2 times daily 30 tablet 0     tamsulosin (FLOMAX) 0.4 MG capsule Take 0.4 mg by mouth daily       OTC products: None, except as noted above    Allergies   Allergen Reactions     Mobic [Meloxicam] Nausea and Diarrhea     Diarrhea, nausea, flu like symptoms since start of use      Latex Allergy: NO    Social History     Tobacco Use     Smoking status: Current Some Day Smoker     Packs/day: 0.00     Years: 20.00     Pack years: 0.00     Types: Pipe     Last attempt to quit: 1998     Years since quittin.7     Smokeless tobacco: Never Used     Tobacco comment: Rare pipe   Substance Use Topics     Alcohol use: No     Alcohol/week: 0.0 standard drinks     History   Drug Use  No       REVIEW OF SYSTEMS:   CONSTITUTIONAL: NEGATIVE for fever, chills, change in weight  INTEGUMENTARY/SKIN: NEGATIVE for worrisome rashes, moles or lesions  EYES: NEGATIVE for vision changes or irritation  ENT/MOUTH: NEGATIVE for ear, mouth and throat problems  RESP: NEGATIVE for significant cough or SOB  CV: NEGATIVE for chest pain, palpitations or peripheral edema  GI: NEGATIVE for nausea, abdominal pain, heartburn, or change in bowel habits  : NEGATIVE for frequency, dysuria, or hematuria  MUSCULOSKELETAL: NEGATIVE for significant arthralgias or myalgia  NEURO: NEGATIVE for weakness, dizziness or paresthesias  ENDOCRINE: NEGATIVE for temperature intolerance, skin/hair changes  HEME: NEGATIVE for bleeding problems  PSYCHIATRIC: NEGATIVE for changes in mood or affect    EXAM:   /60 (BP Location: Left arm, Patient Position: Sitting, Cuff Size: Adult Regular)   Pulse 74   Temp 97.6  F (36.4  C) (Temporal)   Resp 16   Wt 70.8 kg (156 lb)   SpO2 97%   BMI 21.16 kg/m      GENERAL APPEARANCE: healthy, alert and no distress     EYES: EOMI,  PERRL     HENT: ear canals and TM's normal and nose and mouth without ulcers or lesions     NECK: no adenopathy, no asymmetry, masses, or scars and thyroid normal to palpation     RESP: lungs clear to auscultation - no rales, rhonchi or wheezes     CV: regular rates and rhythm, normal S1 S2, no S3 or S4 and no murmur, click or rub     ABDOMEN: Abdomen is soft without rebound, rigidity or guarding.  Evidence of the hernias is noted.  At this time, they both reducible.  No strangulation or incarceration currently noted.  Tender to touch.     MS: extremities normal- no gross deformities noted, no evidence of inflammation in joints, FROM in all extremities.     SKIN: no suspicious lesions or rashes     NEURO: Normal strength and tone, sensory exam grossly normal, mentation intact and speech normal     PSYCH: mentation appears normal. and affect normal/bright      LYMPHATICS: No cervical adenopathy    DIAGNOSTICS:   EKG demonstrates a sinus rhythm with right bundle branch block.  PACs are noted.    Laboratory work is pending and will be reviewed.    Recent Labs   Lab Test 03/13/20  0831 09/16/19  0658  03/23/18  1427  08/01/17  1409   HGB 12.5* 11.8*   < > 11.9*   < > 13.8    223   < > 416   < > 189    141   < > 139   < >  --    POTASSIUM 4.4 4.3   < > 4.6   < >  --    CR 1.06 1.11   < > 1.33*   < >  --    A1C  --   --   --  5.7  --  6.1*    < > = values in this interval not displayed.        IMPRESSION:   Reason for surgery/procedure: Bilateral inguinal hernia with pain and concerns regarding impending incarceration.  Diagnosis/reason for consult: Perioperative risk assessment in a pleasant 86-year-old gentleman with:  1. Preop general physical exam  - *UA reflex to Microscopic and Culture ([place]; [facility]; [facility]; [facility]; [facility]; [facility]; [place]; [place])    2. Non-recurrent bilateral inguinal hernia without obstruction or gangrene    3. Abdominal aortic aneurysm (AAA) without rupture (H)    4. 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 Pernicious anemia  - CBC with platelets and differential    6. History of pulmonary embolism  - EKG 12-lead complete w/read - Clinics  - Basic metabolic panel  (Ca, Cl, CO2, Creat, Gluc, K, Na, BUN)      The proposed surgical procedure is considered INTERMEDIATE risk.    REVISED CARDIAC RISK INDEX  The patient has the following serious cardiovascular risks for perioperative complications such as (MI, PE, VFib and 3  AV Block):  No serious cardiac risks  INTERPRETATION: 1 risks: Class II (low risk - 0.9% complication rate)    The patient has the following additional risks for perioperative complications:  No identified additional risks      ICD-10-CM    1. Preop general physical exam  Z01.818        RECOMMENDATIONS:         --Patient is to take all scheduled  medications on the day of surgery EXCEPT for modifications listed below.    Anticoagulant or Antiplatelet Medication Use  PLAVIX: No contraindication to stopping plavix.  Stop 7-10 days prior to surgery        APPROVAL GIVEN to proceed with proposed procedure, without further diagnostic evaluation       Signed Electronically by: Harrison Tse DO    Copy of this evaluation report is provided to requesting physician.    [place] Preop Guidelines    Revised Cardiac Risk Index

## 2020-09-15 NOTE — H&P (VIEW-ONLY)
[address] [address]  [phone]  Dept: [phone]    PRE-OP EVALUATION:  Today's date: 9/15/2020    Remigio Holly (: 1933) presents for pre-operative evaluation assessment as requested by Dr. Lorenzo.  He requires evaluation and anesthesia risk assessment prior to undergoing surgery/procedure for treatment of hernia .    Proposed Surgery/ Procedure: Hernia repair   Date of Surgery/ Procedure: 2020  Time of Surgery/ Procedure: 7:30 AM  Hospital/Surgical Facility: [facility]   Surgery Fax Number: Note does not need to be faxed, will be available electronically in Epic.  Primary Physician: Harrison Tse  Type of Anesthesia Anticipated: General    Preoperative Questionnaire:   YES, HA - Have you ever had a heart attack or stroke?  No - Have you ever had surgery on your heart or blood vessels, such as a stent, coronary (heart) bypass, or surgery on an artery in the head, neck, heart, or legs?  No - Do you have chest pain when you are physically active?  No - Do you have a history of heart failure?  No - Do you currently have a cold, bronchitis, or symptoms of other respiratory (head and chest) infections?  No - Do you have a cough, shortness of breath, or wheezing?  No - Do you or anyone in your family have a history of blood clots?  No - Do you or anyone in your family have a serious bleeding problem, such as long-lasting bleeding after surgeries or cuts?  No - Have you ever had anemia or been told to take iron pills?  No - Have you had any abnormal blood loss such as black, tarry or bloody stools, or abnormal vaginal bleeding?  No - Have you ever had a blood transfusion?  Yes - Are you willing to have a blood transfusion if it is medically needed before, during, or after your surgery?  No - Have you or anyone in your family ever had problems with anesthesia (sedation for surgery)?  No - Do you have sleep apnea, excessive  snoring, or daytime drowsiness?   No - Do you have any artifical heart valves or other implanted medical devices, such as a pacemaker, defibrillator, or continuous glucose monitor?  YES, left knee - Do you have any artifical joints?  No - Are you allergic to latex?  No - Is there any chance that you may be pregnant?    Patient has a Health Care Directive or Living Will:  NO    HPI:     HPI related to upcoming procedure: Patient has a history of bilateral inguinal hernias.  Will be undergoing staged surgical correction by open technique.      See problem list for active medical problems.  Problems all longstanding and stable, except as noted/documented.  See ROS for pertinent symptoms related to these conditions.      MEDICAL HISTORY:     Patient Active Problem List    Diagnosis Date Noted     Nephrolithiasis 12/25/2012     Priority: High     Non-recurrent bilateral inguinal hernia without obstruction or gangrene 09/02/2020     Priority: Medium     Added automatically from request for surgery [id number]       History of pulmonary embolism 09/01/2020     Priority: Medium     Dupuytren's contracture of left hand 12/04/2018     Priority: Medium     Urinary retention with incomplete bladder emptying 08/20/2017     Priority: Medium     Orthostatic hypotension 08/19/2017     Priority: Medium     Thoracic aortic aneurysm without rupture (H) - 4.7 cm on 8/18/17 (4.5 cm in 8/15) 08/18/2017     Priority: Medium     Near syncope 08/17/2017     Priority: Medium     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/17) 08/15/2017     Priority: Medium     Primary osteoarthritis of both knees 07/26/2017     Priority: Medium     Bilateral knee pain 07/26/2017     Priority: Medium     Postsurgical dumping syndrome 12/19/2016     Priority: Medium     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 Provider to review       CKD (chronic kidney disease) stage 3, GFR 30-59 ml/min (H) 05/05/2013     Priority: Medium     Pain 12/25/2012     Priority: Medium     Advance Care Planning 08/16/2012     Priority: Medium     Advance Care Planning: Receipt of ACP document:  Received: Health Care Directive which was witnessed or notarized on 3-12-13.  Document previously scanned on 3-13-13.  Validation form completed and  scanned.  Code Status reflects choices in most recent ACP document.  Confirmed/documented designated decision maker(s). See permanent comments section of demographics in clinical tab. View document(s) and details by clicking on code status. Added by Sayra Maguire on 8/25/2014.  .Patient states has Advance Directive and will bring in a copy to clinic. 8/16/2012          HYPERLIPIDEMIA LDL GOAL <100 10/31/2010     Priority: Medium     GERD (gastroesophageal reflux disease) 03/26/2010     Priority: Medium     Mixed hyperlipidemia 03/24/2010     Priority: Medium     Closed fracture of calcaneus 01/06/2007     Priority: Medium     Calculus of gallbladder 01/09/2003     Priority: Medium     Problem list name updated by automated process. Provider to review       Abdominal aortic aneurysm (H) 10/22/2002     Priority: Medium     Problem list name updated by automated process. Provider to review       Chest pain      Priority: Medium     Problem list name updated by automated process. Provider to review       Pain in joint, shoulder region      Priority: Medium     Paroxysmal atrial fibrillation (H) 08/26/2010     Priority: Low     Gastric cancer (H) 08/05/2010     Priority: Low     (Problem list name updated by automated process. Provider to review and confirm.)        Past Medical History:   Diagnosis Date     Blood transfusion      Chronic gastric ulcer without mention of hemorrhage, perforation, without mention of obstruction     Ulcer,  Gastric     Gastric cancer (H)      Hemorrhage of gastrointestinal tract, unspecified 01/13/10    [facility]     Hypoglycemia, unspecified 1/26/2015     Measles without mention of complication     Measles     Mixed hyperlipidemia     Hyperlipidemia     Mumps without mention of complication     Mumps     Pulmonary embolism (H) Sept 4, 2010     Urinary calculus, unspecified     Renal stones     Varicella without mention of complication     Chickenpox     Past Surgical History:   Procedure Laterality Date     ABDOMEN SURGERY       ARTHROPLASTY KNEE Left 8/15/2017    Procedure: ARTHROPLASTY KNEE;  Left total knee replacement;  Surgeon: Jonathan Cardona DO;  Location: [facility] OR     C EXCIS STOMACH ULCER,LESN;LOCAL       CHOLECYSTECTOMY, LAPOROSCOPIC  10/6/2008    Cholecystectomy, Laparoscopic     COLONOSCOPY  1/14/10    [facility]     COLONOSCOPY  05/25/10     CYSTOSCOPY, RETROGRADES, EXTRACT STONE, INSERT STENT, COMBINED  12/25/2012    Procedure: COMBINED CYSTOSCOPY, RETROGRADES, EXTRACT STONE, INSERT STENT;  Cystoscopy, Left Stent Placement, left retrograde pylogram, uc;  Surgeon: Amador Sanchez MD;  Location: [facility] OR     ESOPHAGOSCOPY, GASTROSCOPY, DUODENOSCOPY (EGD), COMBINED  8/16/2011    Procedure:COMBINED ESOPHAGOSCOPY, GASTROSCOPY, DUODENOSCOPY (EGD), BIOPSY SINGLE OR MULTIPLE; Surgeon:TONY GARCIA; Location:[facility]     GENERAL SURGERY                           DATE:  07/07/10    Gastrectomy      COLONOSCOPY W/WO BRUSH/WASH  01/31/2006      REPAIR ROTATOR CUFF,ACUTE  3/21/2005    Right      UGI ENDOSCOPY, SIMPLE EXAM  1/13/10    [facility] UGI ENDOSCOPY, SIMPLE EXAM  05/25/10     LASER HOLMIUM LITHOTRIPSY URETER(S), INSERT STENT, COMBINED  1/5/2013    Procedure: COMBINED CYSTOSCOPY, URETEROSCOPY, LASER HOLMIUM LITHOTRIPSY URETER(S), INSERT STENT;  Bilateral  Cystoscopy, Bilateral Ureteroscopy, Bilateral retrogrades and  placement of ureteral stent right side.  Laser Holmium Lithotripsy  and Exchange  of stent left side;  Surgeon: Tone Morejon MD;  [facility]     LASER HOLMIUM LITHOTRIPSY URETER(S), INSERT STENT, COMBINED  2013    Procedure: COMBINED CYSTOSCOPY, URETEROSCOPY, LASER HOLMIUM LITHOTRIPSY URETER(S), INSERT STENT;  Right Ureteroscopy; Stone basketing; Right Stent exchange and  removal of left stent.;  Surgeon: Tone Morejon MD;  Location: [facility] OR     RELEASE DUPUYTRENS CONTRACTURE Left 2018    Procedure: Release Left Hand Dupuytrens Contracture;  Surgeon: Lucio Omalley MD;  Location: [facility]     VIDEO CAPSULE ENDOSCOPY  01/15/10    [facility]     Current Outpatient Medications   Medication Sig Dispense Refill     atorvastatin (LIPITOR) 40 MG tablet Take 40 mg by mouth  3     blood glucose monitoring (ROBERT MICROLET) lancets Use to test blood sugar 1 time daily or as directed. 1 Box 2     clopidogrel (PLAVIX) 75 MG tablet Take 75 mg by mouth daily  3     CONTOUR NEXT TEST test strip USE TO TEST BLOOD SUGAR 1 TIMES DAILY OR AS DIRECTED. 100 each 3     cyanocobalamin (VITAMIN B12) 1000 MCG/ML injection Inject 1 mL (1,000 mcg) into the muscle every 30 days 1 mL 11     senna-docusate (SENOKOT-S;PERICOLACE) 8.6-50 MG per tablet Take 1-2 tablets by mouth 2 times daily 30 tablet 0     tamsulosin (FLOMAX) 0.4 MG capsule Take 0.4 mg by mouth daily       OTC products: None, except as noted above    Allergies   Allergen Reactions     Mobic [Meloxicam] Nausea and Diarrhea     Diarrhea, nausea, flu like symptoms since start of use      Latex Allergy: NO    Social History     Tobacco Use     Smoking status: Current Some Day Smoker     Packs/day: 0.00     Years: 20.00     Pack years: 0.00     Types: Pipe     Last attempt to quit: 1998     Years since quittin.7     Smokeless tobacco: Never Used     Tobacco comment: Rare pipe   Substance Use Topics     Alcohol use: No     Alcohol/week: 0.0 standard drinks     History   Drug Use  No       REVIEW OF SYSTEMS:   CONSTITUTIONAL: NEGATIVE for fever, chills, change in weight  INTEGUMENTARY/SKIN: NEGATIVE for worrisome rashes, moles or lesions  EYES: NEGATIVE for vision changes or irritation  ENT/MOUTH: NEGATIVE for ear, mouth and throat problems  RESP: NEGATIVE for significant cough or SOB  CV: NEGATIVE for chest pain, palpitations or peripheral edema  GI: NEGATIVE for nausea, abdominal pain, heartburn, or change in bowel habits  : NEGATIVE for frequency, dysuria, or hematuria  MUSCULOSKELETAL: NEGATIVE for significant arthralgias or myalgia  NEURO: NEGATIVE for weakness, dizziness or paresthesias  ENDOCRINE: NEGATIVE for temperature intolerance, skin/hair changes  HEME: NEGATIVE for bleeding problems  PSYCHIATRIC: NEGATIVE for changes in mood or affect    EXAM:   /60 (BP Location: Left arm, Patient Position: Sitting, Cuff Size: Adult Regular)   Pulse 74   Temp 97.6  F (36.4  C) (Temporal)   Resp 16   Wt 70.8 kg (156 lb)   SpO2 97%   BMI 21.16 kg/m      GENERAL APPEARANCE: healthy, alert and no distress     EYES: EOMI,  PERRL     HENT: ear canals and TM's normal and nose and mouth without ulcers or lesions     NECK: no adenopathy, no asymmetry, masses, or scars and thyroid normal to palpation     RESP: lungs clear to auscultation - no rales, rhonchi or wheezes     CV: regular rates and rhythm, normal S1 S2, no S3 or S4 and no murmur, click or rub     ABDOMEN: Abdomen is soft without rebound, rigidity or guarding.  Evidence of the hernias is noted.  At this time, they both reducible.  No strangulation or incarceration currently noted.  Tender to touch.     MS: extremities normal- no gross deformities noted, no evidence of inflammation in joints, FROM in all extremities.     SKIN: no suspicious lesions or rashes     NEURO: Normal strength and tone, sensory exam grossly normal, mentation intact and speech normal     PSYCH: mentation appears normal. and affect normal/bright      LYMPHATICS: No cervical adenopathy    DIAGNOSTICS:   EKG demonstrates a sinus rhythm with right bundle branch block.  PACs are noted.    Laboratory work is pending and will be reviewed.    Recent Labs   Lab Test 03/13/20  0831 09/16/19  0658  03/23/18  1427  08/01/17  1409   HGB 12.5* 11.8*   < > 11.9*   < > 13.8    223   < > 416   < > 189    141   < > 139   < >  --    POTASSIUM 4.4 4.3   < > 4.6   < >  --    CR 1.06 1.11   < > 1.33*   < >  --    A1C  --   --   --  5.7  --  6.1*    < > = values in this interval not displayed.        IMPRESSION:   Reason for surgery/procedure: Bilateral inguinal hernia with pain and concerns regarding impending incarceration.  Diagnosis/reason for consult: Perioperative risk assessment in a pleasant 86-year-old gentleman with:  1. Preop general physical exam  - *UA reflex to Microscopic and Culture ([place]; [facility]; [facility]; [facility]; [facility]; [facility]; [place]; [place])    2. Non-recurrent bilateral inguinal hernia without obstruction or gangrene    3. Abdominal aortic aneurysm (AAA) without rupture (H)    4. 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 Pernicious anemia  - CBC with platelets and differential    6. History of pulmonary embolism  - EKG 12-lead complete w/read - Clinics  - Basic metabolic panel  (Ca, Cl, CO2, Creat, Gluc, K, Na, BUN)      The proposed surgical procedure is considered INTERMEDIATE risk.    REVISED CARDIAC RISK INDEX  The patient has the following serious cardiovascular risks for perioperative complications such as (MI, PE, VFib and 3  AV Block):  No serious cardiac risks  INTERPRETATION: 1 risks: Class II (low risk - 0.9% complication rate)    The patient has the following additional risks for perioperative complications:  No identified additional risks      ICD-10-CM    1. Preop general physical exam  Z01.818        RECOMMENDATIONS:         --Patient is to take all scheduled  medications on the day of surgery EXCEPT for modifications listed below.    Anticoagulant or Antiplatelet Medication Use  PLAVIX: No contraindication to stopping plavix.  Stop 7-10 days prior to surgery        APPROVAL GIVEN to proceed with proposed procedure, without further diagnostic evaluation       Signed Electronically by: Harrison Tse DO    Copy of this evaluation report is provided to requesting physician.    [facility] Preop Guidelines    Revised Cardiac Risk Index

## 2020-09-15 NOTE — PATIENT INSTRUCTIONS
"Stop CLOPIDOGREL now.                        Preparing for Your Surgery  Getting started  A surgery nurse will call you to review your health history and instructions. They will give you an arrival time based on your scheduled surgery time.  Please be ready to share the following:    Your doctor's clinic name and phone number    Your medical, surgical and anesthesia history    A list of allergies and sensitivities    A list of medicines, including herbal treatments and over-the-counter drugs    Whether the patient has a legal guardian (ask how to send us the papers in advance)  If your child is having surgery, please ask for a copy of Preparing for Your Child's Surgery.    Preparing for surgery    Within 30 days of surgery: Have an exam at your family clinic (primary care clinic), or go to a pre-operative clinic. This exam is called a \"History and Physical,\" or H&P.    At your H&P exam, talk to your care team about all medicines you take. If you need to stop any medicines before surgery, ask when to start taking them again.  ? We do this for your safety. Many medicines can make you bleed too much during surgery. Some change how well surgery (anesthesia) drugs work.    Call your insurance company to see what it will and won't pay for. Ask if they need to pre-approve the surgery. (If no insurance, call [phone].)    Call your surgeon's clinic if there's any change in your health. This includes signs of a cold or flu (sore throat, runny nose, cough, rash, fever). It also includes a scrape or scratch near the surgery site.    If you have questions on the day of surgery, call your surgery center.  Eating and drinking guidelines  For your safety: Unless your surgeon tells you otherwise, follow the guidelines below.    Eat and drink as usual until 8 hours before surgery. After that, no food or milk.    Drink clear liquids until 2 hours before surgery. These are liquids you can see through, like water, Gatorade and " Propel Water. You may also have black coffee and tea (no cream or milk).    Nothing by mouth within 2 hours of surgery. This includes gum, candy and breath mints.    Stop alcohol the midnight before surgery.    If your family clinic tells you to take medicine on the morning of surgery, it's okay to take it with a sip of water.  Preventing infection    Shower or bathe the night before and morning of your surgery. Follow the instructions your clinic gave you. (If no instructions, use regular soap.)    Don't shave or clip hair near your surgery site. This can lead to skin infection.    Don't smoke the morning of surgery. Smoking increases the risk of infection. You may chew nicotine gum up to 2 hours before surgery. A nicotine patch is okay.  ? Note: Some surgeries require you to completely quit smoking and nicotine. Check with your surgeon.    Your care team will make every effort to keep you safe from infection. We will:  ? Clean our hands often with soap and water (or an alcohol-based hand rub).  ? Clean the skin at your surgery site with a special soap that kills germs. We'll also remove hair from the site as needed.  ? Wear special hair covers, masks, gowns and gloves during surgery.  ? Give antibiotic medicine, if prescribed. Not all surgeries need antibiotics.  What to bring on the day of surgery    Photo ID and insurance card    Copy of your health care directive, if you have one    Glasses and hearing aides (bring cases)  ? You can't wear contacts during surgery    Inhaler and eye drops, if you use them (tell us about these when you arrive)    CPAP machine or breathing device, if you use them    A few personal items, if spending the night    If you have . . .  ? A pacemaker or ICD (cardiac defibrillator): Bring the ID card.  ? An implanted stimulator: Bring the remote control.  ? A legal guardian: Bring a copy of the certified (court-stamped) guardianship papers.  Please remove any jewelry, including body  piercings. Leave jewelry and other valuables at home.  If you're going home the day of surgery  Important: If you don't follow the rules below, we must cancel your surgery.     Arrange for someone to drive you home after surgery. You may not drive, take a taxi or take public transportation by yourself (unless you'll have local anesthesia only).    Arrange for a responsible adult to stay with you overnight. If you don't, we may keep you in the hospital overnight, and you may need to pay the costs yourself.  Questions?   If you have any questions for your care team, list them here: _________________________________________________________________________________________________________________________________________________________________________________________________________________________________________________________________________________________________________________________  For informational purposes only. Not to replace the advice of your health care provider. Copyright   9187-6342 [facility]. All rights reserved. Clinically reviewed by Sandra Ashraf MD. [employer] 193029 - REV 07/19.

## 2020-09-17 NOTE — RESULT ENCOUNTER NOTE
Dear Remigio, your recent test results are attached.    Urinary analysis is negative.  Chemistry panel shows a minimally elevated blood sugar 105 and slightly decreased renal function which is stable.  The blood cell count shows a mild anemia at 12.4 which is actually improved over last year.      You will be contacted with any outstanding results when they are available.  Feel free to contact me via the office or My Chart if you have any questions regarding the above.  Sincerely,  Harrison Tse DO FACOI

## 2020-09-21 ENCOUNTER — VIRTUAL VISIT (OUTPATIENT)
Dept: INTERNAL MEDICINE | Facility: CLINIC | Age: 85
End: 2020-09-21
Payer: COMMERCIAL

## 2020-09-21 DIAGNOSIS — Z53.9 ERRONEOUS ENCOUNTER--DISREGARD: Primary | ICD-10-CM

## 2020-09-21 PROCEDURE — 99207 ZZC NO BILLABLE SERVICE THIS VISIT: CPT | Performed by: INTERNAL MEDICINE

## 2020-09-21 NOTE — PROGRESS NOTES
"Remigio Holly is a 86 year old male who is being evaluated via a billable telephone visit.      The patient has been notified of following:     \"This telephone visit will be conducted via a call between you and your physician/provider. We have found that certain health care needs can be provided without the need for a physical exam.  This service lets us provide the care you need with a short phone conversation.  If a prescription is necessary we can send it directly to your pharmacy.  If lab work is needed we can place an order for that and you can then stop by our lab to have the test done at a later time.    Telephone visits are billed at different rates depending on your insurance coverage. During this emergency period, for some insurers they may be billed the same as an in-person visit.  Please reach out to your insurance provider with any questions.    If during the course of the call the physician/provider feels a telephone visit is not appropriate, you will not be charged for this service.\"    Patient has given verbal consent for Telephone visit?  Yes    What phone number would you like to be contacted at? [phone]    How would you like to obtain your AVS? Stephaniet    Subjective     Remigio Holly is a 86 year old male who presents via phone visit today for the following health issues:    HPI  Patient had form.  Patient lost form.  Will fill out form again when patient gets form to me.  No visit.  No charge.    Dedrick  "

## 2020-09-22 ENCOUNTER — TELEPHONE (OUTPATIENT)
Dept: FAMILY MEDICINE | Facility: OTHER | Age: 85
End: 2020-09-22

## 2020-09-22 DIAGNOSIS — Z11.59 ENCOUNTER FOR SCREENING FOR OTHER VIRAL DISEASES: ICD-10-CM

## 2020-09-22 PROCEDURE — U0003 INFECTIOUS AGENT DETECTION BY NUCLEIC ACID (DNA OR RNA); SEVERE ACUTE RESPIRATORY SYNDROME CORONAVIRUS 2 (SARS-COV-2) (CORONAVIRUS DISEASE [COVID-19]), AMPLIFIED PROBE TECHNIQUE, MAKING USE OF HIGH THROUGHPUT TECHNOLOGIES AS DESCRIBED BY CMS-2020-01-R: HCPCS | Performed by: SURGERY

## 2020-09-22 NOTE — TELEPHONE ENCOUNTER
Reason for Call:  Form, our goal is to have forms completed with 72 hours, however, some forms may require a visit or additional information.    Type of letter, form or note:  Loss of consciousness or volontary contro;    Who is the form from?: Patient    Where did the form come from: Patient or family brought in       What clinic location was the form placed at?: [facility]    Where the form was placed: Dr MAYS Box/Folder    What number is listed as a contact on the form?: [phone]       Additional comments:     Call taken on 9/22/2020 at 3:03 PM by Jaycee Martino

## 2020-09-23 LAB
SARS-COV-2 RNA SPEC QL NAA+PROBE: NOT DETECTED
SPECIMEN SOURCE: NORMAL

## 2020-09-23 NOTE — TELEPHONE ENCOUNTER
This is ok to wait until Dr. Tse is back in clinic. It was filled out once before and the patient misplaced it. Dr. Tse is aware that patient was dropping this off. Nella Nath MA     9/23/2020

## 2020-09-23 NOTE — TELEPHONE ENCOUNTER
Please confirm this request. There is no Epic information available for patient.  Emiliano Smtih MD

## 2020-09-23 NOTE — TELEPHONE ENCOUNTER
This needs to be faxed before the 30, routing to myself and Dr. Tse to make sure it gets done.  Nella Nath MA     9/23/2020

## 2020-09-24 NOTE — TELEPHONE ENCOUNTER
Thank you Nella.    Just put it in my [place] basket and I will do it again when I return on Monday.    Dedrick    (PS.  Conference is so boring that I am actually poking myself in the eye just to stay awake.)

## 2020-09-25 ENCOUNTER — ANESTHESIA (OUTPATIENT)
Dept: SURGERY | Facility: CLINIC | Age: 85
End: 2020-09-25
Payer: MEDICARE

## 2020-09-25 ENCOUNTER — HOSPITAL ENCOUNTER (OUTPATIENT)
Facility: CLINIC | Age: 85
Discharge: HOME | End: 2020-09-25
Attending: SURGERY | Admitting: SURGERY
Payer: MEDICARE

## 2020-09-25 ENCOUNTER — ANESTHESIA EVENT (OUTPATIENT)
Dept: SURGERY | Facility: CLINIC | Age: 85
End: 2020-09-25
Payer: MEDICARE

## 2020-09-25 VITALS
SYSTOLIC BLOOD PRESSURE: 127 MMHG | TEMPERATURE: 96.3 F | DIASTOLIC BLOOD PRESSURE: 60 MMHG | RESPIRATION RATE: 16 BRPM | OXYGEN SATURATION: 97 % | HEART RATE: 99 BPM

## 2020-09-25 DIAGNOSIS — K40.20 NON-RECURRENT BILATERAL INGUINAL HERNIA WITHOUT OBSTRUCTION OR GANGRENE: ICD-10-CM

## 2020-09-25 DIAGNOSIS — Z98.890 S/P LEFT INGUINAL HERNIORRHAPHY: Primary | ICD-10-CM

## 2020-09-25 DIAGNOSIS — Z87.19 S/P LEFT INGUINAL HERNIORRHAPHY: Primary | ICD-10-CM

## 2020-09-25 PROCEDURE — 49505 PRP I/HERN INIT REDUC >5 YR: CPT | Mod: LT | Performed by: SURGERY

## 2020-09-25 PROCEDURE — 25000125 ZZHC RX 250: Performed by: NURSE ANESTHETIST, CERTIFIED REGISTERED

## 2020-09-25 PROCEDURE — C1781 MESH (IMPLANTABLE): HCPCS | Performed by: SURGERY

## 2020-09-25 PROCEDURE — 71000027 ZZH RECOVERY PHASE 2 EACH 15 MINS: Performed by: SURGERY

## 2020-09-25 PROCEDURE — 40000306 ZZH STATISTIC PRE PROC ASSESS II: Performed by: SURGERY

## 2020-09-25 PROCEDURE — 25800030 ZZH RX IP 258 OP 636: Performed by: NURSE ANESTHETIST, CERTIFIED REGISTERED

## 2020-09-25 PROCEDURE — 25000128 H RX IP 250 OP 636: Performed by: SURGERY

## 2020-09-25 PROCEDURE — 25000128 H RX IP 250 OP 636: Performed by: NURSE ANESTHETIST, CERTIFIED REGISTERED

## 2020-09-25 PROCEDURE — 37000009 ZZH ANESTHESIA TECHNICAL FEE, EACH ADDTL 15 MIN: Performed by: SURGERY

## 2020-09-25 PROCEDURE — 36000050 ZZH SURGERY LEVEL 2 1ST 30 MIN: Performed by: SURGERY

## 2020-09-25 PROCEDURE — 36000052 ZZH SURGERY LEVEL 2 EA 15 ADDTL MIN: Performed by: SURGERY

## 2020-09-25 PROCEDURE — 25000125 ZZHC RX 250: Performed by: SURGERY

## 2020-09-25 PROCEDURE — 27210794 ZZH OR GENERAL SUPPLY STERILE: Performed by: SURGERY

## 2020-09-25 PROCEDURE — 37000008 ZZH ANESTHESIA TECHNICAL FEE, 1ST 30 MIN: Performed by: SURGERY

## 2020-09-25 DEVICE — MESH PROLENE 6X6" SOFT: Type: IMPLANTABLE DEVICE | Site: INGUINAL | Status: FUNCTIONAL

## 2020-09-25 RX ORDER — FENTANYL CITRATE 50 UG/ML
INJECTION, SOLUTION INTRAMUSCULAR; INTRAVENOUS PRN
Status: DISCONTINUED | OUTPATIENT
Start: 2020-09-25 | End: 2020-09-25

## 2020-09-25 RX ORDER — SODIUM CHLORIDE, SODIUM LACTATE, POTASSIUM CHLORIDE, CALCIUM CHLORIDE 600; 310; 30; 20 MG/100ML; MG/100ML; MG/100ML; MG/100ML
INJECTION, SOLUTION INTRAVENOUS CONTINUOUS
Status: DISCONTINUED | OUTPATIENT
Start: 2020-09-25 | End: 2020-09-25 | Stop reason: HOSPADM

## 2020-09-25 RX ORDER — ONDANSETRON 2 MG/ML
4 INJECTION INTRAMUSCULAR; INTRAVENOUS EVERY 30 MIN PRN
Status: DISCONTINUED | OUTPATIENT
Start: 2020-09-25 | End: 2020-09-25 | Stop reason: HOSPADM

## 2020-09-25 RX ORDER — CEFAZOLIN SODIUM 2 G/100ML
2 INJECTION, SOLUTION INTRAVENOUS
Status: COMPLETED | OUTPATIENT
Start: 2020-09-25 | End: 2020-09-25

## 2020-09-25 RX ORDER — LIDOCAINE HYDROCHLORIDE 20 MG/ML
INJECTION, SOLUTION INFILTRATION; PERINEURAL PRN
Status: DISCONTINUED | OUTPATIENT
Start: 2020-09-25 | End: 2020-09-25

## 2020-09-25 RX ORDER — OXYCODONE HYDROCHLORIDE 5 MG/1
5 TABLET ORAL
Status: DISCONTINUED | OUTPATIENT
Start: 2020-09-25 | End: 2020-09-25 | Stop reason: HOSPADM

## 2020-09-25 RX ORDER — ONDANSETRON 2 MG/ML
INJECTION INTRAMUSCULAR; INTRAVENOUS PRN
Status: DISCONTINUED | OUTPATIENT
Start: 2020-09-25 | End: 2020-09-25

## 2020-09-25 RX ORDER — HEPARIN SODIUM 5000 [USP'U]/.5ML
5000 INJECTION, SOLUTION INTRAVENOUS; SUBCUTANEOUS
Status: COMPLETED | OUTPATIENT
Start: 2020-09-25 | End: 2020-09-25

## 2020-09-25 RX ORDER — PROPOFOL 10 MG/ML
INJECTION, EMULSION INTRAVENOUS CONTINUOUS PRN
Status: DISCONTINUED | OUTPATIENT
Start: 2020-09-25 | End: 2020-09-25

## 2020-09-25 RX ORDER — FENTANYL CITRATE 50 UG/ML
25-50 INJECTION, SOLUTION INTRAMUSCULAR; INTRAVENOUS
Status: DISCONTINUED | OUTPATIENT
Start: 2020-09-25 | End: 2020-09-25 | Stop reason: HOSPADM

## 2020-09-25 RX ORDER — BUPIVACAINE HYDROCHLORIDE AND EPINEPHRINE 2.5; 5 MG/ML; UG/ML
INJECTION, SOLUTION INFILTRATION; PERINEURAL PRN
Status: DISCONTINUED | OUTPATIENT
Start: 2020-09-25 | End: 2020-09-25 | Stop reason: HOSPADM

## 2020-09-25 RX ORDER — ONDANSETRON 4 MG/1
4 TABLET, ORALLY DISINTEGRATING ORAL EVERY 30 MIN PRN
Status: DISCONTINUED | OUTPATIENT
Start: 2020-09-25 | End: 2020-09-25 | Stop reason: HOSPADM

## 2020-09-25 RX ORDER — DEXAMETHASONE SODIUM PHOSPHATE 4 MG/ML
4 INJECTION, SOLUTION INTRA-ARTICULAR; INTRALESIONAL; INTRAMUSCULAR; INTRAVENOUS; SOFT TISSUE EVERY 10 MIN PRN
Status: DISCONTINUED | OUTPATIENT
Start: 2020-09-25 | End: 2020-09-25 | Stop reason: HOSPADM

## 2020-09-25 RX ORDER — OXYCODONE HYDROCHLORIDE 5 MG/1
5 TABLET ORAL EVERY 6 HOURS PRN
Qty: 12 TABLET | Refills: 0 | Status: SHIPPED | OUTPATIENT
Start: 2020-09-25 | End: 2020-11-04

## 2020-09-25 RX ORDER — NALOXONE HYDROCHLORIDE 0.4 MG/ML
.1-.4 INJECTION, SOLUTION INTRAMUSCULAR; INTRAVENOUS; SUBCUTANEOUS
Status: DISCONTINUED | OUTPATIENT
Start: 2020-09-25 | End: 2020-09-25 | Stop reason: HOSPADM

## 2020-09-25 RX ORDER — CEFAZOLIN SODIUM 1 G/3ML
1 INJECTION, POWDER, FOR SOLUTION INTRAMUSCULAR; INTRAVENOUS SEE ADMIN INSTRUCTIONS
Status: DISCONTINUED | OUTPATIENT
Start: 2020-09-25 | End: 2020-09-25 | Stop reason: HOSPADM

## 2020-09-25 RX ORDER — PROPOFOL 10 MG/ML
INJECTION, EMULSION INTRAVENOUS PRN
Status: DISCONTINUED | OUTPATIENT
Start: 2020-09-25 | End: 2020-09-25

## 2020-09-25 RX ADMIN — LIDOCAINE HYDROCHLORIDE 1 ML: 10 INJECTION, SOLUTION EPIDURAL; INFILTRATION; INTRACAUDAL; PERINEURAL at 06:51

## 2020-09-25 RX ADMIN — HEPARIN SODIUM 5000 UNITS: 5000 INJECTION, SOLUTION INTRAVENOUS; SUBCUTANEOUS at 07:41

## 2020-09-25 RX ADMIN — PROPOFOL 130 MCG/KG/MIN: 10 INJECTION, EMULSION INTRAVENOUS at 07:34

## 2020-09-25 RX ADMIN — CEFAZOLIN SODIUM 2 G: 2 INJECTION, SOLUTION INTRAVENOUS at 07:35

## 2020-09-25 RX ADMIN — FENTANYL CITRATE 25 MCG: 50 INJECTION, SOLUTION INTRAMUSCULAR; INTRAVENOUS at 08:17

## 2020-09-25 RX ADMIN — PROPOFOL 30 MG: 10 INJECTION, EMULSION INTRAVENOUS at 07:34

## 2020-09-25 RX ADMIN — FENTANYL CITRATE 25 MCG: 50 INJECTION, SOLUTION INTRAMUSCULAR; INTRAVENOUS at 08:51

## 2020-09-25 RX ADMIN — FENTANYL CITRATE 50 MCG: 50 INJECTION, SOLUTION INTRAMUSCULAR; INTRAVENOUS at 07:29

## 2020-09-25 RX ADMIN — ONDANSETRON 4 MG: 2 INJECTION INTRAMUSCULAR; INTRAVENOUS at 08:00

## 2020-09-25 RX ADMIN — SODIUM CHLORIDE, POTASSIUM CHLORIDE, SODIUM LACTATE AND CALCIUM CHLORIDE: 600; 310; 30; 20 INJECTION, SOLUTION INTRAVENOUS at 06:50

## 2020-09-25 RX ADMIN — LIDOCAINE HYDROCHLORIDE 40 MG: 20 INJECTION, SOLUTION INFILTRATION; PERINEURAL at 07:34

## 2020-09-25 SDOH — HEALTH STABILITY: MENTAL HEALTH: CURRENT SMOKER: 0

## 2020-09-25 ASSESSMENT — LIFESTYLE VARIABLES: TOBACCO_USE: 1

## 2020-09-25 ASSESSMENT — ENCOUNTER SYMPTOMS: DYSRHYTHMIAS: 1

## 2020-09-25 NOTE — ANESTHESIA PREPROCEDURE EVALUATION
Anesthesia Pre-Procedure Evaluation    Patient: Remigio Holly   MRN: [id number] : 1933          Preoperative Diagnosis: Non-recurrent bilateral inguinal hernia without obstruction or gangrene [K40.20]    Procedure(s):  Open left inguinal hernia repair with mesh    Past Medical History:   Diagnosis Date     Blood transfusion      Chronic gastric ulcer without mention of hemorrhage, perforation, without mention of obstruction     Ulcer, Gastric     Gastric cancer (H)      Hemorrhage of gastrointestinal tract, unspecified 01/13/10    [facility]     Hypoglycemia, unspecified 2015     Measles without mention of complication     Measles     Mixed hyperlipidemia     Hyperlipidemia     Mumps without mention of complication     Mumps     Pulmonary embolism (H) 2010     Urinary calculus, unspecified     Renal stones     Varicella without mention of complication     Chickenpox     Past Surgical History:   Procedure Laterality Date     ABDOMEN SURGERY       ARTHROPLASTY KNEE Left 8/15/2017    Procedure: ARTHROPLASTY KNEE;  Left total knee replacement;  Surgeon: Jonathan Cardona DO;  Location: [facility] OR     C EXCIS STOMACH ULCER,LESN;LOCAL       CHOLECYSTECTOMY, LAPOROSCOPIC  10/6/2008    Cholecystectomy, Laparoscopic     COLONOSCOPY  1/14/10    [facility]     COLONOSCOPY  05/25/10     CYSTOSCOPY, RETROGRADES, EXTRACT STONE, INSERT STENT, COMBINED  2012    Procedure: COMBINED CYSTOSCOPY, RETROGRADES, EXTRACT STONE, INSERT STENT;  Cystoscopy, Left Stent Placement, left retrograde pylogram, uc;  Surgeon: Amador Sanchez MD;  Location: [facility] OR     ESOPHAGOSCOPY, GASTROSCOPY, DUODENOSCOPY (EGD), COMBINED  2011    Procedure:COMBINED ESOPHAGOSCOPY, GASTROSCOPY, DUODENOSCOPY (EGD), BIOPSY SINGLE OR MULTIPLE; Surgeon:TONY GARCIA; Location:[facility]     GENERAL SURGERY                           DATE:  07/07/10    Gastrectomy     HC COLONOSCOPY W/WO BRUSH/WASH   01/31/2006     HC REPAIR ROTATOR CUFF,ACUTE  3/21/2005    Right     HC UGI ENDOSCOPY, SIMPLE EXAM  1/13/10    [facility]     HC UGI ENDOSCOPY, SIMPLE EXAM  05/25/10     LASER HOLMIUM LITHOTRIPSY URETER(S), INSERT STENT, COMBINED  1/5/2013    Procedure: COMBINED CYSTOSCOPY, URETEROSCOPY, LASER HOLMIUM LITHOTRIPSY URETER(S), INSERT STENT;  Bilateral  Cystoscopy, Bilateral Ureteroscopy, Bilateral retrogrades and  placement of ureteral stent right side. Laser Holmium Lithotripsy  and Exchange  of stent left side;  Surgeon: Tone Morejon MD;  Location[facility]     LASER HOLMIUM LITHOTRIPSY URETER(S), INSERT STENT, COMBINED  1/30/2013    Procedure: COMBINED CYSTOSCOPY, URETEROSCOPY, LASER HOLMIUM LITHOTRIPSY URETER(S), INSERT STENT;  Right Ureteroscopy; Stone basketing; Right Stent exchange and  removal of left stent.;  Surgeon: Tone Morejon MD;  Location: [facility]     RELEASE DUPUYTRENS CONTRACTURE Left 11/26/2018    Procedure: Release Left Hand Dupuytrens Contracture;  Surgeon: Lucio Omalley MD;  Location: [facility] OR     VIDEO CAPSULE ENDOSCOPY  01/15/10    [facility]       Anesthesia Evaluation     . Pt has had prior anesthetic. Type: General and MAC    No history of anesthetic complications          ROS/MED HX    ENT/Pulmonary:     (+)tobacco use, Current use Rare pipe use packs/day  , . .    Neurologic:  - neg neurologic ROS     Cardiovascular: Comment: cath on 3/5/18: severe two vessel CAD.  Transthoracic echo showed inferolateral hypokinesis with mild LV dysfunction, EF 45-50%, mild MR, mild TR, and mildly dilated aortic root measuring 4.3 cm.  Carotid ultrasound showed 0-15% stenosis of the right carotid and 16-49% stenosis of the left carotid.  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      3/7/20: PCI to the RCA and MID LAD.  Surgical revascularization recommended as the LAD stent was under expanded due to heavy calcification  and there were concerns for likelihood of stent thrombosis.      3/14/18: Off-pump single vessel LIMA-LAD bypass.    (+) Dyslipidemia, --CAD, -past MI,CABG-date: 3/14/18: minimally invasive CABG x1 (LIMA-LAD), stent,PCI to the RCA and MID LAD   2 Drug Eluting Stent .. : . . . :. dysrhythmias (Paroxysmal a-fib) a-fib, . Previous cardiac testing Echodate:3/4/2018results:LEFT ATRIUM: The left atrium is mildly dilated.     MITRAL VALVE: The mitral valve appears mildly thickened. There is mild prolapse of posterior leaflet.  There is mild to moderate mitral regurgitation. There is no evidence of mitral stenosis.     LEFT VENTRICLE:    Left ventricular internal chamber dimensions and wall thickness appear normal. There is inferolateral hypokinesis. Overall ejection fraction of 45-50%. There is stage I diastolic dysfunction.     AORTIC VALVE:    The aortic valve is trileaflet. There is no regurgitation or stenosis.     AORTA:    The aortic root is mildly dilated measuring 4.3 cm.      RIGHT ATRIUM:    Normal.    TRICUSPID VALVE:    The tricuspid valve is structurally normal with mild tricuspid regurgitation.   Peak systolic velocity of 2.2 m/sec. Estimated right ventricular systolic pressures are normal.      RIGHT VENTRICLE:    Normal size and function.     PULMONIC VALVE:    Normal.    PERICARDIUM:    No pericardial effusion.      CONCLUSION:  1. Inferolateral hypokinesis with mild LV dysfunction.  Ejection fraction is 45-50%.     2. Mild mitral regurgitation.     3. Mild tricuspid regurgitation.     4. Mildly dilated aortic root measuring 4.3 cm.      5. No prior studies available for comparison.date: results:ECG reviewed date:9/15/20 results:SR with RBBB date: results:          METS/Exercise Tolerance:  >4 METS   Hematologic: Comments: Pt as a history of pulmonary embolism. Pernicious Anemia    (+) History of blood clots pt is not anticoagulated, Anemia, History of Transfusion no previous transfusion reaction -       Musculoskeletal:   (+) arthritis,  -       GI/Hepatic:     (+) GERD Asymptomatic on medication, Other GI/Hepatic hx of gastric cancer s/p total gastrectomy and steven-en-y esophagojejunal anastomosis.       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      Psychiatric:  - neg psychiatric ROS       Infectious Disease:  - neg infectious disease ROS       Malignancy:   (+) Malignancy History of GI  GI CA  Remission status post Surgery, Chemo and Radiation, Pt had surgery for GI CA in 2010        Other:    - neg other ROS                      Physical Exam  Normal systems: pulmonary    Airway   Mallampati: II  TM distance: >3 FB  Neck ROM: full    Dental   (+) lower dentures and partials  Comment: Upper partial, lower denture    Cardiovascular   Rhythm and rate: irregular and normal      Pulmonary    breath sounds clear to auscultation            Lab Results   Component Value Date    WBC 5.6 09/15/2020    HGB 12.4 (L) 09/15/2020    HCT 40.3 09/15/2020     09/15/2020    CRP <2.9 07/29/2016    SED 9 07/29/2016     09/15/2020    POTASSIUM 4.3 09/15/2020    CHLORIDE 105 09/15/2020    CO2 27 09/15/2020    BUN 24 09/15/2020    CR 1.19 09/15/2020     (H) 09/15/2020    CARMEN 9.2 09/15/2020    PHOS 4.3 10/01/2010    MAG 2.0 10/01/2010    ALBUMIN 3.7 09/16/2019    PROTTOTAL 6.8 09/16/2019    ALT 19 09/16/2019    AST 25 09/16/2019    ALKPHOS 88 09/16/2019    BILITOTAL 0.5 09/16/2019    LIPASE 68 (L) 09/16/2019    AMYLASE 46 07/29/2016    PTT 30 09/08/2010    INR 1.08 08/16/2011    FIBR 259 07/07/2010    TSH 3.55 07/29/2016       Preop Vitals  BP Readings from Last 3 Encounters:   09/25/20 (!) 140/97   09/15/20 128/60   09/01/20 132/68    Pulse Readings from Last 3  Encounters:   09/25/20 60   09/15/20 74   09/01/20 72      Resp Readings from Last 3 Encounters:   09/25/20 16   09/15/20 16   09/01/20 18    SpO2 Readings from Last 3 Encounters:   09/25/20 99%   09/15/20 97%   09/01/20 98%      Temp Readings from Last 1 Encounters:   09/25/20 97.7  F (36.5  C) (Oral)    Ht Readings from Last 1 Encounters:   03/13/20 1.829 m (6')      Wt Readings from Last 1 Encounters:   09/15/20 70.8 kg (156 lb)    Estimated body mass index is 21.16 kg/m  as calculated from the following:    Height as of 3/13/20: 1.829 m (6').    Weight as of 9/15/20: 70.8 kg (156 lb).       Anesthesia Plan      History & Physical Review  History and physical reviewed and following examination; no interval change.    ASA Status:  3 .    NPO Status:  > 8 hours    Plan for MAC with Intravenous and Propofol induction. Maintenance will be Balanced.  Reason for MAC:  Deep or markedly invasive procedure (G8)  PONV prophylaxis:  Ondansetron (or other 5HT-3) and Dexamethasone or Solumedrol    The patient is not a current smoker  and patient did not smoke on day of surgery     Postoperative Care  Postoperative pain management:  IV analgesics, Oral pain medications and Multi-modal analgesia.      Consents  Anesthetic plan, risks, benefits and alternatives discussed with:  Patient.  Use of blood products discussed: No .   .                 DISHA Varghese CRNA

## 2020-09-25 NOTE — DISCHARGE INSTRUCTIONS
[facility]    Home Care Following Hernia Repair    [facility], DO      Hernia Type:  Inguinal, left    Pain meds:     600mg ibuprofen every 6hrs prn     650mg of acetaminophen every 6hrs prn    You can alternate these meds so that you take one every 3 hrs.      Make sure you do not go over 4000mg of acetaminophen every 24hrs, especially if you are taking Norco or percocet 5/325mg.    Care of the Incision:    Remove gauze dressing (if present) after 48 hours.    If surgical glue was used, keep your incision dry for 24 hours.  Then you may shower, but don t submerge under water for at least 2 weeks.  Gently pat your incision dry with a freshly laundered towel.    Do not touch your incision with bare hands or pick at scabs.    Leave your incision open to air.  Cover it only if clothing rubs or irritates it.  Activity:    Gradually increase your activity.  Walk short distances several times each day and increase the distance as your strength allows.    To promote circulation, do not cross your legs while sitting.    No strenuous lifting or straining for 2-3 weeks.   Do not lift anything over 10-20 pounds until your doctor approves an increase.    Return to work will be determined by the type of work you do and should be discussed with your physician.    Do not drive or operate equipment while taking prescription pain medicines.  You may drive 1 week after surgery if you have stopped taking prescription pain medicines and are pain-free enough to react quickly and make an emergency stop if necessary.    Diet:    Return to the diet you were on before surgery.    Drink plenty of  water.    Avoid foods that cause constipation.      REMEMBER--most prescription pain pills cause constipation.  Walking, extra fluids, and increased fiber (fresh fruits and vegetables, etc.) are natural remedies for constipation.  You can also take mineral oil, 1-2 Tablespoons per day.  If still constipated you may try a stool  softener such as Colace or Miralax.    Call Your Physician if You Have:    Redness, increased swelling or cloudy drainage from your incision.    A temperature of more than 101 degrees F.    Worsening pain in your incision not relieved by your prescription pain pills and/or a short rest.    Any questions or concerns about your recovery, please call     Business hours [phone]    After hours [phone] Nurse Advice Line (24 hours a day)    Follow-up Care:    Make an appointment 2-3 weeks after your surgery.  Call [phone]    Tylenol (acetaminophen) Take 2 tablets every 6 hours for pain  Advil (ibuprofen)  Take 3 tablets every 6 hours    Example:  Tylenol at 11am, Advil at 2pm, Tylenol at 5pm, Advil at 8pm

## 2020-09-25 NOTE — INTERVAL H&P NOTE
The History and Physical has been reviewed, the patient has been examined and no changes have occurred in the patient's condition since the H & P was completed.       [facility], DO

## 2020-09-25 NOTE — ANESTHESIA CARE TRANSFER NOTE
Patient: Remigio Holly    Procedure(s):  Open left inguinal hernia repair with mesh    Diagnosis: Non-recurrent bilateral inguinal hernia without obstruction or gangrene [K40.20]  Diagnosis Additional Information: No value filed.    Anesthesia Type:   MAC     Note:  Airway :Face Mask  Patient transferred to:Phase II  Handoff Report: Identifed the Patient, Identified the Reponsible Provider, Reviewed the pertinent medical history, Discussed the surgical course, Reviewed Intra-OP anesthesia mangement and issues during anesthesia, Set expectations for post-procedure period and Allowed opportunity for questions and acknowledgement of understanding      Vitals: (Last set prior to Anesthesia Care Transfer)    CRNA VITALS  9/25/2020 0821 - 9/25/2020 0907      9/25/2020             Pulse:  71    SpO2:  100 %                Electronically Signed By: DISHA Varghese CRNA  September 25, 2020  9:07 AM

## 2020-09-25 NOTE — OP NOTE
Open Inguinal Herniorrhaphy Procedure Note    Name: Remigio Holly Date/Time of Admission: 2020  5:52 AM  CSN: [id number] Attending Provider: Brittany Lorenzo MD  MRN: [id number] : 1933 86 year old       Pre-operative Diagnosis: bilateral inguinal hernia    Post-operative Diagnosis: left pantaloon inguinal hernia    Procedure:   1. open left inguinal herniorrhaphy with mesh    Surgeon: BRITTANY LORENZO D.O.    Anesthesia: Monitor anesthesia care with local    Indications: The patient has a symptomatic left inguinal hernia.  Patient has bilateral inguinal; but multiple comorbidities and history of multiple surgical history.  Thus Art and I agreed on going the symptomatic side first and if the right side becomes symptomatic we can repair it at a later time.       Procedure Details   The patient was seen in the Holding Room. The risks, benefits, complications, treatment options, and expected outcomes were discussed with the patient. The possibilities of reaction to medication, pain, infection, bleeding, heart attack, death, injury to internal organs, recurrence, testicular damage, infertility, or need for further surgery were discussed with the patient. The patient concurred with the proposed plan, giving informed consent. The site of surgery properly noted/marked and pre-operative antibiotics were administered. The patient was taken to back to the operation room and placed on the table in the supine position. General anesthesia was administered by the Department of Anesthesia via endotracheal tube. A Time Out was held and the above information confirmed.  An oblique incision was created in the left groin at the level of the external ring and ASIS, carried down through the subcutaneous tissues with cautery. The external oblique fascia was identified and incised parallel to its fibers with a 15-blade scalpel; Metzenbaum scissors were used to extend this cephalad and caudad; It was opened through the  external ring with Bovie. Cord structures were encircled at the level of the pubic tubercle and a penrose drain passed. A indirect hernia was identified. The hernia sac was mobilized from the surrounding cord structures. The sac was opened and found to contain no abdominal contents. A high ligation was performed using a 2-0 Neurolon stick-tie.  A cord lipoma was noted; mobilized from surrounding cord structures and excised with Bovie.  Also noted a large direct inguinal hernia with a large direct sac attached to the cord structures.  This hernia sac was mobilized and dunk back into the direct hernia.  The hernia was inferior and medial to the pubic symphysis.  The floor of the inguinal canal was then repaired using interrupted sutures of 2-0 Neurolon, approximating the conjoined tendon to the shelving edge of the inguinal ligament. A 3x6 Bard onlay mesh was then obtained and trimmed to overlie the floor of the inguinal canal, leaving a slit for the cord to pass. The mesh was then secured using two running sutures of 2-0 Prolene. Several additional interrupted sutures of 2-0 Prolene were used to secured the mesh around the internal ring. This yielded an adequate repair. The spermatic cord was then returned to its normal anatomic position and the testicle was noted to lie normally within the hemiscrotum.  The external oblique was then closed using a running suture of 3-0 Vicryl.  The deep subcutaneous tissues were re-approximated with several interrupted sutures of 3-0 monocryl. The skin was closed with 3-0 followed by 4-0 monocryl; exofin was then applied. The patient tolerated the procedure well with no complications.    Estimated Blood Loss: Minimal  Specimens: none  Implants: Bard 3x6 inch onlay mesh  Complications: None; patient tolerated the procedure well.  Disposition: PACU - hemodynamically stable.  Patient to follow-up in the office in 7-10 days        Electronically signed by: [facility] CAMILLE Lorenzo  General Surgery 9/25/2020

## 2020-09-25 NOTE — ANESTHESIA POSTPROCEDURE EVALUATION
Patient: Remigio Holly    Procedure(s):  Open left inguinal hernia repair with mesh    Diagnosis:Non-recurrent bilateral inguinal hernia without obstruction or gangrene [K40.20]  Diagnosis Additional Information: No value filed.    Anesthesia Type:  MAC    Note:  Anesthesia Post Evaluation    Patient location during evaluation: Phase 2  Patient participation: Able to fully participate in evaluation  Level of consciousness: awake and alert  Pain management: adequate  Airway patency: patent  Cardiovascular status: acceptable and stable  Respiratory status: acceptable and room air  Hydration status: acceptable  PONV: none     Anesthetic complications: None    Comments:  Patient was happy with the anesthesia care received and no anesthesia related complications were noted.  I will follow up with the patient again if it is needed.        Last vitals:  Vitals:    09/25/20 0945 09/25/20 1000 09/25/20 1015   BP: 112/60 103/79 127/60   Pulse: 92 197 99   Resp:      Temp: 96.8  F (36  C) 96.6  F (35.9  C) 96.3  F (35.7  C)   SpO2: 97%           Electronically Signed By: DISHA Varghese CRNA  September 25, 2020  11:41 AM

## 2020-09-28 ENCOUNTER — TELEPHONE (OUTPATIENT)
Dept: FAMILY MEDICINE | Facility: OTHER | Age: 85
End: 2020-09-28

## 2020-09-28 NOTE — OR NURSING
"[facility]  Discharge Call Back  Remigio Holly  11/6/1933  MRN: [id number]  Home: [phone] (home)   PCP: Harrison Tse    We are calling to see how you're doing since your surgery/procedure with us?   Comments: \"just not feeling like doing anything, not feeling good all together- no appetite. But i'm just old\"  Clinical Questions  1. Have you had time to look at your discharge instructions? Do you have any questions in regards to the instructions?   Comment: yes, no  2. Do you feel your pain is being controlled with the regimen the surgeon sent you home on? (ie: prescription medications, over the counter pain medications, ice packs)   Comments: just using Tylenol, not really any pain  3. Have you noticed any drainage on your dressing? Do you know what to do if you have bleeding as a result of your procedure?   Comments: no, yes  4. Have you had any nausea/vomiting? Do you know how to treat this?   Comment: none, yes  5. Have you had any signs/symptoms of infection? (ie: fever, swelling, heat, drainage or redness) Do you know what to do if you have?   Comment: no, yes  6. Do you have a follow up appointment made with your surgeon? Do you have a number to contact them at if you need it?   Comment: yes, yes      You may be randomly selected to fill out a [facility] survey. We would appreciate you taking the time to fill this out. It is important to us if you would answer all of the questions on the survey.              "

## 2020-09-28 NOTE — TELEPHONE ENCOUNTER
Loss of consciousness form completed by Harrison Tse DO, faxed, original faxed to patient and copy made to scan into chart. Patient notified.   Nella Nath MA     9/28/2020

## 2020-09-30 ENCOUNTER — DOCUMENTATION ONLY (OUTPATIENT)
Dept: FAMILY MEDICINE | Facility: OTHER | Age: 85
End: 2020-09-30

## 2020-09-30 DIAGNOSIS — I25.10 ASCVD (ARTERIOSCLEROTIC CARDIOVASCULAR DISEASE): ICD-10-CM

## 2020-09-30 DIAGNOSIS — Z98.61 S/P PTCA (PERCUTANEOUS TRANSLUMINAL CORONARY ANGIOPLASTY): ICD-10-CM

## 2020-09-30 DIAGNOSIS — I25.5 ISCHEMIC CARDIOMYOPATHY: ICD-10-CM

## 2020-10-07 ENCOUNTER — ALLIED HEALTH/NURSE VISIT (OUTPATIENT)
Dept: FAMILY MEDICINE | Facility: CLINIC | Age: 85
End: 2020-10-07
Payer: COMMERCIAL

## 2020-10-07 ENCOUNTER — OFFICE VISIT (OUTPATIENT)
Dept: SURGERY | Facility: CLINIC | Age: 85
End: 2020-10-07
Payer: COMMERCIAL

## 2020-10-07 VITALS
DIASTOLIC BLOOD PRESSURE: 60 MMHG | BODY MASS INDEX: 21.21 KG/M2 | WEIGHT: 156.4 LBS | OXYGEN SATURATION: 97 % | SYSTOLIC BLOOD PRESSURE: 114 MMHG | HEART RATE: 70 BPM | RESPIRATION RATE: 16 BRPM

## 2020-10-07 DIAGNOSIS — K40.20 NON-RECURRENT BILATERAL INGUINAL HERNIA WITHOUT OBSTRUCTION OR GANGRENE: ICD-10-CM

## 2020-10-07 DIAGNOSIS — Z87.19 S/P LEFT INGUINAL HERNIA REPAIR: Primary | ICD-10-CM

## 2020-10-07 DIAGNOSIS — Z98.890 S/P LEFT INGUINAL HERNIA REPAIR: Primary | ICD-10-CM

## 2020-10-07 DIAGNOSIS — I48.0 PAROXYSMAL ATRIAL FIBRILLATION (H): ICD-10-CM

## 2020-10-07 DIAGNOSIS — Z23 NEED FOR PROPHYLACTIC VACCINATION AND INOCULATION AGAINST INFLUENZA: Primary | ICD-10-CM

## 2020-10-07 PROCEDURE — G0008 ADMIN INFLUENZA VIRUS VAC: HCPCS

## 2020-10-07 PROCEDURE — 99024 POSTOP FOLLOW-UP VISIT: CPT | Performed by: SURGERY

## 2020-10-07 PROCEDURE — 96372 THER/PROPH/DIAG INJ SC/IM: CPT

## 2020-10-07 PROCEDURE — 90662 IIV NO PRSV INCREASED AG IM: CPT

## 2020-10-07 RX ADMIN — CYANOCOBALAMIN 1000 MCG: 1000 INJECTION, SOLUTION INTRAMUSCULAR; SUBCUTANEOUS at 13:13

## 2020-10-07 NOTE — PROGRESS NOTES
[facility] FOLLOW-UP NOTE  GENERAL SURGERY    PCP: Harrison Tse         Assessment and Plan:   Assessment:   Remigio Holly is a 86 year old male who presented post operatively from open left inguinal hernia repair with mesh 9/25/2020 and is doing very well.       ICD-10-CM    1. S/P left inguinal hernia repair  Z98.890     Z87.19    2. Paroxysmal atrial fibrillation (H)  I48.0    3. Non-recurrent bilateral inguinal hernia without obstruction or gangrene  K40.20        Plan:  -healing bridge in place; no signs of recurrent hernia on left  -Still right inguinal hernia - asymptomatic at this time - pt to notify me when it becomes symptomatic and would like it to be repaired  -Understands that she he is not to lift 20 pounds or more for 2 more weeks.  All questions were answered to his satisfaction.           Subjective:     Remigio Holly is a 86 year old male who presents post operatively from open left inguinal hernia repair with mesh 9/25/2020. Pain has been controled. Currently is not using pain medications. Eating a Regular and having regular bladder/bowel function. Overall doing very well.           Objective:       /60   Pulse 70   Resp 16   Wt 70.9 kg (156 lb 6.4 oz)   SpO2 97%   BMI 21.21 kg/m     Constitutional: Awake, alert, in no acute distress.  Respiratory: Non-labored.   Cardiovascular: Regular rate and rhythm.   Abdomen: left groin noted healing bridge; no recurrence hernia. Incision is Healing well.  Noted unchanged right groin inguinal hernia - reducible.     [facility], DO  [facility]

## 2020-10-07 NOTE — LETTER
10/7/2020         RE: Remigio Holly  [address] [facility] [place] [address]        Dear Colleague,    Thank you for referring your patient, Remiigo Holly, to the [facility]. Please see a copy of my visit note below.    [facility] FOLLOW-UP NOTE  GENERAL SURGERY    PCP: Harrison Tse         Assessment and Plan:   Assessment:   Remigio Holly is a 86 year old male who presented post operatively from open left inguinal hernia repair with mesh 9/25/2020 and is doing very well.       ICD-10-CM    1. S/P left inguinal hernia repair  Z98.890     Z87.19    2. Paroxysmal atrial fibrillation (H)  I48.0    3. Non-recurrent bilateral inguinal hernia without obstruction or gangrene  K40.20        Plan:  -healing bridge in place; no signs of recurrent hernia on left  -Still right inguinal hernia - asymptomatic at this time - pt to notify me when it becomes symptomatic and would like it to be repaired  -Understands that she he is not to lift 20 pounds or more for 2 more weeks.  All questions were answered to his satisfaction.           Subjective:     Remigio Holly is a 86 year old male who presents post operatively from open left inguinal hernia repair with mesh 9/25/2020. Pain has been controled. Currently is not using pain medications. Eating a Regular and having regular bladder/bowel function. Overall doing very well.           Objective:       /60   Pulse 70   Resp 16   Wt 70.9 kg (156 lb 6.4 oz)   SpO2 97%   BMI 21.21 kg/m     Constitutional: Awake, alert, in no acute distress.  Respiratory: Non-labored.   Cardiovascular: Regular rate and rhythm.   Abdomen: left groin noted healing bridge; no recurrence hernia. Incision is Healing well.  Noted unchanged right groin inguinal hernia - reducible.     [facility] Lorenzo, DO  [facility]                Again, thank you for allowing me to participate in the care of  your patient.        Sincerely,        [facility] Hospital[facility] MD Maura

## 2020-10-09 ENCOUNTER — TRANSFERRED RECORDS (OUTPATIENT)
Dept: HEALTH INFORMATION MANAGEMENT | Facility: CLINIC | Age: 85
End: 2020-10-09

## 2020-10-22 LAB — EJECTION FRACTION: NORMAL %

## 2020-11-04 ENCOUNTER — OFFICE VISIT (OUTPATIENT)
Dept: INTERNAL MEDICINE | Facility: CLINIC | Age: 85
End: 2020-11-04
Payer: COMMERCIAL

## 2020-11-04 VITALS
DIASTOLIC BLOOD PRESSURE: 60 MMHG | OXYGEN SATURATION: 100 % | SYSTOLIC BLOOD PRESSURE: 104 MMHG | WEIGHT: 156 LBS | TEMPERATURE: 97.7 F | RESPIRATION RATE: 18 BRPM | HEART RATE: 84 BPM | BODY MASS INDEX: 21.16 KG/M2

## 2020-11-04 DIAGNOSIS — I47.10 SVT (SUPRAVENTRICULAR TACHYCARDIA) (H): Primary | ICD-10-CM

## 2020-11-04 DIAGNOSIS — G89.29 CHRONIC PAIN OF BOTH KNEES: ICD-10-CM

## 2020-11-04 DIAGNOSIS — M25.562 CHRONIC PAIN OF BOTH KNEES: ICD-10-CM

## 2020-11-04 DIAGNOSIS — Z98.61 S/P PTCA (PERCUTANEOUS TRANSLUMINAL CORONARY ANGIOPLASTY): ICD-10-CM

## 2020-11-04 DIAGNOSIS — K91.1 POSTSURGICAL DUMPING SYNDROME: ICD-10-CM

## 2020-11-04 DIAGNOSIS — M25.561 CHRONIC PAIN OF BOTH KNEES: ICD-10-CM

## 2020-11-04 PROCEDURE — 99214 OFFICE O/P EST MOD 30 MIN: CPT | Performed by: INTERNAL MEDICINE

## 2020-11-04 RX ORDER — DILTIAZEM HYDROCHLORIDE 30 MG/1
30 TABLET, FILM COATED ORAL 3 TIMES DAILY
Qty: 30 TABLET | Refills: 0 | Status: SHIPPED | OUTPATIENT
Start: 2020-11-04 | End: 2020-11-11

## 2020-11-04 ASSESSMENT — PAIN SCALES - GENERAL: PAINLEVEL: MILD PAIN (2)

## 2020-11-04 NOTE — PROGRESS NOTES
Subjective     Remigio Holly is a 86 year old male who presents to clinic today for the following health issues:    HPI         Musculoskeletal problem/pain  Onset/Duration: 2week  Description  Location: knee pain, shoulders and elbows - bilateral  Joint Swelling: YES  Redness: no  Pain: YES  Warmth: no  Progression of Symptoms:  worsening  Accompanying signs and symptoms:   Fevers: no  Numbness/tingling/weakness: no  History  Trauma to the area: no  Recent illness:  no  Previous similar problem: gout  Previous evaluation:  no  Precipitating or alleviating factors:  Aggravating factors include: none  Therapies tried and outcome: nothing         EMR reviewed including: History of chief complaint, pertinent distant history, medications, concurrent diagnoses.             Chief Complaint:  #1   Lightheaded, dizzy, short of breath, occasional falls.  Recently worked up in [place]  Has not yet heard results  Had Holter monitor   One third of ventricular beats were the result of PACs   Demonstrated several runs of SVT with maximum heart rate of 184  Symptoms include near syncope and lightheadedness  Carotids were negative on Doppler  Echocardiogram was negative  Abdominal aortic aneurysm scan was also negative  History of PTCA    #2   Knee and shoulder pain  Present for years  Arthritic in origin  Wants injection    #3   Follow-up of Faizan-en-Y/gastric resection  Doing well  Maintaining weight  Denies nausea or melena  Symptoms not associated with carbohydrate consumption                         PAST, FAMILY,SOCIAL HISTORY:     Medical  History:   has a past medical history of Blood transfusion, Chronic gastric ulcer without mention of hemorrhage, perforation, without mention of obstruction, Gastric cancer (H), Hemorrhage of gastrointestinal tract, unspecified (01/13/10), Hypoglycemia, unspecified (1/26/2015), Measles without mention of complication, Mixed hyperlipidemia, Mumps without mention of  complication, Pulmonary embolism (H) (Sept 4, 2010), Urinary calculus, unspecified, and Varicella without mention of complication.     Surgical History:   has a past surgical history that includes EXCIS STOMACH ULCER,LESN;LOCAL; REPAIR ROTATOR CUFF,ACUTE (3/21/2005); Colonoscopy w/wo [place] **Performed** (01/31/2006); cholecystectomy, laporoscopic (10/6/2008); UPPER GI ENDOSCOPY,EXAM (1/13/10); colonoscopy (1/14/10); video capsule endoscopy (01/15/10); colonoscopy (05/25/10); UPPER GI ENDOSCOPY,EXAM (05/25/10); general surgery                           date: (07/07/10); Abdomen surgery; Esophagoscopy, gastroscopy, duodenoscopy (EGD), combined (8/16/2011); Cystoscopy, retrogrades, extract stone, insert stent, combined (12/25/2012); Laser holmium lithotripsy ureter(s), insert stent, combined (1/5/2013); Laser holmium lithotripsy ureter(s), insert stent, combined (1/30/2013); Arthroplasty knee (Left, 8/15/2017); Release dupuytrens contracture (Left, 11/26/2018); and Herniorrhaphy inguinal (Left, 9/25/2020).     Social History:   reports that he has been smoking pipe. He has been smoking about 0.00 packs per day for the past 20.00 years. He has never used smokeless tobacco. He reports that he does not drink alcohol or use drugs.     Family History:  family history includes Alzheimer Disease in his father; C.A.D. in his brother and mother; Cardiovascular in his brother and mother; Heart Disease in his brother and mother.            MEDICATIONS  Current Outpatient Medications   Medication Sig Dispense Refill     atorvastatin (LIPITOR) 40 MG tablet Take 40 mg by mouth  3     blood glucose monitoring (ROBERT MICROLET) lancets Use to test blood sugar 1 time daily or as directed. 1 Box 2     clopidogrel (PLAVIX) 75 MG tablet Take 75 mg by mouth daily  3     CONTOUR NEXT TEST test strip USE TO TEST BLOOD SUGAR 1 TIMES DAILY OR AS DIRECTED. 100 each 3     cyanocobalamin (VITAMIN B12) 1000 MCG/ML injection Inject 1 mL (1,000 mcg)  into the muscle every 30 days 1 mL 11     diltiazem (CARDIZEM) 30 MG tablet Take 1 tablet (30 mg) by mouth 3 times daily 30 tablet 0     tamsulosin (FLOMAX) 0.4 MG capsule Take 0.4 mg by mouth daily       senna-docusate (SENOKOT-S;PERICOLACE) 8.6-50 MG per tablet Take 1-2 tablets by mouth 2 times daily (Patient not taking: Reported on 11/4/2020) 30 tablet 0         --------------------------------------------------------------------------------------------------------------------                              Review of Systems       LUNGS: Pt denies: cough, excess sputum, hemoptysis, or shortness of breath.  Has had episodic dyspnea with the spells.   HEART: Pt denies: chest pain, arrhythmia, syncope,  bradyarrhythmia.  Tachycardia confirmed on Holter monitor   GI: Pt denies: nausea, vomiting, diarrhea, constipation, melena, or hematochezia.   NEURO: Pt denies: seizures, strokes, diplopia, weakness, paraesthesias, or paralysis.   SKIN: Pt denies: itching, rashes, discoloration, or specific lesions of concern. Denies recent hair loss.   PSYCH: The patient denies significant depression, anxiety, mood imbalance. Specifically denies any suicidal ideation.        Examination    /60 (BP Location: Right arm, Patient Position: Sitting, Cuff Size: Adult Regular)   Pulse 84   Temp 97.7  F (36.5  C) (Temporal)   Resp 18   Wt 70.8 kg (156 lb)   SpO2 100%   BMI 21.16 kg/m      Constitutional: The patient appears to be in no acute distress. The patient appears to be adequately hydrated. No acute respiratory or hemodynamic distress is noted at this time.   LUNGS: clear bilaterally, airflow is brisk, no intercostal retraction or stridor is noted. No coughing is noted during visit.   HEART:  regular without rubs, clicks, gallops, or murmurs. PMI is nondisplaced. Upstrokes are brisk. S1,S2 are heard.   GI: Abdomen is soft, without rebound, guarding or tenderness. Bowel sounds are appropriate. No renal bruits are  heard.   NEURO: Pt is alert and appropriate. No neurologic lateralization is noted. Cranial nerves 2-12 are intact. Peripheral sensory and motor function are grossly normal.    SKIN:  warm and dry. No erythema, or rashes are noted. No specific lesions of concern are noted.    PSYCH: The patient appears grossly appropriate. Maintains good eye contact, does not have any jittery or atypical motion. Displays appropriate affect.           Decision Making       1. SVT (supraventricular tachycardia) (H)  On Holter monitor   blood pressure currently 104/60 little room for beta-blocker  - diltiazem (CARDIZEM) 30 MG tablet; Take 1 tablet (30 mg) by mouth 3 times daily  Dispense: 30 tablet; Refill: 0    2. Chronic pain of both knees  We will discuss injections next visit    3. S/P PTCA (percutaneous transluminal coronary angioplasty)  Stable without chest pain.  Echocardiogram demonstrates no significant wall motion abnormality    4. Postsurgical dumping syndrome  At this time symptoms not associated with meals.  Typically hypoperfusion, not dumping syndrome.                         FOLLOW UP   I have asked the patient to make an appointment for followup with me in 1 week            I have carefully explained the diagnosis and treatment options to the patient.  The patient has displayed an understanding of the above, and all subsequent questions were answered.      DO ELAINE Powell    Portions of this note were produced using [employer]  Although every attempt at real-time proof reading has been made, occasional grammar/syntax errors may have been missed.

## 2020-11-11 ENCOUNTER — OFFICE VISIT (OUTPATIENT)
Dept: INTERNAL MEDICINE | Facility: CLINIC | Age: 85
End: 2020-11-11
Payer: COMMERCIAL

## 2020-11-11 VITALS
TEMPERATURE: 97.6 F | BODY MASS INDEX: 21.56 KG/M2 | DIASTOLIC BLOOD PRESSURE: 72 MMHG | WEIGHT: 159 LBS | HEART RATE: 60 BPM | SYSTOLIC BLOOD PRESSURE: 122 MMHG | RESPIRATION RATE: 16 BRPM | OXYGEN SATURATION: 97 %

## 2020-11-11 DIAGNOSIS — I47.10 SVT (SUPRAVENTRICULAR TACHYCARDIA) (H): Primary | ICD-10-CM

## 2020-11-11 DIAGNOSIS — M19.042 PRIMARY OSTEOARTHRITIS OF BOTH HANDS: ICD-10-CM

## 2020-11-11 DIAGNOSIS — M19.041 PRIMARY OSTEOARTHRITIS OF BOTH HANDS: ICD-10-CM

## 2020-11-11 PROCEDURE — 99214 OFFICE O/P EST MOD 30 MIN: CPT | Performed by: INTERNAL MEDICINE

## 2020-11-11 RX ORDER — DILTIAZEM HYDROCHLORIDE 120 MG/1
120 CAPSULE, EXTENDED RELEASE ORAL DAILY
Qty: 30 CAPSULE | Refills: 0 | Status: SHIPPED | OUTPATIENT
Start: 2020-11-11 | End: 2020-11-23 | Stop reason: SINTOL

## 2020-11-11 ASSESSMENT — PAIN SCALES - GENERAL: PAINLEVEL: NO PAIN (0)

## 2020-11-11 NOTE — PROGRESS NOTES
Subjective     Remigio Holly is a 87 year old male who presents to clinic today for the following health issues:    HPI         Medication Followup of Diltiazem     Taking Medication as prescribed: yes    Side Effects:  None    Medication Helping Symptoms:  yes          EMR reviewed including: History of chief complaint, pertinent distant history, medications, concurrent diagnoses.             Chief Complaint:  #1   Follow-up on SVT.  Recently started on diltiazem.  No further SVT.  Blood pressure was markedly low previously, is now normal.  Home blood pressures even slightly high with systolics of 145.  Denies syncope, lightheadedness, or symptoms of arrhythmia.  Was contacted by cardiologist in [place], they wanted to start beta-blocker.  Instructed patient not to take both beta-blocker and calcium channel blocker simultaneously.  He understands.                         PAST, FAMILY,SOCIAL HISTORY:     Medical  History:   has a past medical history of Blood transfusion, Chronic gastric ulcer without mention of hemorrhage, perforation, without mention of obstruction, Gastric cancer (H), Hemorrhage of gastrointestinal tract, unspecified (01/13/10), Hypoglycemia, unspecified (1/26/2015), Measles without mention of complication, Mixed hyperlipidemia, Mumps without mention of complication, Pulmonary embolism (H) (Sept 4, 2010), Urinary calculus, unspecified, and Varicella without mention of complication.     Surgical History:   has a past surgical history that includes EXCIS STOMACH ULCER,LESN;LOCAL; REPAIR ROTATOR CUFF,ACUTE (3/21/2005); Colonoscopy w/wo [place] **Performed** (01/31/2006); cholecystectomy, laporoscopic (10/6/2008); UPPER GI ENDOSCOPY,EXAM (1/13/10); colonoscopy (1/14/10); video capsule endoscopy (01/15/10); colonoscopy (05/25/10); UPPER GI ENDOSCOPY,EXAM (05/25/10); general surgery                           date: (07/07/10); Abdomen surgery; Esophagoscopy, gastroscopy, duodenoscopy (EGD),  combined (8/16/2011); Cystoscopy, retrogrades, extract stone, insert stent, combined (12/25/2012); Laser holmium lithotripsy ureter(s), insert stent, combined (1/5/2013); Laser holmium lithotripsy ureter(s), insert stent, combined (1/30/2013); Arthroplasty knee (Left, 8/15/2017); Release dupuytrens contracture (Left, 11/26/2018); and Herniorrhaphy inguinal (Left, 9/25/2020).     Social History:   reports that he has been smoking pipe. He has been smoking about 0.00 packs per day for the past 20.00 years. He has never used smokeless tobacco. He reports that he does not drink alcohol or use drugs.     Family History:  family history includes Alzheimer Disease in his father; C.A.D. in his brother and mother; Cardiovascular in his brother and mother; Heart Disease in his brother and mother.            MEDICATIONS  Current Outpatient Medications   Medication Sig Dispense Refill     atorvastatin (LIPITOR) 40 MG tablet Take 40 mg by mouth  3     blood glucose monitoring (ROBERT MICROLET) lancets Use to test blood sugar 1 time daily or as directed. 1 Box 2     clopidogrel (PLAVIX) 75 MG tablet Take 75 mg by mouth daily  3     CONTOUR NEXT TEST test strip USE TO TEST BLOOD SUGAR 1 TIMES DAILY OR AS DIRECTED. 100 each 3     cyanocobalamin (VITAMIN B12) 1000 MCG/ML injection Inject 1 mL (1,000 mcg) into the muscle every 30 days 1 mL 11     diltiazem ER (TIAZAC) 120 MG 24 hr ER beaded capsule Take 1 capsule (120 mg) by mouth daily 30 capsule 0     senna-docusate (SENOKOT-S;PERICOLACE) 8.6-50 MG per tablet Take 1-2 tablets by mouth 2 times daily 30 tablet 0     tamsulosin (FLOMAX) 0.4 MG capsule Take 0.4 mg by mouth daily           --------------------------------------------------------------------------------------------------------------------                              Review of Systems       LUNGS: Pt denies: cough, excess sputum, hemoptysis, or shortness of breath.   HEART: Pt denies: chest pain, arrhythmia, syncope,  tachy or bradyarrhythmia.   GI: Pt denies: nausea, vomiting, diarrhea, constipation, melena, or hematochezia.   NEURO: Pt denies: seizures, strokes, diplopia, weakness, paraesthesias, or paralysis.   SKIN: Pt denies: itching, rashes, discoloration, or specific lesions of concern. Denies recent hair loss.   PSYCH: The patient denies significant depression, anxiety, mood imbalance. Specifically denies any suicidal ideation.      Examination       /72 (BP Location: Right arm, Patient Position: Sitting, Cuff Size: Adult Regular)   Pulse 60   Temp 97.6  F (36.4  C) (Temporal)   Resp 16   Wt 72.1 kg (159 lb)   SpO2 97%   BMI 21.56 kg/m         LUNGS: clear bilaterally, airflow is brisk, no intercostal retraction or stridor is noted. No coughing is noted during visit.   HEART:  regular without rubs, clicks, gallops, or murmurs. PMI is nondisplaced. Upstrokes are brisk. S1,S2 are heard.   GI: Abdomen is soft, without rebound, guarding or tenderness. Bowel sounds are appropriate. No renal bruits are heard.   NEURO: Pt is alert and appropriate. No neurologic lateralization is noted. Cranial nerves 2-12 are intact. Peripheral sensory and motor function are grossly normal.    MS: Moderate crepitance is noted in the shoulders.  Arthritic changes of the hands are noted with decreased range of motion.  Hands are still functional.. No deformity is present. Muscle strength is appropriate and equal bilaterally. No acute joint erythema or swelling is present.       Decision Making       1. SVT (supraventricular tachycardia) (H)  Change 3 times daily diltiazem to daily  Continue current medications  - diltiazem ER (TIAZAC) 120 MG 24 hr ER beaded capsule; Take 1 capsule (120 mg) by mouth daily  Dispense: 30 capsule; Refill: 0     2. Primary osteoarthritis of both hands  Recommend over-the-counter sports cream with 4% lidocaine to both hands and shoulder.  Avoid oral NSAIDs if possible.  Discussed injection therapy.  Patient  would like to hold off for now.                           FOLLOW UP   I have asked the patient to make an appointment for followup with me in 1 to 2 months for follow-up of arrhythmia and arthritis            I have carefully explained the diagnosis and treatment options to the patient.  The patient has displayed an understanding of the above, and all subsequent questions were answered.      DO ELAINE Powell    Portions of this note were produced using [employer]  Although every attempt at real-time proof reading has been made, occasional grammar/syntax errors may have been missed.

## 2020-11-23 ENCOUNTER — TELEPHONE (OUTPATIENT)
Dept: FAMILY MEDICINE | Facility: OTHER | Age: 85
End: 2020-11-23

## 2020-11-23 DIAGNOSIS — I47.10 PAROXYSMAL SUPRAVENTRICULAR TACHYCARDIA (H): Primary | ICD-10-CM

## 2020-11-23 RX ORDER — METOPROLOL SUCCINATE 25 MG/1
25 TABLET, EXTENDED RELEASE ORAL DAILY
Qty: 30 TABLET | Refills: 0 | Status: SHIPPED | OUTPATIENT
Start: 2020-11-23 | End: 2020-12-07 | Stop reason: SINTOL

## 2020-11-23 NOTE — TELEPHONE ENCOUNTER
As the diltiazem can cause lower extremity edema, he should discontinue it.  I have sent over prescription for 25 mg of metoprolol daily to his pharmacy.  Please let him know this.  Thank you.    Dedrick

## 2020-11-23 NOTE — TELEPHONE ENCOUNTER
Called and spoke to the patient. He said he recently started Diltiazem. Patient said the last couple of days he has noticed bilateral ankle swelling. He thinks it may be from the Diltiazem. Patient denies redness or warmth. No SOB. Slight pain when walking but not bad. Swelling seems slightly less in the mornings and then gets worse as the day goes on.     Patient wondering if this is normal? Should be continue the medication or discontinue and start something different?    Will route to PCP to advise.     TAZ BellN, RN  [facility]

## 2020-11-23 NOTE — TELEPHONE ENCOUNTER
Reason for call:  Patient reporting a symptom    Symptom or request: swollen ankles, possible reaction to diltiazem    Duration (how long have symptoms been present): 2 days    Have you been treated for this before? No    Additional comments: Art started getting swollen ankles 2 days ago, he is wondering if it could be a reaction to diltiazem, please advise.    Phone Number patient can be reached at:  Home number on file [phone] (home)    Best Time:      Can we leave a detailed message on this number:  YES    Call taken on 11/23/2020 at 8:58 AM by Licha Constantino

## 2020-12-04 ENCOUNTER — ALLIED HEALTH/NURSE VISIT (OUTPATIENT)
Dept: FAMILY MEDICINE | Facility: CLINIC | Age: 85
End: 2020-12-04
Payer: COMMERCIAL

## 2020-12-04 ENCOUNTER — TELEPHONE (OUTPATIENT)
Dept: FAMILY MEDICINE | Facility: OTHER | Age: 85
End: 2020-12-04

## 2020-12-04 DIAGNOSIS — E53.8 VITAMIN B12 DEFICIENCY (NON ANEMIC): Primary | ICD-10-CM

## 2020-12-04 DIAGNOSIS — D51.0 PA (PERNICIOUS ANEMIA): Primary | ICD-10-CM

## 2020-12-04 PROCEDURE — 96372 THER/PROPH/DIAG INJ SC/IM: CPT

## 2020-12-04 PROCEDURE — 99207 PR NO CHARGE NURSE ONLY: CPT

## 2020-12-04 RX ORDER — CYANOCOBALAMIN 1000 UG/ML
1000 INJECTION, SOLUTION INTRAMUSCULAR; SUBCUTANEOUS
Status: ACTIVE | OUTPATIENT
Start: 2020-12-04 | End: 2021-01-01

## 2020-12-04 RX ADMIN — CYANOCOBALAMIN 1000 MCG: 1000 INJECTION, SOLUTION INTRAMUSCULAR; SUBCUTANEOUS at 13:31

## 2020-12-04 NOTE — TELEPHONE ENCOUNTER
Patient's b12 injections have . Patient is on float schedule today to have this done. Please sign pended CAM order. Nella Nath MA     2020

## 2020-12-04 NOTE — NURSING NOTE
Clinic Administered Medication Documentation      Injectable Medication Documentation    Patient was given Cyanocobalamin (B-12). Prior to medication administration, verified patients identity using patient s name and date of birth. Please see MAR and medication order for additional information. Patient instructed to remain in clinic for 15 minutes.      Was entire vial of medication used? Yes  Vial/Syringe: Single dose vial  Expiration Date:  08/2021  Was this medication supplied by the patient? No   Given Left Deltoid    Britney Dudley MA 12/4/2020  1:31 PM      Patient wanted b 12 given in left deltoid due to missing b 12 last month.     Britney Dudley MA 12/4/2020  1:31 PM

## 2020-12-07 ENCOUNTER — VIRTUAL VISIT (OUTPATIENT)
Dept: INTERNAL MEDICINE | Facility: CLINIC | Age: 85
End: 2020-12-07
Payer: COMMERCIAL

## 2020-12-07 DIAGNOSIS — I25.10 ASCVD (ARTERIOSCLEROTIC CARDIOVASCULAR DISEASE): ICD-10-CM

## 2020-12-07 DIAGNOSIS — I25.5 ISCHEMIC CARDIOMYOPATHY: ICD-10-CM

## 2020-12-07 DIAGNOSIS — I47.10 SVT (SUPRAVENTRICULAR TACHYCARDIA) (H): Primary | ICD-10-CM

## 2020-12-07 DIAGNOSIS — R60.0 LOCALIZED EDEMA: ICD-10-CM

## 2020-12-07 PROCEDURE — 99214 OFFICE O/P EST MOD 30 MIN: CPT | Mod: 95 | Performed by: INTERNAL MEDICINE

## 2020-12-07 RX ORDER — FUROSEMIDE 20 MG
20 TABLET ORAL DAILY PRN
Qty: 15 TABLET | Refills: 0 | Status: SHIPPED | OUTPATIENT
Start: 2020-12-07

## 2020-12-07 RX ORDER — DILTIAZEM HYDROCHLORIDE 120 MG/1
120 CAPSULE, EXTENDED RELEASE ORAL DAILY
COMMUNITY
Start: 2020-11-11 | End: 2020-12-07

## 2020-12-07 RX ORDER — DILTIAZEM HYDROCHLORIDE 120 MG/1
120 CAPSULE, EXTENDED RELEASE ORAL DAILY
Qty: 90 CAPSULE | Refills: 0 | Status: SHIPPED | OUTPATIENT
Start: 2020-12-07 | End: 2021-03-16

## 2020-12-07 NOTE — PROGRESS NOTES
"Remigio Holly is a 87 year old male who is being evaluated via a billable telephone visit.      The patient has been notified of following:     \"This telephone visit will be conducted via a call between you and your physician/provider. We have found that certain health care needs can be provided without the need for a physical exam.  This service lets us provide the care you need with a short phone conversation.  If a prescription is necessary we can send it directly to your pharmacy.  If lab work is needed we can place an order for that and you can then stop by our lab to have the test done at a later time.    Telephone visits are billed at different rates depending on your insurance coverage. During this emergency period, for some insurers they may be billed the same as an in-person visit.  Please reach out to your insurance provider with any questions.    If during the course of the call the physician/provider feels a telephone visit is not appropriate, you will not be charged for this service.\"    Patient has given verbal consent for Telephone visit?  Yes    What phone number would you like to be contacted at? [phone]    How would you like to obtain your AVS? Malachi Hutchison     Remigio Holly is a 87 year old male who presents via phone visit today for the following health issues:    HPI     Medication Followup of Metoprolol     Taking Medication as prescribed: NO-stopped and restarted diltiazem     Side Effects:  Dizziness     Medication Helping Symptoms:  NO-caused dizziness. He discontinued metoprolol and restarted taking diltiazem. Diltiazem is causing mild lower leg edema but otherwise helping symptoms.           EMR reviewed including: History of chief complaint, pertinent distant history, medications, concurrent diagnoses.             Chief Complaint:  #1   Patient has history of SVT.  Recently taken off of diltiazem and placed on metoprolol as he was having leg " swelling.  Now notes that the metoprolol makes him feel tired and weak and would prefer to go back on the diltiazem.  Denies of syncope or near syncope.  Cannot describe bradycardia.  Edema generally resolves by morning and is modest.    #2   History of ischemic cardiomyopathy.  Notes no progressive edema.  Denies significant dyspnea with moderate exertion.  Is rather active for his advanced years.                           PAST, FAMILY,SOCIAL HISTORY:     Medical  History:   has a past medical history of Blood transfusion, Chronic gastric ulcer without mention of hemorrhage, perforation, without mention of obstruction, Gastric cancer (H), Hemorrhage of gastrointestinal tract, unspecified (01/13/10), Hypoglycemia, unspecified (1/26/2015), Measles without mention of complication, Mixed hyperlipidemia, Mumps without mention of complication, Pulmonary embolism (H) (Sept 4, 2010), Urinary calculus, unspecified, and Varicella without mention of complication.     Surgical History:   has a past surgical history that includes EXCIS STOMACH ULCER,LESN;LOCAL; REPAIR ROTATOR CUFF,ACUTE (3/21/2005); Colonoscopy w/wo [place] **Performed** (01/31/2006); cholecystectomy, laporoscopic (10/6/2008); UPPER GI ENDOSCOPY,EXAM (1/13/10); colonoscopy (1/14/10); video capsule endoscopy (01/15/10); colonoscopy (05/25/10); UPPER GI ENDOSCOPY,EXAM (05/25/10); general surgery                           date: (07/07/10); Abdomen surgery; Esophagoscopy, gastroscopy, duodenoscopy (EGD), combined (8/16/2011); Cystoscopy, retrogrades, extract stone, insert stent, combined (12/25/2012); Laser holmium lithotripsy ureter(s), insert stent, combined (1/5/2013); Laser holmium lithotripsy ureter(s), insert stent, combined (1/30/2013); Arthroplasty knee (Left, 8/15/2017); Release dupuytrens contracture (Left, 11/26/2018); and Herniorrhaphy inguinal (Left, 9/25/2020).     Social History:   reports that he has been smoking pipe. He has been smoking about 0.00  packs per day for the past 20.00 years. He has never used smokeless tobacco. He reports that he does not drink alcohol or use drugs.     Family History:  family history includes Alzheimer Disease in his father; C.A.D. in his brother and mother; Cardiovascular in his brother and mother; Heart Disease in his brother and mother.            MEDICATIONS  Current Outpatient Medications   Medication Sig Dispense Refill     atorvastatin (LIPITOR) 40 MG tablet Take 40 mg by mouth  3     blood glucose monitoring (ROBERT MICROLET) lancets Use to test blood sugar 1 time daily or as directed. 1 Box 2     clopidogrel (PLAVIX) 75 MG tablet Take 75 mg by mouth daily  3     CONTOUR NEXT TEST test strip USE TO TEST BLOOD SUGAR 1 TIMES DAILY OR AS DIRECTED. 100 each 3     cyanocobalamin (VITAMIN B12) 1000 MCG/ML injection Inject 1 mL (1,000 mcg) into the muscle every 30 days 1 mL 11     diltiazem ER (TIAZAC) 120 MG 24 hr ER beaded capsule Take 1 capsule (120 mg) by mouth daily 90 capsule 0     furosemide (LASIX) 20 MG tablet Take 1 tablet (20 mg) by mouth daily as needed (Leg swelling) 15 tablet 0     senna-docusate (SENOKOT-S;PERICOLACE) 8.6-50 MG per tablet Take 1-2 tablets by mouth 2 times daily 30 tablet 0     tamsulosin (FLOMAX) 0.4 MG capsule Take 0.4 mg by mouth daily           --------------------------------------------------------------------------------------------------------------------                              Review of Systems       LUNGS: Pt denies: cough, excess sputum, hemoptysis, or shortness of breath.   HEART: Pt denies: chest pain, arrhythmia, syncope, tachy or bradyarrhythmia.  Has had minimal recurrent SVT on the diltiazem or on the beta-blocker.  No further syncope.   GI: Pt denies: nausea, vomiting, diarrhea, constipation, melena, or hematochezia.   NEURO: Pt denies: seizures, strokes, diplopia, weakness, paraesthesias, or paralysis.   SKIN: Pt denies: itching, rashes, discoloration, or specific lesions of  concern. Denies recent hair loss.   PSYCH: The patient denies significant depression, anxiety, mood imbalance. Specifically denies any suicidal ideation.                                      No physical examination is performed as this is a virtual visit.  The patient's voice is strong, there is no evidence of labored breathing or wheezing.  The patient is alert and appropriate and demonstrates no obvious behavioral irregularities.             Decision Making       1. SVT (supraventricular tachycardia) (H)  Restart diltiazem 120 mg daily.  - diltiazem ER (TIAZAC) 120 MG 24 hr ER beaded capsule; Take 1 capsule (120 mg) by mouth daily  Dispense: 90 capsule; Refill: 0    2. Localized edema  Instructed to use the Lasix once daily only if edema is persistent over the night.  If it resolves by morning, he will not  - furosemide (LASIX) 20 MG tablet; Take 1 tablet (20 mg) by mouth daily as needed (Leg swelling)  Dispense: 15 tablet; Refill: 0    3. ASCVD (arteriosclerotic cardiovascular disease)   are you today this [place] event continue current Plavix, statin, diltiazem.  Intolerant to beta-blocker.    4. Ischemic cardiomyopathy  As above                         FOLLOW UP   I have asked the patient to make an appointment for followup with me in 1 month for face-to-face visit with lab work.            I have carefully explained the diagnosis and treatment options to the patient.  The patient has displayed an understanding of the above, and all subsequent questions were answered.      DO ELAINE Powell    Portions of this note were produced using [employer]  Although every attempt at real-time proof reading has been made, occasional grammar/syntax errors may have been missed.      Telephone time: 17 minutes

## 2020-12-23 ENCOUNTER — TELEPHONE (OUTPATIENT)
Dept: INTERNAL MEDICINE | Facility: CLINIC | Age: 85
End: 2020-12-23

## 2020-12-23 DIAGNOSIS — K59.00 CONSTIPATION, UNSPECIFIED CONSTIPATION TYPE: Primary | ICD-10-CM

## 2020-12-23 RX ORDER — POLYETHYLENE GLYCOL 3350 17 G/17G
1 POWDER, FOR SOLUTION ORAL DAILY
Qty: 578 G | Refills: 0 | Status: SHIPPED | OUTPATIENT
Start: 2020-12-23 | End: 2021-05-11

## 2020-12-23 NOTE — TELEPHONE ENCOUNTER
Patient states that he has been using miralax, milk of magnesia, stool softer, ex lax  And warm water enema and has not had a bm in 4 days.    Per Dr. Estrella, patient should try a warm soapy enema or a fleet enema. Patient is going to try to get to the pharmacy today. Nella Nath MA     12/23/2020

## 2020-12-23 NOTE — TELEPHONE ENCOUNTER
As long as he is passing gas and not obstructed with nausea, vomiting and no gas then he can try miralax 1 capful in liquid daily.

## 2020-12-23 NOTE — TELEPHONE ENCOUNTER
Reason for Call:  Other constipation    Detailed comments: patient is calling to inform pcp he has been constipated and wondering what he should use for this   Please call to advise  Thank you    Phone Number Patient can be reached at: Home number on file [phone] (home)    Best Time: any    Can we leave a detailed message on this number? YES    Call taken on 12/23/2020 at 11:49 AM by Corrina Worthy

## 2020-12-24 ENCOUNTER — HOSPITAL ENCOUNTER (EMERGENCY)
Facility: CLINIC | Age: 85
Discharge: HOME | End: 2020-12-24
Attending: EMERGENCY MEDICINE | Admitting: EMERGENCY MEDICINE
Payer: MEDICARE

## 2020-12-24 VITALS
HEART RATE: 89 BPM | SYSTOLIC BLOOD PRESSURE: 139 MMHG | DIASTOLIC BLOOD PRESSURE: 98 MMHG | OXYGEN SATURATION: 97 % | RESPIRATION RATE: 18 BRPM | TEMPERATURE: 97.8 F

## 2020-12-24 DIAGNOSIS — N13.9 URINARY OUTFLOW OBSTRUCTION: ICD-10-CM

## 2020-12-24 DIAGNOSIS — K59.00 CONSTIPATION, UNSPECIFIED CONSTIPATION TYPE: ICD-10-CM

## 2020-12-24 DIAGNOSIS — K56.41 FECAL IMPACTION IN RECTUM (H): ICD-10-CM

## 2020-12-24 PROCEDURE — 99282 EMERGENCY DEPT VISIT SF MDM: CPT | Performed by: EMERGENCY MEDICINE

## 2020-12-24 PROCEDURE — 99283 EMERGENCY DEPT VISIT LOW MDM: CPT | Performed by: EMERGENCY MEDICINE

## 2020-12-24 NOTE — ED PROVIDER NOTES
History     Chief Complaint   Patient presents with     Constipation     The history is provided by the patient.     Remigio Holly is a 87 year old male with a history of intermittent constipation who presents to the emergency department with concerns of constipation. The patient reports not being able to have a bowel movement for the past 2-3 days. He has been using fleet enemas without relief. No vomiting. His abdomen appears distended.     Allergies:  Allergies   Allergen Reactions     Mobic [Meloxicam] Nausea and Diarrhea     Diarrhea, nausea, flu like symptoms since start of use       Problem List:    Patient Active Problem List    Diagnosis Date Noted     Nephrolithiasis 12/25/2012     Priority: High     ASCVD (arteriosclerotic cardiovascular disease) 09/30/2020     Priority: Medium     Ischemic cardiomyopathy 09/30/2020     Priority: Medium     Non-recurrent bilateral inguinal hernia without obstruction or gangrene 09/02/2020     Priority: Medium     Added automatically from request for surgery [id number]       History of pulmonary embolism 09/01/2020     Priority: Medium     Dupuytren's contracture of left hand 12/04/2018     Priority: Medium     S/P PTCA (percutaneous transluminal coronary angioplasty) 03/14/2018     Priority: Medium     Urinary retention with incomplete bladder emptying 08/20/2017     Priority: Medium     Orthostatic hypotension 08/19/2017     Priority: Medium     Thoracic aortic aneurysm without rupture (H) - 4.7 cm on 8/18/17 (4.5 cm in 8/15) 08/18/2017     Priority: Medium     Near syncope 08/17/2017     Priority: Medium     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/17) 08/15/2017     Priority: Medium     Primary osteoarthritis of both knees 07/26/2017     Priority: Medium     Bilateral knee pain 07/26/2017     Priority: Medium     Postsurgical dumping syndrome 12/19/2016     Priority: Medium     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 Provider to review       CKD (chronic kidney disease) stage 3, GFR 30-59 ml/min 05/05/2013     Priority: Medium     Pain 12/25/2012     Priority: Medium     Advance Care Planning 08/16/2012     Priority: Medium     Advance Care Planning: Receipt of ACP document:  Received: Health Care Directive which was witnessed or notarized on 3-12-13.  Document previously scanned on 3-13-13.  Validation form completed and  scanned.  Code Status reflects choices in most recent ACP document.  Confirmed/documented designated decision maker(s). See permanent comments section of demographics in clinical tab. View document(s) and details by clicking on code status. Added by Sayra Maguire on 8/25/2014.  .Patient states has Advance Directive and will bring in a copy to clinic. 8/16/2012          HYPERLIPIDEMIA LDL GOAL <100 10/31/2010     Priority: Medium     GERD (gastroesophageal reflux disease) 03/26/2010     Priority: Medium     Mixed hyperlipidemia 03/24/2010     Priority: Medium     Closed fracture of calcaneus 01/06/2007     Priority: Medium     Calculus of gallbladder 01/09/2003     Priority: Medium     Problem list name updated by automated process. Provider to review       Abdominal aortic aneurysm (H) 10/22/2002     Priority: Medium     Problem list name updated by automated process. Provider to review       Chest pain      Priority: Medium     Problem list name updated by automated process. Provider to review       Pain in joint, shoulder region      Priority: Medium     Paroxysmal atrial fibrillation (H) 08/26/2010     Priority: Low     Gastric cancer (H) 08/05/2010     Priority: Low     (Problem list name updated by automated process. Provider to review and confirm.)          Past Medical History:    Past Medical History:    Diagnosis Date     Blood transfusion      Chronic gastric ulcer without mention of hemorrhage, perforation, without mention of obstruction      Gastric cancer (H)      Hemorrhage of gastrointestinal tract, unspecified 01/13/10     Hypoglycemia, unspecified 1/26/2015     Measles without mention of complication      Mixed hyperlipidemia      Mumps without mention of complication      Pulmonary embolism (H) Sept 4, 2010     Urinary calculus, unspecified      Varicella without mention of complication        Past Surgical History:    Past Surgical History:   Procedure Laterality Date     ABDOMEN SURGERY       ARTHROPLASTY KNEE Left 8/15/2017    Procedure: ARTHROPLASTY KNEE;  Left total knee replacement;  Surgeon: Jonathan Cardona DO;  Location: [facility]     C EXCIS STOMACH ULCER,LESN;LOCAL       CHOLECYSTECTOMY, LAPOROSCOPIC  10/6/2008    Cholecystectomy, Laparoscopic     COLONOSCOPY  1/14/10    [facility]     COLONOSCOPY  05/25/10     CYSTOSCOPY, RETROGRADES, EXTRACT STONE, INSERT STENT, COMBINED  12/25/2012    Procedure: COMBINED CYSTOSCOPY, RETROGRADES, EXTRACT STONE, INSERT STENT;  Cystoscopy, Left Stent Placement, left retrograde pylogram, uc;  Surgeon: Amador Sanchez MD;  Location: [facility] OR     ESOPHAGOSCOPY, GASTROSCOPY, DUODENOSCOPY (EGD), COMBINED  8/16/2011    Procedure:COMBINED ESOPHAGOSCOPY, GASTROSCOPY, DUODENOSCOPY (EGD), BIOPSY SINGLE OR MULTIPLE; Surgeon:TONY GARCIA; Location:[facility]     GENERAL SURGERY                           DATE:  07/07/10    Gastrectomy      COLONOSCOPY W/WO BRUSH/WASH  01/31/2006      REPAIR ROTATOR CUFF,ACUTE  3/21/2005    Right      UGI ENDOSCOPY, SIMPLE EXAM  1/13/10    [facility] UGI ENDOSCOPY, SIMPLE EXAM  05/25/10     HERNIORRHAPHY INGUINAL Left 9/25/2020    Procedure: Open left inguinal hernia repair with mesh;  Surgeon: Tanvir Lorenzo MD;  Location: [facility] OR     LASER HOLMIUM LITHOTRIPSY URETER(S), INSERT STENT, COMBINED   2013    Procedure: COMBINED CYSTOSCOPY, URETEROSCOPY, LASER HOLMIUM LITHOTRIPSY URETER(S), INSERT STENT;  Bilateral  Cystoscopy, Bilateral Ureteroscopy, Bilateral retrogrades and  placement of ureteral stent right side. Laser Holmium Lithotripsy  and Exchange  of stent left side;  Surgeon: Tone Morejon MD;  Location: [facility] OR     LASER HOLMIUM LITHOTRIPSY URETER(S), INSERT STENT, COMBINED  2013    Procedure: COMBINED CYSTOSCOPY, URETEROSCOPY, LASER HOLMIUM LITHOTRIPSY URETER(S), INSERT STENT;  Right Ureteroscopy; Stone basketing; Right Stent exchange and  removal of left stent.;  Surgeon: Tone Morejon MD;  Location: [facility]     RELEASE DUPUYTRENS CONTRACTURE Left 2018    Procedure: Release Left Hand Dupuytrens Contracture;  Surgeon: Lucio Omalley MD;  Location: [facility]     VIDEO CAPSULE ENDOSCOPY  01/15/10    [facility]       Family History:    Family History   Problem Relation Age of Onset     Alzheimer Disease Father      Cardiovascular Mother      C.A.D. Mother      Heart Disease Mother      Cardiovascular Brother      Heart Disease Brother      C.A.D. Brother        Social History:  Marital Status:   [2]  Social History     Tobacco Use     Smoking status: Current Some Day Smoker     Packs/day: 0.00     Years: 20.00     Pack years: 0.00     Types: Pipe     Last attempt to quit: 1998     Years since quittin.9     Smokeless tobacco: Never Used     Tobacco comment: Rare pipe   Substance Use Topics     Alcohol use: No     Alcohol/week: 0.0 standard drinks     Drug use: No        Medications:         atorvastatin (LIPITOR) 40 MG tablet       blood glucose monitoring (ROBERT MICROLET) lancets       clopidogrel (PLAVIX) 75 MG tablet       CONTOUR NEXT TEST test strip       cyanocobalamin (VITAMIN B12) 1000 MCG/ML injection       diltiazem ER (TIAZAC) 120 MG 24 hr ER beaded capsule       furosemide (LASIX) 20 MG tablet       polyethylene glycol (MIRALAX) 17 GM/Dose  powder       senna-docusate (SENOKOT-S;PERICOLACE) 8.6-50 MG per tablet       tamsulosin (FLOMAX) 0.4 MG capsule          Review of Systems   All other systems reviewed and are negative.      Physical Exam          Physical Exam  Vitals signs and nursing note reviewed.   Eyes:      General: No scleral icterus.     Conjunctiva/sclera: Conjunctivae normal.   Pulmonary:      Effort: Pulmonary effort is normal.   Abdominal:      Tenderness: There is abdominal tenderness. There is no guarding or rebound.      Comments: Mildly distended.  Firm with palpable suspected stool retention.  Rectal exam confirmed impaction which was digitally disimpacted and broken up.   Skin:     Capillary Refill: Capillary refill takes less than 2 seconds.   Neurological:      General: No focal deficit present.      Mental Status: He is alert.   Psychiatric:         Mood and Affect: Mood normal.         ED Course        Procedures                   No results found for this or any previous visit (from the past 24 hour(s)).    Medications - No data to display    Assessments & Plan (with Medical Decision Making)  Remigio Holly is an 87 year old who presents secondary to constipation. History of intermittent constipation. Upon arrival he is afebrile and vitals are stable. Exam was reassuring, but was noticeably distended abdomen with fecal rectal impaction.  Underwent digital disimpaction followed by a tap water enema.  6:51 PM  Good results after enema       I have reviewed the nursing notes.    I have reviewed the findings, diagnosis, plan and need for follow up with the patient.      New Prescriptions    No medications on file       Final diagnoses:   Constipation, unspecified constipation type   Fecal impaction in rectum (H)       This document serves as a record of services personally performed by Yousif Maguire DO*. It was created on their behalf by Violet De Oliveira, a trained medical scribe. The creation of this record is  based on the provider's personal observations and the statements of the patient. This document has been checked and approved by the attending provider.    Note: Chart documentation done in part with Dragon Voice Recognition software. Although reviewed after completion, some word and grammatical errors may remain.    12/24/2020   [facility] EMERGENCY DEPT     Yousif Maguire,   12/24/20 0401

## 2020-12-24 NOTE — ED TRIAGE NOTES
"S: Constipation  B: Pt reports \"not having a good bowel movement in 5 days.\" Abdominal discomfort along with urinary retention.  A: Fleet enemas at home with no relief.   R: Ready for MD evaluation.    "

## 2020-12-24 NOTE — ED AVS SNAPSHOT
[facility]  [address] [place] [address]  Phone: [phone]  Fax: [phone]                                    Remigio Holly   MRN: [id number]    Department: [facility]   Date of Visit: 12/24/2020           After Visit Summary Signature Page    I have received my discharge instructions, and my questions have been answered. I have discussed any challenges I see with this plan with the nurse or doctor.    ..........................................................................................................................................  Patient/Patient Representative Signature      ..........................................................................................................................................  Patient Representative Print Name and Relationship to Patient    ..................................................               ................................................  Date                                   Time    ..........................................................................................................................................  Reviewed by Signature/Title    ...................................................              ..............................................  Date                                               Time          22EPIC Rev 08/18

## 2020-12-25 NOTE — DISCHARGE INSTRUCTIONS
Recommend MiraLAX 1 capful daily, increasing fiber in diet and drinking more water.  This will help counteract medication that can be constipating.     We also noted that you are having difficulty fully emptying your bladder.  This would account for dribbling and incontinence before you get to the bathroom.  I would recommend that you have your primary care doctor arrange for an appointment with a urologist.

## 2021-01-01 ENCOUNTER — ANESTHESIA EVENT (OUTPATIENT)
Dept: SURGERY | Facility: CLINIC | Age: 86
End: 2021-01-01
Payer: MEDICARE

## 2021-01-01 ENCOUNTER — ANESTHESIA (OUTPATIENT)
Dept: SURGERY | Facility: CLINIC | Age: 86
End: 2021-01-01
Payer: MEDICARE

## 2021-01-01 ENCOUNTER — OFFICE VISIT (OUTPATIENT)
Dept: INTERNAL MEDICINE | Facility: CLINIC | Age: 86
End: 2021-01-01
Payer: COMMERCIAL

## 2021-01-01 ENCOUNTER — TRANSFERRED RECORDS (OUTPATIENT)
Dept: HEALTH INFORMATION MANAGEMENT | Facility: CLINIC | Age: 86
End: 2021-01-01

## 2021-01-01 ENCOUNTER — TELEPHONE (OUTPATIENT)
Dept: SURGERY | Facility: CLINIC | Age: 86
End: 2021-01-01

## 2021-01-01 ENCOUNTER — OFFICE VISIT (OUTPATIENT)
Dept: SURGERY | Facility: CLINIC | Age: 86
End: 2021-01-01
Payer: COMMERCIAL

## 2021-01-01 ENCOUNTER — LAB (OUTPATIENT)
Dept: LAB | Facility: CLINIC | Age: 86
End: 2021-01-01
Payer: COMMERCIAL

## 2021-01-01 ENCOUNTER — HOSPITAL ENCOUNTER (OUTPATIENT)
Facility: CLINIC | Age: 86
Discharge: HOME | End: 2021-12-30
Attending: SURGERY | Admitting: SURGERY
Payer: MEDICARE

## 2021-01-01 VITALS
BODY MASS INDEX: 21.7 KG/M2 | OXYGEN SATURATION: 96 % | RESPIRATION RATE: 19 BRPM | WEIGHT: 160 LBS | DIASTOLIC BLOOD PRESSURE: 82 MMHG | SYSTOLIC BLOOD PRESSURE: 143 MMHG | HEART RATE: 72 BPM | TEMPERATURE: 97.3 F

## 2021-01-01 VITALS
DIASTOLIC BLOOD PRESSURE: 68 MMHG | HEART RATE: 76 BPM | WEIGHT: 167 LBS | RESPIRATION RATE: 20 BRPM | OXYGEN SATURATION: 99 % | BODY MASS INDEX: 22.65 KG/M2 | TEMPERATURE: 96.7 F | SYSTOLIC BLOOD PRESSURE: 134 MMHG

## 2021-01-01 VITALS
SYSTOLIC BLOOD PRESSURE: 122 MMHG | HEART RATE: 72 BPM | BODY MASS INDEX: 21.97 KG/M2 | WEIGHT: 162 LBS | DIASTOLIC BLOOD PRESSURE: 78 MMHG | TEMPERATURE: 97 F | OXYGEN SATURATION: 98 %

## 2021-01-01 DIAGNOSIS — D51.0 PERNICIOUS ANEMIA: ICD-10-CM

## 2021-01-01 DIAGNOSIS — I71.40 ABDOMINAL AORTIC ANEURYSM (AAA) WITHOUT RUPTURE (H): ICD-10-CM

## 2021-01-01 DIAGNOSIS — Z98.61 S/P PTCA (PERCUTANEOUS TRANSLUMINAL CORONARY ANGIOPLASTY): ICD-10-CM

## 2021-01-01 DIAGNOSIS — Z11.59 ENCOUNTER FOR SCREENING FOR OTHER VIRAL DISEASES: ICD-10-CM

## 2021-01-01 DIAGNOSIS — Z01.818 PREOP GENERAL PHYSICAL EXAM: Primary | ICD-10-CM

## 2021-01-01 DIAGNOSIS — N18.30 STAGE 3 CHRONIC KIDNEY DISEASE, UNSPECIFIED WHETHER STAGE 3A OR 3B CKD (H): ICD-10-CM

## 2021-01-01 DIAGNOSIS — K40.90 RIGHT INGUINAL HERNIA: ICD-10-CM

## 2021-01-01 DIAGNOSIS — E78.5 HYPERLIPIDEMIA LDL GOAL <130: ICD-10-CM

## 2021-01-01 DIAGNOSIS — I25.5 ISCHEMIC CARDIOMYOPATHY: ICD-10-CM

## 2021-01-01 DIAGNOSIS — Z98.890 S/P LEFT INGUINAL HERNIA REPAIR: ICD-10-CM

## 2021-01-01 DIAGNOSIS — K40.90 RIGHT INGUINAL HERNIA: Primary | ICD-10-CM

## 2021-01-01 DIAGNOSIS — Z87.19 S/P LEFT INGUINAL HERNIA REPAIR: ICD-10-CM

## 2021-01-01 DIAGNOSIS — I48.0 PAROXYSMAL ATRIAL FIBRILLATION (H): ICD-10-CM

## 2021-01-01 DIAGNOSIS — Z87.19 S/P RIGHT INGUINAL HERNIA REPAIR: Primary | ICD-10-CM

## 2021-01-01 DIAGNOSIS — Z01.818 PREOP GENERAL PHYSICAL EXAM: ICD-10-CM

## 2021-01-01 DIAGNOSIS — Z98.890 S/P RIGHT INGUINAL HERNIA REPAIR: Primary | ICD-10-CM

## 2021-01-01 DIAGNOSIS — Z86.711 HISTORY OF PULMONARY EMBOLISM: ICD-10-CM

## 2021-01-01 DIAGNOSIS — C16.9 MALIGNANT NEOPLASM OF STOMACH, UNSPECIFIED LOCATION (H): ICD-10-CM

## 2021-01-01 LAB
ALBUMIN SERPL-MCNC: 3.8 G/DL (ref 3.4–5)
ALBUMIN UR-MCNC: NEGATIVE MG/DL
ALP SERPL-CCNC: 124 U/L (ref 40–150)
ALT SERPL W P-5'-P-CCNC: 21 U/L (ref 0–70)
ANION GAP SERPL CALCULATED.3IONS-SCNC: 6 MMOL/L (ref 3–14)
APPEARANCE UR: CLEAR
AST SERPL W P-5'-P-CCNC: 23 U/L (ref 0–45)
BASOPHILS # BLD AUTO: 0 10E3/UL (ref 0–0.2)
BASOPHILS NFR BLD AUTO: 0 %
BILIRUB DIRECT SERPL-MCNC: 0.1 MG/DL (ref 0–0.2)
BILIRUB SERPL-MCNC: <0.1 MG/DL (ref 0.2–1.3)
BILIRUB UR QL STRIP: NEGATIVE
BUN SERPL-MCNC: 26 MG/DL (ref 7–30)
CALCIUM SERPL-MCNC: 9.6 MG/DL (ref 8.5–10.1)
CHLORIDE BLD-SCNC: 109 MMOL/L (ref 94–109)
CHOLEST SERPL-MCNC: 118 MG/DL
CO2 SERPL-SCNC: 26 MMOL/L (ref 20–32)
COLOR UR AUTO: YELLOW
CREAT SERPL-MCNC: 1.04 MG/DL (ref 0.66–1.25)
CREAT UR-MCNC: 129 MG/DL
EOSINOPHIL # BLD AUTO: 0 10E3/UL (ref 0–0.7)
EOSINOPHIL NFR BLD AUTO: 0 %
ERYTHROCYTE [DISTWIDTH] IN BLOOD BY AUTOMATED COUNT: 14.7 % (ref 10–15)
FASTING STATUS PATIENT QL REPORTED: NO
GFR SERPL CREATININE-BSD FRML MDRD: 69 ML/MIN/1.73M2
GLUCOSE BLD-MCNC: 106 MG/DL (ref 70–99)
GLUCOSE UR STRIP-MCNC: 50 MG/DL
HCT VFR BLD AUTO: 38.7 % (ref 40–53)
HDLC SERPL-MCNC: 53 MG/DL
HGB BLD-MCNC: 12.2 G/DL (ref 13.3–17.7)
HGB UR QL STRIP: NEGATIVE
IMM GRANULOCYTES # BLD: 0 10E3/UL
IMM GRANULOCYTES NFR BLD: 0 %
KETONES UR STRIP-MCNC: NEGATIVE MG/DL
LDLC SERPL CALC-MCNC: 54 MG/DL
LEUKOCYTE ESTERASE UR QL STRIP: NEGATIVE
LYMPHOCYTES # BLD AUTO: 1 10E3/UL (ref 0.8–5.3)
LYMPHOCYTES NFR BLD AUTO: 18 %
MCH RBC QN AUTO: 28.8 PG (ref 26.5–33)
MCHC RBC AUTO-ENTMCNC: 31.5 G/DL (ref 31.5–36.5)
MCV RBC AUTO: 92 FL (ref 78–100)
MICROALBUMIN UR-MCNC: 50 MG/L
MICROALBUMIN/CREAT UR: 38.76 MG/G CR (ref 0–17)
MONOCYTES # BLD AUTO: 0.7 10E3/UL (ref 0–1.3)
MONOCYTES NFR BLD AUTO: 13 %
NEUTROPHILS # BLD AUTO: 3.9 10E3/UL (ref 1.6–8.3)
NEUTROPHILS NFR BLD AUTO: 69 %
NITRATE UR QL: NEGATIVE
NONHDLC SERPL-MCNC: 65 MG/DL
NRBC # BLD AUTO: 0 10E3/UL
NRBC BLD AUTO-RTO: 0 /100
PH UR STRIP: 5 [PH] (ref 5–7)
PLATELET # BLD AUTO: 266 10E3/UL (ref 150–450)
POTASSIUM BLD-SCNC: 4.1 MMOL/L (ref 3.4–5.3)
PROT SERPL-MCNC: 7.6 G/DL (ref 6.8–8.8)
RBC # BLD AUTO: 4.23 10E6/UL (ref 4.4–5.9)
SARS-COV-2 RNA RESP QL NAA+PROBE: NEGATIVE
SODIUM SERPL-SCNC: 141 MMOL/L (ref 133–144)
SP GR UR STRIP: 1.02 (ref 1–1.03)
TRIGL SERPL-MCNC: 57 MG/DL
UROBILINOGEN UR STRIP-MCNC: NORMAL MG/DL
WBC # BLD AUTO: 5.7 10E3/UL (ref 4–11)

## 2021-01-01 PROCEDURE — U0003 INFECTIOUS AGENT DETECTION BY NUCLEIC ACID (DNA OR RNA); SEVERE ACUTE RESPIRATORY SYNDROME CORONAVIRUS 2 (SARS-COV-2) (CORONAVIRUS DISEASE [COVID-19]), AMPLIFIED PROBE TECHNIQUE, MAKING USE OF HIGH THROUGHPUT TECHNOLOGIES AS DESCRIBED BY CMS-2020-01-R: HCPCS

## 2021-01-01 PROCEDURE — 250N000025 HC SEVOFLURANE, PER MIN: Performed by: SURGERY

## 2021-01-01 PROCEDURE — 250N000011 HC RX IP 250 OP 636: Performed by: NURSE ANESTHETIST, CERTIFIED REGISTERED

## 2021-01-01 PROCEDURE — 360N000075 HC SURGERY LEVEL 2, PER MIN: Performed by: SURGERY

## 2021-01-01 PROCEDURE — 99215 OFFICE O/P EST HI 40 MIN: CPT | Performed by: SURGERY

## 2021-01-01 PROCEDURE — 81003 URINALYSIS AUTO W/O SCOPE: CPT

## 2021-01-01 PROCEDURE — 710N000012 HC RECOVERY PHASE 2, PER MINUTE: Performed by: SURGERY

## 2021-01-01 PROCEDURE — 250N000009 HC RX 250: Performed by: NURSE ANESTHETIST, CERTIFIED REGISTERED

## 2021-01-01 PROCEDURE — 85025 COMPLETE CBC W/AUTO DIFF WBC: CPT

## 2021-01-01 PROCEDURE — 99214 OFFICE O/P EST MOD 30 MIN: CPT | Mod: 25 | Performed by: INTERNAL MEDICINE

## 2021-01-01 PROCEDURE — 80061 LIPID PANEL: CPT

## 2021-01-01 PROCEDURE — 96372 THER/PROPH/DIAG INJ SC/IM: CPT | Performed by: INTERNAL MEDICINE

## 2021-01-01 PROCEDURE — 999N000141 HC STATISTIC PRE-PROCEDURE NURSING ASSESSMENT: Performed by: SURGERY

## 2021-01-01 PROCEDURE — 250N000009 HC RX 250: Performed by: SURGERY

## 2021-01-01 PROCEDURE — 82248 BILIRUBIN DIRECT: CPT

## 2021-01-01 PROCEDURE — 250N000011 HC RX IP 250 OP 636: Performed by: SURGERY

## 2021-01-01 PROCEDURE — 258N000003 HC RX IP 258 OP 636: Performed by: NURSE ANESTHETIST, CERTIFIED REGISTERED

## 2021-01-01 PROCEDURE — 80053 COMPREHEN METABOLIC PANEL: CPT

## 2021-01-01 PROCEDURE — C1781 MESH (IMPLANTABLE): HCPCS | Performed by: SURGERY

## 2021-01-01 PROCEDURE — 710N000010 HC RECOVERY PHASE 1, LEVEL 2, PER MIN: Performed by: SURGERY

## 2021-01-01 PROCEDURE — 370N000017 HC ANESTHESIA TECHNICAL FEE, PER MIN: Performed by: SURGERY

## 2021-01-01 PROCEDURE — 36415 COLL VENOUS BLD VENIPUNCTURE: CPT

## 2021-01-01 PROCEDURE — 258N000003 HC RX IP 258 OP 636: Performed by: SURGERY

## 2021-01-01 PROCEDURE — 82043 UR ALBUMIN QUANTITATIVE: CPT

## 2021-01-01 PROCEDURE — 250N000013 HC RX MED GY IP 250 OP 250 PS 637: Performed by: SURGERY

## 2021-01-01 PROCEDURE — 49505 PRP I/HERN INIT REDUC >5 YR: CPT | Mod: RT | Performed by: SURGERY

## 2021-01-01 PROCEDURE — 272N000001 HC OR GENERAL SUPPLY STERILE: Performed by: SURGERY

## 2021-01-01 PROCEDURE — 93000 ELECTROCARDIOGRAM COMPLETE: CPT | Performed by: INTERNAL MEDICINE

## 2021-01-01 PROCEDURE — U0005 INFEC AGEN DETEC AMPLI PROBE: HCPCS

## 2021-01-01 DEVICE — MESH PROLENE 6X6" SOFT: Type: IMPLANTABLE DEVICE | Site: INGUINAL | Status: FUNCTIONAL

## 2021-01-01 RX ORDER — ONDANSETRON 2 MG/ML
INJECTION INTRAMUSCULAR; INTRAVENOUS PRN
Status: DISCONTINUED | OUTPATIENT
Start: 2021-01-01 | End: 2021-01-01

## 2021-01-01 RX ORDER — CEFAZOLIN SODIUM 2 G/100ML
2 INJECTION, SOLUTION INTRAVENOUS
Status: COMPLETED | OUTPATIENT
Start: 2021-01-01 | End: 2021-01-01

## 2021-01-01 RX ORDER — SODIUM CHLORIDE, SODIUM LACTATE, POTASSIUM CHLORIDE, CALCIUM CHLORIDE 600; 310; 30; 20 MG/100ML; MG/100ML; MG/100ML; MG/100ML
INJECTION, SOLUTION INTRAVENOUS CONTINUOUS
Status: DISCONTINUED | OUTPATIENT
Start: 2021-01-01 | End: 2021-01-01 | Stop reason: HOSPADM

## 2021-01-01 RX ORDER — ACETAMINOPHEN 325 MG/1
975 TABLET ORAL ONCE
Status: COMPLETED | OUTPATIENT
Start: 2021-01-01 | End: 2021-01-01

## 2021-01-01 RX ORDER — ONDANSETRON 4 MG/1
4 TABLET, ORALLY DISINTEGRATING ORAL EVERY 30 MIN PRN
Status: DISCONTINUED | OUTPATIENT
Start: 2021-01-01 | End: 2021-01-01 | Stop reason: HOSPADM

## 2021-01-01 RX ORDER — DEXAMETHASONE SODIUM PHOSPHATE 4 MG/ML
INJECTION, SOLUTION INTRA-ARTICULAR; INTRALESIONAL; INTRAMUSCULAR; INTRAVENOUS; SOFT TISSUE PRN
Status: DISCONTINUED | OUTPATIENT
Start: 2021-01-01 | End: 2021-01-01

## 2021-01-01 RX ORDER — FENTANYL CITRATE 50 UG/ML
25 INJECTION, SOLUTION INTRAMUSCULAR; INTRAVENOUS EVERY 5 MIN PRN
Status: DISCONTINUED | OUTPATIENT
Start: 2021-01-01 | End: 2021-01-01 | Stop reason: HOSPADM

## 2021-01-01 RX ORDER — EPHEDRINE SULFATE 50 MG/ML
INJECTION, SOLUTION INTRAMUSCULAR; INTRAVENOUS; SUBCUTANEOUS PRN
Status: DISCONTINUED | OUTPATIENT
Start: 2021-01-01 | End: 2021-01-01

## 2021-01-01 RX ORDER — BUPIVACAINE HYDROCHLORIDE AND EPINEPHRINE 2.5; 5 MG/ML; UG/ML
INJECTION, SOLUTION EPIDURAL; INFILTRATION; INTRACAUDAL; PERINEURAL PRN
Status: DISCONTINUED | OUTPATIENT
Start: 2021-01-01 | End: 2021-01-01 | Stop reason: HOSPADM

## 2021-01-01 RX ORDER — FENTANYL CITRATE 50 UG/ML
INJECTION, SOLUTION INTRAMUSCULAR; INTRAVENOUS PRN
Status: DISCONTINUED | OUTPATIENT
Start: 2021-01-01 | End: 2021-01-01

## 2021-01-01 RX ORDER — OXYCODONE HYDROCHLORIDE 5 MG/1
5 TABLET ORAL
Status: COMPLETED | OUTPATIENT
Start: 2021-01-01 | End: 2021-01-01

## 2021-01-01 RX ORDER — OXYCODONE HYDROCHLORIDE 5 MG/1
5 TABLET ORAL EVERY 6 HOURS PRN
Qty: 12 TABLET | Refills: 0 | Status: SHIPPED | OUTPATIENT
Start: 2021-01-01 | End: 2022-01-01

## 2021-01-01 RX ORDER — MEPERIDINE HYDROCHLORIDE 25 MG/ML
12.5 INJECTION INTRAMUSCULAR; INTRAVENOUS; SUBCUTANEOUS
Status: DISCONTINUED | OUTPATIENT
Start: 2021-01-01 | End: 2021-01-01 | Stop reason: HOSPADM

## 2021-01-01 RX ORDER — CYANOCOBALAMIN 1000 UG/ML
1000 INJECTION, SOLUTION INTRAMUSCULAR; SUBCUTANEOUS
Status: ACTIVE | OUTPATIENT
Start: 2021-01-01

## 2021-01-01 RX ORDER — LIDOCAINE HYDROCHLORIDE 20 MG/ML
INJECTION, SOLUTION INFILTRATION; PERINEURAL PRN
Status: DISCONTINUED | OUTPATIENT
Start: 2021-01-01 | End: 2021-01-01

## 2021-01-01 RX ORDER — PROPOFOL 10 MG/ML
INJECTION, EMULSION INTRAVENOUS PRN
Status: DISCONTINUED | OUTPATIENT
Start: 2021-01-01 | End: 2021-01-01

## 2021-01-01 RX ORDER — FENTANYL CITRATE 50 UG/ML
25 INJECTION, SOLUTION INTRAMUSCULAR; INTRAVENOUS
Status: DISCONTINUED | OUTPATIENT
Start: 2021-01-01 | End: 2021-01-01 | Stop reason: HOSPADM

## 2021-01-01 RX ORDER — ALUMINA, MAGNESIA, AND SIMETHICONE 2400; 2400; 240 MG/30ML; MG/30ML; MG/30ML
30 SUSPENSION ORAL EVERY 4 HOURS PRN
Qty: 769 ML | Refills: 0 | Status: SHIPPED | OUTPATIENT
Start: 2021-01-01 | End: 2022-01-01

## 2021-01-01 RX ORDER — OXYCODONE HYDROCHLORIDE 5 MG/1
5 TABLET ORAL EVERY 4 HOURS PRN
Status: DISCONTINUED | OUTPATIENT
Start: 2021-01-01 | End: 2021-01-01 | Stop reason: HOSPADM

## 2021-01-01 RX ORDER — ONDANSETRON 2 MG/ML
4 INJECTION INTRAMUSCULAR; INTRAVENOUS EVERY 30 MIN PRN
Status: DISCONTINUED | OUTPATIENT
Start: 2021-01-01 | End: 2021-01-01 | Stop reason: HOSPADM

## 2021-01-01 RX ORDER — CEFAZOLIN SODIUM 2 G/100ML
2 INJECTION, SOLUTION INTRAVENOUS SEE ADMIN INSTRUCTIONS
Status: DISCONTINUED | OUTPATIENT
Start: 2021-01-01 | End: 2021-01-01 | Stop reason: HOSPADM

## 2021-01-01 RX ORDER — LIDOCAINE 40 MG/G
CREAM TOPICAL
Status: DISCONTINUED | OUTPATIENT
Start: 2021-01-01 | End: 2021-01-01 | Stop reason: HOSPADM

## 2021-01-01 RX ADMIN — SODIUM CHLORIDE, POTASSIUM CHLORIDE, SODIUM LACTATE AND CALCIUM CHLORIDE: 600; 310; 30; 20 INJECTION, SOLUTION INTRAVENOUS at 09:11

## 2021-01-01 RX ADMIN — PHENYLEPHRINE HYDROCHLORIDE 50 MCG: 10 INJECTION INTRAVENOUS at 11:14

## 2021-01-01 RX ADMIN — FENTANYL CITRATE 25 MCG: 50 INJECTION, SOLUTION INTRAMUSCULAR; INTRAVENOUS at 11:06

## 2021-01-01 RX ADMIN — CYANOCOBALAMIN 1000 MCG: 1000 INJECTION, SOLUTION INTRAMUSCULAR; SUBCUTANEOUS at 14:10

## 2021-01-01 RX ADMIN — ACETAMINOPHEN 975 MG: 325 TABLET, FILM COATED ORAL at 08:36

## 2021-01-01 RX ADMIN — OXYCODONE HYDROCHLORIDE 5 MG: 5 TABLET ORAL at 12:52

## 2021-01-01 RX ADMIN — FENTANYL CITRATE 25 MCG: 50 INJECTION INTRAMUSCULAR; INTRAVENOUS at 13:18

## 2021-01-01 RX ADMIN — DEXAMETHASONE SODIUM PHOSPHATE 4 MG: 4 INJECTION, SOLUTION INTRA-ARTICULAR; INTRALESIONAL; INTRAMUSCULAR; INTRAVENOUS; SOFT TISSUE at 11:16

## 2021-01-01 RX ADMIN — ONDANSETRON 4 MG: 2 INJECTION INTRAMUSCULAR; INTRAVENOUS at 12:11

## 2021-01-01 RX ADMIN — FENTANYL CITRATE 25 MCG: 50 INJECTION, SOLUTION INTRAMUSCULAR; INTRAVENOUS at 11:21

## 2021-01-01 RX ADMIN — CEFAZOLIN SODIUM 2 G: 10 INJECTION, POWDER, FOR SOLUTION INTRAVENOUS at 09:12

## 2021-01-01 RX ADMIN — PROPOFOL 140 MG: 10 INJECTION, EMULSION INTRAVENOUS at 11:08

## 2021-01-01 RX ADMIN — Medication 5 MG: at 11:14

## 2021-01-01 RX ADMIN — LIDOCAINE HYDROCHLORIDE 70 MG: 20 INJECTION, SOLUTION INFILTRATION; PERINEURAL at 11:08

## 2021-01-01 RX ADMIN — CEFAZOLIN SODIUM 2 G: 2 INJECTION, SOLUTION INTRAVENOUS at 10:58

## 2021-01-01 RX ADMIN — FENTANYL CITRATE 25 MCG: 50 INJECTION INTRAMUSCULAR; INTRAVENOUS at 13:45

## 2021-01-01 RX ADMIN — Medication 5 MG: at 11:33

## 2021-01-01 RX ADMIN — PHENYLEPHRINE HYDROCHLORIDE 50 MCG: 10 INJECTION INTRAVENOUS at 11:33

## 2021-01-01 ASSESSMENT — ENCOUNTER SYMPTOMS: DYSRHYTHMIAS: 1

## 2021-01-01 ASSESSMENT — PAIN SCALES - GENERAL
PAINLEVEL: NO PAIN (0)
PAINLEVEL: NO PAIN (0)

## 2021-01-13 ENCOUNTER — ALLIED HEALTH/NURSE VISIT (OUTPATIENT)
Dept: FAMILY MEDICINE | Facility: CLINIC | Age: 86
End: 2021-01-13
Payer: COMMERCIAL

## 2021-01-13 DIAGNOSIS — E53.8 VITAMIN B12 DEFICIENCY (NON ANEMIC): Primary | ICD-10-CM

## 2021-01-13 PROCEDURE — 99207 PR NO CHARGE NURSE ONLY: CPT

## 2021-01-13 PROCEDURE — 96372 THER/PROPH/DIAG INJ SC/IM: CPT

## 2021-01-13 RX ADMIN — CYANOCOBALAMIN 1000 MCG: 1000 INJECTION, SOLUTION INTRAMUSCULAR; SUBCUTANEOUS at 09:14

## 2021-01-13 NOTE — PROGRESS NOTES
Clinic Administered Medication Documentation      Injectable Medication Documentation    Patient was given Cyanocobalamin (B-12). Prior to medication administration, verified patients identity using patient s name and date of birth. Please see MAR and medication order for additional information. Patient instructed to remain in clinic for 15 minutes and report any adverse reaction to staff immediately .      Was entire vial of medication used? Yes  Vial/Syringe: Single dose vial  Expiration Date:  08/2022  Given: LD  Was this medication supplied by the patient? No

## 2021-02-15 ENCOUNTER — NURSE TRIAGE (OUTPATIENT)
Dept: INTERNAL MEDICINE | Facility: CLINIC | Age: 86
End: 2021-02-15

## 2021-02-15 NOTE — TELEPHONE ENCOUNTER
Patient is scheduled tomorrow, 2/16/21, with PCP.  TAZ KhanN, RN      Reason for Disposition    MODERATE hand swelling (e.g., visible swelling of hand and fingers; pitting edema)    Additional Information    Negative: Severe difficulty breathing (e.g., struggling for each breath, speaks in single words)    Negative: Sounds like a life-threatening emergency to the triager    Negative: Followed a hand injury    Negative: Swelling followed an insect bite to the area    Negative: Swelling followed an bee, wasp, or other sting to the area    Negative: Swelling of arm is main problem    Negative: Swelling of wrist is main symptom    Negative: Cast problems or questions    Negative: Pain, redness, swelling, or pus at IV Site    Negative: Difficulty breathing at rest    Negative: Looks like a broken bone or dislocated joint (e.g., crooked or deformed)    Negative: Entire hand is cool or blue in comparison to other side    Negative: [1] Can't use hand or can barely use hand AND [2] new onset    Negative: [1] Difficulty breathing with exertion (e.g., walking) AND [2] new onset or worsening    Negative: [1] Red area or streak AND [2] fever    Negative: [1] Swelling is painful to touch AND [2] fever    Negative: [1] Cast arm AND [2] now increased pain    Negative: [1] Postpartum (i.e. < 6 weeks since delivery) AND [2] new blurred vision or vision changes    Negative: [1] Postpartum (i.e. < 6 weeks since delivery) AND [2] face swelling    Negative: Patient sounds very sick or weak to the triager    Negative: [1] Pregnant > 20 weeks AND [2] face swelling    Negative: [1] Pregnant > 20 weeks AND [2] new blurred vision or vision changes    Negative: SEVERE hand swelling (e.g., swelling of entire hand and up into forearm)    Negative: [1] Red area or streak [2] large (> 2 in. or 5 cm)    Protocols used: HAND SWELLING-A-AH

## 2021-02-16 ENCOUNTER — OFFICE VISIT (OUTPATIENT)
Dept: INTERNAL MEDICINE | Facility: CLINIC | Age: 86
End: 2021-02-16
Payer: COMMERCIAL

## 2021-02-16 VITALS
HEIGHT: 72 IN | RESPIRATION RATE: 16 BRPM | TEMPERATURE: 97.3 F | DIASTOLIC BLOOD PRESSURE: 76 MMHG | BODY MASS INDEX: 22.52 KG/M2 | WEIGHT: 166.3 LBS | SYSTOLIC BLOOD PRESSURE: 116 MMHG | OXYGEN SATURATION: 99 % | HEART RATE: 70 BPM

## 2021-02-16 DIAGNOSIS — N18.31 STAGE 3A CHRONIC KIDNEY DISEASE (H): ICD-10-CM

## 2021-02-16 DIAGNOSIS — K59.01 SLOW TRANSIT CONSTIPATION: ICD-10-CM

## 2021-02-16 DIAGNOSIS — M19.041 ARTHRITIS OF BOTH HANDS: ICD-10-CM

## 2021-02-16 DIAGNOSIS — M19.042 ARTHRITIS OF BOTH HANDS: ICD-10-CM

## 2021-02-16 DIAGNOSIS — I47.10 SVT (SUPRAVENTRICULAR TACHYCARDIA) (H): ICD-10-CM

## 2021-02-16 DIAGNOSIS — C16.9 MALIGNANT NEOPLASM OF STOMACH, UNSPECIFIED LOCATION (H): ICD-10-CM

## 2021-02-16 DIAGNOSIS — I71.40 ABDOMINAL AORTIC ANEURYSM (AAA) WITHOUT RUPTURE (H): ICD-10-CM

## 2021-02-16 DIAGNOSIS — M17.11 ARTHRITIS OF RIGHT KNEE: Primary | ICD-10-CM

## 2021-02-16 PROCEDURE — 99214 OFFICE O/P EST MOD 30 MIN: CPT | Mod: 25 | Performed by: INTERNAL MEDICINE

## 2021-02-16 PROCEDURE — 96372 THER/PROPH/DIAG INJ SC/IM: CPT | Performed by: INTERNAL MEDICINE

## 2021-02-16 RX ADMIN — CYANOCOBALAMIN 1000 MCG: 1000 INJECTION, SOLUTION INTRAMUSCULAR; SUBCUTANEOUS at 08:58

## 2021-02-16 ASSESSMENT — PAIN SCALES - GENERAL: PAINLEVEL: NO PAIN (0)

## 2021-02-16 ASSESSMENT — MIFFLIN-ST. JEOR: SCORE: 1467.33

## 2021-02-16 NOTE — PROGRESS NOTES
"        Kianna Garcia is a 87 year old who presents for the following health issues     HPI       Chief Complaint   Patient presents with     Musculoskeletal Problem     Right hand swelling since Thursday, no known injury. Pain. Has had gout in left hand gout in left hand        EMR reviewed including:             Complaint, History of Chief Complaint, Corresponding Review of Systems, and Complaint Specific Physical Examination.    #1   Swelling in the right hand  Has resolved today.  Reportedly was worse the last 2 days.  Associated with discomfort.  Patient does have significant degenerative arthritis of the hands bilaterally.  Range of motion and  strength are decreased somewhat but functional.       Exam:  Osteophyte formation in the phalanges is noted.  The metacarpophalangeal joints are also enlarged.  No acute inflammation or synovial thickening noted at this time.    #2   Pain in the right knee.  Significant crepitance with walking or motion.  Post left total knee arthroplasty.  Has right-sided \"bone-on-bone\".  Does not wish to have elective arthroplasty due to advanced age.       Exam:  Significant form to the knee is noted.  Crepitance and grind with any motion present.  No signs of lateral or anterior laxity noted.    #3   History of abdominal aortic aneurysm.  CAT scan in 2019 shows no significant worsening.    #4   History of stomach cancer post gastrectomy.  Does complain of some constipation.  Describes slow transit.  Has been using bulking agents without stool softener.  Denies melena or hematochezia.       Exam:  Abdomen is soft without rebound, rigidity or guarding.      Vital Signs:   /76 (BP Location: Right arm, Patient Position: Sitting, Cuff Size: Adult Regular)   Pulse 70   Temp 97.3  F (36.3  C) (Temporal)   Resp 16   Wt 75.4 kg (166 lb 4.8 oz)   SpO2 99%   BMI 22.55 kg/m               Decision Making    Problem and Complexity     1. Slow transit constipation  Recommend " Citrucel and MiraLAX.  Instructions given to be used twice a day with plenty of water    2. Arthritis of both hands  Would like to avoid chronic anti-inflammatory usage due to age, renal function.  Recommend over-the-counter topical creams with 4% lidocaine.  We will give the patient 5  20 mg prednisone to have on hand for acute exacerbations.   Instructed to take the entire course as scheduled (do not ration them over months) and notify me as soon as possible.    3. Arthritis of right knee  Patient wishes to have an injection.  Discussed arthrocentesis.  He wishes to pursue this    4. Malignant neoplasm of stomach, unspecified location (H)  Post gastrectomy.  Doing well    5. Abdominal aortic aneurysm (AAA) without rupture (H)  No signs of worsening    6. SVT (supraventricular tachycardia) (H)  Stable without recurrence    7. Stage 3a chronic kidney disease  Avoid nonsteroidal anti-inflammatories          ------------------------------------------------------------------------------------------------------------------------------  Data    4=1/3 5=2/3    1  >3  Specialty (external) notes reviewed:   Cardiology notes read and appreciated    Tests ordered (by provider) reviewed:   None     Tests ordered (by provider):   None    Independent Historians Interview:   None    2  Review of outside (other providers) Tests:   None    3   Verbal Discussion with Specialists:    None      ----------------------------------------------------------------------------------------------------------------------------------  Risk   Prescription drug management:   Chronic and ongoing                                High risk for toxicity:     Decision regarding surgery:    None     Social determinants of health:   None     Decision to withhold therapy:   None                                 FOLLOW UP   I have asked the patient to make an appointment for followup with me in 4month            I have carefully explained the diagnosis and  treatment options to the patient.  The patient has displayed an understanding of the above, and all subsequent questions were answered.      DO ELAINE Powell    Portions of this note were produced using kajeet  Although every attempt at real-time proof reading has been made, occasional grammar/syntax errors may have been missed.                                                            Procedure Note    Procedure:         Arthrocentesis major joint/right knee    Indication:        Severe arthritis of the knee with unrelenting pain    Medication:      1 cc 1% lidocaine without epinephrine and 2 cc of 40 mg/cc Kenalog suspension    Anesthesia:    As above       Details:   Informed consent, alcohol prep, 27-gauge needle via medial approach.  Immediate relief of discomfort.  Blood loss 0.           Post Procedural Diagnosis:          Same as above    Follow-up:      As needed.  Discussed limitations of injection.  Discussed potential for infection etc.       :    DO ELAINE Verdugo    Portions of this note were produced using kajeet  Although every attempt at real-time proof reading has been made, occasional grammar/syntax errors may have been missed.

## 2021-03-15 DIAGNOSIS — I47.10 SVT (SUPRAVENTRICULAR TACHYCARDIA) (H): ICD-10-CM

## 2021-03-16 RX ORDER — DILTIAZEM HYDROCHLORIDE 120 MG/1
CAPSULE, EXTENDED RELEASE ORAL
Qty: 90 CAPSULE | Refills: 1 | Status: SHIPPED | OUTPATIENT
Start: 2021-03-16 | End: 2021-09-14

## 2021-03-16 NOTE — TELEPHONE ENCOUNTER
Routing refill request to provider for review/approval because:  Labs not current:  TAZ BatresN, RN  [facility]

## 2021-03-19 ENCOUNTER — ALLIED HEALTH/NURSE VISIT (OUTPATIENT)
Dept: FAMILY MEDICINE | Facility: CLINIC | Age: 86
End: 2021-03-19
Payer: COMMERCIAL

## 2021-03-19 ENCOUNTER — TELEPHONE (OUTPATIENT)
Dept: INTERNAL MEDICINE | Facility: CLINIC | Age: 86
End: 2021-03-19

## 2021-03-19 DIAGNOSIS — E53.8 VITAMIN B12 DEFICIENCY (NON ANEMIC): Primary | ICD-10-CM

## 2021-03-19 PROCEDURE — 99207 PR NO CHARGE NURSE ONLY: CPT

## 2021-03-19 PROCEDURE — 96372 THER/PROPH/DIAG INJ SC/IM: CPT

## 2021-03-19 RX ADMIN — CYANOCOBALAMIN 1000 MCG: 1000 INJECTION, SOLUTION INTRAMUSCULAR; SUBCUTANEOUS at 11:43

## 2021-03-19 NOTE — LETTER
[address] [address]  [phone]        March 24, 2021    Remigio Holly  [address] [place] [id number]

## 2021-03-19 NOTE — PROGRESS NOTES
Clinic Administered Medication Documentation      Injectable Medication Documentation    Patient was given Cyanocobalamin (B-12). Prior to medication administration, verified patients identity using patient s name and date of birth. Please see MAR and medication order for additional information. Patient instructed to remain in clinic for 15 minutes and report any adverse reaction to staff immediately .      Was entire vial of medication used? Yes  Vial/Syringe: Single dose vial  Expiration Date:  08/2022  Was this medication supplied by the patient? No     Ivone Charlton CMA ([facility])

## 2021-03-19 NOTE — TELEPHONE ENCOUNTER
Loss of consciousness form brought into clinic by patient. Pt wants faxed then mailed to him.   Form put in provider's basket. Nella Nath MA     3/19/2021

## 2021-03-29 ENCOUNTER — TELEPHONE (OUTPATIENT)
Dept: INTERNAL MEDICINE | Facility: CLINIC | Age: 86
End: 2021-03-29

## 2021-03-29 NOTE — TELEPHONE ENCOUNTER
Patient calling and stating he received the DMV form in the mail that provider completed. Patient is questioning if this form had been sent to the DMV or not? Informed there is documentation in his chart that the form was faxed to the DMV and then mailed back to the patient. Donna Cheek LPN

## 2021-04-22 ENCOUNTER — ALLIED HEALTH/NURSE VISIT (OUTPATIENT)
Dept: FAMILY MEDICINE | Facility: CLINIC | Age: 86
End: 2021-04-22
Payer: COMMERCIAL

## 2021-04-22 DIAGNOSIS — E53.8 VITAMIN B12 DEFICIENCY (NON ANEMIC): Primary | ICD-10-CM

## 2021-04-22 PROCEDURE — 99207 PR NO CHARGE NURSE ONLY: CPT

## 2021-04-22 PROCEDURE — 96372 THER/PROPH/DIAG INJ SC/IM: CPT

## 2021-04-22 RX ADMIN — CYANOCOBALAMIN 1000 MCG: 1000 INJECTION, SOLUTION INTRAMUSCULAR; SUBCUTANEOUS at 09:49

## 2021-04-22 NOTE — PROGRESS NOTES
Clinic Administered Medication Documentation      Injectable Medication Documentation    Patient was given Cyanocobalamin (B-12). Prior to medication administration, verified patients identity using patient s name and date of birth. Please see MAR and medication order for additional information. Patient instructed to remain in clinic for 15 minutes and report any adverse reaction to staff immediately .      Was entire vial of medication used? Yes  Vial/Syringe: Single dose vial  Expiration Date:  08/22  Was this medication supplied by the patient? No

## 2021-05-08 ENCOUNTER — HEALTH MAINTENANCE LETTER (OUTPATIENT)
Age: 86
End: 2021-05-08

## 2021-05-10 DIAGNOSIS — K59.00 CONSTIPATION, UNSPECIFIED CONSTIPATION TYPE: ICD-10-CM

## 2021-05-11 RX ORDER — POLYETHYLENE GLYCOL 3350 17 G/17G
1 POWDER, FOR SOLUTION ORAL DAILY
Qty: 578 G | Refills: 1 | Status: SHIPPED | OUTPATIENT
Start: 2021-05-11 | End: 2022-01-01

## 2021-05-11 NOTE — TELEPHONE ENCOUNTER
Prescription approved per [facility] Refill Protocol.    TAZ BellN, RN  [facility]

## 2021-05-14 DIAGNOSIS — I25.10 ASCVD (ARTERIOSCLEROTIC CARDIOVASCULAR DISEASE): Primary | ICD-10-CM

## 2021-05-14 NOTE — TELEPHONE ENCOUNTER
Routing refill request to provider for review/approval because:  Labs out of range:  Hgb    Dulce Guevara RN

## 2021-05-17 RX ORDER — CLOPIDOGREL BISULFATE 75 MG/1
75 TABLET ORAL DAILY
Qty: 90 TABLET | Refills: 1 | Status: SHIPPED | OUTPATIENT
Start: 2021-05-17 | End: 2021-11-05

## 2021-06-04 ENCOUNTER — TRANSFERRED RECORDS (OUTPATIENT)
Dept: HEALTH INFORMATION MANAGEMENT | Facility: CLINIC | Age: 86
End: 2021-06-04

## 2021-06-23 ENCOUNTER — ALLIED HEALTH/NURSE VISIT (OUTPATIENT)
Dept: FAMILY MEDICINE | Facility: CLINIC | Age: 86
End: 2021-06-23
Payer: COMMERCIAL

## 2021-06-23 DIAGNOSIS — E53.8 VITAMIN B12 DEFICIENCY (NON ANEMIC): Primary | ICD-10-CM

## 2021-06-23 PROCEDURE — 99207 PR NO CHARGE NURSE ONLY: CPT

## 2021-06-23 PROCEDURE — 96372 THER/PROPH/DIAG INJ SC/IM: CPT | Performed by: INTERNAL MEDICINE

## 2021-06-23 RX ADMIN — CYANOCOBALAMIN 1000 MCG: 1000 INJECTION, SOLUTION INTRAMUSCULAR; SUBCUTANEOUS at 09:57

## 2021-06-23 NOTE — PROGRESS NOTES
Clinic Administered Medication Documentation    Administrations This Visit     cyanocobalamin injection 1,000 mcg     Admin Date  06/23/2021 Action  Given Dose  1,000 mcg Route  Intramuscular Site  Left Deltoid Administered By  Natacha Fiore    Ordering Provider: Harrison Tse, DO    Patient Supplied?: No                  Injectable Medication Documentation    Patient was given Cyanocobalamin (B-12). Prior to medication administration, verified patients identity using patient s name and date of birth. Please see MAR and medication order for additional information. Patient instructed to remain in clinic for 15 minutes and report any adverse reaction to staff immediately .      Was entire vial of medication used? Yes  Vial/Syringe: Single dose vial  Expiration Date:  08/2022  Was this medication supplied by the patient? No

## 2021-08-03 ENCOUNTER — ALLIED HEALTH/NURSE VISIT (OUTPATIENT)
Dept: FAMILY MEDICINE | Facility: CLINIC | Age: 86
End: 2021-08-03
Payer: COMMERCIAL

## 2021-08-03 DIAGNOSIS — E53.8 VITAMIN B 12 DEFICIENCY: Primary | ICD-10-CM

## 2021-08-03 DIAGNOSIS — E16.2 HYPOGLYCEMIA: ICD-10-CM

## 2021-08-03 PROCEDURE — 96372 THER/PROPH/DIAG INJ SC/IM: CPT | Performed by: INTERNAL MEDICINE

## 2021-08-03 PROCEDURE — 99207 PR NO CHARGE NURSE ONLY: CPT

## 2021-08-03 RX ADMIN — CYANOCOBALAMIN 1000 MCG: 1000 INJECTION, SOLUTION INTRAMUSCULAR; SUBCUTANEOUS at 13:01

## 2021-08-03 NOTE — PROGRESS NOTES
Clinic Administered Medication Documentation          Injectable Medication Documentation    Patient was given Cyanocobalamin (B-12). Prior to medication administration, verified patients identity using patient s name and date of birth. Please see MAR and medication order for additional information. Patient instructed to remain in clinic for 15 minutes and report any adverse reaction to staff immediately .      Was entire vial of medication used? Yes  Vial/Syringe: Single dose vial  Expiration Date:  03/31/2023  Was this medication supplied by the patient? No

## 2021-08-05 NOTE — TELEPHONE ENCOUNTER
Prescription approved per [facility] Refill Protocol.      Kimberlee Hernandes RN  [facility]

## 2021-09-14 DIAGNOSIS — I47.10 SVT (SUPRAVENTRICULAR TACHYCARDIA) (H): ICD-10-CM

## 2021-09-15 RX ORDER — DILTIAZEM HYDROCHLORIDE 120 MG/1
CAPSULE, EXTENDED RELEASE ORAL
Qty: 90 CAPSULE | Refills: 1 | Status: SHIPPED | OUTPATIENT
Start: 2021-09-15 | End: 2022-01-01

## 2021-09-15 NOTE — TELEPHONE ENCOUNTER
Routing refill request to provider for review/approval because:  Labs out of range:  TAZ BatresN, RN  [facility]

## 2021-09-21 ENCOUNTER — ALLIED HEALTH/NURSE VISIT (OUTPATIENT)
Dept: FAMILY MEDICINE | Facility: CLINIC | Age: 86
End: 2021-09-21
Payer: COMMERCIAL

## 2021-09-21 DIAGNOSIS — Z23 NEED FOR PROPHYLACTIC VACCINATION AND INOCULATION AGAINST INFLUENZA: Primary | ICD-10-CM

## 2021-09-21 PROCEDURE — 96372 THER/PROPH/DIAG INJ SC/IM: CPT | Performed by: INTERNAL MEDICINE

## 2021-09-21 PROCEDURE — 99207 PR NO CHARGE NURSE ONLY: CPT

## 2021-09-21 PROCEDURE — 90662 IIV NO PRSV INCREASED AG IM: CPT

## 2021-09-21 PROCEDURE — G0008 ADMIN INFLUENZA VIRUS VAC: HCPCS

## 2021-09-21 RX ADMIN — CYANOCOBALAMIN 1000 MCG: 1000 INJECTION, SOLUTION INTRAMUSCULAR; SUBCUTANEOUS at 11:27

## 2021-09-21 NOTE — PROGRESS NOTES
Clinic Administered Medication Documentation    Administrations This Visit     cyanocobalamin injection 1,000 mcg     Admin Date  09/21/2021 Action  Given Dose  1,000 mcg Route  Intramuscular Site  Right Deltoid Administered By  Ericka Ye CMA    Ordering Provider: Harrison Tse, DO    Patient Supplied?: No                  Injectable Medication Documentation    Patient was given Cyanocobalamin (B-12). Prior to medication administration, verified patients identity using patient s name and date of birth. Please see MAR and medication order for additional information. Patient instructed to remain in clinic for 15 minutes and report any adverse reaction to staff immediately .      Was entire vial of medication used? Yes  Vial/Syringe: Single dose vial  Expiration Date:  03/2023  Was this medication supplied by the patient? No   Ericka Ye MA

## 2021-11-04 DIAGNOSIS — I25.10 ASCVD (ARTERIOSCLEROTIC CARDIOVASCULAR DISEASE): ICD-10-CM

## 2021-11-05 RX ORDER — CLOPIDOGREL BISULFATE 75 MG/1
75 TABLET ORAL DAILY
Qty: 90 TABLET | Refills: 1 | Status: SHIPPED | OUTPATIENT
Start: 2021-11-05 | End: 2022-01-01

## 2021-11-05 NOTE — TELEPHONE ENCOUNTER
Plavix  Routing refill request to provider for review/approval because:  Labs out of range:  [facility]  Labs not current:  [facility], PLT    Erika Au RN

## 2021-11-12 ENCOUNTER — OFFICE VISIT (OUTPATIENT)
Dept: INTERNAL MEDICINE | Facility: CLINIC | Age: 86
End: 2021-11-12
Payer: COMMERCIAL

## 2021-11-12 VITALS
OXYGEN SATURATION: 97 % | HEART RATE: 72 BPM | WEIGHT: 165.5 LBS | BODY MASS INDEX: 22.45 KG/M2 | RESPIRATION RATE: 20 BRPM | TEMPERATURE: 97 F | SYSTOLIC BLOOD PRESSURE: 124 MMHG | DIASTOLIC BLOOD PRESSURE: 68 MMHG

## 2021-11-12 DIAGNOSIS — K40.90 RIGHT INGUINAL HERNIA: Primary | ICD-10-CM

## 2021-11-12 DIAGNOSIS — I25.10 ASCVD (ARTERIOSCLEROTIC CARDIOVASCULAR DISEASE): ICD-10-CM

## 2021-11-12 PROCEDURE — 99213 OFFICE O/P EST LOW 20 MIN: CPT | Mod: 25 | Performed by: INTERNAL MEDICINE

## 2021-11-12 PROCEDURE — 96372 THER/PROPH/DIAG INJ SC/IM: CPT | Performed by: INTERNAL MEDICINE

## 2021-11-12 RX ADMIN — CYANOCOBALAMIN 1000 MCG: 1000 INJECTION, SOLUTION INTRAMUSCULAR; SUBCUTANEOUS at 10:08

## 2021-11-12 ASSESSMENT — PAIN SCALES - GENERAL: PAINLEVEL: NO PAIN (0)

## 2021-11-12 NOTE — PROGRESS NOTES
Clinic Administered Medication Documentation          Injectable Medication Documentation    Patient was given Cyanocobalamin (B-12). Prior to medication administration, verified patients identity using patient s name and date of birth. Please see MAR and medication order for additional information. Patient instructed to remain in clinic for 15 minutes and report any adverse reaction to staff immediately .      Was entire vial of medication used? Yes  Vial/Syringe: Single dose vial  Expiration Date:  April 2023  Was this medication supplied by the patient? No

## 2021-11-12 NOTE — PROGRESS NOTES
Subjective   Art is a 88 year old who presents for the following health issues     HPI     Chief Complaint   Patient presents with     Mass     lump in groin area, years        EMR reviewed including:             Complaint, History of Chief Complaint, Corresponding Review of Systems, and Complaint Specific Physical Examination.    #1   Painful lump in the right groin.  History of left inguinal herniorrhaphy couple years ago.  Similar symptoms on the right.  Has gotten larger and more painful.  Is still reducible.  Denies gastrointestinal symptoms including obstruction or diarrhea.  Has no urinary symptoms.  Able to urinate without difficulty.        Exam:   GI: Abdomen is soft, without rebound, guarding or tenderness. Bowel sounds are appropriate. No renal bruits are heard.  An apparent direct inguinal hernia is noted.  Measures approximately 3 cm in diameter and does not extend into the scrotum at this time.      #2   Follow-up on coronary artery disease.  Saw cardiologist in June.  Had PTCA in 2018 for non-ST elevated myocardial infarction.  Subsequently went through coronary artery bypass grafting.  Intermittent history of atrial fibrillation but not on anticoagulation due to hematuria.  Does have some residual ischemic cardiomyopathy.  Currently chest pain-free.  Currently taking Plavix, statin, calcium channel blocker.  Not on beta-blocker.          Patient has been interviewed, applicable history and applied review of systems have been performed.    Vital Signs:   /68 (BP Location: Right arm, Patient Position: Chair, Cuff Size: Adult Large)   Pulse 72   Temp 97  F (36.1  C) (Temporal)   Resp 20   Wt 75.1 kg (165 lb 8 oz)   SpO2 97%   BMI 22.45 kg/m        Decision Making    Problem and Complexity     1. Right inguinal hernia  We will set up a general surgery.    - Adult General Surg Referral; Future    2. ASCVD (arteriosclerotic cardiovascular disease)  Based on the patient's history and  multiple medical problems.  We will hold off on performing any preoperative examination at this time until surgery has been solidified.  At that time we will need to do preoperative examination as this will include cardiac work-up etc.                                FOLLOW UP   I have asked the patient to make an appointment for followup with me in 2 to 4 weeks        I have carefully explained the diagnosis and treatment options to the patient.  The patient has displayed an understanding of the above, and all subsequent questions were answered.      DO ELAINE Powell    Portions of this note were produced using [employer]  Although every attempt at real-time proof reading has been made, occasional grammar/syntax errors may have been missed.

## 2021-11-12 NOTE — NURSING NOTE
Health Maintenance Due   Topic Date Due     ANNUAL REVIEW OF HM ORDERS  Never done     ZOSTER IMMUNIZATION (1 of 2) Never done     MEDICARE ANNUAL WELLNESS VISIT  03/13/2021     FALL RISK ASSESSMENT  03/13/2021     COVID-19 Vaccine (3 - Booster for Moderna series) 09/18/2021     Sybil MCINTYRE LPN

## 2021-12-07 NOTE — PROGRESS NOTES
Assessment & Plan   Problem List Items Addressed This Visit        Circulatory    Paroxysmal atrial fibrillation (H)       Urinary    CKD (chronic kidney disease) stage 3, GFR 30-59 ml/min (H)       Other    History of pulmonary embolism    S/P PTCA (percutaneous transluminal coronary angioplasty)      Other Visit Diagnoses     Right inguinal hernia    -  Primary    Relevant Orders    Case Request: right open inguinal hernia repair with mesh    S/P left inguinal hernia repair             The patient was thoroughly counseled regarding Right inguinal hernia [K40.90].     The patient was informed that the proposed procedure or medical intervention involves reduction and repair with or without mesh and does offer a very good likelihood of symptom relief. We also discussed the options of repairing the hernia laparoscopically, robotically, versus open. Patient opted for open repair.      The patient was made aware of the risks of the procedure, including but not limited to:  nerve entrapment or injury, persistence of pain (10%), injury to the bowel/bladder, infertility, ischemic orchitis (rare), Injury to the vas deferens (rare), hematoma, mesh migration, mesh infection, cardiac or pulmonary complication and anesthesia related complications also that difficulties may be encountered during recovery to include: wound infection, recurrence (5-10%), seroma, hematoma and chronic pain.     In the course of the evaluation we did discuss other therapeutic options with the patient, including continued watchful waiting. The risks and benefits of these options were also discussed which include but are not limited to: incarceration and/or strangulation..     Also discussed were possible problems or difficulties the patient may encounter if treatment was not pursued at this time.     The patient was informed [facility] will [facility] responsible for the procedure. Assistance during the procedure and during  hospitalization may also be provided by other physicians, nurses and technicians.     The patient will be provided additional education resources by the support staff. If there are ever any questions regarding their diagnosis or the procedure, the patient is encouraged to ask.     All of the patient s or their legal representative s questions have been answered to their satisfaction and they have indicated a clear understanding of this discussion.   Remigio expressed understanding of risks, benefits and alternatives and wished to proceed.     All findings, test results, and diagnosis were discussed with the patient. Remigio  participated in the decision making process and agreed with the plan of care. Questions were sought and answered.          40 minutes spent on the date of the encounter doing chart review, history and exam, documentation and further activities per the note       Tobacco Cessation:   reports that he has been smoking pipe. He has been smoking about 0.00 packs per day for the past 20.00 years. He has never used smokeless tobacco.    No follow-ups on file.    Patient was seen and examined by myself and Dr. Lorenzo.  The note was then scribed by me.     Asuncion Reina, MS3    This patient was seen and examined by myself as well as the medical student.  The medical student scribed the note and I have reviewed it, edited it appropriately, and agree with the final documentation.    RudyMarvin Lorenzo MD  [facility] HERNAN Garcia is a 88 year old who presents for the following health issues  accompanied by his spouse.    HPI     Presents for right inguinal hernia. Does not recall inciting event for hernia, has had it for years. Present during left hernia repair 09/2020, but was asymptomatic at that time. Since then, hernia has grown in size. Bothersome when crossing his legs. Some pain with lifting, although he does not lift much anymore. Denies symptoms of obstruction, diarrhea,  constipation. Previous left inguinal hernia repair 1 year ago, otherwise no surgeries. No history of diabetes. Currently taking Plavix after MI 3 years ago; pt also has stent placement, and PAF. Smokes a pipe occasionally. Left hernia repair went well. No lasting numbness, tingling or pain in the left groin. Does not notice repair mesh on left side and no signs of recurrent on that side.     Review of Systems   As above.      Objective    /78   Pulse 72   Temp 97  F (36.1  C) (Temporal)   Wt 73.5 kg (162 lb)   SpO2 98%   BMI 21.97 kg/m    Body mass index is 21.97 kg/m .  Physical Exam  Constitutional:       Appearance: Normal appearance.   Eyes:      Conjunctiva/sclera: Conjunctivae normal.   Pulmonary:      Effort: Pulmonary effort is normal.   Abdominal:      Hernia: A hernia is present. Hernia is present in the right inguinal area.       Skin:     General: Skin is warm.   Neurological:      Mental Status: He is alert and oriented to person, place, and time.   Psychiatric:         Mood and Affect: Mood normal.         Thought Content: Thought content normal.         Judgment: Judgment normal.

## 2021-12-07 NOTE — LETTER
12/7/2021         RE: Remigio Holly  [address] [facility] [id number]        Dear Colleague,    Thank you for referring your patient, Remigio Holly, to the [facility]. Please see a copy of my visit note below.      Assessment & Plan   Problem List Items Addressed This Visit        Circulatory    Paroxysmal atrial fibrillation (H)       Urinary    CKD (chronic kidney disease) stage 3, GFR 30-59 ml/min (H)       Other    History of pulmonary embolism    S/P PTCA (percutaneous transluminal coronary angioplasty)      Other Visit Diagnoses     Right inguinal hernia    -  Primary    Relevant Orders    Case Request: right open inguinal hernia repair with mesh    S/P left inguinal hernia repair             The patient was thoroughly counseled regarding Right inguinal hernia [K40.90].     The patient was informed that the proposed procedure or medical intervention involves reduction and repair with or without mesh and does offer a very good likelihood of symptom relief. We also discussed the options of repairing the hernia laparoscopically, robotically, versus open. Patient opted for open repair.      The patient was made aware of the risks of the procedure, including but not limited to:  nerve entrapment or injury, persistence of pain (10%), injury to the bowel/bladder, infertility, ischemic orchitis (rare), Injury to the vas deferens (rare), hematoma, mesh migration, mesh infection, cardiac or pulmonary complication and anesthesia related complications also that difficulties may be encountered during recovery to include: wound infection, recurrence (5-10%), seroma, hematoma and chronic pain.     In the course of the evaluation we did discuss other therapeutic options with the patient, including continued watchful waiting. The risks and benefits of these options were also discussed which include but are not limited to: incarceration and/or strangulation..     Also  discussed were possible problems or difficulties the patient may encounter if treatment was not pursued at this time.     The patient was informed that Tanvir Lorenzo MD will be primarily responsible for the procedure. Assistance during the procedure and during hospitalization may also be provided by other physicians, nurses and technicians.     The patient will be provided additional education resources by the support staff. If there are ever any questions regarding their diagnosis or the procedure, the patient is encouraged to ask.     All of the patient s or their legal representative s questions have been answered to their satisfaction and they have indicated a clear understanding of this discussion.   Remigio expressed understanding of risks, benefits and alternatives and wished to proceed.     All findings, test results, and diagnosis were discussed with the patient. Remigio  participated in the decision making process and agreed with the plan of care. Questions were sought and answered.          40 minutes spent on the date of the encounter doing chart review, history and exam, documentation and further activities per the note       Tobacco Cessation:   reports that he has been smoking pipe. He has been smoking about 0.00 packs per day for the past 20.00 years. He has never used smokeless tobacco.    No follow-ups on file.    Patient was seen and examined by myself and Dr. Lorenzo.  The note was then scribed by me.     Asuncion Reina, MS3    This patient was seen and examined by myself as well as the medical student.  The medical student scribed the note and I have reviewed it, edited it appropriately, and agree with the final documentation.    [facility] Hospital[facility]onatan Lorenzo MD  [facility] HERNAN Garcia is a 88 year old who presents for the following health issues  accompanied by his spouse.    HPI     Presents for right inguinal hernia. Does not recall inciting event for hernia, has had it for years.  Present during left hernia repair 09/2020, but was asymptomatic at that time. Since then, hernia has grown in size. Bothersome when crossing his legs. Some pain with lifting, although he does not lift much anymore. Denies symptoms of obstruction, diarrhea, constipation. Previous left inguinal hernia repair 1 year ago, otherwise no surgeries. No history of diabetes. Currently taking Plavix after MI 3 years ago; pt also has stent placement, and PAF. Smokes a pipe occasionally. Left hernia repair went well. No lasting numbness, tingling or pain in the left groin. Does not notice repair mesh on left side and no signs of recurrent on that side.     Review of Systems   As above.      Objective    /78   Pulse 72   Temp 97  F (36.1  C) (Temporal)   Wt 73.5 kg (162 lb)   SpO2 98%   BMI 21.97 kg/m    Body mass index is 21.97 kg/m .  Physical Exam  Constitutional:       Appearance: Normal appearance.   Eyes:      Conjunctiva/sclera: Conjunctivae normal.   Pulmonary:      Effort: Pulmonary effort is normal.   Abdominal:      Hernia: A hernia is present. Hernia is present in the right inguinal area.       Skin:     General: Skin is warm.   Neurological:      Mental Status: He is alert and oriented to person, place, and time.   Psychiatric:         Mood and Affect: Mood normal.         Thought Content: Thought content normal.         Judgment: Judgment normal.                  Again, thank you for allowing me to participate in the care of your patient.        Sincerely,        RudyMarvin Lorenzo MD

## 2021-12-07 NOTE — TELEPHONE ENCOUNTER
Type of surgery: right open inguinal hernia repair with mesh    Location of surgery: [facility]  Date and time of surgery: 12/30  Surgeon: Maura  Pre-Op Appt Date: 12/27  Post-Op Appt Date: 1/11   Packet sent out: Yes  Pre-cert/Authorization completed:  Not Applicable  Date: na     [Verbal consent obtained from patient] : the patient, [unfilled] [de-identified] : 73 year female  patient with history of stable Essential Hypertension, Hypercholesterolemia, GERD without esophagitis, Vitamin D Deficiency, history as stated, initiated telephonic visit for Medication Adherence and past lab results.\par \par

## 2021-12-27 NOTE — PATIENT INSTRUCTIONS
Preparing for Your Surgery  Getting started  A nurse will call you to review your health history and instructions. They will give you an arrival time based on your scheduled surgery time. Please be ready to share:    Your doctor's clinic name and phone number    Your medical, surgical and anesthesia history    A list of allergies and sensitivities    A list of medicines, including herbal treatments and over-the-counter drugs    Whether the patient has a legal guardian (ask how to send us the papers in advance)  Please tell us if you're pregnant--or if there's any chance you might be pregnant. Some surgeries may injure a fetus (unborn baby), so they require a pregnancy test. Surgeries that are safe for a fetus don't always need a test, and you can choose whether to have one.   If you have a child who's having surgery, please ask for a copy of Preparing for Your Child's Surgery.    Preparing for surgery    Within 30 days of surgery: Have a pre-op exam (sometimes called an H&P, or History and Physical). This can be done at a clinic or pre-operative center.  ? If you're having a , you may not need this exam. Talk to your care team.    At your pre-op exam, talk to your care team about all medicines you take. If you need to stop any medicines before surgery, ask when to start taking them again.  ? We do this for your safety. Many medicines can make you bleed too much during surgery. Some change how well surgery (anesthesia) drugs work.    Call your insurance company to let them know you're having surgery. (If you don't have insurance, call [phone].)    Call your clinic if there's any change in your health. This includes signs of a cold or flu (sore throat, runny nose, cough, rash, fever). It also includes a scrape or scratch near the surgery site.    If you have questions on the day of surgery, call your hospital or surgery center.  COVID testing  You may need to be tested for COVID-19 before having  surgery. If so, we will give you instructions.  Eating and drinking guidelines  For your safety: Unless your surgeon tells you otherwise, follow the guidelines below.    Eat and drink as usual until 8 hours before surgery. After that, no food or milk.    Drink clear liquids until 2 hours before surgery. These are liquids you can see through, like water, Gatorade and Propel Water. You may also have black coffee and tea (no cream or milk).    Nothing by mouth within 2 hours of surgery. This includes gum, candy and breath mints.    If you drink alcohol: Stop drinking it the night before surgery.    If your care team tells you to take medicine on the morning of surgery, it's okay to take it with a sip of water.  Preventing infection    Shower or bathe the night before and morning of your surgery. Follow the instructions your clinic gave you. (If no instructions, use regular soap.)    Don't shave or clip hair near your surgery site. We'll remove the hair if needed.    Don't smoke or vape the morning of surgery. You may chew nicotine gum up to 2 hours before surgery. A nicotine patch is okay.  ? Note: Some surgeries require you to completely quit smoking and nicotine. Check with your surgeon.    Your care team will make every effort to keep you safe from infection. We will:  ? Clean our hands often with soap and water (or an alcohol-based hand rub).  ? Clean the skin at your surgery site with a special soap that kills germs.  ? Give you a special gown to keep you warm. (Cold raises the risk of infection.)  ? Wear special hair covers, masks, gowns and gloves during surgery.  ? Give antibiotic medicine, if prescribed. Not all surgeries need antibiotics.  What to bring on the day of surgery    Photo ID and insurance card    Copy of your health care directive, if you have one    Glasses and hearing aides (bring cases)  ? You can't wear contacts during surgery    Inhaler and eye drops, if you use them (tell us about these when  you arrive)    CPAP machine or breathing device, if you use them    A few personal items, if spending the night    If you have . . .  ? A pacemaker, ICD (cardiac defibrillator) or other implant: Bring the ID card.  ? An implanted stimulator: Bring the remote control.  ? A legal guardian: Bring a copy of the certified (court-stamped) guardianship papers.  Please remove any jewelry, including body piercings. Leave jewelry and other valuables at home.  If you're going home the day of surgery    You must have a responsible adult drive you home. They should stay with you overnight as well.    If you don't have someone to stay with you, and you aren't safe to go home alone, we may keep you overnight. Insurance often won't pay for this.  After surgery  If it's hard to control your pain or you need more pain medicine, please call your surgeon's office.  Questions?   If you have any questions for your care team, list them here: _________________________________________________________________________________________________________________________________________________________________________ ____________________________________ ____________________________________ ____________________________________  For informational purposes only. Not to replace the advice of your health care provider. Copyright   2003, 2019 [facility]. All rights reserved. Clinically reviewed by Sandra Ashraf MD. [employer] [id number] - REV 07/21.

## 2021-12-27 NOTE — PROGRESS NOTES
Clinic Administered Medication Documentation    Administrations This Visit     cyanocobalamin injection 1,000 mcg     Admin Date  12/27/2021 Action  Given Dose  1,000 mcg Route  Intramuscular Site  Right Deltoid Administered By  Fiorella Maguire CMA    Ordering Provider: Harrison Tse, DO    Patient Supplied?: No                  Injectable Medication Documentation    Patient was given Cyanocobalamin (B-12). Prior to medication administration, verified patients identity using patient s name and date of birth. Please see MAR and medication order for additional information. Patient instructed to remain in clinic for 15 minutes.      Was entire vial of medication used? Yes  Vial/Syringe: Single dose vial  Expiration Date:  12/22  Was this medication supplied by the patient? No

## 2021-12-27 NOTE — H&P (VIEW-ONLY)
[address] [address]  Phone: [phone]  Primary Provider: Harrison Tse  Pre-op Performing Provider: HARRISON TSE      PREOPERATIVE EVALUATION:  Today's date: 12/27/2021    Remigio Holly is a 88 year old male who presents for a preoperative evaluation.    Surgical Information:  Surgery/Procedure: Right open inguinal hernia repair with mesh  Surgery Location: [facility]  Surgeon: Dr. Lorenzo  Surgery Date: 12/30/21  Time of Surgery: 10:00  Where patient plans to recover: At home with family  Fax number for surgical facility: Note does not need to be faxed, will be available electronically in Epic.    Type of Anesthesia Anticipated: MAC    Assessment & Plan     The proposed surgical procedure is considered INTERMEDIATE risk.    Preop general physical exam  - UA Macro with Reflex to Micro and Culture - lab collect; Future  - EKG 12-lead complete w/read - Clinics    Right inguinal hernia    S/P PTCA (percutaneous transluminal coronary angioplasty)    Abdominal aortic aneurysm (AAA) without rupture (H)    Malignant neoplasm of stomach, unspecified location (H)  - alum & mag hydroxide-simethicone (MAALOX  ES) 400-400-40 MG/5ML SUSP suspension; Take 30 mLs by mouth every 4 hours as needed for indigestion    Ischemic cardiomyopathy  - Basic metabolic panel  (Ca, Cl, CO2, Creat, Gluc, K, Na, BUN); Future  - EKG 12-lead complete w/read - Clinics    Pernicious anemia  - CBC with platelets and differential; Future  - cyanocobalamin injection 1,000 mcg    Hyperlipidemia LDL goal <130  - Hepatic panel (Albumin, ALT, AST, Bili, Alk Phos, TP); Future  - Lipid panel reflex to direct LDL Fasting; Future  - Albumin Random Urine Quantitative with Creat Ratio; Future         Risks and Recommendations:  The patient has the following additional risks and recommendations for perioperative complications:   - No identified additional risk  factors other than previously addressed    Medication Instructions:  .  Patient is instructed to discontinue Plavix 5 days prior to the procedure and not to take any of his regular medications the morning of the procedure.    RECOMMENDATION:  APPROVAL GIVEN to proceed with proposed procedure, without further diagnostic evaluation.    Review of external notes as documented above         Subjective     HPI related to upcoming procedure: Patient has persistent pain secondary to the presence of a right inguinal hernia.  Conservative measures have been unsuccessful.  He is now anticipating surgical intervention discussed the risks and benefits with his operating surgeon.    Preop Questions 12/27/2021   1. Have you ever had a heart attack or stroke? YES -    2. Have you ever had surgery on your heart or blood vessels, such as a stent placement, a coronary artery bypass, or surgery on an artery in your head, neck, heart, or legs? YES -    3. Do you have chest pain with activity? No   4. Do you have a history of  heart failure? No   5. Do you currently have a cold, bronchitis or symptoms of other infection? No   6. Do you have a cough, shortness of breath, or wheezing? No   7. Do you or anyone in your family have previous history of blood clots? No   8. Do you or does anyone in your family have a serious bleeding problem such as prolonged bleeding following surgeries or cuts? No   9. Have you ever had problems with anemia or been told to take iron pills? No   10. Have you had any abnormal blood loss such as black, tarry or bloody stools? No   11. Have you ever had a blood transfusion? YES -    11a. Have you ever had a transfusion reaction? No   12. Are you willing to have a blood transfusion if it is medically needed before, during, or after your surgery? Yes   13. Have you or any of your relatives ever had problems with anesthesia? No   14. Do you have sleep apnea, excessive snoring or daytime drowsiness? No   15. Do you  have any artifical heart valves or other implanted medical devices like a pacemaker, defibrillator, or continuous glucose monitor? No   16. Do you have artificial joints? YES -    17. Are you allergic to latex? No       Health Care Directive:  Patient has a Health Care Directive on file      Preoperative Review of :   reviewed - no record of controlled substances prescribed.      Status of Chronic Conditions:  See problem list for active medical problems.  Problems all longstanding and stable, except as noted/documented.  See ROS for pertinent symptoms related to these conditions.      Review of Systems  CONSTITUTIONAL: NEGATIVE for fever, chills, change in weight  INTEGUMENTARY/SKIN: NEGATIVE for worrisome rashes, moles or lesions  EYES: NEGATIVE for vision changes or irritation  ENT/MOUTH: NEGATIVE for ear, mouth and throat problems  RESP: NEGATIVE for significant cough or SOB  CV: NEGATIVE for chest pain, palpitations or peripheral edema  GI: NEGATIVE for nausea, abdominal pain, heartburn, or change in bowel habits  : NEGATIVE for frequency, dysuria, or hematuria.  Patient does have the right inguinal hernia which at this time does not appear to be reducible.  MUSCULOSKELETAL: NEGATIVE for significant arthralgias or myalgia  NEURO: NEGATIVE for weakness, dizziness or paresthesias  ENDOCRINE: NEGATIVE for temperature intolerance, skin/hair changes  HEME: NEGATIVE for bleeding problems  PSYCHIATRIC: NEGATIVE for changes in mood or affect    Patient Active Problem List    Diagnosis Date Noted     Nephrolithiasis 12/25/2012     Priority: High     ASCVD (arteriosclerotic cardiovascular disease) 09/30/2020     Priority: Medium     Ischemic cardiomyopathy 09/30/2020     Priority: Medium     Non-recurrent bilateral inguinal hernia without obstruction or gangrene 09/02/2020     Priority: Medium     Added automatically from request for surgery [id number]       History of pulmonary embolism 09/01/2020     Priority:  Medium     Dupuytren's contracture of left hand 12/04/2018     Priority: Medium     S/P PTCA (percutaneous transluminal coronary angioplasty) 03/14/2018     Priority: Medium     Urinary retention with incomplete bladder emptying 08/20/2017     Priority: Medium     Orthostatic hypotension 08/19/2017     Priority: Medium     Thoracic aortic aneurysm without rupture (H) - 4.7 cm on 8/18/17 (4.5 cm in 8/15) 08/18/2017     Priority: Medium     Near syncope 08/17/2017     Priority: Medium     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 Provider to review       CKD (chronic kidney disease) stage 3, GFR 30-59 ml/min (H) 05/05/2013     Priority: Medium     Pain 12/25/2012     Priority: Medium     Advance Care Planning 08/16/2012     Priority: Medium     Advance Care Planning: Receipt of ACP document:  Received: Health Care Directive which was witnessed or notarized on 3-12-13.  Document previously scanned on 3-13-13.  Validation form completed and  scanned.  Code Status reflects choices in most recent ACP document.  Confirmed/documented designated decision maker(s). See permanent comments section of demographics in clinical tab. View document(s) and details by clicking on code status. Added by Sayra Maguire on 8/25/2014.  .Patient states has Advance Directive and will bring in a copy to clinic. 8/16/2012          HYPERLIPIDEMIA LDL GOAL <100 10/31/2010     Priority: Medium     GERD (gastroesophageal reflux disease) 03/26/2010      Priority: Medium     Mixed hyperlipidemia 03/24/2010     Priority: Medium     Closed fracture of calcaneus 01/06/2007     Priority: Medium     Calculus of gallbladder 01/09/2003     Priority: Medium     Problem list name updated by automated process. Provider to review       Abdominal aortic aneurysm (H) 10/22/2002     Priority: Medium     Problem list name updated by automated process. Provider to review       Chest pain      Priority: Medium     Problem list name updated by automated process. Provider to review       Pain in joint, shoulder region      Priority: Medium     Paroxysmal atrial fibrillation (H) 08/26/2010     Priority: Low     Gastric cancer (H) 08/05/2010     Priority: Low     (Problem list name updated by automated process. Provider to review and confirm.)        Past Medical History:   Diagnosis Date     Blood transfusion      Chronic gastric ulcer without mention of hemorrhage, perforation, without mention of obstruction     Ulcer, Gastric     Gastric cancer (H)      Hemorrhage of gastrointestinal tract, unspecified 01/13/10    [facility]     Hypoglycemia, unspecified 1/26/2015     Measles without mention of complication     Measles     Mixed hyperlipidemia     Hyperlipidemia     Mumps without mention of complication     Mumps     Pulmonary embolism (H) Sept 4, 2010     Urinary calculus, unspecified     Renal stones     Varicella without mention of complication     Chickenpox     Past Surgical History:   Procedure Laterality Date     ABDOMEN SURGERY       ARTHROPLASTY KNEE Left 8/15/2017    Procedure: ARTHROPLASTY KNEE;  Left total knee replacement;  Surgeon: Jonathan Cardona DO;  Location: [facility]     CHOLECYSTECTOMY, LAPOROSCOPIC  10/6/2008    Cholecystectomy, Laparoscopic     COLONOSCOPY  1/14/10    [facility]     COLONOSCOPY  05/25/10     CYSTOSCOPY, RETROGRADES, EXTRACT STONE, INSERT STENT, COMBINED  12/25/2012    Procedure: COMBINED CYSTOSCOPY, RETROGRADES, EXTRACT STONE,  INSERT STENT;  Cystoscopy, Left Stent Placement, left retrograde pylogram, uc;  Surgeon: Amador Sanchez MD;  Location: [facility] OR     ESOPHAGOSCOPY, GASTROSCOPY, DUODENOSCOPY (EGD), COMBINED  8/16/2011    Procedure:COMBINED ESOPHAGOSCOPY, GASTROSCOPY, DUODENOSCOPY (EGD), BIOPSY SINGLE OR MULTIPLE; Surgeon:TONY GARCIA; Location[facility]     GENERAL SURGERY                           DATE:  07/07/10    Gastrectomy     HC REPAIR ROTATOR CUFF,ACUTE  3/21/2005    Right     HERNIORRHAPHY INGUINAL Left 9/25/2020    Procedure: Open left inguinal hernia repair with mesh;  Surgeon: Tanvir Lorenzo MD;  Location: [facility]     LASER HOLMIUM LITHOTRIPSY URETER(S), INSERT STENT, COMBINED  1/5/2013    Procedure: COMBINED CYSTOSCOPY, URETEROSCOPY, LASER HOLMIUM LITHOTRIPSY URETER(S), INSERT STENT;  Bilateral  Cystoscopy, Bilateral Ureteroscopy, Bilateral retrogrades and  placement of ureteral stent right side. Laser Holmium Lithotripsy  and Exchange  of stent left side;  Surgeon: Tone Morejon MD;  Location: [facility]     LASER HOLMIUM LITHOTRIPSY URETER(S), INSERT STENT, COMBINED  1/30/2013    Procedure: COMBINED CYSTOSCOPY, URETEROSCOPY, LASER HOLMIUM LITHOTRIPSY URETER(S), INSERT STENT;  Right Ureteroscopy; Stone basketing; Right Stent exchange and  removal of left stent.;  Surgeon: Tone Morejon MD;  Location: [facility]     RELEASE DUPUYTRENS CONTRACTURE Left 11/26/2018    Procedure: Release Left Hand Dupuytrens Contracture;  Surgeon: Lucio Omalley MD;  Location: [facility] OR     VIDEO CAPSULE ENDOSCOPY  01/15/10    [facility] EXCIS STOMACH ULCER,LESN;LOCAL       [facility] COLONOSCOPY W/WO BRUSH/WASH  01/31/2006     [facility] UGI ENDOSCOPY, SIMPLE EXAM  1/13/10    [facility] UGI ENDOSCOPY, SIMPLE EXAM  05/25/10     Current Outpatient Medications   Medication Sig Dispense Refill     atorvastatin (LIPITOR) 40 MG tablet Take 40 mg by mouth  3     blood glucose (CONTOUR NEXT TEST) test strip USE  TO TEST BLOOD SUGAR 1 TIMES DAILY. 100 strip 3     blood glucose monitoring (ROBERT MICROLET) lancets Use to test blood sugar 1 time daily or as directed. 1 Box 2     cyanocobalamin (VITAMIN B12) 1000 MCG/ML injection Inject 1 mL (1,000 mcg) into the muscle every 30 days 1 mL 11     diltiazem ER (TIAZAC) 120 MG 24 hr ER beaded capsule TAKE ONE CAPSULE BY MOUTH ONCE DAILY 90 capsule 1     furosemide (LASIX) 20 MG tablet Take 1 tablet (20 mg) by mouth daily as needed (Leg swelling) 15 tablet 0     polyethylene glycol (MIRALAX) 17 GM/Dose powder Take 17 g (1 capful) by mouth daily 578 g 1     senna-docusate (SENOKOT-S;PERICOLACE) 8.6-50 MG per tablet Take 1-2 tablets by mouth 2 times daily 30 tablet 0     tamsulosin (FLOMAX) 0.4 MG capsule Take 0.4 mg by mouth daily       clopidogrel (PLAVIX) 75 MG tablet Take 1 tablet (75 mg) by mouth daily (Patient not taking: Reported on 2021) 90 tablet 1       Allergies   Allergen Reactions     Mobic [Meloxicam] Nausea and Diarrhea     Diarrhea, nausea, flu like symptoms since start of use        Social History     Tobacco Use     Smoking status: Current Some Day Smoker     Packs/day: 0.00     Years: 20.00     Pack years: 0.00     Types: Pipe     Last attempt to quit: 1998     Years since quittin.0     Smokeless tobacco: Never Used     Tobacco comment: Rare pipe   Substance Use Topics     Alcohol use: No     Alcohol/week: 0.0 standard drinks     Family History   Problem Relation Age of Onset     Alzheimer Disease Father      Cardiovascular Mother      C.A.D. Mother      Heart Disease Mother      Cardiovascular Brother      Heart Disease Brother      C.A.D. Brother      History   Drug Use No         Objective     /68   Pulse 76   Temp (!) 96.7  F (35.9  C) (Temporal)   Resp 20   Wt 75.8 kg (167 lb)   SpO2 99%   BMI 22.65 kg/m      Physical Exam    GENERAL APPEARANCE: healthy, alert, active and appears substantially younger than his stated age.     EYES:  EOMI,  PERRL     HENT: ear canals and TM's normal and nose and mouth without ulcers or lesions     NECK: no adenopathy, no asymmetry, masses, or scars and thyroid normal to palpation     RESP: lungs clear to auscultation - no rales, rhonchi or wheezes     CV: regular rates and rhythm, normal S1 S2, no S3 or S4 and no murmur, click or rub     ABDOMEN: Abdomen is soft no rebound, rigidity or guarding.  Evidence of bilateral inguinal hernia surgeries performed.  Evidence of recurrence on the right is noted.  No evidence of strangulation present.     MS: extremities normal- no gross deformities noted, no evidence of inflammation in joints, FROM in all extremities.     SKIN: no suspicious lesions or rashes     NEURO: Normal strength and tone, sensory exam grossly normal, mentation intact and speech normal     PSYCH: mentation appears normal. and affect normal/bright     LYMPHATICS: No cervical adenopathy    Recent Labs   Lab Test 09/15/20  1159 03/13/20  0831   HGB 12.4* 12.5*    249    140   POTASSIUM 4.3 4.4   CR 1.19 1.06        Diagnostics:  Recent Results (from the past 48 hour(s))   Asymptomatic COVID-19 Virus (Coronavirus) by PCR Nose    Collection Time: 12/27/21  2:02 PM    Specimen: Nose; Swab   Result Value Ref Range    SARS CoV2 PCR Negative Negative, Testing sent to reference lab. Results will be returned via unsolicited result   UA Macro with Reflex to Micro and Culture - lab collect    Collection Time: 12/27/21  2:02 PM    Specimen: Urine, Midstream   Result Value Ref Range    Color Urine Yellow Colorless, Straw, Light Yellow, Yellow    Appearance Urine Clear Clear    Glucose Urine 50  (A) Negative mg/dL    Bilirubin Urine Negative Negative    Ketones Urine Negative Negative mg/dL    Specific Gravity Urine 1.023 1.003 - 1.035    Blood Urine Negative Negative    pH Urine 5.0 5.0 - 7.0    Protein Albumin Urine Negative Negative mg/dL    Urobilinogen Urine Normal Normal, 2.0 mg/dL    Nitrite Urine  Negative Negative    Leukocyte Esterase Urine Negative Negative   Basic metabolic panel  (Ca, Cl, CO2, Creat, Gluc, K, Na, BUN)    Collection Time: 12/27/21  2:02 PM   Result Value Ref Range    Sodium 141 133 - 144 mmol/L    Potassium 4.1 3.4 - 5.3 mmol/L    Chloride 109 94 - 109 mmol/L    Carbon Dioxide (CO2) 26 20 - 32 mmol/L    Anion Gap 6 3 - 14 mmol/L    Urea Nitrogen 26 7 - 30 mg/dL    Creatinine 1.04 0.66 - 1.25 mg/dL    Calcium 9.6 8.5 - 10.1 mg/dL    Glucose 106 (H) 70 - 99 mg/dL    GFR Estimate 69 >60 mL/min/1.73m2   Hepatic panel (Albumin, ALT, AST, Bili, Alk Phos, TP)    Collection Time: 12/27/21  2:02 PM   Result Value Ref Range    Bilirubin Total <0.1 (L) 0.2 - 1.3 mg/dL    Bilirubin Direct 0.1 0.0 - 0.2 mg/dL    Protein Total 7.6 6.8 - 8.8 g/dL    Albumin 3.8 3.4 - 5.0 g/dL    Alkaline Phosphatase 124 40 - 150 U/L    AST 23 0 - 45 U/L    ALT 21 0 - 70 U/L   Lipid panel reflex to direct LDL Fasting    Collection Time: 12/27/21  2:02 PM   Result Value Ref Range    Cholesterol 118 <200 mg/dL    Triglycerides 57 <150 mg/dL    Direct Measure HDL 53 >=40 mg/dL    LDL Cholesterol Calculated 54 <=100 mg/dL    Non HDL Cholesterol 65 <130 mg/dL    Patient Fasting > 8hrs? No    Albumin Random Urine Quantitative with Creat Ratio    Collection Time: 12/27/21  2:02 PM   Result Value Ref Range    Creatinine Urine mg/dL 129 mg/dL    Albumin Urine mg/L 50 mg/L    Albumin Urine mg/g Cr 38.76 (H) 0.00 - 17.00 mg/g Cr   CBC with platelets and differential    Collection Time: 12/27/21  2:02 PM   Result Value Ref Range    WBC Count 5.7 4.0 - 11.0 10e3/uL    RBC Count 4.23 (L) 4.40 - 5.90 10e6/uL    Hemoglobin 12.2 (L) 13.3 - 17.7 g/dL    Hematocrit 38.7 (L) 40.0 - 53.0 %    MCV 92 78 - 100 fL    MCH 28.8 26.5 - 33.0 pg    MCHC 31.5 31.5 - 36.5 g/dL    RDW 14.7 10.0 - 15.0 %    Platelet Count 266 150 - 450 10e3/uL    % Neutrophils 69 %    % Lymphocytes 18 %    % Monocytes 13 %    % Eosinophils 0 %    % Basophils 0 %    %  Immature Granulocytes 0 %    NRBCs per 100 WBC 0 <1 /100    Absolute Neutrophils 3.9 1.6 - 8.3 10e3/uL    Absolute Lymphocytes 1.0 0.8 - 5.3 10e3/uL    Absolute Monocytes 0.7 0.0 - 1.3 10e3/uL    Absolute Eosinophils 0.0 0.0 - 0.7 10e3/uL    Absolute Basophils 0.0 0.0 - 0.2 10e3/uL    Absolute Immature Granulocytes 0.0 <=0.4 10e3/uL    Absolute NRBCs 0.0 10e3/uL      EKG: appears normal, NSR, normal axis, o acute ST/T changes c/w ischemia, no LVH by voltage criteria, Right bundle branch block is noted.    Revised Cardiac Risk Index (RCRI):  The patient has the following serious cardiovascular risks for perioperative complications:   - No serious cardiac risks = 0 points     RCRI Interpretation: 1 point: Class II (low risk - 0.9% complication rate)           Signed Electronically by: Harrison Tse DO  Copy of this evaluation report is provided to requesting physician.       no

## 2021-12-27 NOTE — PROGRESS NOTES
[address] [address]  Phone: [phone]  Primary Provider: Harrison Tse  Pre-op Performing Provider: HARRISON TSE      PREOPERATIVE EVALUATION:  Today's date: 12/27/2021    Remigio Holly is a 88 year old male who presents for a preoperative evaluation.    Surgical Information:  Surgery/Procedure: Right open inguinal hernia repair with mesh  Surgery Location: [facility]  Surgeon: Dr. Lorenzo  Surgery Date: 12/30/21  Time of Surgery: 10:00  Where patient plans to recover: At home with family  Fax number for surgical facility: Note does not need to be faxed, will be available electronically in Epic.    Type of Anesthesia Anticipated: MAC    Assessment & Plan     The proposed surgical procedure is considered INTERMEDIATE risk.    Preop general physical exam  - UA Macro with Reflex to Micro and Culture - lab collect; Future  - EKG 12-lead complete w/read - Clinics    Right inguinal hernia    S/P PTCA (percutaneous transluminal coronary angioplasty)    Abdominal aortic aneurysm (AAA) without rupture (H)    Malignant neoplasm of stomach, unspecified location (H)  - alum & mag hydroxide-simethicone (MAALOX  ES) 400-400-40 MG/5ML SUSP suspension; Take 30 mLs by mouth every 4 hours as needed for indigestion    Ischemic cardiomyopathy  - Basic metabolic panel  (Ca, Cl, CO2, Creat, Gluc, K, Na, BUN); Future  - EKG 12-lead complete w/read - Clinics    Pernicious anemia  - CBC with platelets and differential; Future  - cyanocobalamin injection 1,000 mcg    Hyperlipidemia LDL goal <130  - Hepatic panel (Albumin, ALT, AST, Bili, Alk Phos, TP); Future  - Lipid panel reflex to direct LDL Fasting; Future  - Albumin Random Urine Quantitative with Creat Ratio; Future         Risks and Recommendations:  The patient has the following additional risks and recommendations for perioperative complications:   - No identified additional risk  factors other than previously addressed    Medication Instructions:  .  Patient is instructed to discontinue Plavix 5 days prior to the procedure and not to take any of his regular medications the morning of the procedure.    RECOMMENDATION:  APPROVAL GIVEN to proceed with proposed procedure, without further diagnostic evaluation.    Review of external notes as documented above         Subjective     HPI related to upcoming procedure: Patient has persistent pain secondary to the presence of a right inguinal hernia.  Conservative measures have been unsuccessful.  He is now anticipating surgical intervention discussed the risks and benefits with his operating surgeon.    Preop Questions 12/27/2021   1. Have you ever had a heart attack or stroke? YES -    2. Have you ever had surgery on your heart or blood vessels, such as a stent placement, a coronary artery bypass, or surgery on an artery in your head, neck, heart, or legs? YES -    3. Do you have chest pain with activity? No   4. Do you have a history of  heart failure? No   5. Do you currently have a cold, bronchitis or symptoms of other infection? No   6. Do you have a cough, shortness of breath, or wheezing? No   7. Do you or anyone in your family have previous history of blood clots? No   8. Do you or does anyone in your family have a serious bleeding problem such as prolonged bleeding following surgeries or cuts? No   9. Have you ever had problems with anemia or been told to take iron pills? No   10. Have you had any abnormal blood loss such as black, tarry or bloody stools? No   11. Have you ever had a blood transfusion? YES -    11a. Have you ever had a transfusion reaction? No   12. Are you willing to have a blood transfusion if it is medically needed before, during, or after your surgery? Yes   13. Have you or any of your relatives ever had problems with anesthesia? No   14. Do you have sleep apnea, excessive snoring or daytime drowsiness? No   15. Do you  have any artifical heart valves or other implanted medical devices like a pacemaker, defibrillator, or continuous glucose monitor? No   16. Do you have artificial joints? YES -    17. Are you allergic to latex? No       Health Care Directive:  Patient has a Health Care Directive on file      Preoperative Review of :   reviewed - no record of controlled substances prescribed.      Status of Chronic Conditions:  See problem list for active medical problems.  Problems all longstanding and stable, except as noted/documented.  See ROS for pertinent symptoms related to these conditions.      Review of Systems  CONSTITUTIONAL: NEGATIVE for fever, chills, change in weight  INTEGUMENTARY/SKIN: NEGATIVE for worrisome rashes, moles or lesions  EYES: NEGATIVE for vision changes or irritation  ENT/MOUTH: NEGATIVE for ear, mouth and throat problems  RESP: NEGATIVE for significant cough or SOB  CV: NEGATIVE for chest pain, palpitations or peripheral edema  GI: NEGATIVE for nausea, abdominal pain, heartburn, or change in bowel habits  : NEGATIVE for frequency, dysuria, or hematuria.  Patient does have the right inguinal hernia which at this time does not appear to be reducible.  MUSCULOSKELETAL: NEGATIVE for significant arthralgias or myalgia  NEURO: NEGATIVE for weakness, dizziness or paresthesias  ENDOCRINE: NEGATIVE for temperature intolerance, skin/hair changes  HEME: NEGATIVE for bleeding problems  PSYCHIATRIC: NEGATIVE for changes in mood or affect    Patient Active Problem List    Diagnosis Date Noted     Nephrolithiasis 12/25/2012     Priority: High     ASCVD (arteriosclerotic cardiovascular disease) 09/30/2020     Priority: Medium     Ischemic cardiomyopathy 09/30/2020     Priority: Medium     Non-recurrent bilateral inguinal hernia without obstruction or gangrene 09/02/2020     Priority: Medium     Added automatically from request for surgery [id number]       History of pulmonary embolism 09/01/2020     Priority:  Medium     Dupuytren's contracture of left hand 12/04/2018     Priority: Medium     S/P PTCA (percutaneous transluminal coronary angioplasty) 03/14/2018     Priority: Medium     Urinary retention with incomplete bladder emptying 08/20/2017     Priority: Medium     Orthostatic hypotension 08/19/2017     Priority: Medium     Thoracic aortic aneurysm without rupture (H) - 4.7 cm on 8/18/17 (4.5 cm in 8/15) 08/18/2017     Priority: Medium     Near syncope 08/17/2017     Priority: Medium     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 Provider to review       CKD (chronic kidney disease) stage 3, GFR 30-59 ml/min (H) 05/05/2013     Priority: Medium     Pain 12/25/2012     Priority: Medium     Advance Care Planning 08/16/2012     Priority: Medium     Advance Care Planning: Receipt of ACP document:  Received: Health Care Directive which was witnessed or notarized on 3-12-13.  Document previously scanned on 3-13-13.  Validation form completed and  scanned.  Code Status reflects choices in most recent ACP document.  Confirmed/documented designated decision maker(s). See permanent comments section of demographics in clinical tab. View document(s) and details by clicking on code status. Added by Sayra Maguire on 8/25/2014.  .Patient states has Advance Directive and will bring in a copy to clinic. 8/16/2012          HYPERLIPIDEMIA LDL GOAL <100 10/31/2010     Priority: Medium     GERD (gastroesophageal reflux disease) 03/26/2010      Priority: Medium     Mixed hyperlipidemia 03/24/2010     Priority: Medium     Closed fracture of calcaneus 01/06/2007     Priority: Medium     Calculus of gallbladder 01/09/2003     Priority: Medium     Problem list name updated by automated process. Provider to review       Abdominal aortic aneurysm (H) 10/22/2002     Priority: Medium     Problem list name updated by automated process. Provider to review       Chest pain      Priority: Medium     Problem list name updated by automated process. Provider to review       Pain in joint, shoulder region      Priority: Medium     Paroxysmal atrial fibrillation (H) 08/26/2010     Priority: Low     Gastric cancer (H) 08/05/2010     Priority: Low     (Problem list name updated by automated process. Provider to review and confirm.)        Past Medical History:   Diagnosis Date     Blood transfusion      Chronic gastric ulcer without mention of hemorrhage, perforation, without mention of obstruction     Ulcer, Gastric     Gastric cancer (H)      Hemorrhage of gastrointestinal tract, unspecified 01/13/10    [facility]     Hypoglycemia, unspecified 1/26/2015     Measles without mention of complication     Measles     Mixed hyperlipidemia     Hyperlipidemia     Mumps without mention of complication     Mumps     Pulmonary embolism (H) Sept 4, 2010     Urinary calculus, unspecified     Renal stones     Varicella without mention of complication     Chickenpox     Past Surgical History:   Procedure Laterality Date     ABDOMEN SURGERY       ARTHROPLASTY KNEE Left 8/15/2017    Procedure: ARTHROPLASTY KNEE;  Left total knee replacement;  Surgeon: Jonathan Cardona DO;  Location: [facility]     CHOLECYSTECTOMY, LAPOROSCOPIC  10/6/2008    Cholecystectomy, Laparoscopic     COLONOSCOPY  1/14/10    [facility]     COLONOSCOPY  05/25/10     CYSTOSCOPY, RETROGRADES, EXTRACT STONE, INSERT STENT, COMBINED  12/25/2012    Procedure: COMBINED CYSTOSCOPY, RETROGRADES, EXTRACT STONE,  INSERT STENT;  Cystoscopy, Left Stent Placement, left retrograde pylogram, uc;  Surgeon: Amador Sanchez MD;  Location: [facility] OR     ESOPHAGOSCOPY, GASTROSCOPY, DUODENOSCOPY (EGD), COMBINED  8/16/2011    Procedure:COMBINED ESOPHAGOSCOPY, GASTROSCOPY, DUODENOSCOPY (EGD), BIOPSY SINGLE OR MULTIPLE; Surgeon:TONY GARCIA; [facility]     GENERAL SURGERY                           DATE:  07/07/10    Gastrectomy     HC REPAIR ROTATOR CUFF,ACUTE  3/21/2005    Right     HERNIORRHAPHY INGUINAL Left 9/25/2020    Procedure: Open left inguinal hernia repair with mesh;  Surgeon: Tanvir Lorenzo MD;  Location: [facility]     LASER HOLMIUM LITHOTRIPSY URETER(S), INSERT STENT, COMBINED  1/5/2013    Procedure: COMBINED CYSTOSCOPY, URETEROSCOPY, LASER HOLMIUM LITHOTRIPSY URETER(S), INSERT STENT;  Bilateral  Cystoscopy, Bilateral Ureteroscopy, Bilateral retrogrades and  placement of ureteral stent right side. Laser Holmium Lithotripsy  and Exchange  of stent left side;  Surgeon: Tone Morejon MD;  Location: [facility]     LASER HOLMIUM LITHOTRIPSY URETER(S), INSERT STENT, COMBINED  1/30/2013    Procedure: COMBINED CYSTOSCOPY, URETEROSCOPY, LASER HOLMIUM LITHOTRIPSY URETER(S), INSERT STENT;  Right Ureteroscopy; Stone basketing; Right Stent exchange and  removal of left stent.;  Surgeon: Tone Morejon MD;  Location: [facility]     RELEASE DUPUYTRENS CONTRACTURE Left 11/26/2018    Procedure: Release Left Hand Dupuytrens Contracture;  Surgeon: Lucio Omalley MD;  Location: [facility] OR     VIDEO CAPSULE ENDOSCOPY  01/15/10    [facility] EXCIS STOMACH ULCER,LESN;LOCAL       [facility] COLONOSCOPY W/WO BRUSH/WASH  01/31/2006     [facility] UGI ENDOSCOPY, SIMPLE EXAM  1/13/10    [facility] UGI ENDOSCOPY, SIMPLE EXAM  05/25/10     Current Outpatient Medications   Medication Sig Dispense Refill     atorvastatin (LIPITOR) 40 MG tablet Take 40 mg by mouth  3     blood glucose (CONTOUR NEXT TEST) test strip USE  TO TEST BLOOD SUGAR 1 TIMES DAILY. 100 strip 3     blood glucose monitoring (ROBERT MICROLET) lancets Use to test blood sugar 1 time daily or as directed. 1 Box 2     cyanocobalamin (VITAMIN B12) 1000 MCG/ML injection Inject 1 mL (1,000 mcg) into the muscle every 30 days 1 mL 11     diltiazem ER (TIAZAC) 120 MG 24 hr ER beaded capsule TAKE ONE CAPSULE BY MOUTH ONCE DAILY 90 capsule 1     furosemide (LASIX) 20 MG tablet Take 1 tablet (20 mg) by mouth daily as needed (Leg swelling) 15 tablet 0     polyethylene glycol (MIRALAX) 17 GM/Dose powder Take 17 g (1 capful) by mouth daily 578 g 1     senna-docusate (SENOKOT-S;PERICOLACE) 8.6-50 MG per tablet Take 1-2 tablets by mouth 2 times daily 30 tablet 0     tamsulosin (FLOMAX) 0.4 MG capsule Take 0.4 mg by mouth daily       clopidogrel (PLAVIX) 75 MG tablet Take 1 tablet (75 mg) by mouth daily (Patient not taking: Reported on 2021) 90 tablet 1       Allergies   Allergen Reactions     Mobic [Meloxicam] Nausea and Diarrhea     Diarrhea, nausea, flu like symptoms since start of use        Social History     Tobacco Use     Smoking status: Current Some Day Smoker     Packs/day: 0.00     Years: 20.00     Pack years: 0.00     Types: Pipe     Last attempt to quit: 1998     Years since quittin.0     Smokeless tobacco: Never Used     Tobacco comment: Rare pipe   Substance Use Topics     Alcohol use: No     Alcohol/week: 0.0 standard drinks     Family History   Problem Relation Age of Onset     Alzheimer Disease Father      Cardiovascular Mother      C.A.D. Mother      Heart Disease Mother      Cardiovascular Brother      Heart Disease Brother      C.A.D. Brother      History   Drug Use No         Objective     /68   Pulse 76   Temp (!) 96.7  F (35.9  C) (Temporal)   Resp 20   Wt 75.8 kg (167 lb)   SpO2 99%   BMI 22.65 kg/m      Physical Exam    GENERAL APPEARANCE: healthy, alert, active and appears substantially younger than his stated age.     EYES:  EOMI,  PERRL     HENT: ear canals and TM's normal and nose and mouth without ulcers or lesions     NECK: no adenopathy, no asymmetry, masses, or scars and thyroid normal to palpation     RESP: lungs clear to auscultation - no rales, rhonchi or wheezes     CV: regular rates and rhythm, normal S1 S2, no S3 or S4 and no murmur, click or rub     ABDOMEN: Abdomen is soft no rebound, rigidity or guarding.  Evidence of bilateral inguinal hernia surgeries performed.  Evidence of recurrence on the right is noted.  No evidence of strangulation present.     MS: extremities normal- no gross deformities noted, no evidence of inflammation in joints, FROM in all extremities.     SKIN: no suspicious lesions or rashes     NEURO: Normal strength and tone, sensory exam grossly normal, mentation intact and speech normal     PSYCH: mentation appears normal. and affect normal/bright     LYMPHATICS: No cervical adenopathy    Recent Labs   Lab Test 09/15/20  1159 03/13/20  0831   HGB 12.4* 12.5*    249    140   POTASSIUM 4.3 4.4   CR 1.19 1.06        Diagnostics:  Recent Results (from the past 48 hour(s))   Asymptomatic COVID-19 Virus (Coronavirus) by PCR Nose    Collection Time: 12/27/21  2:02 PM    Specimen: Nose; Swab   Result Value Ref Range    SARS CoV2 PCR Negative Negative, Testing sent to reference lab. Results will be returned via unsolicited result   UA Macro with Reflex to Micro and Culture - lab collect    Collection Time: 12/27/21  2:02 PM    Specimen: Urine, Midstream   Result Value Ref Range    Color Urine Yellow Colorless, Straw, Light Yellow, Yellow    Appearance Urine Clear Clear    Glucose Urine 50  (A) Negative mg/dL    Bilirubin Urine Negative Negative    Ketones Urine Negative Negative mg/dL    Specific Gravity Urine 1.023 1.003 - 1.035    Blood Urine Negative Negative    pH Urine 5.0 5.0 - 7.0    Protein Albumin Urine Negative Negative mg/dL    Urobilinogen Urine Normal Normal, 2.0 mg/dL    Nitrite Urine  Negative Negative    Leukocyte Esterase Urine Negative Negative   Basic metabolic panel  (Ca, Cl, CO2, Creat, Gluc, K, Na, BUN)    Collection Time: 12/27/21  2:02 PM   Result Value Ref Range    Sodium 141 133 - 144 mmol/L    Potassium 4.1 3.4 - 5.3 mmol/L    Chloride 109 94 - 109 mmol/L    Carbon Dioxide (CO2) 26 20 - 32 mmol/L    Anion Gap 6 3 - 14 mmol/L    Urea Nitrogen 26 7 - 30 mg/dL    Creatinine 1.04 0.66 - 1.25 mg/dL    Calcium 9.6 8.5 - 10.1 mg/dL    Glucose 106 (H) 70 - 99 mg/dL    GFR Estimate 69 >60 mL/min/1.73m2   Hepatic panel (Albumin, ALT, AST, Bili, Alk Phos, TP)    Collection Time: 12/27/21  2:02 PM   Result Value Ref Range    Bilirubin Total <0.1 (L) 0.2 - 1.3 mg/dL    Bilirubin Direct 0.1 0.0 - 0.2 mg/dL    Protein Total 7.6 6.8 - 8.8 g/dL    Albumin 3.8 3.4 - 5.0 g/dL    Alkaline Phosphatase 124 40 - 150 U/L    AST 23 0 - 45 U/L    ALT 21 0 - 70 U/L   Lipid panel reflex to direct LDL Fasting    Collection Time: 12/27/21  2:02 PM   Result Value Ref Range    Cholesterol 118 <200 mg/dL    Triglycerides 57 <150 mg/dL    Direct Measure HDL 53 >=40 mg/dL    LDL Cholesterol Calculated 54 <=100 mg/dL    Non HDL Cholesterol 65 <130 mg/dL    Patient Fasting > 8hrs? No    Albumin Random Urine Quantitative with Creat Ratio    Collection Time: 12/27/21  2:02 PM   Result Value Ref Range    Creatinine Urine mg/dL 129 mg/dL    Albumin Urine mg/L 50 mg/L    Albumin Urine mg/g Cr 38.76 (H) 0.00 - 17.00 mg/g Cr   CBC with platelets and differential    Collection Time: 12/27/21  2:02 PM   Result Value Ref Range    WBC Count 5.7 4.0 - 11.0 10e3/uL    RBC Count 4.23 (L) 4.40 - 5.90 10e6/uL    Hemoglobin 12.2 (L) 13.3 - 17.7 g/dL    Hematocrit 38.7 (L) 40.0 - 53.0 %    MCV 92 78 - 100 fL    MCH 28.8 26.5 - 33.0 pg    MCHC 31.5 31.5 - 36.5 g/dL    RDW 14.7 10.0 - 15.0 %    Platelet Count 266 150 - 450 10e3/uL    % Neutrophils 69 %    % Lymphocytes 18 %    % Monocytes 13 %    % Eosinophils 0 %    % Basophils 0 %    %  Immature Granulocytes 0 %    NRBCs per 100 WBC 0 <1 /100    Absolute Neutrophils 3.9 1.6 - 8.3 10e3/uL    Absolute Lymphocytes 1.0 0.8 - 5.3 10e3/uL    Absolute Monocytes 0.7 0.0 - 1.3 10e3/uL    Absolute Eosinophils 0.0 0.0 - 0.7 10e3/uL    Absolute Basophils 0.0 0.0 - 0.2 10e3/uL    Absolute Immature Granulocytes 0.0 <=0.4 10e3/uL    Absolute NRBCs 0.0 10e3/uL      EKG: appears normal, NSR, normal axis, o acute ST/T changes c/w ischemia, no LVH by voltage criteria, Right bundle branch block is noted.    Revised Cardiac Risk Index (RCRI):  The patient has the following serious cardiovascular risks for perioperative complications:   - No serious cardiac risks = 0 points     RCRI Interpretation: 1 point: Class II (low risk - 0.9% complication rate)           Signed Electronically by: Harrison Tse DO  Copy of this evaluation report is provided to requesting physician.

## 2021-12-30 NOTE — ANESTHESIA CARE TRANSFER NOTE
Patient: Remigio Holly    Procedure: Procedure(s):  right open inguinal hernia repair with mesh       Diagnosis: Right inguinal hernia [K40.90]  Diagnosis Additional Information: No value filed.    Anesthesia Type:   General     Note:    Oropharynx: oropharynx clear of all foreign objects and spontaneously breathing  Level of Consciousness: drowsy  Oxygen Supplementation: face mask    Independent Airway: airway patency satisfactory and stable  Dentition: dentition unchanged  Vital Signs Stable: post-procedure vital signs reviewed and stable  Report to RN Given: handoff report given  Patient transferred to: PACU    Handoff Report: Identifed the Patient, Identified the Reponsible Provider, Reviewed the pertinent medical history, Discussed the surgical course, Reviewed Intra-OP anesthesia mangement and issues during anesthesia, Set expectations for post-procedure period and Allowed opportunity for questions and acknowledgement of understanding      Vitals:  Vitals Value Taken Time   /73 12/30/21 1222   Temp     Pulse 76 12/30/21 1224   Resp 16 12/30/21 1224   SpO2 100 % 12/30/21 1224   Vitals shown include unvalidated device data.    Electronically Signed By: DISHA Varghese CRNA  December 30, 2021  12:26 PM

## 2021-12-30 NOTE — BRIEF OP NOTE
"[facility]    Brief Operative Note    Pre-operative diagnosis: Right inguinal hernia [K40.90]  Post-operative diagnosis Same as pre-operative diagnosis    Procedure: Procedure(s):  right open inguinal hernia repair with mesh  Surgeon: Surgeon(s) and Role:     * Tanvir Lorenzo MD - Primary  Anesthesia: Monitor Anesthesia Care   Estimated Blood Loss: Minimal    Drains: None  Specimens: * No specimens in log *  Findings:   large direct inguinal hernia wiht small cord lipoma.  Complications: None.  Implants:   Implant Name Type Inv. Item Serial No.  Lot No. LRB No. Used Action   MESH PROLENE 6X6\" SOFT - [id number] Mesh MESH PROLENE 6X6\" SOFT  [id number] Implanted           "

## 2021-12-30 NOTE — INTERVAL H&P NOTE
I have reviewed the surgical (or preoperative) H&P that is linked to this encounter, and examined the patient. There are no significant changes    [facility], DO

## 2021-12-30 NOTE — ANESTHESIA PROCEDURE NOTES
Airway       Patient location during procedure: OR  Staff -        CRNA: Godfrey Beard APRN CRNA       Performed By: CRNA  Consent for Airway        Urgency: elective  Indications and Patient Condition       Indications for airway management: paolo-procedural       Induction type:intravenous       Mask difficulty assessment: 1 - vent by mask    Final Airway Details       Final airway type: supraglottic airway    Supraglottic Airway Details        Type: LMA       Brand: LMA Unique       LMA size: 5    Post intubation assessment        Placement verified by: capnometry, equal breath sounds and chest rise        Number of attempts at approach: 1       Number of other approaches attempted: 0       Secured with: plastic tape       Ease of procedure: easy       Dentition: Intact and Unchanged

## 2021-12-30 NOTE — ANESTHESIA POSTPROCEDURE EVALUATION
Patient: Remigio Holly    Procedure: Procedure(s):  right open inguinal hernia repair with mesh       Diagnosis:Right inguinal hernia [K40.90]  Diagnosis Additional Information: No value filed.    Anesthesia Type:  General    Note:  Disposition: Outpatient   Postop Pain Control: Uneventful            Sign Out: Well controlled pain   PONV: No   Neuro/Psych: Uneventful            Sign Out: Acceptable/Baseline neuro status   Airway/Respiratory: Uneventful            Sign Out: Acceptable/Baseline resp. status   CV/Hemodynamics: Uneventful            Sign Out: Acceptable CV status   Other NRE: NONE   DID A NON-ROUTINE EVENT OCCUR? No    Event details/Postop Comments:  Pt was happy with anesthesia care.  No complications.  I will follow up with the pt if needed.           Last vitals:  Vitals Value Taken Time   BP 89/56 12/30/21 1255   Temp 96.8  F (36  C) 12/30/21 1300   Pulse 67 12/30/21 1300   Resp 18 12/30/21 1300   SpO2 96 % 12/30/21 1300   Vitals shown include unvalidated device data.    Electronically Signed By: DISHA Varghese CRNA  December 30, 2021  2:13 PM

## 2021-12-30 NOTE — ANESTHESIA PREPROCEDURE EVALUATION
Anesthesia Pre-Procedure Evaluation    Patient: Remigio Holly   MRN: [id number] : 1933        Preoperative Diagnosis: Right inguinal hernia [K40.90]    Procedure : Procedure(s):  right open inguinal hernia repair with mesh          Past Medical History:   Diagnosis Date     Blood transfusion      Chronic gastric ulcer without mention of hemorrhage, perforation, without mention of obstruction     Ulcer, Gastric     Gastric cancer (H)      Hemorrhage of gastrointestinal tract, unspecified 01/13/10    [facility]     Hypoglycemia, unspecified 2015     Measles without mention of complication     Measles     Mixed hyperlipidemia     Hyperlipidemia     Mumps without mention of complication     Mumps     Pulmonary embolism (H) 2010     Urinary calculus, unspecified     Renal stones     Varicella without mention of complication     Chickenpox      Past Surgical History:   Procedure Laterality Date     ABDOMEN SURGERY       ARTHROPLASTY KNEE Left 8/15/2017    Procedure: ARTHROPLASTY KNEE;  Left total knee replacement;  Surgeon: Jonathan Cardona DO;  Location: [facility]     CHOLECYSTECTOMY, LAPOROSCOPIC  10/6/2008    Cholecystectomy, Laparoscopic     COLONOSCOPY  1/14/10    [facility]     COLONOSCOPY  05/25/10     CYSTOSCOPY, RETROGRADES, EXTRACT STONE, INSERT STENT, COMBINED  2012    Procedure: COMBINED CYSTOSCOPY, RETROGRADES, EXTRACT STONE, INSERT STENT;  Cystoscopy, Left Stent Placement, left retrograde pylogram, uc;  Surgeon: Amador Sanchez MD;  Location: [facility]     ESOPHAGOSCOPY, GASTROSCOPY, DUODENOSCOPY (EGD), COMBINED  2011    Procedure:COMBINED ESOPHAGOSCOPY, GASTROSCOPY, DUODENOSCOPY (EGD), BIOPSY SINGLE OR MULTIPLE; Surgeon:TONY GARCIA; Location:[facility]     GENERAL SURGERY                           DATE:  07/07/10    Gastrectomy     HC REPAIR ROTATOR CUFF,ACUTE  3/21/2005    Right     HERNIORRHAPHY INGUINAL Left 2020    Procedure:  Open left inguinal hernia repair with mesh;  Surgeon: Tanvir Lorenzo MD;  Location: [facility]     LASER HOLMIUM LITHOTRIPSY URETER(S), INSERT STENT, COMBINED  2013    Procedure: COMBINED CYSTOSCOPY, URETEROSCOPY, LASER HOLMIUM LITHOTRIPSY URETER(S), INSERT STENT;  Bilateral  Cystoscopy, Bilateral Ureteroscopy, Bilateral retrogrades and  placement of ureteral stent right side. Laser Holmium Lithotripsy  and Exchange  of stent left side;  Surgeon: Tone Morejon MD;  Location: [facility]     LASER HOLMIUM LITHOTRIPSY URETER(S), INSERT STENT, COMBINED  2013    Procedure: COMBINED CYSTOSCOPY, URETEROSCOPY, LASER HOLMIUM LITHOTRIPSY URETER(S), INSERT STENT;  Right Ureteroscopy; Stone basketing; Right Stent exchange and  removal of left stent.;  Surgeon: Tone Morejon MD;  Location: [facility]     RELEASE DUPUYTRENS CONTRACTURE Left 2018    Procedure: Release Left Hand Dupuytrens Contracture;  Surgeon: Lucio Omalley MD;  Location: [facility]     VIDEO CAPSULE ENDOSCOPY  01/15/10    [facility] EXCIS STOMACH ULCER,LESN;LOCAL       [facility] COLONOSCOPY W/WO BRUSH/WASH  2006     [facility] UGI ENDOSCOPY, SIMPLE EXAM  1/13/10    [facility] UGI ENDOSCOPY, SIMPLE EXAM  05/25/10      Allergies   Allergen Reactions     Mobic [Meloxicam] Nausea and Diarrhea     Diarrhea, nausea, flu like symptoms since start of use      Social History     Tobacco Use     Smoking status: Current Some Day Smoker     Packs/day: 0.00     Years: 20.00     Pack years: 0.00     Types: Pipe     Last attempt to quit: 1998     Years since quittin.0     Smokeless tobacco: Never Used     Tobacco comment: Rare pipe   Substance Use Topics     Alcohol use: No     Alcohol/week: 0.0 standard drinks      Wt Readings from Last 1 Encounters:   21 75.8 kg (167 lb)        Anesthesia Evaluation   Pt has had prior anesthetic. Type: General.    No history of anesthetic complications       ROS/MED  HX  ENT/Pulmonary: Comment: - Hx of PE       Neurologic:  - neg neurologic ROS     Cardiovascular: Comment: - Ischemic cardiomyopathy   - Abdominal aortic aneurysm     (+) -----dysrhythmias, a-fib, Previous cardiac testing   Echo: Date: 10/22/20 Results:  Summary:     * The estimated ejection fraction is 50-55%.     * There is mild concentric left ventricular hypertrophy.     * Normal right ventricular systolic function.     * There is no evidence of ASD/PFO by color Doppler.     * There is moderate tricuspid regurgitation.     * No pulmonary hypertension, estimated pulmonary arterial     Stress Test: Date: Results:    ECG Reviewed: Date: 12/29/21 Results:  - NSR with RBBB   Cath: Date: Results:      METS/Exercise Tolerance:     Hematologic:       Musculoskeletal:       GI/Hepatic: Comment: - S/P Faizan-en-Y     (+) GERD, Asymptomatic on medication,     Renal/Genitourinary:     (+) renal disease, type: CRI,     Endo:       Psychiatric/Substance Use:  - neg psychiatric ROS     Infectious Disease:       Malignancy:       Other:            Physical Exam    Airway        Mallampati: II   TM distance: > 3 FB   Neck ROM: full   Mouth opening: > 3 cm    Respiratory Devices and Support         Dental  no notable dental history         Cardiovascular   cardiovascular exam normal          Pulmonary   pulmonary exam normal                OUTSIDE LABS:  CBC:   Lab Results   Component Value Date    WBC 5.7 12/27/2021    WBC 5.6 09/15/2020    HGB 12.2 (L) 12/27/2021    HGB 12.4 (L) 09/15/2020    HCT 38.7 (L) 12/27/2021    HCT 40.3 09/15/2020     12/27/2021     09/15/2020     BMP:   Lab Results   Component Value Date     12/27/2021     09/15/2020    POTASSIUM 4.1 12/27/2021    POTASSIUM 4.3 09/15/2020    CHLORIDE 109 12/27/2021    CHLORIDE 105 09/15/2020    CO2 26 12/27/2021    CO2 27 09/15/2020    BUN 26 12/27/2021    BUN 24 09/15/2020    CR 1.04 12/27/2021    CR 1.19 09/15/2020     (H) 12/27/2021      (H) 09/15/2020     COAGS:   Lab Results   Component Value Date    PTT 30 09/08/2010    INR 1.08 08/16/2011    FIBR 259 07/07/2010     POC:   Lab Results   Component Value Date     (H) 11/26/2018     HEPATIC:   Lab Results   Component Value Date    ALBUMIN 3.8 12/27/2021    PROTTOTAL 7.6 12/27/2021    ALT 21 12/27/2021    AST 23 12/27/2021    ALKPHOS 124 12/27/2021    BILITOTAL <0.1 (L) 12/27/2021     OTHER:   Lab Results   Component Value Date    PH 7.49 (H) 09/08/2010    LACT 1.0 08/29/2013    A1C 5.7 03/23/2018    CARMEN 9.6 12/27/2021    PHOS 4.3 10/01/2010    MAG 2.0 10/01/2010    LIPASE 68 (L) 09/16/2019    AMYLASE 46 07/29/2016    TSH 3.55 07/29/2016    CRP <2.9 07/29/2016    SED 9 07/29/2016       Anesthesia Plan    ASA Status:  3   NPO Status:  NPO Appropriate    Anesthesia Type: General.     - Airway: LMA   Induction: Intravenous.   Maintenance: Inhalation.        Consents    Anesthesia Plan(s) and associated risks, benefits, and realistic alternatives discussed. Questions answered and patient/representative(s) expressed understanding.     - Discussed: Risks, Benefits and Alternatives for BOTH SEDATION and the PROCEDURE were discussed     - Discussed with:  Patient      - Extended Intubation/Ventilatory Support Discussed: No.      - Patient is DNR/DNI Status: No    Use of blood products discussed: Yes.     - Discussed with: Patient.     Postoperative Care    Pain management: IV analgesics.   PONV prophylaxis: Ondansetron (or other 5HT-3), Dexamethasone or Solumedrol     Comments:    Other Comments: The risks and benefits of anesthesia, and the alternatives where applicable, have been discussed with the patient, and they wish to proceed.            DISHA Childs CRNA

## 2021-12-30 NOTE — OP NOTE
Open Inguinal Herniorrhaphy Procedure Note    Name: Remigio Holly Date/Time of Admission: 2021  8:06 AM  CSN: [id number] Attending Provider: Selin mims. providers found  MRN: [id number] : 1933 88 year old       Pre-operative Diagnosis: right inguinal hernia    Post-operative Diagnosis: right direct inguinal hernia    Procedure:   1. Open right inguinal herniorrhaphy with mesh    Surgeon: BRITTANY SHIPMAN D.O.    Anesthesia: GETA    Indications: The patient has a symptomatic right inguinal hernia.     Procedure Details   The patient was seen in the Holding Room. The risks, benefits, complications, treatment options, and expected outcomes were discussed with the patient. The possibilities of reaction to medication, pain, infection, bleeding, heart attack, death, injury to internal organs, recurrence, testicular damage, infertility, or need for further surgery were discussed with the patient. The patient concurred with the proposed plan, giving informed consent. The site of surgery properly noted/marked and pre-operative antibiotics were administered. The patient was taken to back to the operation room and placed on the table in the supine position. General anesthesia was administered by the Department of Anesthesia via endotracheal tube. A Time Out was held and the above information confirmed.  An oblique incision was created in the right groin at the level of the external ring and ASIS, carried down through the subcutaneous tissues with cautery. The external oblique fascia was identified and incised parallel to its fibers with a 15-blade scalpel; Metzenbaum scissors were used to extend this cephalad and caudad; It was opened through the external ring with Bovie. Cord structures were encircled at the level of the pubic tubercle and a penrose drain passed. A direct hernia was identified. The hernia sac was mobilized from the surrounding cord structures. The sac was pushed into the abdomen and later  incorporate within the Bassini repair.  A cord lipoma was noted; mobilized from surrounding cord structures and excised with Bovie. The floor of the inguinal canal was then repaired using interrupted sutures of 2-0 Neurolon, approximating the conjoined tendon to the shelving edge of the inguinal ligament. A 3x6 Bard onlay mesh was then obtained and trimmed to overlie the floor of the inguinal canal, leaving a slit for the cord to pass. The mesh was then secured using two running sutures of 2-0 Prolene. Several additional interrupted sutures of 2-0 Prolene were used to secured the mesh around the internal ring. This yielded an adequate repair. The spermatic cord was then returned to its normal anatomic position and the testicle was noted to lie normally within the hemiscrotum.  The external oblique was then closed using a running suture of 3-0 Vicryl.  The skin was then closed with 4-0 Monocryl in a simple interrupted buried fashion followed by a running subcuticular 4-0 Monocryl.  There was then cleaned and dried exofin was then further used to reapproximate the skin.  The patient tolerated the procedure well with no complications.    Estimated Blood Loss: Minimal  Specimens: none  Implants: Bard 3x6 inch onlay mesh  Complications: None; patient tolerated the procedure well.  Disposition: PACU - hemodynamically stable.  Patient to follow-up in the office in 7-10 days        Electronically signed by: CAMILLE Biggs [facility] 12/30/2021

## 2021-12-30 NOTE — DISCHARGE INSTRUCTIONS
[facility]    Home Care Following Hernia Repair    [facility] Lorenzo, DO      Hernia Type:  Right open inguinal    Pain meds:     600mg ibuprofen every 6hrs prn     650mg of acetaminophen every 6hrs prn    You can alternate these meds so that you take one every 3 hrs.      Make sure you do not go over 4000mg of acetaminophen every 24hrs, especially if you are taking Norco or percocet 5/325mg.    Care of the Incision:    Remove gauze dressing (if present) after 48 hours.    If surgical glue was used, keep your incision dry for 24 hours.  Then you may shower, but don t submerge under water for at least 2 weeks.  Gently pat your incision dry with a freshly laundered towel.    Do not touch your incision with bare hands or pick at scabs.    Leave your incision open to air.  Cover it only if clothing rubs or irritates it.  Activity:    Gradually increase your activity.  Walk short distances several times each day and increase the distance as your strength allows.    To promote circulation, do not cross your legs while sitting.    No strenuous lifting or straining for 2-3 weeks.   Do not lift anything over 10-20 pounds until your doctor approves an increase.    Return to work will be determined by the type of work you do and should be discussed with your physician.    Do not drive or operate equipment while taking prescription pain medicines.  You may drive 1 week after surgery if you have stopped taking prescription pain medicines and are pain-free enough to react quickly and make an emergency stop if necessary.    Diet:    Return to the diet you were on before surgery.    Drink plenty of  water.    Avoid foods that cause constipation.      REMEMBER--most prescription pain pills cause constipation.  Walking, extra fluids, and increased fiber (fresh fruits and vegetables, etc.) are natural remedies for constipation.  You can also take mineral oil, 1-2 Tablespoons per day.  If still constipated you may try a  stool softener such as Colace or Miralax.    Call Your Physician if You Have:    Redness, increased swelling or cloudy drainage from your incision.    A temperature of more than 101 degrees F.    Worsening pain in your incision not relieved by your prescription pain pills and/or a short rest.    Any questions or concerns about your recovery, please call     Business hours [phone]    After hours [phone] Nurse Advice Line (24 hours a day)    Follow-up Care:    Make an appointment 2-3 weeks after your surgery.  Call [phone]      [facility] Same-Day [facility]   Adult Discharge Orders & Instructions     For 24 hours after surgery    1. Get plenty of rest.  A responsible adult must stay with you for at least 24 hours after you leave the hospital.   2. Do not drive or use heavy equipment.  If you have weakness or tingling, don't drive or use heavy equipment until this feeling goes away.  3. Do not drink alcohol.  4. Avoid strenuous or risky activities.  Ask for help when climbing stairs.   5. You may feel lightheaded.  If so, sit for a few minutes before standing.  Have someone help you get up.   6. You may have a slight fever. Call the doctor if your fever is over 100 F (37.7 C) (taken under the tongue) or lasts longer than 24 hours.  7. You may have a dry mouth, a sore throat, muscle aches or trouble sleeping.  These should go away after 24 hours.  8. Do not make important or legal decisions.  We don t expect you to have any problems from the surgery or treatment you had today. Just in case, here s what to do if you have pain, upset stomach (nausea), bleeding or infection:  Pain:  Take medicines your doctor has prescribed or over-the-counter medicine they have suggested. Resting and using ice packs can help, too. For surgery on an arm or leg, raise it on a pillow to ease swelling. Call your doctor if these methods don t work.  Copyright Gerald Gill, Licensed under [employer]  Upset  stomach (nausea):  Take anti-nausea medicine approved by your doctor. Drink clear liquids like apple juice, ginger ale, broth or 7-Up. Be sure to drink enough fluids. Rest can help, too. Move to normal foods when you re ready. Bleeding:  In the first 24 hours, you may see a little blood on your dressing, about the size of a quarter. You don t need to worry about this much blood, but if the blood spot keeps getting bigger:    Put pressure on the wound if you can, AND    Call your doctor.  Copyright [employer], Licensed under [employer]  Fever/Infection: Please call your doctor if you have any of these signs:    Redness    Swelling    Wound feels warm    Pain gets worse    Bad-smelling fluid leaks from wound    Fever or chills  Call your doctor for any of the followin.  It has been over 8 to 10 hours since surgery and you are still not able to urinate (pass water).    2.  Headache for over 24 hours.    3.  Numbness, tingling or weakness in your legs the day after surgery (if you had spinal anesthesia).    Nurse advice line: [phone]

## 2022-01-01 ENCOUNTER — VIRTUAL VISIT (OUTPATIENT)
Dept: INTERNAL MEDICINE | Facility: CLINIC | Age: 87
End: 2022-01-01
Payer: COMMERCIAL

## 2022-01-01 ENCOUNTER — TELEPHONE (OUTPATIENT)
Dept: GASTROENTEROLOGY | Facility: CLINIC | Age: 87
End: 2022-01-01

## 2022-01-01 ENCOUNTER — OFFICE VISIT (OUTPATIENT)
Dept: INTERNAL MEDICINE | Facility: CLINIC | Age: 87
End: 2022-01-01
Payer: COMMERCIAL

## 2022-01-01 ENCOUNTER — OFFICE VISIT (OUTPATIENT)
Dept: FAMILY MEDICINE | Facility: CLINIC | Age: 87
End: 2022-01-01
Payer: COMMERCIAL

## 2022-01-01 ENCOUNTER — APPOINTMENT (OUTPATIENT)
Dept: CT IMAGING | Facility: CLINIC | Age: 87
End: 2022-01-01
Attending: PHYSICIAN ASSISTANT
Payer: MEDICARE

## 2022-01-01 ENCOUNTER — HOSPITAL ENCOUNTER (OUTPATIENT)
Dept: GENERAL RADIOLOGY | Facility: CLINIC | Age: 87
Discharge: HOME | End: 2022-10-13
Attending: INTERNAL MEDICINE
Payer: MEDICARE

## 2022-01-01 ENCOUNTER — HOSPITAL ENCOUNTER (EMERGENCY)
Facility: CLINIC | Age: 87
Discharge: HOME | End: 2022-08-18
Attending: PHYSICIAN ASSISTANT | Admitting: PHYSICIAN ASSISTANT
Payer: MEDICARE

## 2022-01-01 ENCOUNTER — VIRTUAL VISIT (OUTPATIENT)
Dept: GASTROENTEROLOGY | Facility: CLINIC | Age: 87
End: 2022-01-01
Payer: COMMERCIAL

## 2022-01-01 ENCOUNTER — ALLIED HEALTH/NURSE VISIT (OUTPATIENT)
Dept: FAMILY MEDICINE | Facility: CLINIC | Age: 87
End: 2022-01-01
Payer: COMMERCIAL

## 2022-01-01 ENCOUNTER — OFFICE VISIT (OUTPATIENT)
Dept: SURGERY | Facility: CLINIC | Age: 87
End: 2022-01-01
Payer: COMMERCIAL

## 2022-01-01 ENCOUNTER — HOSPITAL ENCOUNTER (OUTPATIENT)
Dept: MRI IMAGING | Facility: CLINIC | Age: 87
Discharge: HOME | End: 2022-11-01
Attending: INTERNAL MEDICINE | Admitting: INTERNAL MEDICINE
Payer: MEDICARE

## 2022-01-01 ENCOUNTER — TELEPHONE (OUTPATIENT)
Dept: HEMATOLOGY | Facility: CLINIC | Age: 87
End: 2022-01-01

## 2022-01-01 ENCOUNTER — HOSPITAL ENCOUNTER (OUTPATIENT)
Dept: GENERAL RADIOLOGY | Facility: CLINIC | Age: 87
Discharge: HOME | End: 2022-09-15
Attending: STUDENT IN AN ORGANIZED HEALTH CARE EDUCATION/TRAINING PROGRAM | Admitting: STUDENT IN AN ORGANIZED HEALTH CARE EDUCATION/TRAINING PROGRAM
Payer: MEDICARE

## 2022-01-01 ENCOUNTER — HOSPITAL ENCOUNTER (EMERGENCY)
Facility: CLINIC | Age: 87
Discharge: HOME | End: 2022-10-13
Attending: EMERGENCY MEDICINE | Admitting: EMERGENCY MEDICINE
Payer: MEDICARE

## 2022-01-01 ENCOUNTER — TELEPHONE (OUTPATIENT)
Dept: INTERNAL MEDICINE | Facility: CLINIC | Age: 87
End: 2022-01-01

## 2022-01-01 ENCOUNTER — HEALTH MAINTENANCE LETTER (OUTPATIENT)
Age: 87
End: 2022-01-01

## 2022-01-01 ENCOUNTER — LAB (OUTPATIENT)
Dept: LAB | Facility: CLINIC | Age: 87
End: 2022-01-01
Payer: COMMERCIAL

## 2022-01-01 ENCOUNTER — DOCUMENTATION ONLY (OUTPATIENT)
Dept: OTHER | Facility: CLINIC | Age: 87
End: 2022-01-01

## 2022-01-01 ENCOUNTER — PATIENT OUTREACH (OUTPATIENT)
Dept: CARE COORDINATION | Facility: CLINIC | Age: 87
End: 2022-01-01

## 2022-01-01 ENCOUNTER — NURSE TRIAGE (OUTPATIENT)
Dept: INTERNAL MEDICINE | Facility: CLINIC | Age: 87
End: 2022-01-01

## 2022-01-01 ENCOUNTER — HOSPITAL ENCOUNTER (OUTPATIENT)
Dept: CT IMAGING | Facility: CLINIC | Age: 87
Discharge: HOME | End: 2022-10-13
Attending: INTERNAL MEDICINE
Payer: MEDICARE

## 2022-01-01 ENCOUNTER — HOSPITAL ENCOUNTER (OUTPATIENT)
Dept: CT IMAGING | Facility: CLINIC | Age: 87
Discharge: HOME | End: 2022-10-26
Attending: INTERNAL MEDICINE | Admitting: INTERNAL MEDICINE
Payer: MEDICARE

## 2022-01-01 VITALS
BODY MASS INDEX: 22.65 KG/M2 | HEART RATE: 71 BPM | SYSTOLIC BLOOD PRESSURE: 120 MMHG | RESPIRATION RATE: 18 BRPM | TEMPERATURE: 97.2 F | OXYGEN SATURATION: 98 % | DIASTOLIC BLOOD PRESSURE: 78 MMHG | WEIGHT: 167 LBS

## 2022-01-01 VITALS
SYSTOLIC BLOOD PRESSURE: 138 MMHG | HEIGHT: 72 IN | OXYGEN SATURATION: 98 % | WEIGHT: 162 LBS | HEART RATE: 72 BPM | BODY MASS INDEX: 21.94 KG/M2 | TEMPERATURE: 97.3 F | DIASTOLIC BLOOD PRESSURE: 82 MMHG | RESPIRATION RATE: 18 BRPM

## 2022-01-01 VITALS
SYSTOLIC BLOOD PRESSURE: 147 MMHG | HEART RATE: 80 BPM | TEMPERATURE: 97.8 F | DIASTOLIC BLOOD PRESSURE: 94 MMHG | OXYGEN SATURATION: 97 %

## 2022-01-01 VITALS
HEIGHT: 72 IN | RESPIRATION RATE: 20 BRPM | BODY MASS INDEX: 22.28 KG/M2 | SYSTOLIC BLOOD PRESSURE: 120 MMHG | TEMPERATURE: 97.2 F | HEART RATE: 76 BPM | OXYGEN SATURATION: 98 % | WEIGHT: 164.5 LBS | DIASTOLIC BLOOD PRESSURE: 60 MMHG

## 2022-01-01 VITALS
HEART RATE: 76 BPM | BODY MASS INDEX: 21.67 KG/M2 | OXYGEN SATURATION: 97 % | SYSTOLIC BLOOD PRESSURE: 143 MMHG | DIASTOLIC BLOOD PRESSURE: 91 MMHG | HEIGHT: 72 IN | RESPIRATION RATE: 15 BRPM | WEIGHT: 160 LBS | TEMPERATURE: 97.7 F

## 2022-01-01 VITALS
BODY MASS INDEX: 23.79 KG/M2 | OXYGEN SATURATION: 95 % | HEART RATE: 80 BPM | SYSTOLIC BLOOD PRESSURE: 122 MMHG | RESPIRATION RATE: 20 BRPM | WEIGHT: 173 LBS | TEMPERATURE: 98.1 F | DIASTOLIC BLOOD PRESSURE: 68 MMHG

## 2022-01-01 VITALS — WEIGHT: 154 LBS | BODY MASS INDEX: 20.89 KG/M2

## 2022-01-01 DIAGNOSIS — R10.32 LLQ ABDOMINAL PAIN: ICD-10-CM

## 2022-01-01 DIAGNOSIS — C16.9 MALIGNANT NEOPLASM OF STOMACH, UNSPECIFIED LOCATION (H): ICD-10-CM

## 2022-01-01 DIAGNOSIS — I81 PORTAL VEIN THROMBOSIS: ICD-10-CM

## 2022-01-01 DIAGNOSIS — K74.00 HEPATIC FIBROSIS, UNSPECIFIED: ICD-10-CM

## 2022-01-01 DIAGNOSIS — N18.30 STAGE 3 CHRONIC KIDNEY DISEASE, UNSPECIFIED WHETHER STAGE 3A OR 3B CKD (H): ICD-10-CM

## 2022-01-01 DIAGNOSIS — K59.00 CONSTIPATION, UNSPECIFIED CONSTIPATION TYPE: Primary | ICD-10-CM

## 2022-01-01 DIAGNOSIS — I25.10 ASCVD (ARTERIOSCLEROTIC CARDIOVASCULAR DISEASE): ICD-10-CM

## 2022-01-01 DIAGNOSIS — I48.0 PAROXYSMAL ATRIAL FIBRILLATION (H): Primary | ICD-10-CM

## 2022-01-01 DIAGNOSIS — K86.9 LESION OF PANCREAS: ICD-10-CM

## 2022-01-01 DIAGNOSIS — E53.8 VITAMIN B 12 DEFICIENCY: Primary | ICD-10-CM

## 2022-01-01 DIAGNOSIS — I82.890 SPLENIC VEIN THROMBOSIS: ICD-10-CM

## 2022-01-01 DIAGNOSIS — D69.6 THROMBOCYTOPENIA (H): ICD-10-CM

## 2022-01-01 DIAGNOSIS — R18.8 OTHER ASCITES: ICD-10-CM

## 2022-01-01 DIAGNOSIS — Z23 NEED FOR PROPHYLACTIC VACCINATION AND INOCULATION AGAINST INFLUENZA: ICD-10-CM

## 2022-01-01 DIAGNOSIS — Z98.61 S/P PTCA (PERCUTANEOUS TRANSLUMINAL CORONARY ANGIOPLASTY): ICD-10-CM

## 2022-01-01 DIAGNOSIS — Z00.00 MEDICARE ANNUAL WELLNESS VISIT, SUBSEQUENT: ICD-10-CM

## 2022-01-01 DIAGNOSIS — Z87.19 S/P HERNIA REPAIR: Primary | ICD-10-CM

## 2022-01-01 DIAGNOSIS — R10.13 EPIGASTRIC PAIN: ICD-10-CM

## 2022-01-01 DIAGNOSIS — I48.0 PAROXYSMAL ATRIAL FIBRILLATION (H): ICD-10-CM

## 2022-01-01 DIAGNOSIS — K59.00 CONSTIPATION, UNSPECIFIED CONSTIPATION TYPE: ICD-10-CM

## 2022-01-01 DIAGNOSIS — Z11.59 SCREENING FOR VIRAL DISEASE: ICD-10-CM

## 2022-01-01 DIAGNOSIS — I47.10 SVT (SUPRAVENTRICULAR TACHYCARDIA) (H): ICD-10-CM

## 2022-01-01 DIAGNOSIS — K55.069 SUPERIOR MESENTERIC VEIN THROMBOSIS (H): ICD-10-CM

## 2022-01-01 DIAGNOSIS — Z90.3 S/P TOTAL GASTRECTOMY AND ROUX-EN-Y ESOPHAGOJEJUNAL ANASTOMOSIS: Primary | ICD-10-CM

## 2022-01-01 DIAGNOSIS — R10.32 ABDOMINAL PAIN, LEFT LOWER QUADRANT: ICD-10-CM

## 2022-01-01 DIAGNOSIS — N20.0 RENAL LITHIASIS: ICD-10-CM

## 2022-01-01 DIAGNOSIS — K86.89 PANCREATIC MASS: ICD-10-CM

## 2022-01-01 DIAGNOSIS — Z98.890 S/P HERNIA REPAIR: Primary | ICD-10-CM

## 2022-01-01 DIAGNOSIS — R10.84 GENERALIZED ABDOMINAL PAIN: Primary | ICD-10-CM

## 2022-01-01 DIAGNOSIS — R10.13 EPIGASTRIC PAIN: Primary | ICD-10-CM

## 2022-01-01 DIAGNOSIS — K55.069 MESENTERIC VENOUS THROMBOSIS (H): ICD-10-CM

## 2022-01-01 DIAGNOSIS — K57.30 DIVERTICULAR DISEASE OF COLON: Primary | ICD-10-CM

## 2022-01-01 DIAGNOSIS — Z23 HIGH PRIORITY FOR 2019-NCOV VACCINE: ICD-10-CM

## 2022-01-01 DIAGNOSIS — Z87.442 HISTORY OF NEPHROLITHIASIS: ICD-10-CM

## 2022-01-01 DIAGNOSIS — R63.4 WEIGHT LOSS: ICD-10-CM

## 2022-01-01 DIAGNOSIS — N20.1 LEFT URETERAL STONE: ICD-10-CM

## 2022-01-01 DIAGNOSIS — R79.9 ABNORMAL FINDING OF BLOOD CHEMISTRY, UNSPECIFIED: ICD-10-CM

## 2022-01-01 DIAGNOSIS — R10.32 ABDOMINAL PAIN, LEFT LOWER QUADRANT: Primary | ICD-10-CM

## 2022-01-01 DIAGNOSIS — E83.10 IRON DISORDER: ICD-10-CM

## 2022-01-01 DIAGNOSIS — Z98.0 S/P TOTAL GASTRECTOMY AND ROUX-EN-Y ESOPHAGOJEJUNAL ANASTOMOSIS: Primary | ICD-10-CM

## 2022-01-01 DIAGNOSIS — R63.4 WEIGHT LOSS: Primary | ICD-10-CM

## 2022-01-01 DIAGNOSIS — I81 PORTAL VEIN THROMBOSIS: Primary | ICD-10-CM

## 2022-01-01 DIAGNOSIS — R10.9 LEFT SIDED ABDOMINAL PAIN: ICD-10-CM

## 2022-01-01 DIAGNOSIS — K86.9 LESION OF PANCREAS: Primary | ICD-10-CM

## 2022-01-01 LAB
% LINING CELLS, BODY FLUID: 8 %
AFP SERPL-MCNC: 3.2 NG/ML
ALBUMIN BODY FLUID SOURCE: NORMAL
ALBUMIN FLD-MCNC: 0.9 G/DL
ALBUMIN SERPL-MCNC: 3.2 G/DL (ref 3.4–5)
ALBUMIN SERPL-MCNC: 3.4 G/DL (ref 3.4–5)
ALBUMIN SERPL-MCNC: 3.6 G/DL (ref 3.4–5)
ALBUMIN SERPL-MCNC: 3.6 G/DL (ref 3.4–5)
ALBUMIN UR-MCNC: 30 MG/DL
ALBUMIN UR-MCNC: NEGATIVE MG/DL
ALP SERPL-CCNC: 138 U/L (ref 40–150)
ALP SERPL-CCNC: 163 U/L (ref 40–150)
ALP SERPL-CCNC: 171 U/L (ref 40–150)
ALT SERPL W P-5'-P-CCNC: 32 U/L (ref 0–70)
ALT SERPL W P-5'-P-CCNC: 35 U/L (ref 0–70)
ALT SERPL W P-5'-P-CCNC: 44 U/L (ref 0–70)
ANION GAP SERPL CALCULATED.3IONS-SCNC: 5 MMOL/L (ref 3–14)
ANION GAP SERPL CALCULATED.3IONS-SCNC: 6 MMOL/L (ref 3–14)
ANION GAP SERPL CALCULATED.3IONS-SCNC: 9 MMOL/L (ref 3–14)
APPEARANCE FLD: ABNORMAL
APPEARANCE UR: CLEAR
APPEARANCE UR: CLEAR
AST SERPL W P-5'-P-CCNC: 50 U/L (ref 0–45)
AST SERPL W P-5'-P-CCNC: 56 U/L (ref 0–45)
AST SERPL W P-5'-P-CCNC: 74 U/L (ref 0–45)
BACTERIA #/AREA URNS HPF: ABNORMAL /HPF
BACTERIA FLD CULT: NO GROWTH
BASOPHILS # BLD AUTO: 0 10E3/UL (ref 0–0.2)
BASOPHILS NFR BLD AUTO: 0 %
BILIRUB SERPL-MCNC: 0.6 MG/DL (ref 0.2–1.3)
BILIRUB SERPL-MCNC: 0.9 MG/DL (ref 0.2–1.3)
BILIRUB SERPL-MCNC: 1 MG/DL (ref 0.2–1.3)
BILIRUB UR QL STRIP: NEGATIVE
BILIRUB UR QL STRIP: NEGATIVE
BUN SERPL-MCNC: 19 MG/DL (ref 7–30)
BUN SERPL-MCNC: 19 MG/DL (ref 7–30)
BUN SERPL-MCNC: 23 MG/DL (ref 7–30)
CALCIUM SERPL-MCNC: 9 MG/DL (ref 8.5–10.1)
CALCIUM SERPL-MCNC: 9 MG/DL (ref 8.5–10.1)
CALCIUM SERPL-MCNC: 9.2 MG/DL (ref 8.5–10.1)
CELL COUNT BODY FLUID SOURCE: ABNORMAL
CHLORIDE BLD-SCNC: 109 MMOL/L (ref 94–109)
CHLORIDE BLD-SCNC: 109 MMOL/L (ref 94–109)
CHLORIDE BLD-SCNC: 112 MMOL/L (ref 94–109)
CO2 SERPL-SCNC: 22 MMOL/L (ref 20–32)
CO2 SERPL-SCNC: 22 MMOL/L (ref 20–32)
CO2 SERPL-SCNC: 25 MMOL/L (ref 20–32)
COLOR FLD: YELLOW
COLOR UR AUTO: YELLOW
COLOR UR AUTO: YELLOW
CREAT BLD-MCNC: 1.2 MG/DL (ref 0.7–1.3)
CREAT SERPL-MCNC: 1.05 MG/DL (ref 0.66–1.25)
CREAT SERPL-MCNC: 1.15 MG/DL (ref 0.66–1.25)
CREAT SERPL-MCNC: 1.24 MG/DL (ref 0.66–1.25)
CRP SERPL-MCNC: <2.9 MG/L (ref 0–8)
EOSINOPHIL # BLD AUTO: 0 10E3/UL (ref 0–0.7)
EOSINOPHIL # BLD AUTO: 0.1 10E3/UL (ref 0–0.7)
EOSINOPHIL # BLD AUTO: 0.1 10E3/UL (ref 0–0.7)
EOSINOPHIL NFR BLD AUTO: 0 %
EOSINOPHIL NFR BLD AUTO: 1 %
EOSINOPHIL NFR BLD AUTO: 1 %
ERYTHROCYTE [DISTWIDTH] IN BLOOD BY AUTOMATED COUNT: 15.2 % (ref 10–15)
ERYTHROCYTE [DISTWIDTH] IN BLOOD BY AUTOMATED COUNT: 16.2 % (ref 10–15)
ERYTHROCYTE [DISTWIDTH] IN BLOOD BY AUTOMATED COUNT: 16.4 % (ref 10–15)
FERRITIN SERPL-MCNC: 23 NG/ML (ref 26–388)
GFR SERPL CREATININE-BSD FRML MDRD: 56 ML/MIN/1.73M2
GFR SERPL CREATININE-BSD FRML MDRD: 58 ML/MIN/1.73M2
GFR SERPL CREATININE-BSD FRML MDRD: 61 ML/MIN/1.73M2
GFR SERPL CREATININE-BSD FRML MDRD: 68 ML/MIN/1.73M2
GLUCOSE BLD-MCNC: 108 MG/DL (ref 70–99)
GLUCOSE BLD-MCNC: 122 MG/DL (ref 70–99)
GLUCOSE BLD-MCNC: 175 MG/DL (ref 70–99)
GLUCOSE UR STRIP-MCNC: NEGATIVE MG/DL
GLUCOSE UR STRIP-MCNC: NEGATIVE MG/DL
GRAM STAIN RESULT: NORMAL
GRAM STAIN RESULT: NORMAL
HAV IGG SER QL IA: NONREACTIVE
HBV CORE AB SERPL QL IA: NONREACTIVE
HBV SURFACE AB SERPL IA-ACNC: 0.92 M[IU]/ML
HBV SURFACE AB SERPL IA-ACNC: NONREACTIVE M[IU]/ML
HBV SURFACE AG SERPL QL IA: NONREACTIVE
HCT VFR BLD AUTO: 35.3 % (ref 40–53)
HCT VFR BLD AUTO: 35.5 % (ref 40–53)
HCT VFR BLD AUTO: 35.8 % (ref 40–53)
HCV AB SERPL QL IA: NONREACTIVE
HGB BLD-MCNC: 11.5 G/DL (ref 13.3–17.7)
HGB BLD-MCNC: 11.6 G/DL (ref 13.3–17.7)
HGB BLD-MCNC: 11.9 G/DL (ref 13.3–17.7)
HGB UR QL STRIP: ABNORMAL
HGB UR QL STRIP: ABNORMAL
HOLD SPECIMEN: NORMAL
HYALINE CASTS: 1 /LPF
IMM GRANULOCYTES # BLD: 0 10E3/UL
IMM GRANULOCYTES NFR BLD: 0 %
IRON SATN MFR SERPL: 18 % (ref 15–46)
IRON SERPL-MCNC: 82 UG/DL (ref 35–180)
KETONES UR STRIP-MCNC: ABNORMAL MG/DL
KETONES UR STRIP-MCNC: NEGATIVE MG/DL
LACTATE SERPL-SCNC: 1.8 MMOL/L (ref 0.7–2)
LEUKOCYTE ESTERASE UR QL STRIP: NEGATIVE
LEUKOCYTE ESTERASE UR QL STRIP: NEGATIVE
LIPASE SERPL-CCNC: 103 U/L (ref 73–393)
LYMPHOCYTES # BLD AUTO: 0.8 10E3/UL (ref 0.8–5.3)
LYMPHOCYTES # BLD AUTO: 0.9 10E3/UL (ref 0.8–5.3)
LYMPHOCYTES # BLD AUTO: 0.9 10E3/UL (ref 0.8–5.3)
LYMPHOCYTES NFR BLD AUTO: 16 %
LYMPHOCYTES NFR BLD AUTO: 17 %
LYMPHOCYTES NFR BLD AUTO: 19 %
LYMPHOCYTES NFR FLD MANUAL: 51 %
MCH RBC QN AUTO: 29.3 PG (ref 26.5–33)
MCH RBC QN AUTO: 29.3 PG (ref 26.5–33)
MCH RBC QN AUTO: 29.4 PG (ref 26.5–33)
MCHC RBC AUTO-ENTMCNC: 32.6 G/DL (ref 31.5–36.5)
MCHC RBC AUTO-ENTMCNC: 32.7 G/DL (ref 31.5–36.5)
MCHC RBC AUTO-ENTMCNC: 33.2 G/DL (ref 31.5–36.5)
MCV RBC AUTO: 88 FL (ref 78–100)
MCV RBC AUTO: 90 FL (ref 78–100)
MCV RBC AUTO: 90 FL (ref 78–100)
MONOCYTES # BLD AUTO: 0.5 10E3/UL (ref 0–1.3)
MONOCYTES # BLD AUTO: 0.7 10E3/UL (ref 0–1.3)
MONOCYTES # BLD AUTO: 0.8 10E3/UL (ref 0–1.3)
MONOCYTES NFR BLD AUTO: 13 %
MONOCYTES NFR BLD AUTO: 17 %
MONOCYTES NFR BLD AUTO: 9 %
MONOS+MACROS NFR FLD MANUAL: 33 %
MUCOUS THREADS #/AREA URNS LPF: PRESENT /LPF
MUCOUS THREADS #/AREA URNS LPF: PRESENT /LPF
NEUTROPHILS # BLD AUTO: 3 10E3/UL (ref 1.6–8.3)
NEUTROPHILS # BLD AUTO: 3.7 10E3/UL (ref 1.6–8.3)
NEUTROPHILS # BLD AUTO: 3.8 10E3/UL (ref 1.6–8.3)
NEUTROPHILS NFR BLD AUTO: 63 %
NEUTROPHILS NFR BLD AUTO: 70 %
NEUTROPHILS NFR BLD AUTO: 74 %
NEUTS BAND NFR FLD MANUAL: 8 %
NITRATE UR QL: NEGATIVE
NITRATE UR QL: NEGATIVE
NRBC # BLD AUTO: 0 10E3/UL
NRBC BLD AUTO-RTO: 0 /100
PATH REPORT.ADDENDUM SPEC: NORMAL
PATH REPORT.COMMENTS IMP SPEC: NORMAL
PATH REPORT.FINAL DX SPEC: NORMAL
PATH REPORT.GROSS SPEC: NORMAL
PATH REPORT.MICROSCOPIC SPEC OTHER STN: NORMAL
PATH REPORT.RELEVANT HX SPEC: NORMAL
PATH REV: ABNORMAL
PH UR STRIP: 5 [PH] (ref 5–7)
PH UR STRIP: 6 [PH] (ref 5–7)
PLATELET # BLD AUTO: 158 10E3/UL (ref 150–450)
PLATELET # BLD AUTO: 206 10E3/UL (ref 150–450)
PLATELET # BLD AUTO: 246 10E3/UL (ref 150–450)
POTASSIUM BLD-SCNC: 4.1 MMOL/L (ref 3.4–5.3)
POTASSIUM BLD-SCNC: 4.5 MMOL/L (ref 3.4–5.3)
POTASSIUM BLD-SCNC: 4.8 MMOL/L (ref 3.4–5.3)
PROT FLD-MCNC: 1.7 G/DL
PROT SERPL-MCNC: 7.1 G/DL (ref 6.8–8.8)
PROT SERPL-MCNC: 7.1 G/DL (ref 6.8–8.8)
PROT SERPL-MCNC: 7.3 G/DL (ref 6.8–8.8)
PROTEIN BODY FLUID SOURCE: NORMAL
RADIOLOGIST FLAGS: ABNORMAL
RADIOLOGIST FLAGS: ABNORMAL
RBC # BLD AUTO: 3.91 10E6/UL (ref 4.4–5.9)
RBC # BLD AUTO: 3.96 10E6/UL (ref 4.4–5.9)
RBC # BLD AUTO: 4.06 10E6/UL (ref 4.4–5.9)
RBC URINE: 10 /HPF
RBC URINE: >182 /HPF
SODIUM SERPL-SCNC: 139 MMOL/L (ref 133–144)
SODIUM SERPL-SCNC: 140 MMOL/L (ref 133–144)
SODIUM SERPL-SCNC: 140 MMOL/L (ref 133–144)
SP GR UR STRIP: 1.02 (ref 1–1.03)
SP GR UR STRIP: >=1.03 (ref 1–1.03)
SQUAMOUS EPITHELIAL: <1 /HPF
SQUAMOUS EPITHELIAL: <1 /HPF
TIBC SERPL-MCNC: 445 UG/DL (ref 240–430)
UROBILINOGEN UR STRIP-MCNC: 4 MG/DL
UROBILINOGEN UR STRIP-MCNC: NORMAL MG/DL
WBC # BLD AUTO: 4.8 10E3/UL (ref 4–11)
WBC # BLD AUTO: 5 10E3/UL (ref 4–11)
WBC # BLD AUTO: 5.5 10E3/UL (ref 4–11)
WBC # FLD AUTO: 672 /UL
WBC URINE: 2 /HPF
WBC URINE: 4 /HPF

## 2022-01-01 PROCEDURE — 36415 COLL VENOUS BLD VENIPUNCTURE: CPT

## 2022-01-01 PROCEDURE — 88112 CYTOPATH CELL ENHANCE TECH: CPT | Mod: 26 | Performed by: PATHOLOGY

## 2022-01-01 PROCEDURE — 80053 COMPREHEN METABOLIC PANEL: CPT

## 2022-01-01 PROCEDURE — G0439 PPPS, SUBSEQ VISIT: HCPCS | Performed by: INTERNAL MEDICINE

## 2022-01-01 PROCEDURE — 250N000011 HC RX IP 250 OP 636: Performed by: RADIOLOGY

## 2022-01-01 PROCEDURE — 250N000011 HC RX IP 250 OP 636: Performed by: PHYSICIAN ASSISTANT

## 2022-01-01 PROCEDURE — 96372 THER/PROPH/DIAG INJ SC/IM: CPT | Performed by: INTERNAL MEDICINE

## 2022-01-01 PROCEDURE — 99207 PR NO CHARGE NURSE ONLY: CPT

## 2022-01-01 PROCEDURE — 99285 EMERGENCY DEPT VISIT HI MDM: CPT | Mod: 25

## 2022-01-01 PROCEDURE — 86708 HEPATITIS A ANTIBODY: CPT

## 2022-01-01 PROCEDURE — 99215 OFFICE O/P EST HI 40 MIN: CPT | Performed by: INTERNAL MEDICINE

## 2022-01-01 PROCEDURE — 74019 RADEX ABDOMEN 2 VIEWS: CPT

## 2022-01-01 PROCEDURE — 74177 CT ABD & PELVIS W/CONTRAST: CPT | Mod: MG

## 2022-01-01 PROCEDURE — 82040 ASSAY OF SERUM ALBUMIN: CPT | Performed by: EMERGENCY MEDICINE

## 2022-01-01 PROCEDURE — 83605 ASSAY OF LACTIC ACID: CPT | Performed by: EMERGENCY MEDICINE

## 2022-01-01 PROCEDURE — 88305 TISSUE EXAM BY PATHOLOGIST: CPT | Mod: TC | Performed by: EMERGENCY MEDICINE

## 2022-01-01 PROCEDURE — 85025 COMPLETE CBC W/AUTO DIFF WBC: CPT | Performed by: INTERNAL MEDICINE

## 2022-01-01 PROCEDURE — 83550 IRON BINDING TEST: CPT

## 2022-01-01 PROCEDURE — 99215 OFFICE O/P EST HI 40 MIN: CPT | Mod: 25 | Performed by: INTERNAL MEDICINE

## 2022-01-01 PROCEDURE — 250N000009 HC RX 250: Performed by: INTERNAL MEDICINE

## 2022-01-01 PROCEDURE — 36415 COLL VENOUS BLD VENIPUNCTURE: CPT | Performed by: INTERNAL MEDICINE

## 2022-01-01 PROCEDURE — 0134A COVID-19,PF,MODERNA BIVALENT: CPT | Performed by: INTERNAL MEDICINE

## 2022-01-01 PROCEDURE — 82728 ASSAY OF FERRITIN: CPT

## 2022-01-01 PROCEDURE — 99024 POSTOP FOLLOW-UP VISIT: CPT | Performed by: SURGERY

## 2022-01-01 PROCEDURE — 99285 EMERGENCY DEPT VISIT HI MDM: CPT | Mod: 25 | Performed by: EMERGENCY MEDICINE

## 2022-01-01 PROCEDURE — 86140 C-REACTIVE PROTEIN: CPT | Performed by: PHYSICIAN ASSISTANT

## 2022-01-01 PROCEDURE — 96127 BRIEF EMOTIONAL/BEHAV ASSMT: CPT | Performed by: STUDENT IN AN ORGANIZED HEALTH CARE EDUCATION/TRAINING PROGRAM

## 2022-01-01 PROCEDURE — 86704 HEP B CORE ANTIBODY TOTAL: CPT

## 2022-01-01 PROCEDURE — 88112 CYTOPATH CELL ENHANCE TECH: CPT | Mod: TC | Performed by: EMERGENCY MEDICINE

## 2022-01-01 PROCEDURE — 258N000003 HC RX IP 258 OP 636: Performed by: PHYSICIAN ASSISTANT

## 2022-01-01 PROCEDURE — 81001 URINALYSIS AUTO W/SCOPE: CPT | Performed by: INTERNAL MEDICINE

## 2022-01-01 PROCEDURE — 99207 PR NO CHARGE LOS: CPT | Performed by: INTERNAL MEDICINE

## 2022-01-01 PROCEDURE — G1010 CDSM STANSON: HCPCS

## 2022-01-01 PROCEDURE — 85004 AUTOMATED DIFF WBC COUNT: CPT | Performed by: EMERGENCY MEDICINE

## 2022-01-01 PROCEDURE — 86706 HEP B SURFACE ANTIBODY: CPT

## 2022-01-01 PROCEDURE — 96375 TX/PRO/DX INJ NEW DRUG ADDON: CPT

## 2022-01-01 PROCEDURE — 85025 COMPLETE CBC W/AUTO DIFF WBC: CPT | Performed by: PHYSICIAN ASSISTANT

## 2022-01-01 PROCEDURE — 96374 THER/PROPH/DIAG INJ IV PUSH: CPT

## 2022-01-01 PROCEDURE — 96376 TX/PRO/DX INJ SAME DRUG ADON: CPT

## 2022-01-01 PROCEDURE — 36415 COLL VENOUS BLD VENIPUNCTURE: CPT | Performed by: EMERGENCY MEDICINE

## 2022-01-01 PROCEDURE — 99214 OFFICE O/P EST MOD 30 MIN: CPT | Performed by: STUDENT IN AN ORGANIZED HEALTH CARE EDUCATION/TRAINING PROGRAM

## 2022-01-01 PROCEDURE — 96361 HYDRATE IV INFUSION ADD-ON: CPT

## 2022-01-01 PROCEDURE — 99207 PR NO CHARGE NURSE ONLY: CPT | Performed by: INTERNAL MEDICINE

## 2022-01-01 PROCEDURE — 87340 HEPATITIS B SURFACE AG IA: CPT

## 2022-01-01 PROCEDURE — 89051 BODY FLUID CELL COUNT: CPT | Performed by: EMERGENCY MEDICINE

## 2022-01-01 PROCEDURE — 91313 COVID-19,PF,MODERNA BIVALENT: CPT | Performed by: INTERNAL MEDICINE

## 2022-01-01 PROCEDURE — 99214 OFFICE O/P EST MOD 30 MIN: CPT | Mod: 25 | Performed by: INTERNAL MEDICINE

## 2022-01-01 PROCEDURE — 83690 ASSAY OF LIPASE: CPT

## 2022-01-01 PROCEDURE — G0008 ADMIN INFLUENZA VIRUS VAC: HCPCS

## 2022-01-01 PROCEDURE — 86803 HEPATITIS C AB TEST: CPT

## 2022-01-01 PROCEDURE — 82565 ASSAY OF CREATININE: CPT

## 2022-01-01 PROCEDURE — 250N000009 HC RX 250: Performed by: RADIOLOGY

## 2022-01-01 PROCEDURE — 87205 SMEAR GRAM STAIN: CPT | Performed by: EMERGENCY MEDICINE

## 2022-01-01 PROCEDURE — 99205 OFFICE O/P NEW HI 60 MIN: CPT | Mod: 95 | Performed by: INTERNAL MEDICINE

## 2022-01-01 PROCEDURE — 99215 OFFICE O/P EST HI 40 MIN: CPT | Mod: 95 | Performed by: INTERNAL MEDICINE

## 2022-01-01 PROCEDURE — 250N000011 HC RX IP 250 OP 636: Performed by: INTERNAL MEDICINE

## 2022-01-01 PROCEDURE — 82105 ALPHA-FETOPROTEIN SERUM: CPT

## 2022-01-01 PROCEDURE — 87070 CULTURE OTHR SPECIMN AEROBIC: CPT | Performed by: EMERGENCY MEDICINE

## 2022-01-01 PROCEDURE — 80053 COMPREHEN METABOLIC PANEL: CPT | Performed by: EMERGENCY MEDICINE

## 2022-01-01 PROCEDURE — A9585 GADOBUTROL INJECTION: HCPCS | Performed by: INTERNAL MEDICINE

## 2022-01-01 PROCEDURE — 36415 COLL VENOUS BLD VENIPUNCTURE: CPT | Performed by: PHYSICIAN ASSISTANT

## 2022-01-01 PROCEDURE — 49083 ABD PARACENTESIS W/IMAGING: CPT | Performed by: EMERGENCY MEDICINE

## 2022-01-01 PROCEDURE — 90662 IIV NO PRSV INCREASED AG IM: CPT

## 2022-01-01 PROCEDURE — 81001 URINALYSIS AUTO W/SCOPE: CPT | Performed by: PHYSICIAN ASSISTANT

## 2022-01-01 PROCEDURE — 82042 OTHER SOURCE ALBUMIN QUAN EA: CPT | Performed by: EMERGENCY MEDICINE

## 2022-01-01 PROCEDURE — 80053 COMPREHEN METABOLIC PANEL: CPT | Performed by: PHYSICIAN ASSISTANT

## 2022-01-01 PROCEDURE — 255N000002 HC RX 255 OP 636: Performed by: INTERNAL MEDICINE

## 2022-01-01 PROCEDURE — 99285 EMERGENCY DEPT VISIT HI MDM: CPT | Performed by: PHYSICIAN ASSISTANT

## 2022-01-01 PROCEDURE — 84157 ASSAY OF PROTEIN OTHER: CPT | Performed by: EMERGENCY MEDICINE

## 2022-01-01 PROCEDURE — 88305 TISSUE EXAM BY PATHOLOGIST: CPT | Mod: 26 | Performed by: PATHOLOGY

## 2022-01-01 RX ORDER — ONDANSETRON 4 MG/1
4 TABLET, ORALLY DISINTEGRATING ORAL EVERY 8 HOURS PRN
Qty: 10 TABLET | Refills: 0 | Status: SHIPPED | OUTPATIENT
Start: 2022-01-01 | End: 2022-01-01

## 2022-01-01 RX ORDER — GADOBUTROL 604.72 MG/ML
10 INJECTION INTRAVENOUS ONCE
Status: COMPLETED | OUTPATIENT
Start: 2022-01-01 | End: 2022-01-01

## 2022-01-01 RX ORDER — RIVAROXABAN 20 MG/1
TABLET, FILM COATED ORAL
Qty: 30 TABLET | Refills: 0 | OUTPATIENT
Start: 2022-01-01

## 2022-01-01 RX ORDER — IOPAMIDOL 755 MG/ML
500 INJECTION, SOLUTION INTRAVASCULAR ONCE
Status: COMPLETED | OUTPATIENT
Start: 2022-01-01 | End: 2022-01-01

## 2022-01-01 RX ORDER — HYDROMORPHONE HYDROCHLORIDE 1 MG/ML
0.5 INJECTION, SOLUTION INTRAMUSCULAR; INTRAVENOUS; SUBCUTANEOUS
Status: DISCONTINUED | OUTPATIENT
Start: 2022-01-01 | End: 2022-01-01 | Stop reason: HOSPADM

## 2022-01-01 RX ORDER — DILTIAZEM HYDROCHLORIDE 120 MG/1
CAPSULE, EXTENDED RELEASE ORAL
Qty: 90 CAPSULE | Refills: 1 | Status: SHIPPED | OUTPATIENT
Start: 2022-01-01 | End: 2022-01-01

## 2022-01-01 RX ORDER — SPIRONOLACTONE 50 MG/1
50 TABLET, FILM COATED ORAL DAILY
Qty: 30 TABLET | Refills: 1 | Status: SHIPPED | OUTPATIENT
Start: 2022-01-01

## 2022-01-01 RX ORDER — OXYCODONE HYDROCHLORIDE 5 MG/1
5 TABLET ORAL EVERY 6 HOURS PRN
Qty: 12 TABLET | Refills: 0 | Status: SHIPPED | OUTPATIENT
Start: 2022-01-01 | End: 2022-01-01

## 2022-01-01 RX ORDER — KETOROLAC TROMETHAMINE 15 MG/ML
15 INJECTION, SOLUTION INTRAMUSCULAR; INTRAVENOUS ONCE
Status: COMPLETED | OUTPATIENT
Start: 2022-01-01 | End: 2022-01-01

## 2022-01-01 RX ORDER — POLYETHYLENE GLYCOL 3350 17 G/17G
1 POWDER, FOR SOLUTION ORAL DAILY
Qty: 578 G | Refills: 1 | Status: SHIPPED | OUTPATIENT
Start: 2022-01-01

## 2022-01-01 RX ORDER — CLOPIDOGREL BISULFATE 75 MG/1
TABLET ORAL
Qty: 90 TABLET | Refills: 0 | Status: SHIPPED | OUTPATIENT
Start: 2022-01-01

## 2022-01-01 RX ORDER — ONDANSETRON 2 MG/ML
4 INJECTION INTRAMUSCULAR; INTRAVENOUS EVERY 30 MIN PRN
Status: DISCONTINUED | OUTPATIENT
Start: 2022-01-01 | End: 2022-01-01 | Stop reason: HOSPADM

## 2022-01-01 RX ORDER — TAMSULOSIN HYDROCHLORIDE 0.4 MG/1
0.4 CAPSULE ORAL DAILY
Qty: 10 CAPSULE | Refills: 0 | Status: SHIPPED | OUTPATIENT
Start: 2022-01-01 | End: 2022-01-01

## 2022-01-01 RX ORDER — CIPROFLOXACIN 500 MG/1
500 TABLET, FILM COATED ORAL 2 TIMES DAILY
Qty: 14 TABLET | Refills: 0 | Status: SHIPPED | OUTPATIENT
Start: 2022-01-01

## 2022-01-01 RX ORDER — DILTIAZEM HYDROCHLORIDE 120 MG/1
CAPSULE, COATED, EXTENDED RELEASE ORAL
Qty: 90 CAPSULE | Refills: 0 | Status: SHIPPED | OUTPATIENT
Start: 2022-01-01

## 2022-01-01 RX ORDER — BISACODYL 10 MG
10 SUPPOSITORY, RECTAL RECTAL DAILY PRN
Qty: 30 SUPPOSITORY | Refills: 1 | Status: SHIPPED | OUTPATIENT
Start: 2022-01-01

## 2022-01-01 RX ORDER — METRONIDAZOLE 500 MG/1
500 TABLET ORAL 3 TIMES DAILY
Qty: 21 TABLET | Refills: 0 | Status: SHIPPED | OUTPATIENT
Start: 2022-01-01 | End: 2022-01-01

## 2022-01-01 RX ORDER — SIMETHICONE 80 MG
80 TABLET,CHEWABLE ORAL EVERY 6 HOURS PRN
Qty: 30 TABLET | Refills: 1 | Status: SHIPPED | OUTPATIENT
Start: 2022-01-01

## 2022-01-01 RX ORDER — SODIUM CHLORIDE 9 MG/ML
INJECTION, SOLUTION INTRAVENOUS CONTINUOUS
Status: DISCONTINUED | OUTPATIENT
Start: 2022-01-01 | End: 2022-01-01 | Stop reason: HOSPADM

## 2022-01-01 RX ORDER — HYDROCODONE BITARTRATE AND ACETAMINOPHEN 5; 325 MG/1; MG/1
1 TABLET ORAL EVERY 6 HOURS PRN
Qty: 10 TABLET | Refills: 0 | Status: SHIPPED | OUTPATIENT
Start: 2022-01-01 | End: 2022-01-01

## 2022-01-01 RX ADMIN — SODIUM CHLORIDE 1000 ML: 9 INJECTION, SOLUTION INTRAVENOUS at 18:03

## 2022-01-01 RX ADMIN — ONDANSETRON 4 MG: 2 INJECTION INTRAMUSCULAR; INTRAVENOUS at 16:56

## 2022-01-01 RX ADMIN — CYANOCOBALAMIN 1000 MCG: 1000 INJECTION, SOLUTION INTRAMUSCULAR; SUBCUTANEOUS at 11:03

## 2022-01-01 RX ADMIN — CYANOCOBALAMIN 1000 MCG: 1000 INJECTION, SOLUTION INTRAMUSCULAR; SUBCUTANEOUS at 09:00

## 2022-01-01 RX ADMIN — CYANOCOBALAMIN 1000 MCG: 1000 INJECTION, SOLUTION INTRAMUSCULAR; SUBCUTANEOUS at 10:21

## 2022-01-01 RX ADMIN — CYANOCOBALAMIN 1000 MCG: 1000 INJECTION, SOLUTION INTRAMUSCULAR; SUBCUTANEOUS at 10:58

## 2022-01-01 RX ADMIN — SODIUM CHLORIDE 60 ML: 9 INJECTION, SOLUTION INTRAVENOUS at 08:52

## 2022-01-01 RX ADMIN — ONDANSETRON 4 MG: 2 INJECTION INTRAMUSCULAR; INTRAVENOUS at 19:14

## 2022-01-01 RX ADMIN — IOPAMIDOL 84 ML: 755 INJECTION, SOLUTION INTRAVENOUS at 10:45

## 2022-01-01 RX ADMIN — CYANOCOBALAMIN 1000 MCG: 1000 INJECTION, SOLUTION INTRAMUSCULAR; SUBCUTANEOUS at 11:46

## 2022-01-01 RX ADMIN — CYANOCOBALAMIN 1000 MCG: 1000 INJECTION, SOLUTION INTRAMUSCULAR; SUBCUTANEOUS at 14:25

## 2022-01-01 RX ADMIN — SODIUM CHLORIDE 60 ML: 9 INJECTION, SOLUTION INTRAVENOUS at 10:45

## 2022-01-01 RX ADMIN — HYDROMORPHONE HYDROCHLORIDE 1 MG: 1 INJECTION, SOLUTION INTRAMUSCULAR; INTRAVENOUS; SUBCUTANEOUS at 16:59

## 2022-01-01 RX ADMIN — CYANOCOBALAMIN 1000 MCG: 1000 INJECTION, SOLUTION INTRAMUSCULAR; SUBCUTANEOUS at 09:55

## 2022-01-01 RX ADMIN — GADOBUTROL 10 ML: 604.72 INJECTION INTRAVENOUS at 15:54

## 2022-01-01 RX ADMIN — HYDROMORPHONE HYDROCHLORIDE 0.5 MG: 1 INJECTION, SOLUTION INTRAMUSCULAR; INTRAVENOUS; SUBCUTANEOUS at 17:18

## 2022-01-01 RX ADMIN — CYANOCOBALAMIN 1000 MCG: 1000 INJECTION, SOLUTION INTRAMUSCULAR; SUBCUTANEOUS at 08:45

## 2022-01-01 RX ADMIN — KETOROLAC TROMETHAMINE 15 MG: 15 INJECTION, SOLUTION INTRAMUSCULAR; INTRAVENOUS at 18:04

## 2022-01-01 RX ADMIN — IOPAMIDOL 80 ML: 755 INJECTION, SOLUTION INTRAVENOUS at 08:52

## 2022-01-01 ASSESSMENT — PAIN SCALES - GENERAL
PAINLEVEL: MODERATE PAIN (4)
PAINLEVEL: SEVERE PAIN (7)
PAINLEVEL: SEVERE PAIN (6)
PAINLEVEL: MODERATE PAIN (4)
PAINLEVEL: SEVERE PAIN (7)

## 2022-01-01 ASSESSMENT — ENCOUNTER SYMPTOMS
COUGH: 0
SORE THROAT: 0
SHORTNESS OF BREATH: 1
NAUSEA: 1
HEARTBURN: 0
WEAKNESS: 1
DYSURIA: 0
DIARRHEA: 0
DIZZINESS: 0
HEADACHES: 0
ABDOMINAL PAIN: 1
ABDOMINAL PAIN: 1
HEMATOCHEZIA: 0
EYE PAIN: 0
CONSTIPATION: 1
ARTHRALGIAS: 0
FREQUENCY: 1
MYALGIAS: 0
NERVOUS/ANXIOUS: 0
HEMATURIA: 0
FEVER: 0
PALPITATIONS: 0
JOINT SWELLING: 0
CHILLS: 0
PARESTHESIAS: 0

## 2022-01-01 ASSESSMENT — PATIENT HEALTH QUESTIONNAIRE - PHQ9
SUM OF ALL RESPONSES TO PHQ QUESTIONS 1-9: 14
SUM OF ALL RESPONSES TO PHQ QUESTIONS 1-9: 14
10. IF YOU CHECKED OFF ANY PROBLEMS, HOW DIFFICULT HAVE THESE PROBLEMS MADE IT FOR YOU TO DO YOUR WORK, TAKE CARE OF THINGS AT HOME, OR GET ALONG WITH OTHER PEOPLE: SOMEWHAT DIFFICULT

## 2022-01-01 ASSESSMENT — ACTIVITIES OF DAILY LIVING (ADL)
CURRENT_FUNCTION: NO ASSISTANCE NEEDED
ADLS_ACUITY_SCORE: 37

## 2022-01-10 NOTE — PROGRESS NOTES
"Type of surgery/procedure:  ***  Date of surgery:  ***  Are you experiencing pain in surgical area? {YES/NO/WHERE:528774}  Have you had a temperature since surgery? {YES/HIGH/OFTEN:750039}  Incision closed with {CLOSURE:622268::\"staples\"}.  Appearance: {INCISION APPEARANCE:800773}  Drainage: {YES/COLOR:030566::\"No\"}  Are there any other problems you would like to discuss?  ***  Reviewed incision care with the patient? ***      "

## 2022-01-11 NOTE — LETTER
1/11/2022         RE: Remigio Holly  [address] [facility] [address]        Dear Colleague,    Thank you for referring your patient, Remigio Holly, to the [facility]. Please see a copy of my visit note below.      Assessment & Plan   Problem List Items Addressed This Visit     None      Visit Diagnoses     S/P hernia repair    -  Primary         Doing well  Minimal pain  Incisions are CDI  Discussed restrictions of no lifting more than 20lbs for 4-6 weeks from DOS  All questions were answered.        Tobacco Cessation:   reports that he has been smoking pipe. He has been smoking about 0.00 packs per day for the past 20.00 years. He has never used smokeless tobacco.      No follow-ups on file.    Tanvir Lroenzo MD  [facility]    Kianna Garcia is a 88 year old who presents for the following health issues:    open left inguinal hernia 9/25/2020; open right inguinal hernia 12/30/2022.  Pain tolerable.  Noted seroma and healing bridge at incision.  Some bruising on both groins.         Review of Systems   Constitutional, HEENT, cardiovascular, pulmonary, gi and gu systems are negative, except as otherwise noted.      Objective    /78   Pulse 71   Temp 97.2  F (36.2  C) (Temporal)   Resp 18   Wt 75.8 kg (167 lb)   SpO2 98%   BMI 22.65 kg/m    Body mass index is 22.65 kg/m .  Physical Exam   Bilateral groin: Incisions CDI; healing bridge and seroma on right side.  No signs of recurrent on neither.  Some ecchyomosis on left groin.                 Again, thank you for allowing me to participate in the care of your patient.        Sincerely,        Tanvir Lorenzo MD

## 2022-01-12 NOTE — PROGRESS NOTES
Assessment & Plan   Problem List Items Addressed This Visit     None      Visit Diagnoses     S/P hernia repair    -  Primary         Doing well  Minimal pain  Incisions are CDI  Discussed restrictions of no lifting more than 20lbs for 4-6 weeks from DOS  All questions were answered.        Tobacco Cessation:   reports that he has been smoking pipe. He has been smoking about 0.00 packs per day for the past 20.00 years. He has never used smokeless tobacco.      No follow-ups on file.    [facility] MD Maura  [facility] HERNAN Garcia is a 88 year old who presents for the following health issues:    open left inguinal hernia 9/25/2020; open right inguinal hernia 12/30/2022.  Pain tolerable.  Noted seroma and healing bridge at incision.  Some bruising on both groins.         Review of Systems   Constitutional, HEENT, cardiovascular, pulmonary, gi and gu systems are negative, except as otherwise noted.      Objective    /78   Pulse 71   Temp 97.2  F (36.2  C) (Temporal)   Resp 18   Wt 75.8 kg (167 lb)   SpO2 98%   BMI 22.65 kg/m    Body mass index is 22.65 kg/m .  Physical Exam   Bilateral groin: Incisions CDI; healing bridge and seroma on right side.  No signs of recurrent on neither.  Some ecchyomosis on left groin.

## 2022-02-01 NOTE — PROGRESS NOTES
Clinic Administered Medication Documentation    Administrations This Visit     cyanocobalamin injection 1,000 mcg     Admin Date  02/01/2022 Action  Given Dose  1,000 mcg Route  Intramuscular Site  Left Deltoid Administered By  Natacha Fiore    Ordering Provider: Harrison Tse, DO    Patient Supplied?: No                  Injectable Medication Documentation    Patient was given Cyanocobalamin (B-12). Prior to medication administration, verified patients identity using patient s name and date of birth. Please see MAR and medication order for additional information. Patient instructed to remain in clinic for 15 minutes and report any adverse reaction to staff immediately .      Was entire vial of medication used? Yes  Vial/Syringe: Single dose vial  Expiration Date:  12/22  Was this medication supplied by the patient? No   Janie Fiore MA 2/1/2022

## 2022-02-22 NOTE — TELEPHONE ENCOUNTER
Routing refill request to provider for review/approval because:  Labs out of range:  Creatinine    TAZ BellN, RN  [facility]

## 2022-03-18 NOTE — PROGRESS NOTES
Clinic Administered Medication Documentation    Administrations This Visit     cyanocobalamin injection 1,000 mcg     Admin Date  03/18/2022 Action  Given Dose  1,000 mcg Route  Intramuscular Site  Left Deltoid Administered By  Aline Jeff MA    Ordering Provider: Harrison Tse DO    NDC: 5432-4304-13    Lot#: [id number]    : HIKMA    Patient Supplied?: No    Comments: not off schedule                  Injectable Medication Documentation    Patient was given Cyanocobalamin (B-12). Prior to medication administration, verified patients identity using patient s name and date of birth. Please see MAR and medication order for additional information. Patient instructed to remain in clinic for 15 minutes and report any adverse reaction to staff immediately .      Was entire vial of medication used? Yes  Vial/Syringe: Single dose vial  Expiration Date:  7/2023  Was this medication supplied by the patient? No

## 2022-04-19 NOTE — PROGRESS NOTES
Clinic Administered Medication Documentation    Administrations This Visit     cyanocobalamin injection 1,000 mcg     Admin Date  04/19/2022 Action  Given Dose  1,000 mcg Route  Intramuscular Site  Right Deltoid Administered By  Ivone Charlton CMA    Ordering Provider: Harrison Tse, DO    Patient Supplied?: No                  Injectable Medication Documentation    Patient was given Cyanocobalamin (B-12). Prior to medication administration, verified patients identity using patient s name and date of birth. Please see MAR and medication order for additional information. Patient instructed to remain in clinic for 15 minutes and report any adverse reaction to staff immediately .      Was entire vial of medication used? Yes  Vial/Syringe: Single dose vial  Expiration Date:  12/2022  Was this medication supplied by the patient? No     Ivone Charlton CMA (AAMA)

## 2022-05-27 NOTE — PROGRESS NOTES
Clinic Administered Medication Documentation    Administrations This Visit     cyanocobalamin injection 1,000 mcg     Admin Date  05/27/2022 Action  Given Dose  1,000 mcg Route  Intramuscular Site  Left Deltoid Administered By  Nella Nath CMA    Ordering Provider: Harrison Tse DO    NDC: 08623-650-02    Lot#: [id number]    : [employer] [facility]    Patient Supplied?: No                  Injectable Medication Documentation    Patient was given Cyanocobalamin (B-12). Prior to medication administration, verified patients identity using patient s name and date of birth. Please see MAR and medication order for additional information. Patient instructed to remain in clinic for 15 minutes and report any adverse reaction to staff immediately .      Was entire vial of medication used? Yes  Vial/Syringe: Single dose vial  Expiration Date:  12/22  Was this medication supplied by the patient? No   Nella Nath MA     5/27/2022

## 2022-07-08 NOTE — NURSING NOTE
Clinic Administered Medication Documentation    Administrations This Visit     cyanocobalamin injection 1,000 mcg     Admin Date  07/08/2022 Action  Given Dose  1,000 mcg Route  Intramuscular Site  Left Deltoid Administered By  Shahida Garcia CMA    Ordering Provider: Harrison Tse, DO    Patient Supplied?: No                  Injectable Medication Documentation    Patient was given Cyanocobalamin (B-12). Prior to medication administration, verified patients identity using patient s name and date of birth. Please see MAR and medication order for additional information. Patient instructed to remain in clinic for 15 minutes and report any adverse reaction to staff immediately .      Was entire vial of medication used? Yes  Vial/Syringe: Single dose vial  Expiration Date:  01/2024  Was this medication supplied by the patient? No     Shahida Garcia CMA

## 2022-08-09 NOTE — PROGRESS NOTES
.  Clinic Administered Medication Documentation    Administrations This Visit     cyanocobalamin injection 1,000 mcg     Admin Date  08/09/2022 Action  Given Dose  1,000 mcg Route  Intramuscular Site  Left Deltoid Administered By  Nella Nath CMA    Ordering Provider: Harrison Tse, DO    Patient Supplied?: No                  Injectable Medication Documentation    Patient was given Cyanocobalamin (B-12). Prior to medication administration, verified patients identity using patient s name and date of birth. Please see MAR and medication order for additional information. Patient instructed to remain in clinic for 15 minutes and report any adverse reaction to staff immediately .      Was entire vial of medication used? Yes  Vial/Syringe: Single dose vial  Expiration Date:  01/24  Was this medication supplied by the patient? No   Nella Nath MA     8/9/2022

## 2022-08-18 NOTE — ED NOTES
Bed: ED03  Expected date:   Expected time:   Means of arrival:   Comments:  [facility]  88y   Abdominal pain

## 2022-08-18 NOTE — ED PROVIDER NOTES
History     Chief Complaint   Patient presents with     Abdominal Pain       HPI  Remigio Holly is a 88 year old male who presents to the emergency department complaining of abdominal pain. The patient reports he was sitting on the couch after lunch when he developed sudden onset left lower abdominal pain.  It is sharp, severe, and has been constant since onset.  It is not radiating anywhere.  He had associated nausea but no vomiting.  He denies any recent fevers.  He had a bowel movement today that was normal.  Denies any urinary symptoms such as blood in the urine or pain with urination.  He does note a history of kidney stones in the past.  He called EMS who gave him 1 mg Dilaudid and 4 mg Zofran in route which helped for short time.        Allergies:  Allergies   Allergen Reactions     Mobic [Meloxicam] Nausea and Diarrhea     Diarrhea, nausea, flu like symptoms since start of use       Problem List:    Patient Active Problem List    Diagnosis Date Noted     Nephrolithiasis 12/25/2012     Priority: High     ASCVD (arteriosclerotic cardiovascular disease) 09/30/2020     Priority: Medium     Ischemic cardiomyopathy 09/30/2020     Priority: Medium     Non-recurrent bilateral inguinal hernia without obstruction or gangrene 09/02/2020     Priority: Medium     Added automatically from request for surgery [id number]       History of pulmonary embolism 09/01/2020     Priority: Medium     Dupuytren's contracture of left hand 12/04/2018     Priority: Medium     S/P PTCA (percutaneous transluminal coronary angioplasty) 03/14/2018     Priority: Medium     Urinary retention with incomplete bladder emptying 08/20/2017     Priority: Medium     Orthostatic hypotension 08/19/2017     Priority: Medium     Thoracic aortic aneurysm without rupture (H) - 4.7 cm on 8/18/17 (4.5 cm in 8/15) 08/18/2017     Priority: Medium     Near syncope 08/17/2017     Priority: Medium     Status post total left knee replacement using  cement (8/15/17) 08/15/2017     Priority: Medium     Primary osteoarthritis of both knees 07/26/2017     Priority: Medium     Bilateral knee pain 07/26/2017     Priority: Medium     Postsurgical dumping syndrome 12/19/2016     Priority: Medium     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 Provider to review       CKD (chronic kidney disease) stage 3, GFR 30-59 ml/min (H) 05/05/2013     Priority: Medium     Pain 12/25/2012     Priority: Medium     Advance Care Planning 08/16/2012     Priority: Medium     Advance Care Planning: Receipt of ACP document:  Received: Health Care Directive which was witnessed or notarized on 3-12-13.  Document previously scanned on 3-13-13.  Validation form completed and  scanned.  Code Status reflects choices in most recent ACP document.  Confirmed/documented designated decision maker(s). See permanent comments section of demographics in clinical tab. View document(s) and details by clicking on code status. Added by Sayra Maguire on 8/25/2014.  .Patient states has Advance Directive and will bring in a copy to clinic. 8/16/2012          HYPERLIPIDEMIA LDL GOAL <100 10/31/2010     Priority: Medium     GERD (gastroesophageal reflux disease) 03/26/2010     Priority: Medium     Mixed hyperlipidemia 03/24/2010     Priority: Medium     Closed fracture of calcaneus 01/06/2007     Priority: Medium     Calculus of gallbladder 01/09/2003     Priority: Medium     Problem list name updated by automated process. Provider to review       Abdominal aortic aneurysm (H) 10/22/2002     Priority: Medium     Problem list name updated by automated process. Provider to review       Chest pain      Priority: Medium     Problem list name updated by automated process. Provider to review       Pain in joint,  shoulder region      Priority: Medium     Paroxysmal atrial fibrillation (H) 08/26/2010     Priority: Low     Gastric cancer (H) 08/05/2010     Priority: Low     (Problem list name updated by automated process. Provider to review and confirm.)          Past Medical History:    Past Medical History:   Diagnosis Date     Blood transfusion      Chronic gastric ulcer without mention of hemorrhage, perforation, without mention of obstruction      Gastric cancer (H)      Hemorrhage of gastrointestinal tract, unspecified 01/13/10     Hypoglycemia, unspecified 1/26/2015     Measles without mention of complication      Mixed hyperlipidemia      Mumps without mention of complication      Pulmonary embolism (H) Sept 4, 2010     Urinary calculus, unspecified      Varicella without mention of complication        Past Surgical History:    Past Surgical History:   Procedure Laterality Date     ABDOMEN SURGERY       ARTHROPLASTY KNEE Left 8/15/2017    Procedure: ARTHROPLASTY KNEE;  Left total knee replacement;  Surgeon: Jonathan Cardona DO;  Location:  OR     CHOLECYSTECTOMY, LAPOROSCOPIC  10/6/2008    Cholecystectomy, Laparoscopic     COLONOSCOPY  1/14/10    [facility]     COLONOSCOPY  05/25/10     CYSTOSCOPY, RETROGRADES, EXTRACT STONE, INSERT STENT, COMBINED  12/25/2012    Procedure: COMBINED CYSTOSCOPY, RETROGRADES, EXTRACT STONE, INSERT STENT;  Cystoscopy, Left Stent Placement, left retrograde pylogram, uc;  Surgeon: Amador Sanchez MD;  Location: [facility] OR     ESOPHAGOSCOPY, GASTROSCOPY, DUODENOSCOPY (EGD), COMBINED  8/16/2011    Procedure:COMBINED ESOPHAGOSCOPY, GASTROSCOPY, DUODENOSCOPY (EGD), BIOPSY SINGLE OR MULTIPLE; Surgeon:TONY GARCIA; Location: GI     GENERAL SURGERY                           DATE:  07/07/10    Gastrectomy     HC REPAIR ROTATOR CUFF,ACUTE  3/21/2005    Right     HERNIORRHAPHY INGUINAL Left 9/25/2020    Procedure: Open left inguinal hernia repair with mesh;  Surgeon:  Tanvir Lorenzo MD;  Location: [facility] OR     HERNIORRHAPHY INGUINAL Right 2021    Procedure: right open inguinal hernia repair with mesh;  Surgeon: Tanvir Lorenzo MD;  Location: [facility] OR     LASER HOLMIUM LITHOTRIPSY URETER(S), INSERT STENT, COMBINED  2013    Procedure: COMBINED CYSTOSCOPY, URETEROSCOPY, LASER HOLMIUM LITHOTRIPSY URETER(S), INSERT STENT;  Bilateral  Cystoscopy, Bilateral Ureteroscopy, Bilateral retrogrades and  placement of ureteral stent right side. Laser Holmium Lithotripsy  and Exchange  of stent left side;  Surgeon: Tone Morejon MD;  Location: [facility] OR     LASER HOLMIUM LITHOTRIPSY URETER(S), INSERT STENT, COMBINED  2013    Procedure: COMBINED CYSTOSCOPY, URETEROSCOPY, LASER HOLMIUM LITHOTRIPSY URETER(S), INSERT STENT;  Right Ureteroscopy; Stone basketing; Right Stent exchange and  removal of left stent.;  Surgeon: Tone Morejon MD;  Location: [facility] OR     RELEASE DUPUYTRENS CONTRACTURE Left 2018    Procedure: Release Left Hand Dupuytrens Contracture;  Surgeon: Lucio Omalley MD;  Location: [facility] OR     VIDEO CAPSULE ENDOSCOPY  01/15/10    [facility] EXCIS STOMACH ULCER,LESN;LOCAL       [facility] COLONOSCOPY W/WO BRUSH/WASH  2006     [facility] UGI ENDOSCOPY, SIMPLE EXAM  1/13/10    [facility] UGI ENDOSCOPY, SIMPLE EXAM  05/25/10       Family History:    Family History   Problem Relation Age of Onset     Alzheimer Disease Father      Cardiovascular Mother      C.A.D. Mother      Heart Disease Mother      Cardiovascular Brother      Heart Disease Brother      C.A.D. Brother        Social History:  Marital Status:   [2]  Social History     Tobacco Use     Smoking status: Current Some Day Smoker     Packs/day: 0.00     Years: 20.00     Pack years: 0.00     Types: Pipe     Last attempt to quit: 1998     Years since quittin.6     Smokeless tobacco: Never Used     Tobacco comment: Rare pipe   Substance Use Topics     Alcohol use: No      Alcohol/week: 0.0 standard drinks     Drug use: No        Medications:    ondansetron (ZOFRAN ODT) 4 MG ODT tab  oxyCODONE (ROXICODONE) 5 MG tablet  tamsulosin (FLOMAX) 0.4 MG capsule  alum & mag hydroxide-simethicone (MAALOX  ES) 400-400-40 MG/5ML SUSP suspension  atorvastatin (LIPITOR) 40 MG tablet  blood glucose (CONTOUR NEXT TEST) test strip  blood glucose monitoring (ROBERT MICROLET) lancets  clopidogrel (PLAVIX) 75 MG tablet  cyanocobalamin (VITAMIN B12) 1000 MCG/ML injection  diltiazem ER (TIAZAC) 120 MG 24 hr ER beaded capsule  furosemide (LASIX) 20 MG tablet  polyethylene glycol (MIRALAX) 17 GM/Dose powder  senna-docusate (SENOKOT-S;PERICOLACE) 8.6-50 MG per tablet  tamsulosin (FLOMAX) 0.4 MG capsule          Review of Systems   All other systems reviewed and are negative.      Physical Exam   BP: 139/86  Pulse: 69  Temp: 97.8  F (36.6  C)  SpO2: 98 %      Physical Exam  Vitals and nursing note reviewed.   Constitutional:       General: He is in acute distress (appears uncomfortable).      Appearance: He is well-developed. He is not ill-appearing or diaphoretic.   HENT:      Head: Normocephalic and atraumatic.      Nose: Nose normal.   Eyes:      Conjunctiva/sclera: Conjunctivae normal.      Pupils: Pupils are equal, round, and reactive to light.   Cardiovascular:      Rate and Rhythm: Normal rate and regular rhythm.      Heart sounds: Normal heart sounds.   Pulmonary:      Effort: Pulmonary effort is normal. No respiratory distress.      Breath sounds: Normal breath sounds.   Abdominal:      General: Abdomen is flat. Bowel sounds are normal. There is no distension.      Palpations: Abdomen is soft.      Tenderness: There is abdominal tenderness in the left lower quadrant. Negative signs include McBurney's sign.   Musculoskeletal:         General: No deformity.      Cervical back: Neck supple.   Skin:     General: Skin is warm and dry.   Neurological:      General: No focal deficit present.      Mental  Status: He is alert and oriented to person, place, and time. Mental status is at baseline.      Coordination: Coordination normal.   Psychiatric:         Mood and Affect: Mood is anxious.         Behavior: Behavior normal.         ED Course           Procedures      Results for orders placed or performed during the hospital encounter of 08/18/22 (from the past 24 hour(s))   CBC with platelets differential    Narrative    The following orders were created for panel order CBC with platelets differential.  Procedure                               Abnormality         Status                     ---------                               -----------         ------                     CBC with platelets and d...[[id number]]  Abnormal            Final result                 Please view results for these tests on the individual orders.   Comprehensive metabolic panel   Result Value Ref Range    Sodium 140 133 - 144 mmol/L    Potassium 4.8 3.4 - 5.3 mmol/L    Chloride 112 (H) 94 - 109 mmol/L    Carbon Dioxide (CO2) 22 20 - 32 mmol/L    Anion Gap 6 3 - 14 mmol/L    Urea Nitrogen 23 7 - 30 mg/dL    Creatinine 1.05 0.66 - 1.25 mg/dL    Calcium 9.0 8.5 - 10.1 mg/dL    Glucose 122 (H) 70 - 99 mg/dL    Alkaline Phosphatase 138 40 - 150 U/L    AST 50 (H) 0 - 45 U/L    ALT 32 0 - 70 U/L    Protein Total 7.1 6.8 - 8.8 g/dL    Albumin 3.6 3.4 - 5.0 g/dL    Bilirubin Total 0.6 0.2 - 1.3 mg/dL    GFR Estimate 68 >60 mL/min/1.73m2   CRP inflammation   Result Value Ref Range    CRP Inflammation <2.9 0.0 - 8.0 mg/L   CBC with platelets and differential   Result Value Ref Range    WBC Count 5.5 4.0 - 11.0 10e3/uL    RBC Count 3.96 (L) 4.40 - 5.90 10e6/uL    Hemoglobin 11.6 (L) 13.3 - 17.7 g/dL    Hematocrit 35.5 (L) 40.0 - 53.0 %    MCV 90 78 - 100 fL    MCH 29.3 26.5 - 33.0 pg    MCHC 32.7 31.5 - 36.5 g/dL    RDW 15.2 (H) 10.0 - 15.0 %    Platelet Count 158 150 - 450 10e3/uL    % Neutrophils 70 %    % Lymphocytes 17 %    % Monocytes 13 %    %  Eosinophils 0 %    % Basophils 0 %    % Immature Granulocytes 0 %    NRBCs per 100 WBC 0 <1 /100    Absolute Neutrophils 3.8 1.6 - 8.3 10e3/uL    Absolute Lymphocytes 0.9 0.8 - 5.3 10e3/uL    Absolute Monocytes 0.7 0.0 - 1.3 10e3/uL    Absolute Eosinophils 0.0 0.0 - 0.7 10e3/uL    Absolute Basophils 0.0 0.0 - 0.2 10e3/uL    Absolute Immature Granulocytes 0.0 <=0.4 10e3/uL    Absolute NRBCs 0.0 10e3/uL   CT Abdomen Pelvis w/o Contrast    Narrative    EXAM: CT ABDOMEN PELVIS W/O CONTRAST  LOCATION: [facility]  DATE/TIME: 8/18/2022 5:16 PM    INDICATION: llq pain, hx kidney stones  COMPARISON: 09/16/2019  TECHNIQUE: CT scan of the abdomen and pelvis was performed without IV contrast. Multiplanar reformats were obtained. Dose reduction techniques were used.  CONTRAST: None.    FINDINGS:   LOWER CHEST: Severe coronary artery calcifications.    HEPATOBILIARY: Atrophic left lobe. Cholecystectomy.    PANCREAS: Atrophic parenchyma. No main duct dilation.    SPLEEN: Normal.    ADRENAL GLANDS: Normal.    KIDNEYS/BLADDER: Asymmetric mild left renal collecting system dilation extending to a 0.4 x 0.3 x 0.4 cm calculus in the mid left ureter (2/121). Other nonobstructing calculi also present bilaterally, the largest of which is in the left lower pole   measuring 0.8 x 0.8 x 0.9 cm. No right renal collecting system dilation. Unchanged simple and hemorrhagic/proteinaceous cysts bilaterally, which do not require further follow-up.    BOWEL: No obstruction or inflammatory change.    LYMPH NODES: Normal.    VASCULATURE: Moderate-severe atherosclerotic calcification. No aneurysm.    PELVIC ORGANS: Prostate is mildly enlarged.    MUSCULOSKELETAL: Osteopenia and degenerative changes in the spine. No aggressive or destructive lesion.      Impression    IMPRESSION:   1.  Obstructing 0.4 cm calculus in the mid left ureter.    2.  Nonobstructing calculi elsewhere bilaterally.     UA with Microscopic reflex to  Culture    Specimen: Urine, Midstream   Result Value Ref Range    Color Urine Yellow Colorless, Straw, Light Yellow, Yellow    Appearance Urine Clear Clear    Glucose Urine Negative Negative mg/dL    Bilirubin Urine Negative Negative    Ketones Urine Trace (A) Negative mg/dL    Specific Gravity Urine >=1.030 1.003 - 1.035    Blood Urine Large (A) Negative    pH Urine 6.0 5.0 - 7.0    Protein Albumin Urine Negative Negative mg/dL    Urobilinogen Urine Normal Normal, 2.0 mg/dL    Nitrite Urine Negative Negative    Leukocyte Esterase Urine Negative Negative    Bacteria Urine Few (A) None Seen /HPF    Mucus Urine Present (A) None Seen /LPF    RBC Urine >182 (H) <=2 /HPF    WBC Urine 4 <=5 /HPF    Squamous Epithelials Urine <1 <=1 /HPF    Hyaline Casts Urine 1 <=2 /LPF    Narrative    Urine Culture not indicated       Medications   HYDROmorphone (PF) (DILAUDID) injection 0.5 mg (0.5 mg Intravenous Given 8/18/22 1718)   ondansetron (ZOFRAN) injection 4 mg (4 mg Intravenous Given 8/18/22 1914)   0.9% sodium chloride BOLUS (0 mLs Intravenous Stopped 8/18/22 1908)     Followed by   sodium chloride 0.9% infusion (has no administration in time range)   HYDROmorphone (DILAUDID) injection 1 mg (1 mg Intravenous Given 8/18/22 1659)   ketorolac (TORADOL) injection 15 mg (15 mg Intravenous Given 8/18/22 1804)          Assessments & Plan (with Medical Decision Making)  Remigio Holly is a 88 year old male who presented to the ED complaining of left lower abdominal pain that began suddenly this evening.  Associated nausea but no vomiting.  He was afebrile on arrival with unremarkable vital signs.  Appeared acutely uncomfortable but nontoxic.  Did not appear ill.  Exam today demonstrated tenderness in the left lower abdomen.  He does have a history of renal stones, considered that is a possibility.  Also consider diverticulitis.  IV established and labs drawn for further evaluation.  Patient given Dilaudid, Zofran, and  Toradol here with good relief of symptoms.  Also given bolus of fluids.  Labs reviewed and were overall unremarkable, his white count was normal and CRP negative.  AST mildly elevated at 50 but otherwise no other LFT abnormalities.  No acute electrolyte abnormalities.  Urinalysis showed hematuria but no signs of infection.  CT of the abdomen/pelvis showed an obstructive 0.4 cm calculus in the left mid ureter.  I discussed these results with the patient and his son.  Fortunately, pain is well controlled, he has no signs of concurrent infection, so I think this can be managed in the outpatient setting.  I am optimistic you daily from past the stone on his own.  He was comfortable with plan to discharge home.  He was prescribed oxycodone for pain control and advised to also use Tylenol.  He will be given Zofran for nausea and Flomax to help with passage of stone.  He was encouraged to drink lots of fluids.  If symptoms are not improved within a few days he should follow-up with his clinic provider.  He was given instructions on when to return to the ED.  All questions answered and patient discharged home in stable condition.     I have reviewed the nursing notes.    I have reviewed the findings, diagnosis, plan and need for follow up with the patient.    Discharge Medication List as of 8/18/2022  7:09 PM      START taking these medications    Details   ondansetron (ZOFRAN ODT) 4 MG ODT tab Take 1 tablet (4 mg) by mouth every 8 hours as needed, Disp-10 tablet, R-0, E-Prescribe      oxyCODONE (ROXICODONE) 5 MG tablet Take 1 tablet (5 mg) by mouth every 6 hours as needed for pain, Disp-12 tablet, R-0, E-Prescribe      !! tamsulosin (FLOMAX) 0.4 MG capsule Take 1 capsule (0.4 mg) by mouth daily for 10 doses, Disp-10 capsule, R-0, E-Prescribe       !! - Potential duplicate medications found. Please discuss with provider.          Final diagnoses:   Left ureteral stone   Left sided abdominal pain     Note: Chart  documentation done in part with Dragon Voice Recognition software. Although reviewed after completion, some word and grammatical errors may remain.     8/18/2022   [facility] EMERGENCY DEPT     Kera Locke PA-C  08/18/22 [id number]

## 2022-08-18 NOTE — ED TRIAGE NOTES
Pt having left lower quadrant pain that started after he ate lunch today, did have some nausea as well, was given pain medication and nausea medication by EMS that were helpful for a small amount of time

## 2022-08-19 NOTE — DISCHARGE INSTRUCTIONS
You have a 0.4 cm stone in the middle of your left ureter, the 2 between the kidney and the bladder.  Fortunately there are no signs of infection.  I think he will probably be able to pass the stone at home.    For pain control, use the oxycodone as needed.  You can also take Tylenol.  Oxycodone will make you sleepy and constipated, increase your MiraLAX if constipation occurs.  The Zofran prescribed will help with nausea.    Take the tamsulosin, AKA Flomax, to help relax the ureter and allow the stone to pass.  Be sure you are drinking lots of fluids.    If you do have any worsening symptoms please return to the emergency department right away.  If symptoms are not resolving within a few days, follow-up with your clinic provider for reassessment.    Thank you for choosing [facility]'s Emergency Department. It was a pleasure taking care of you today. If you have any questions, please call [phone].    Kera Locke PA-C

## 2022-09-13 NOTE — TELEPHONE ENCOUNTER
"Diltiazem  Routing refill request to provider for review/approval because:  BP failed RN protocol    plavix  Routing refill request to provider for review/approval because:  A break in medication- Plavix last filled 11/5/2021 for 90 tablets and 1 refill  Requested Prescriptions   Pending Prescriptions Disp Refills     clopidogrel (PLAVIX) 75 MG tablet [Pharmacy Med Name: Clopidogrel Bisulfate 75 MG Oral Tablet] 90 tablet 0     Sig: Take 1 tablet by mouth once daily       Plavix Failed - 9/9/2022 12:24 PM        Failed - Normal HGB on file in past 12 months     Recent Labs   Lab Test 08/18/22  1656   HGB 11.6*               Passed - No active PPI on record unless is Protonix        Passed - Normal Platelets on file in past 12 months     Recent Labs   Lab Test 08/18/22  1656                  Passed - Recent (12 mo) or future (30 days) visit within the authorizing provider's specialty     Patient has had an office visit with the authorizing provider or a provider within the authorizing providers department within the previous 12 mos or has a future within next 30 days. See \"Patient Info\" tab in inbasket, or \"Choose Columns\" in Meds & Orders section of the refill encounter.              Passed - Medication is active on med list        Passed - Patient is age 18 or older           diltiazem ER COATED BEADS (CARTIA XT) 120 MG 24 hr capsule [Pharmacy Med Name: Cartia  MG Oral Capsule Extended Release 24 Hour] 90 capsule 0     Sig: TAKE 1  BY MOUTH ONCE DAILY       Calcium Channel Blockers Protocol  Failed - 9/9/2022 12:24 PM        Failed - Blood pressure under 140/90 in past 12 months     BP Readings from Last 3 Encounters:   08/18/22 (!) 147/94   01/11/22 120/78   12/30/21 (!) 143/82                 Failed - Medication is active on med list        Passed - Normal ALT in past 12 months     Recent Labs   Lab Test 08/18/22  1656   ALT 32             Passed - Recent (12 mo) or future (30 days) visit within the " "authorizing provider's specialty     Patient has had an office visit with the authorizing provider or a provider within the authorizing providers department within the previous 12 mos or has a future within next 30 days. See \"Patient Info\" tab in inbasket, or \"Choose Columns\" in Meds & Orders section of the refill encounter.              Passed - Patient is age 18 or older        Passed - Normal serum creatinine on file in past 12 months     Recent Labs   Lab Test 08/18/22  1656 08/10/15  1030 08/07/15  1217   CR 1.05   < >  --    CREAT  --   --  1.4*    < > = values in this interval not displayed.       Ok to refill medication if creatinine is low               "

## 2022-09-13 NOTE — PROGRESS NOTES
Clinic Administered Medication Documentation    Administrations This Visit     cyanocobalamin injection 1,000 mcg     Admin Date  09/13/2022 Action  Given Dose  1,000 mcg Route  Intramuscular Site  Right Deltoid Administered By  Natacha Fiore    Ordering Provider: Harrison Tse, DO    Patient Supplied?: No                  Injectable Medication Documentation    Patient was given Cyanocobalamin (B-12). Prior to medication administration, verified patients identity using patient s name and date of birth. Please see MAR and medication order for additional information. Patient instructed to remain in clinic for 15 minutes and report any adverse reaction to staff immediately .      Was entire vial of medication used? Yes  Vial/Syringe: Single dose vial  Expiration Date:  3/24  Was this medication supplied by the patient? No

## 2022-09-14 NOTE — TELEPHONE ENCOUNTER
Pt declines going to the ED or Urgent care.  Would like to be seen in the clinic.     Reason for Disposition    Pain is mainly in upper abdomen (if needed ask: 'is it mainly above the belly button?')    [1] MODERATE pain (e.g., interferes with normal activities) AND [2] comes and goes (cramps) AND [3] present > 24 hours  (Exception: pain with Vomiting or Diarrhea - see that Guideline)    [1] Pain lasts > 10 minutes AND [2] age > 50     Pt declines going to ED or Urgent care at this time.    Additional Information    Negative: Passed out (i.e., fainted, collapsed and was not responding)    Negative: Shock suspected (e.g., cold/pale/clammy skin, too weak to stand, low BP, rapid pulse)    Negative: Sounds like a life-threatening emergency to the triager    Negative: Chest pain    Negative: SEVERE difficulty breathing (e.g., struggling for each breath, speaks in single words)    Negative: Shock suspected (e.g., cold/pale/clammy skin, too weak to stand, low BP, rapid pulse)    Negative: Difficult to awaken or acting confused (e.g., disoriented, slurred speech)    Negative: Passed out (i.e., lost consciousness, collapsed and was not responding)    Negative: Visible sweat on face or sweat dripping down face    Negative: Sounds like a life-threatening emergency to the triager    Negative: Followed an abdomen (stomach) injury    Negative: Chest pain    Negative: [1] SEVERE pain (e.g., excruciating) AND [2] present > 1 hour    Negative: [1] Pain lasts > 10 minutes AND [2] age > 40 AND [3] associated chest, arm, neck, upper back or jaw pain    Negative: [1] Pain lasts > 10 minutes AND [2] age > 35 AND [3] at least one cardiac risk factor (i.e., hypertension, diabetes, obesity, smoker or strong family history of heart disease)    Negative: [1] Pain lasts > 10 minutes AND [2] history of heart disease (i.e., heart attack, bypass surgery, angina, angioplasty, CHF; not just a heart murmur)    Negative: [1] Pain lasts > 10 minutes  "AND [2] difficulty breathing    Negative: [1] Vomiting AND [2] contains red blood  (Exception: few streaks and only occurred once)    Negative: [1] Vomiting AND [2] contains black (\"coffee ground\") material    Negative: Blood in bowel movements  (Exception: Blood on surface of BM with constipation)    Negative: Black or tarry bowel movements (Exception: chronic-unchanged black-grey bowel movements AND is taking iron pills or Pepto-bismol)    Negative: [1] MILD-MODERATE pain AND [2] constant AND [3] present > 2 hours    Negative: [1] MILD-MODERATE pain AND [2] not relieved by antacids    Negative: [1] Vomiting AND [2] contains bile (green color)    Negative: [1] Vomiting AND [2] abdomen looks much more swollen than usual    Negative: White of the eyes have turned yellow (i.e., jaundice)    Negative: Fever > 103 F (39.4 C)    Negative: [1] Fever > 101 F (38.3 C) AND [2] age > 60 years    Negative: [1] Fever > 100.0 F (37.8 C) AND [2] bedridden (e.g., nursing home patient, CVA, chronic illness, recovering from surgery)    Negative: [1] Fever > 100.0 F (37.8 C) AND [2] diabetes mellitus or weak immune system (e.g., HIV positive, cancer chemo, splenectomy, organ transplant, chronic steroids)    Protocols used: ABDOMINAL PAIN - MALE-A-OH, ABDOMINAL PAIN - UPPER-A-AH, ABDOMINAL PAIN - UPPER-A-OH      "

## 2022-09-14 NOTE — TELEPHONE ENCOUNTER
Spoke with Dr. Tuan MD, who stated he was unable to work patient into his schedule today, but could see him on another day after reviewing patient notes per triage stated below.  To go to urgent care/ ED for worsening symptoms.     Phoned patient and informed him of Dr. Tuan MD's verbal advise.  Patient stated if he sits or lies down his pain level is at a 4 or less/10.  Patient stated understanding of advise as verbalized per Dr. Tuan MD.  Patient scheduled with Dr. Tuan MD for 9/15/2022.  Patient  advised to seek urgent emergency care for worsening symptoms.  Patient stated understanding.

## 2022-09-15 NOTE — PROGRESS NOTES
Assessment & Plan     Constipation, unspecified constipation type  Symptoms today do seem consistent with constipation and gas.  Moderate amount of stool and gas noted on abdominal x-ray without evidence of obstruction.  Patient's pain not consistent with colicky pain he had previously with kidney stone but will get UA to evaluate for blood.  Did pass the stone without issue but had similar discomfort previously now worsening.  Did have CT at that time which noted stone but no other significant abnormalities.  Patient with history of constipation but quit doing fiber as well as MiraLAX.  We will have him restart both of these now.  Stop Maalox as this caused further discomfort for him.  We will have him start daily suppository as needed until he is having regular soft bowel movements.  Can also try simethicone.  Patient urinating normally with no signs of retention.  Always concerning with his history of gastric cancer so if his constipation or abdominal discomfort is not improving I would like to follow-up in 1 to 2 weeks.  Sooner if new or worsening symptoms.  - XR Abdomen 2 Views  - bisacodyl (DULCOLAX) 10 MG suppository  Dispense: 30 suppository; Refill: 1  - polyethylene glycol (MIRALAX) 17 GM/Dose powder  Dispense: 578 g; Refill: 1  - simethicone (MYLICON) 80 MG chewable tablet  Dispense: 30 tablet; Refill: 1    LLQ abdominal pain  - UA reflex to Microscopic - lab collect    History of nephrolithiasis      Nicotine/Tobacco Cessation:  He reports that he has been smoking pipe. He has been smoking about 0.00 packs per day for the past 20.00 years. He has never used smokeless tobacco.        Depression Screening Follow Up    PHQ 9/15/2022   PHQ-9 Total Score 14   Q9: Thoughts of better off dead/self-harm past 2 weeks Not at all     Last PHQ-9 9/15/2022   1.  Little interest or pleasure in doing things 1   2.  Feeling down, depressed, or hopeless 2   3.  Trouble falling or staying asleep, or sleeping too much 2    4.  Feeling tired or having little energy 3   5.  Poor appetite or overeating 3   6.  Feeling bad about yourself 0   7.  Trouble concentrating 0   8.  Moving slowly or restless 3   Q9: Thoughts of better off dead/self-harm past 2 weeks 0   PHQ-9 Total Score 14       Follow Up Actions Taken  Patient with no suicidal ideation feeling things are stable at this time.  Not wanting further follow-up.    Vinay Dickerson MD  [facility] HERNAN Garcia is a 88 year old accompanied by his spouse, presenting for the following health issues:  Abdominal Pain (2 months)      Abdominal Pain     History of Present Illness       Reason for visit:  Stomach pain    He eats 2-3 servings of fruits and vegetables daily.He consumes 1 sweetened beverage(s) daily.He exercises with enough effort to increase his heart rate 60 or more minutes per day.  He exercises with enough effort to increase his heart rate 7 days per week.   He is taking medications regularly.    Today's PHQ-9         PHQ-9 Total Score: 14    PHQ-9 Q9 Thoughts of better off dead/self-harm past 2 weeks :   Not at all    How difficult have these problems made it for you to do your work, take care of things at home, or get along with other people: Somewhat difficult       Worsening abdominal discomfort and constipation.  Having hard stools and straining.  Recently did an enema today which did help get rid of some stool but still very hard.  Does look like he has a history of constipation with MiraLAX and fiber use in the past but he has not done this for quite some time.  Was most recently seen in the ER a month ago and diagnosed with kidney stone which he passed.  No colicky pain now but fairly constant and no urinary symptoms.  Reports no trouble emptying his bladder at this time.  Does have history of gastric cancer with no significant weight changes here now.  Does note loss of appetite but no nausea or vomiting.  No blood in stool or  "urine.  Gallbladder removed.    Review of Systems   Gastrointestinal: Positive for abdominal pain.      Constitutional, HEENT, cardiovascular, pulmonary, gi and gu systems are negative, except as otherwise noted.      Objective    /60 (BP Location: Right arm, Patient Position: Chair, Cuff Size: Adult Regular)   Pulse 76   Temp 97.2  F (36.2  C) (Temporal)   Resp 20   Ht 1.816 m (5' 11.5\")   Wt 74.6 kg (164 lb 8 oz)   SpO2 98%   BMI 22.62 kg/m    Body mass index is 22.62 kg/m .  Physical Exam   GENERAL: healthy, alert and no distress  EYES: Eyes grossly normal to inspection, PERRL and conjunctivae and sclerae normal  HENT:  nose and mouth without ulcers or lesions  RESP: lungs clear to auscultation - no rales, rhonchi or wheezes  CV: regular rate and rhythm, normal S1 S2,  no murmur, click or rub, no peripheral edema and peripheral pulses strong  ABDOMEN: soft, left lower and upper quadrant tenderness to palpation, hyperactive bowel sounds, negative Johnson's.  No CVA tenderness or specific suprapubic tenderness.  MS: no gross musculoskeletal defects noted, no edema  SKIN: no suspicious lesions or rashes  NEURO: Normal strength and tone, mentation intact and speech normal  PSYCH: mentation appears normal, affect normal/bright            "

## 2022-09-29 PROBLEM — I71.20 THORACIC AORTIC ANEURYSM WITHOUT RUPTURE (H): Status: RESOLVED | Noted: 2017-08-18 | Resolved: 2022-01-01

## 2022-09-29 PROBLEM — D69.6 THROMBOCYTOPENIA (H): Status: ACTIVE | Noted: 2022-01-01

## 2022-09-29 NOTE — PROGRESS NOTES
"SUBJECTIVE:   Art is a 88 year old who presents for Preventive Visit.      Patient has been advised of split billing requirements and indicates understanding: Yes  Are you in the first 12 months of your Medicare coverage?  No    Healthy Habits:     In general, how would you rate your overall health?  Fair    Frequency of exercise:  6-7 days/week    Duration of exercise:  45-60 minutes    Do you usually eat at least 4 servings of fruit and vegetables a day, include whole grains    & fiber and avoid regularly eating high fat or \"junk\" foods?  No    Taking medications regularly:  Yes    Medication side effects:  None    Ability to successfully perform activities of daily living:  No assistance needed    Home Safety:  No safety concerns identified    Hearing Impairment:  No hearing concerns    In the past 6 months, have you been bothered by leaking of urine? Yes    In general, how would you rate your overall mental or emotional health?  Excellent      PHQ-2 Total Score: 0    Additional concerns today:  Yes    Do you feel safe in your environment? Yes    Have you ever done Advance Care Planning? (For example, a Health Directive, POLST, or a discussion with a medical provider or your loved ones about your wishes): Yes, advance care planning is on file.       Fall risk  Fallen 2 or more times in the past year?: No  Any fall with injury in the past year?: No    Cognitive Screening   1) Repeat 3 items (Leader, Season, Table)    2) Clock draw: NORMAL  3) 3 item recall: Recalls 3 objects  Results: 3 items recalled: COGNITIVE IMPAIRMENT LESS LIKELY    Mini-CogTM Copyright RISSA Thomas. Licensed by the author for use in [facility]; reprinted with permission (suzanna@.[email or web address]). All rights reserved.      Do you have sleep apnea, excessive snoring or daytime drowsiness?: no    Reviewed and updated as needed this visit by clinical staff   Tobacco  Allergies  Meds   Med Hx  Surg Hx  Fam Hx  Soc Hx          Reviewed and " updated as needed this visit by Provider                   Social History     Tobacco Use     Smoking status: Current Some Day Smoker     Packs/day: 0.00     Years: 20.00     Pack years: 0.00     Types: Pipe     Last attempt to quit: 1998     Years since quittin.7     Smokeless tobacco: Never Used     Tobacco comment: Rare pipe   Substance Use Topics     Alcohol use: No     Alcohol/week: 0.0 standard drinks         Alcohol Use 2022   Prescreen: >3 drinks/day or >7 drinks/week? No   Prescreen: >3 drinks/day or >7 drinks/week? -           Patient complains of left lower quadrant abdominal pain  Had kidney stone couple weeks ago which has now passed.  In the interim was treated for constipation and has had improvement of the constipation but continued abdominal discomfort.  CAT scan previously performed demonstrates diverticular disease.  Patient notes feeling tired with low-grade fever and occasional chills.  Taking food and fluid adequately.  Patient has history of gastric cancer with complete gastrectomy.    Follow-up on renal lithiasis.  No flank pain at this time.  No hematuria.  Pain appears completely different than previous discomfort associated with renal ophiasis.        Current providers sharing in care for this patient include:   Patient Care Team:  Harrison Tse DO as PCP - General (Internal Medicine)  Wade Larose MD as MD (Oncology)  Es Luna RN as Nurse Coordinator (Oncology)  Harrison Tse DO as Assigned PCP  Lorenzo, MD Tanvir as Assigned Surgical Provider    The following health maintenance items are reviewed in Epic and correct as of today:  Health Maintenance   Topic Date Due     ANNUAL REVIEW OF  ORDERS  Never done     ZOSTER IMMUNIZATION (1 of 2) Never done     MEDICARE ANNUAL WELLNESS VISIT  2021     COVID-19 Vaccine (4 - Booster for Moderna series) 2022     LIPID  2022     MICROALBUMIN  2022     BMP   08/18/2023     HEMOGLOBIN  08/18/2023     NICOTINE/TOBACCO CESSATION COUNSELING Q 1 YR  09/15/2023     FALL RISK ASSESSMENT  09/29/2023     ADVANCE CARE PLANNING  03/13/2025     DTAP/TDAP/TD IMMUNIZATION (3 - Td or Tdap) 03/13/2030     PHQ-2 (once per calendar year)  Completed     INFLUENZA VACCINE  Completed     Pneumococcal Vaccine: 65+ Years  Completed     URINALYSIS  Completed     IPV IMMUNIZATION  Aged Out     MENINGITIS IMMUNIZATION  Aged Out     HEPATITIS B IMMUNIZATION  Aged Out     Lab work is in process  BP Readings from Last 3 Encounters:   09/15/22 120/60   08/18/22 (!) 147/94   01/11/22 120/78    Wt Readings from Last 3 Encounters:   09/15/22 74.6 kg (164 lb 8 oz)   01/11/22 75.8 kg (167 lb)   12/30/21 72.6 kg (160 lb)                  Patient Active Problem List   Diagnosis     Chest pain     Pain in joint, shoulder region     Calculus of gallbladder     Closed fracture of calcaneus     Mixed hyperlipidemia     GERD (gastroesophageal reflux disease)     Gastric cancer (H)     Paroxysmal atrial fibrillation (H)     HYPERLIPIDEMIA LDL GOAL <100     Advance Care Planning     Pain     Nephrolithiasis     CKD (chronic kidney disease) stage 3, GFR 30-59 ml/min (H)     Hypoglycemia     Pernicious anemia     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     Postsurgical dumping syndrome     Primary osteoarthritis of both knees     Bilateral knee pain     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/17)     Near syncope     Orthostatic hypotension     Urinary retention with incomplete bladder emptying     Dupuytren's contracture of left hand     History of pulmonary embolism     Non-recurrent bilateral inguinal hernia without obstruction or gangrene     S/P PTCA (percutaneous transluminal coronary angioplasty)     ASCVD (arteriosclerotic cardiovascular disease)     Ischemic cardiomyopathy     Thrombocytopenia (H)     Past Surgical History:   Procedure Laterality Date     ABDOMEN  SURGERY       ARTHROPLASTY KNEE Left 8/15/2017    Procedure: ARTHROPLASTY KNEE;  Left total knee replacement;  Surgeon: Jonathan Cardona DO;  [facility]     CHOLECYSTECTOMY, LAPOROSCOPIC  10/6/2008    Cholecystectomy, Laparoscopic     COLONOSCOPY  1/14/10    [facility]     COLONOSCOPY  05/25/10     CYSTOSCOPY, RETROGRADES, EXTRACT STONE, INSERT STENT, COMBINED  12/25/2012    Procedure: COMBINED CYSTOSCOPY, RETROGRADES, EXTRACT STONE, INSERT STENT;  Cystoscopy, Left Stent Placement, left retrograde pylogram, uc;  Surgeon: Amador Sanchez MD;  [facility]     ESOPHAGOSCOPY, GASTROSCOPY, DUODENOSCOPY (EGD), COMBINED  8/16/2011    Procedure:COMBINED ESOPHAGOSCOPY, GASTROSCOPY, DUODENOSCOPY (EGD), BIOPSY SINGLE OR MULTIPLE; Surgeon:TONY GARCIA; Location[facility]     GENERAL SURGERY                           DATE:  07/07/10    Gastrectomy     HC REPAIR ROTATOR CUFF,ACUTE  3/21/2005    Right     HERNIORRHAPHY INGUINAL Left 9/25/2020    Procedure: Open left inguinal hernia repair with mesh;  Surgeon: Tanvir Lorenzo MD;  [facility]     HERNIORRHAPHY INGUINAL Right 12/30/2021    Procedure: right open inguinal hernia repair with mesh;  Surgeon: Tanvir Lorenzo MD;  [facility]     LASER HOLMIUM LITHOTRIPSY URETER(S), INSERT STENT, COMBINED  1/5/2013    Procedure: COMBINED CYSTOSCOPY, URETEROSCOPY, LASER HOLMIUM LITHOTRIPSY URETER(S), INSERT STENT;  Bilateral  Cystoscopy, Bilateral Ureteroscopy, Bilateral retrogrades and  placement of ureteral stent right side. Laser Holmium Lithotripsy  and Exchange  of stent left side;  Surgeon: Tone Morejon MD;  [facility]     LASER HOLMIUM LITHOTRIPSY URETER(S), INSERT STENT, COMBINED  1/30/2013    Procedure: COMBINED CYSTOSCOPY, URETEROSCOPY, LASER HOLMIUM LITHOTRIPSY URETER(S), INSERT STENT;  Right Ureteroscopy; Stone basketing; Right Stent exchange and  removal of left stent.;  Surgeon: Tone Morejon MD;  Location:  [facility] OR     RELEASE DUPUYTRENS CONTRACTURE Left 2018    Procedure: Release Left Hand Dupuytrens Contracture;  Surgeon: Lucio Omalley MD;  Location: [facility] OR     VIDEO CAPSULE ENDOSCOPY  01/15/10    [facility] EXCIS STOMACH ULCER,LESN;LOCAL       [facility] COLONOSCOPY W/WO BRUSH/WASH  2006     [facility] UGI ENDOSCOPY, SIMPLE EXAM  1/13/10    [facility] UGI ENDOSCOPY, SIMPLE EXAM  05/25/10       Social History     Tobacco Use     Smoking status: Current Some Day Smoker     Packs/day: 0.00     Years: 20.00     Pack years: 0.00     Types: Pipe     Last attempt to quit: 1998     Years since quittin.7     Smokeless tobacco: Never Used     Tobacco comment: Rare pipe   Substance Use Topics     Alcohol use: No     Alcohol/week: 0.0 standard drinks     Family History   Problem Relation Age of Onset     Alzheimer Disease Father      Cardiovascular Mother      C.A.D. Mother      Heart Disease Mother      Cardiovascular Brother      Heart Disease Brother      C.A.D. Brother          Current Outpatient Medications   Medication Sig Dispense Refill     atorvastatin (LIPITOR) 40 MG tablet Take 40 mg by mouth  3     bisacodyl (DULCOLAX) 10 MG suppository Place 1 suppository (10 mg) rectally daily as needed for constipation 30 suppository 1     blood glucose (CONTOUR NEXT TEST) test strip USE TO TEST BLOOD SUGAR 1 TIMES DAILY. 100 strip 3     blood glucose monitoring (ROBERT MICROLET) lancets Use to test blood sugar 1 time daily or as directed. 1 Box 2     ciprofloxacin (CIPRO) 500 MG tablet Take 1 tablet (500 mg) by mouth 2 times daily 14 tablet 0     clopidogrel (PLAVIX) 75 MG tablet Take 1 tablet by mouth once daily 90 tablet 0     cyanocobalamin (VITAMIN B12) 1000 MCG/ML injection Inject 1 mL (1,000 mcg) into the muscle every 30 days 1 mL 11     diltiazem ER COATED BEADS (CARTIA XT) 120 MG 24 hr capsule TAKE 1  BY MOUTH ONCE DAILY 90 capsule 0     metroNIDAZOLE (FLAGYL) 500 MG tablet Take  "1 tablet (500 mg) by mouth 3 times daily for 7 days 21 tablet 0     polyethylene glycol (MIRALAX) 17 GM/Dose powder Take 17 g (1 capful) by mouth daily 578 g 1     simethicone (MYLICON) 80 MG chewable tablet Take 1 tablet (80 mg) by mouth every 6 hours as needed for flatulence or cramping 30 tablet 1     furosemide (LASIX) 20 MG tablet Take 1 tablet (20 mg) by mouth daily as needed (Leg swelling) (Patient not taking: No sig reported) 15 tablet 0     senna-docusate (SENOKOT-S;PERICOLACE) 8.6-50 MG per tablet Take 1-2 tablets by mouth 2 times daily (Patient not taking: Reported on 9/29/2022) 30 tablet 0     tamsulosin (FLOMAX) 0.4 MG capsule Take 0.4 mg by mouth daily (Patient not taking: No sig reported)       Allergies   Allergen Reactions     Mobic [Meloxicam] Nausea and Diarrhea     Diarrhea, nausea, flu like symptoms since start of use             Review of Systems   Constitutional: Negative for chills and fever.   HENT: Positive for hearing loss. Negative for congestion, ear pain and sore throat.    Eyes: Negative for pain and visual disturbance.   Respiratory: Positive for shortness of breath. Negative for cough.    Cardiovascular: Negative for chest pain, palpitations and peripheral edema.   Gastrointestinal: Positive for abdominal pain, constipation and nausea. Negative for diarrhea, heartburn and hematochezia.   Genitourinary: Positive for frequency. Negative for dysuria, genital sores, hematuria, impotence, penile discharge and urgency.   Musculoskeletal: Negative for arthralgias, joint swelling and myalgias.   Skin: Negative for rash.   Neurological: Positive for weakness. Negative for dizziness, headaches and paresthesias.   Psychiatric/Behavioral: Negative for mood changes. The patient is not nervous/anxious.    Cancer also noted was under hypertension but not on chest x-ray to evaluate cardiomegaly and cannot use patient wants to  Tests look for \"   There were no vitals taken for this visit. Estimated " "body mass index is 22.62 kg/m  as calculated from the following:    Height as of 9/15/22: 1.816 m (5' 11.5\").    Weight as of 9/15/22: 74.6 kg (164 lb 8 oz).  Physical Exam  GENERAL: healthy, alert and no distress  EYES: Eyes grossly normal to inspection, PERRL and conjunctivae and sclerae normal  HENT: ear canals and TM's normal, nose and mouth without ulcers or lesions  NECK: no adenopathy, no asymmetry, masses, or scars and thyroid normal to palpation  RESP: lungs clear to auscultation - no rales, rhonchi or wheezes  CV: regular rate and rhythm, normal S1 S2, no S3 or S4, no murmur, click or rub, no peripheral edema and peripheral pulses strong  ABDOMEN: soft,  no hepatosplenomegaly, no masses and bowel sounds normal.  Left lower quadrant tenderness is noted.  No rebound present.  Scarring is consistent with his history.  No flank pain is present at this time.  Brad's punch is negative.  MS: no gross musculoskeletal defects noted, no edema  SKIN: no suspicious lesions or rashes  NEURO: Normal strength and tone, mentation intact and speech normal  PSYCH: mentation appears normal, affect normal/bright    Diagnostic Test Results:  Results for orders placed or performed in visit on 09/29/22 (from the past 24 hour(s))   CBC with platelets and differential    Narrative    The following orders were created for panel order CBC with platelets and differential.  Procedure                               Abnormality         Status                     ---------                               -----------         ------                     CBC with platelets and d...[[id number]]  Abnormal            Final result                 Please view results for these tests on the individual orders.   CBC with platelets and differential   Result Value Ref Range    WBC Count 4.8 4.0 - 11.0 10e3/uL    RBC Count 3.91 (L) 4.40 - 5.90 10e6/uL    Hemoglobin 11.5 (L) 13.3 - 17.7 g/dL    Hematocrit 35.3 (L) 40.0 - 53.0 %    MCV 90 78 - 100 fL    " MCH 29.4 26.5 - 33.0 pg    MCHC 32.6 31.5 - 36.5 g/dL    RDW 16.2 (H) 10.0 - 15.0 %    Platelet Count 206 150 - 450 10e3/uL    % Neutrophils 63 %    % Lymphocytes 19 %    % Monocytes 17 %    % Eosinophils 1 %    % Basophils 0 %    % Immature Granulocytes 0 %    NRBCs per 100 WBC 0 <1 /100    Absolute Neutrophils 3.0 1.6 - 8.3 10e3/uL    Absolute Lymphocytes 0.9 0.8 - 5.3 10e3/uL    Absolute Monocytes 0.8 0.0 - 1.3 10e3/uL    Absolute Eosinophils 0.1 0.0 - 0.7 10e3/uL    Absolute Basophils 0.0 0.0 - 0.2 10e3/uL    Absolute Immature Granulocytes 0.0 <=0.4 10e3/uL    Absolute NRBCs 0.0 10e3/uL   UA Macro with Reflex to Micro and Culture - lab collect    Specimen: Urine, NOS   Result Value Ref Range    Color Urine Yellow Colorless, Straw, Light Yellow, Yellow    Appearance Urine Clear Clear    Glucose Urine Negative Negative mg/dL    Bilirubin Urine Negative Negative    Ketones Urine Negative Negative mg/dL    Specific Gravity Urine 1.023 1.003 - 1.035    Blood Urine Small (A) Negative    pH Urine 5.0 5.0 - 7.0    Protein Albumin Urine 30  (A) Negative mg/dL    Urobilinogen Urine 4.0 (A) Normal, 2.0 mg/dL    Nitrite Urine Negative Negative    Leukocyte Esterase Urine Negative Negative    Mucus Urine Present (A) None Seen /LPF    RBC Urine 10 (H) <=2 /HPF    WBC Urine 2 <=5 /HPF    Squamous Epithelials Urine <1 <=1 /HPF    Narrative    Urine Culture not indicated     *Note: Due to a large number of results and/or encounters for the requested time period, some results have not been displayed. A complete set of results can be found in Results Review.       ASSESSMENT / PLAN:       ICD-10-CM    1. Diverticular disease of colon  K57.30 CBC with platelets and differential     ciprofloxacin (CIPRO) 500 MG tablet     metroNIDAZOLE (FLAGYL) 500 MG tablet     CBC with platelets and differential   2. Medicare annual wellness visit, subsequent  Z00.00    3. LLQ abdominal pain  R10.32 CANCELED: UA reflex to Microscopic - lab collect  "  4. Renal lithiasis  N20.0 UA Macro with Reflex to Micro and Culture - lab collect     UA Macro with Reflex to Micro and Culture - lab collect   5. Thrombocytopenia (H)  D69.6    6. Paroxysmal atrial fibrillation (H)  I48.0    7. Stage 3 chronic kidney disease, unspecified whether stage 3a or 3b CKD (H)  N18.30    8. Malignant neoplasm of stomach, unspecified location (H)  C16.9    Based on the patient's abdominal pain, history of diverticular disease, vague systemic symptoms of infection, and her previous CAT scan confirming history of diverticular disease, will start on Cipro and Flagyl.  Patient specifically struck to not drink any alcohol while on the medication..  Check CBC for leukocytosis.  Recommend patient continues current bowel protocol to avoid further constipation.    Patient has been advised of split billing requirements and indicates understanding: Yes    COUNSELING:  Reviewed preventive health counseling, as reflected in patient instructions       Regular exercise       Healthy diet/nutrition       Vision screening       Hearing screening       Dental care       Colon cancer screening       Prostate cancer screening    Estimated body mass index is 22.62 kg/m  as calculated from the following:    Height as of 9/15/22: 1.816 m (5' 11.5\").    Weight as of 9/15/22: 74.6 kg (164 lb 8 oz).        He reports that he has been smoking pipe. He has been smoking about 0.00 packs per day for the past 20.00 years. He has never used smokeless tobacco.  Nicotine/Tobacco Cessation Plan:   Information offered: Patient not interested at this time      Appropriate preventive services were discussed with this patient, including applicable screening as appropriate for cardiovascular disease, diabetes, osteopenia/osteoporosis, and glaucoma.  As appropriate for age/gender, discussed screening for colorectal cancer, prostate cancer, breast cancer, and cervical cancer. Checklist reviewing preventive services available has " been given to the patient.    Reviewed patients plan of care and provided an AVS. The Basic Care Plan (routine screening as documented in Health Maintenance) for Remigio meets the Care Plan requirement. This Care Plan has been established and reviewed with the Patient.    Counseling Resources:          I have reviewed the patient's vaccination schedule and discussed the benefits of prophylactic vaccination in detail.  I recommend the patient contact their pharmacist for vaccinations.  Discussed that most insurance companies now favor reimbursement to the pharmacies and it will financially behoove the patient to have vaccinations performed at their pharmacy.        Harrison Tse [facility]

## 2022-10-13 NOTE — PROGRESS NOTES
Kianna Garcia is a 88 year old, presenting for the following health issues:  Abdominal Pain (3 weeks/)      History of Present Illness       Reason for visit:  Paln    He eats 0-1 servings of fruits and vegetables daily.He consumes 0 sweetened beverage(s) daily.He exercises with enough effort to increase his heart rate 20 to 29 minutes per day.  He exercises with enough effort to increase his heart rate 7 days per week.   He is taking medications regularly.             Review of Systems         Objective    /68 (BP Location: Right arm, Patient Position: Chair, Cuff Size: Adult Large)   Pulse 80   Temp 98.1  F (36.7  C) (Temporal)   Resp 20   Wt 78.5 kg (173 lb)   SpO2 95%   BMI 23.79 kg/m    Body mass index is 23.79 kg/m .  Physical Exam                   EMR reviewed including:            Complaint, History of Chief Complaint, Corresponding Review of Systems, and Complaint Specific Physical Examination.    #1 follow-up abdominal pain  Patient completed 7 days of ciprofloxacin.  No improvement to symptoms.  Pain continues postprandially with all meals and liquid intake.  Patient admits chronic constipation, stating he has passed minimal stool in the last 4 days; denies blood in stool.  Patient takes a capful of MiraLAX daily, and Dulcolax as needed.    Patient reports difficulty with fluid intake due to associated abdominal pain.  Denies issues with urination, fever.  Patient admits being 2 weeks behind on his B12 injection; personal history of gastric cancer and gastrectomy in 2011.    EXAM:   Constitutional: The patient appears to be in no acute distress. The patient appears to be adequately hydrated. No acute respiratory or hemodynamic distress is noted at this time.   PSYCH: The patient appears grossly appropriate. Maintains good eye contact, does not have any jittery or atypical motion. Displays appropriate affect.   GI: Normal bowel sounds throughout.  Negative Johnson sign. Negative  McBurney's point.  Pain with palpation left lower quadrant and suprapubic area.  Significant mass noted in left lower quadrant; rockhard to palpation.          Patient has been interviewed, applicable history and applied review of systems have been performed.    Vital Signs:   /68 (BP Location: Right arm, Patient Position: Chair, Cuff Size: Adult Large)   Pulse 80   Temp 98.1  F (36.7  C) (Temporal)   Resp 20   Wt 78.5 kg (173 lb)   SpO2 95%   BMI 23.79 kg/m        Decision Making    Problem and Complexity     1. Abdominal pain, left lower quadrant  Patient's medical history significant for gastric cancer and chronic constipation; palpated mass on physical exam of concern.  Patient with known diverticulosis, however no improvement to symptoms with antibiotic course.  Current constipation regimen does not seem to be emptying the patient sufficiently.    Plan  Recommended short-term use of magnesium citrate, however this product is no longer manufactured.  Recommend MiraLAX 4 scoops in a glass of water twice daily.  Abdominal x-ray ordered.  Demonstrates no complete obstruction with the presence of left upper quadrant stool.  CAT scan pending later today.    Last colonoscopy was in 2010, may consider scoping the patient based upon results of x-ray.    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  Patient is referred to the emergency department for hospitalization.     2. High priority for 2019-nCoV vaccine  Vaccination given  - COVID-19,PF,MODERNA BIVALENT 18+Yrs    3. Paroxysmal atrial fibrillation (H)  Stable.  Patient currently on Plavix but will most likely require chronic anticoagulation given his splanchnic embolism.    4. Thrombocytopenia (H)  Platelet count closely    5. Stage 3 chronic kidney disease, unspecified whether stage 3a or 3b CKD (H)  Currently stable.  Continue current medication follow closely                          FOLLOW UP    I  have asked the patient to make an appointment for followup with either:  1.  Me,  2.  The patient's preferred provider,  3.  Any available provider  In  24 to 48 hours pending results          I have carefully explained the diagnosis and treatment options to the patient.  The patient has displayed an understanding of the above, and all subsequent questions were answered.      DO ELAINE Powell    Portions of this note were produced using [employer]  Although every attempt at real-time proof reading has been made, occasional grammar/syntax errors may have been missed.           This patient has been interviewed, examined, diagnosed, and informed of the above by me personally.  Medical records and available pertinent information has been reviewed by me personally.  All decisions and discussion have been between myself and the patient/family.  This was done in the presence of Oni Grimes, who acted as a medical scribe and recorded the events above.  No diagnosis or decision making was made by the above-mentioned scribe.  The patient, and or his/her ensurors will not be billed for the presence or actions of this scribe.  The information recorded by the scribe has been reviewed by me and found to be accurate.            Addendum: Contacted by radiology department and acutely notified that patient has extensive splanchnic venous thrombosis.  Patient contacted and told to immediately return for presentation to the emergency department.  Personally spoke to physicians in the emergency department to give them a brief history of the patient's current medical situation.  They are aware.

## 2022-10-13 NOTE — DISCHARGE INSTRUCTIONS
Return to the emergency department if develop new or worsening symptoms.  Start the blood thinner this evening.  Use caution with any bleeding such as black tarry colored stools or bloody stools.  You will have follow-up with gastroenterology.  They will contact you for an appointment.  The Norco including the pain pill can make you constipated so limit its use as you are already having difficulty with constipation.  Take the MiraLAX as directed by your doctor.  I hope this gets sorted out soon.

## 2022-10-13 NOTE — NURSING NOTE
Clinic Administered Medication Documentation    Administrations This Visit     cyanocobalamin injection 1,000 mcg     Admin Date  10/13/2022 Action  Given Dose  1,000 mcg Route  Intramuscular Site  Right Deltoid Administered By  Sybil Persaud LPN    Ordering Provider: Harrison Tse, DO    Patient Supplied?: No                  Injectable Medication Documentation    Patient was given Cyanocobalamin (B-12). Prior to medication administration, verified patients identity using patient s name and date of birth. Please see MAR and medication order for additional information. Patient instructed to remain in clinic for 15 minutes and report any adverse reaction to staff immediately .      Was entire vial of medication used? Yes  Vial/Syringe: Single dose vial  Expiration Date:  01/31/2024  Was this medication supplied by the patient? No

## 2022-10-13 NOTE — NURSING NOTE
The patient is requesting pain medication.  I talked with Dr. Tse and he stated nom he can use just Tylenol.  I informed the patient of this.

## 2022-10-13 NOTE — ED PROVIDER NOTES
History     Chief Complaint   Patient presents with     Abnormal Cxr/ct     HPI  Remigio Holly is a 88 year old male who presents to the ER at the direction of primary care secondary to abd pain, new findings on CT below. Recently complete 7 day course of ciprofloxacin without improvement.  He has had chronic constipation.  History of gastric cancer and gastrectomy in 2011.   In clinic today a mass was felt in the LLQ.  Recommended magnesium citrate or miralax.  He does not take any blood thinners but does have a history of CAD and CABG 3 years ago.  History of PE.   He is on plavix but not coumadin or a DOAC.  He has noted that his abdomen is getting more and more distended and its never been like that before.  No fever or new cough.  He has had new fatigue.     Last colonoscopy was in 2010.     IMPRESSION:   1.  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 There is mild thickening and  enhancement of the small bowel wall which could be associated with  venous ischemia. No free intraperitoneal air.  2.  Gilmore's hernia supraumbilical, ventral abdominal wall containing  a portion of the transverse colon wall. More of the colon wall is in  this hernia as compared to the prior study but no definite evidence  for obstruction or strangulation of the colon is seen.  3.  New large amount of ascites.  4.  Hyperdense cyst versus less likely solid lesion mid right kidney.  5.  Extensive atherosclerosis.  6.  Urinary bladder calculi. Nonobstructive intrarenal calculi.     [Critical Result: Portal vein, superior mesenteric vein, and splenic  vein thrombosis with questionable venous ischemia of the small bowel,  new large amount of ascites, new pleural effusions, enlarging  Gilmore's hernia]    CT in august, non contrast  IMPRESSION:   1.  Obstructing 0.4 cm calculus in the mid left  ureter.     2.  Nonobstructing calculi elsewhere bilaterally.    Allergies:  Allergies   Allergen Reactions     Mobic [Meloxicam] Nausea and Diarrhea     Diarrhea, nausea, flu like symptoms since start of use       Problem List:    Patient Active Problem List    Diagnosis Date Noted     Nephrolithiasis 12/25/2012     Priority: High     Thrombocytopenia (H) 09/29/2022     Priority: Medium     ASCVD (arteriosclerotic cardiovascular disease) 09/30/2020     Priority: Medium     Ischemic cardiomyopathy 09/30/2020     Priority: Medium     Non-recurrent bilateral inguinal hernia without obstruction or gangrene 09/02/2020     Priority: Medium     Added automatically from request for surgery [id number]       History of pulmonary embolism 09/01/2020     Priority: Medium     Dupuytren's contracture of left hand 12/04/2018     Priority: Medium     S/P PTCA (percutaneous transluminal coronary angioplasty) 03/14/2018     Priority: Medium     Urinary retention with incomplete bladder emptying 08/20/2017     Priority: Medium     Orthostatic hypotension 08/19/2017     Priority: Medium     Near syncope 08/17/2017     Priority: Medium     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 Provider to review       CKD (chronic kidney disease) stage 3, GFR 30-59 ml/min (H) 05/05/2013     Priority: Medium     Pain 12/25/2012     Priority: Medium     Advance Care Planning 08/16/2012     Priority: Medium     Advance Care Planning: Receipt of ACP document:   Received: Health Care Directive which was witnessed or notarized on 3-12-13.  Document previously scanned on 3-13-13.  Validation form completed and  scanned.  Code Status reflects choices in most recent ACP document.  Confirmed/documented designated decision maker(s). See permanent comments section of demographics in clinical tab. View document(s) and details by clicking on code status. Added by Sayra Maguire on 8/25/2014.  .Patient states has Advance Directive and will bring in a copy to clinic. 8/16/2012          HYPERLIPIDEMIA LDL GOAL <100 10/31/2010     Priority: Medium     GERD (gastroesophageal reflux disease) 03/26/2010     Priority: Medium     Mixed hyperlipidemia 03/24/2010     Priority: Medium     Closed fracture of calcaneus 01/06/2007     Priority: Medium     Calculus of gallbladder 01/09/2003     Priority: Medium     Problem list name updated by automated process. Provider to review       Chest pain      Priority: Medium     Problem list name updated by automated process. Provider to review       Pain in joint, shoulder region      Priority: Medium     Paroxysmal atrial fibrillation (H) 08/26/2010     Priority: Low     Gastric cancer (H) 08/05/2010     Priority: Low     (Problem list name updated by automated process. Provider to review and confirm.)          Past Medical History:    Past Medical History:   Diagnosis Date     Blood transfusion      Chronic gastric ulcer without mention of hemorrhage, perforation, without mention of obstruction      Gastric cancer (H)      Hemorrhage of gastrointestinal tract, unspecified 01/13/10     Hypoglycemia, unspecified 1/26/2015     Measles without mention of complication      Mixed hyperlipidemia      Mumps without mention of complication      Pulmonary embolism (H) Sept 4, 2010     Urinary calculus, unspecified      Varicella without mention of complication        Past Surgical History:    Past Surgical History:   Procedure Laterality Date     ABDOMEN  SURGERY       ARTHROPLASTY KNEE Left 8/15/2017    Procedure: ARTHROPLASTY KNEE;  Left total knee replacement;  Surgeon: Jonathan Cardona DO;  Location: [facility]     CHOLECYSTECTOMY, LAPOROSCOPIC  10/6/2008    Cholecystectomy, Laparoscopic     COLONOSCOPY  1/14/10    [facility]     COLONOSCOPY  05/25/10     CYSTOSCOPY, RETROGRADES, EXTRACT STONE, INSERT STENT, COMBINED  12/25/2012    Procedure: COMBINED CYSTOSCOPY, RETROGRADES, EXTRACT STONE, INSERT STENT;  Cystoscopy, Left Stent Placement, left retrograde pylogram, uc;  Surgeon: Amador Sanchez MD;  Location: [facility]     ESOPHAGOSCOPY, GASTROSCOPY, DUODENOSCOPY (EGD), COMBINED  8/16/2011    Procedure:COMBINED ESOPHAGOSCOPY, GASTROSCOPY, DUODENOSCOPY (EGD), BIOPSY SINGLE OR MULTIPLE; Surgeon:TONY GARCIA; Location:[facility]     GENERAL SURGERY                           DATE:  07/07/10    Gastrectomy     HC REPAIR ROTATOR CUFF,ACUTE  3/21/2005    Right     HERNIORRHAPHY INGUINAL Left 9/25/2020    Procedure: Open left inguinal hernia repair with mesh;  Surgeon: Tanvir Lorenzo MD;  Location: [facility]     HERNIORRHAPHY INGUINAL Right 12/30/2021    Procedure: right open inguinal hernia repair with mesh;  Surgeon: Tanvir Lorenzo MD;  [facility]: [facility]     LASER HOLMIUM LITHOTRIPSY URETER(S), INSERT STENT, COMBINED  1/5/2013    Procedure: COMBINED CYSTOSCOPY, URETEROSCOPY, LASER HOLMIUM LITHOTRIPSY URETER(S), INSERT STENT;  Bilateral  Cystoscopy, Bilateral Ureteroscopy, Bilateral retrogrades and  placement of ureteral stent right side. Laser Holmium Lithotripsy  and Exchange  of stent left side;  Surgeon: Tone Morejon MD;  Location: [facility]     LASER HOLMIUM LITHOTRIPSY URETER(S), INSERT STENT, COMBINED  1/30/2013    Procedure: COMBINED CYSTOSCOPY, URETEROSCOPY, LASER HOLMIUM LITHOTRIPSY URETER(S), INSERT STENT;  Right Ureteroscopy; Stone basketing; Right Stent exchange and  removal of left stent.;  Surgeon: Tone Morejon MD;  Location:  [facility] OR     RELEASE DUPUYTRENS CONTRACTURE Left 2018    Procedure: Release Left Hand Dupuytrens Contracture;  Surgeon: Lucio Omalley MD;  Location: [facility] OR     VIDEO CAPSULE ENDOSCOPY  01/15/10    [facility] EXCIS STOMACH ULCER,LESN;LOCAL       [facility] COLONOSCOPY W/WO BRUSH/WASH  2006     [facility] UGI ENDOSCOPY, SIMPLE EXAM  1/13/10    [facility] UGI ENDOSCOPY, SIMPLE EXAM  05/25/10       Family History:    Family History   Problem Relation Age of Onset     Alzheimer Disease Father      Cardiovascular Mother      C.A.D. Mother      Heart Disease Mother      Cardiovascular Brother      Heart Disease Brother      C.A.D. Brother        Social History:  Marital Status:   [2]  Social History     Tobacco Use     Smoking status: Some Days     Packs/day: 0.00     Years: 20.00     Pack years: 0.00     Types: Pipe, Cigarettes     Last attempt to quit: 1998     Years since quittin.7     Smokeless tobacco: Never     Tobacco comments:     Rare pipe   Substance Use Topics     Alcohol use: No     Alcohol/week: 0.0 standard drinks     Drug use: No        Medications:    atorvastatin (LIPITOR) 40 MG tablet  bisacodyl (DULCOLAX) 10 MG suppository  blood glucose (CONTOUR NEXT TEST) test strip  clopidogrel (PLAVIX) 75 MG tablet  cyanocobalamin (VITAMIN B12) 1000 MCG/ML injection  diltiazem ER COATED BEADS (CARTIA XT) 120 MG 24 hr capsule  furosemide (LASIX) 20 MG tablet  HYDROcodone-acetaminophen (NORCO) 5-325 MG tablet  polyethylene glycol (MIRALAX) 17 GM/Dose powder  rivaroxaban ANTICOAGULANT (XARELTO) 20 MG TABS tablet  senna-docusate (SENOKOT-S;PERICOLACE) 8.6-50 MG per tablet  tamsulosin (FLOMAX) 0.4 MG capsule  blood glucose monitoring (ROBERT MICROLET) lancets  ciprofloxacin (CIPRO) 500 MG tablet  simethicone (MYLICON) 80 MG chewable tablet          Review of Systems   All other systems reviewed and are negative.      Physical Exam   BP: (!) 146/97  Pulse: 88  Temp: 97.7  F  (36.5  C)  Resp: 15  Height: 182.9 cm (6')  Weight: 72.6 kg (160 lb)  SpO2: 97 %      Physical Exam  Vitals and nursing note reviewed.   Constitutional:       General: He is not in acute distress.     Appearance: He is well-developed and well-nourished. He is not diaphoretic.   HENT:      Head: Normocephalic and atraumatic.      Right Ear: External ear normal.      Left Ear: External ear normal.      Nose: Nose normal. No congestion or rhinorrhea.      Mouth/Throat:      Mouth: Mucous membranes are moist.      Pharynx: No oropharyngeal exudate or posterior oropharyngeal erythema.   Eyes:      General: No scleral icterus.     Extraocular Movements: Extraocular movements intact.      Pupils: Pupils are equal, round, and reactive to light.   Cardiovascular:      Rate and Rhythm: Normal rate and regular rhythm.   Pulmonary:      Effort: Pulmonary effort is normal.      Breath sounds: Normal breath sounds.   Abdominal:      General: There is distension.      Tenderness: There is abdominal tenderness. There is no guarding or rebound.      Comments: Abdominal scars from previous surgery    Musculoskeletal:      Cervical back: Normal range of motion and neck supple.   Skin:     General: Skin is warm and dry.      Findings: No rash.   Neurological:      General: No focal deficit present.      Mental Status: He is alert and oriented to person, place, and time.   Psychiatric:         Mood and Affect: Mood normal.         ED [facility]    -Paracentesis    Date/Time: 10/13/2022 3:05 PM  Performed by: Yousif Ghosh MD  Authorized by: Yousif Ghosh MD     Risks, benefits and alternatives discussed.      PRE-PROCEDURE DETAILS     Procedure purpose:  Diagnostic (and therapeutic )    ANESTHESIA (see MAR for exact dosages):     Anesthesia method:  Local infiltration    Local anesthetic:  Lidocaine 1% w/o epi    PROCEDURE DETAILS     Ultrasound guidance: yes       Puncture site:  L lower quadrant    Fluid removed amount:  500    Fluid appearance:  Janice    Dressing:  Antibiotic ointment and adhesive bandage      PROCEDURE    Patient Tolerance:  Patient tolerated the procedure well with no immediate complications      Results for orders placed during the hospital encounter of 10/13/22    POC US GUIDE FOR PARACENTESIS    Impression  Limited Bedside Abdominal Ultrasound, performed and interpreted by me.    Indication: new onset ascites on CT, needle guidance for paracentesis    With the patient in partial left lateral position, the rlq and llq evaluated for fluid pocket for ascites.    Findings: Free fluid present in abdomen but not a large amount.  Fluid c/w ascites. llq pocket found for ascites performed per procedure note.    IMPRESSION: Free fluid present as noted above.                Results for orders placed or performed during the hospital encounter of 10/13/22 (from the past 24 hour(s))   CBC with platelets differential    Narrative    The following orders were created for panel order CBC with platelets differential.  Procedure                               Abnormality         Status                     ---------                               -----------         ------                     CBC with platelets and d...[[id number]]  Abnormal            Final result                 Please view results for these tests on the individual orders.   Comprehensive metabolic panel   Result Value Ref Range    Sodium 140 133 - 144 mmol/L    Potassium 4.1 3.4 - 5.3 mmol/L    Chloride 109 94 - 109 mmol/L    Carbon Dioxide (CO2) 22 20 - 32 mmol/L    Anion Gap 9 3 - 14 mmol/L    Urea Nitrogen 19 7 - 30 mg/dL    Creatinine 1.15 0.66 - 1.25 mg/dL    Calcium 9.0 8.5 - 10.1 mg/dL    Glucose 175 (H) 70 - 99 mg/dL    Alkaline Phosphatase 163 (H) 40 - 150 U/L    AST 74 (H) 0 - 45 U/L    ALT 44 0 - 70 U/L    Protein Total 7.3 6.8 - 8.8 g/dL    Albumin 3.4 3.4 - 5.0 g/dL    Bilirubin Total 0.9 0.2  - 1.3 mg/dL    GFR Estimate 61 >60 mL/min/1.73m2   Lactic acid whole blood   Result Value Ref Range    Lactic Acid 1.8 0.7 - 2.0 mmol/L   CBC with platelets and differential   Result Value Ref Range    WBC Count 5.0 4.0 - 11.0 10e3/uL    RBC Count 4.06 (L) 4.40 - 5.90 10e6/uL    Hemoglobin 11.9 (L) 13.3 - 17.7 g/dL    Hematocrit 35.8 (L) 40.0 - 53.0 %    MCV 88 78 - 100 fL    MCH 29.3 26.5 - 33.0 pg    MCHC 33.2 31.5 - 36.5 g/dL    RDW 16.4 (H) 10.0 - 15.0 %    Platelet Count 246 150 - 450 10e3/uL    % Neutrophils 74 %    % Lymphocytes 16 %    % Monocytes 9 %    % Eosinophils 1 %    % Basophils 0 %    % Immature Granulocytes 0 %    NRBCs per 100 WBC 0 <1 /100    Absolute Neutrophils 3.7 1.6 - 8.3 10e3/uL    Absolute Lymphocytes 0.8 0.8 - 5.3 10e3/uL    Absolute Monocytes 0.5 0.0 - 1.3 10e3/uL    Absolute Eosinophils 0.1 0.0 - 0.7 10e3/uL    Absolute Basophils 0.0 0.0 - 0.2 10e3/uL    Absolute Immature Granulocytes 0.0 <=0.4 10e3/uL    Absolute NRBCs 0.0 10e3/uL   Albumin level   Result Value Ref Range    Albumin 3.6 3.4 - 5.0 g/dL   POC US GUIDE FOR PARACENTESIS    Impression    Limited Bedside Abdominal Ultrasound, performed and interpreted by me.     Indication: new onset ascites on CT, needle guidance for paracentesis     With the patient in partial left lateral position, the rlq and llq evaluated for fluid pocket for ascites.     Findings: Free fluid present in abdomen but not a large amount.  Fluid c/w ascites. llq pocket found for ascites performed per procedure note.     IMPRESSION: Free fluid present as noted above.   Extra Tube ([place] Draw)    Narrative    The following orders were created for panel order Extra Tube ([facility] Draw).  Procedure                               Abnormality         Status                     ---------                               -----------         ------                     Extra Blood Culture Bottle[[id number]]                                                  Extra Body  Fluid/CSF Col...[[id number]]                      Final result                 Please view results for these tests on the individual orders.   Extra Body Fluid/CSF Collection   Result Value Ref Range    Hold Specimen hold    Cell count with differential fluid    Narrative    The following orders were created for panel order Cell count with differential fluid.  Procedure                               Abnormality         Status                     ---------                               -----------         ------                     Cell Count Body Fluid[[id number]]        Abnormal            Final result               Differential Body Fluid[[id number]]                          Preliminary result           Please view results for these tests on the individual orders.   Gram stain    Specimen: Abdomen; Ascites Fluid   Result Value Ref Range    Gram Stain Result No organisms seen    Cell Count Body Fluid   Result Value Ref Range    Color Yellow Colorless, Yellow    Clarity Cloudy (A) Clear    Total Nucleated Cells 672 /uL    Body Fluid Comment        Cell Count Fluid Source Paracentesis     Narrative    No reference ranges have been established.  This result  should be interpreted in the context of the patient's clinical condition and   compared to simultaneous measurement in the patient's blood.         Differential Body Fluid   Result Value Ref Range    % Neutrophils 8 %    % Lymphocytes 51 %    % Monocyte/Macrophages 33 %    % Lining Cells 8 %    Narrative    Sent for Review by Pathologist. Review comments will be entered. Results will be updated after review as applicable.    No reference ranges have been established. This result should be interpreted in the context of the patient's clinical condition and compared to simultaneous measurement in the patient's blood.     *Note: Due to a large number of results and/or encounters for the requested time period, some results have not been displayed. A complete set of  results can be found in Results Review.       Medications - No data to display    Assessments & Plan (with Medical Decision Making)  New onset ascites in a patient with a history of gastrectomy due to gastric cancer and no history of alcohol abuse.  LFTs are reassuring. 500 ml of tameka colored ascites drawn off during paracentesis, fluid analysis and cytology sent.  Overall patient is stable appearing.    He does have multiple thromboses - portal, splenic, SMV which may be causing some small bowel ischemia/thickening but the lactate is normal.    I discussed these findings with gastroenterology on-call, Dr. Tamez.  He will follow-up with the patient soon.  He agrees that the patient seems stable for discharge home.  He does recommend starting him on an anticoagulant.  He agrees with starting Xarelto.  He stated that he thought the ascites could just be related to the significant thrombosis of his portal vein.  Is possible it could be malignant.  Cytology is pending.  I discussed these possibilities with the patient.  Return to ER precautions and fall precautions discussed.  He was not having any discomfort in the abdomen or in the site of the paracentesis prior to discharge.  This does not look like it represents an infection or SBP.  Gram stain was negative, no organisms seen. SAAG is -0.2 The patient was warned regarding using the anticoagulation and the risks.  He also wanted something for pain but does have some constipation.  He was given a small prescription for hydrocodone/acetaminophen but warned regarding constipation.  He will use it only if he really needs it.       I have reviewed the nursing notes.    I have reviewed the findings, diagnosis, plan and need for follow up with the patient.      New Prescriptions    HYDROCODONE-ACETAMINOPHEN (NORCO) 5-325 MG TABLET    Take 1 tablet by mouth every 6 hours as needed for severe pain    RIVAROXABAN ANTICOAGULANT (XARELTO) 20 MG TABS TABLET    Take 1 tablet  (20 mg) by mouth daily (with dinner) for 14 days       Final diagnoses:   Other ascites   Portal vein thrombosis   Superior mesenteric vein thrombosis (H)       10/13/2022   [facility] EMERGENCY DEPT     Yuosif Ghosh MD  10/13/22 [id number]

## 2022-10-13 NOTE — ED TRIAGE NOTES
Has been having lower abdominal pain for about 3 weeks.  Denies N/V and xray and ct from primary MD and was called to come to ED for abnormal CT results.     Triage Assessment     Row Name 10/13/22 [id number]       Triage Assessment (Adult)    Airway WDL WDL       Respiratory WDL    Respiratory WDL WDL       Skin Circulation/Temperature WDL    Skin Circulation/Temperature WDL WDL       Cardiac WDL    Cardiac WDL WDL       Peripheral/Neurovascular WDL    Peripheral Neurovascular WDL WDL       Cognitive/Neuro/Behavioral WDL    Cognitive/Neuro/Behavioral WDL WDL

## 2022-10-18 NOTE — PROGRESS NOTES
GI CLINIC VISIT    CC/REFERRING MD:  No ref. provider found  REASON FOR CONSULTATION:   No ref. 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    # Extensive thrombosis of the portal, superior mesenteric, and splenic veins   - Unclear etiology, no prior Hx of cirrhosis.   - Primary liver, local advanced, or metastatic malignancy is possible, however, No clinical or radiological evidence of stomach cancer recurrence.   - No evidence of intestinal ischemia or liver ischemia   - Discussed in the tumor board on 10/14/22.     # New onset ascites   - Exudative, studies suggest portal hypertension   - pending cytology     Recommendations  - Continue anticoagulation for at least 6-12 months, a message was sent to PCP for refills   - Referral to hematology for evaluation for further work up for etiology of extensive PVT  - CT abdomen pelvis with/without contrast  - Viral hepatitis panel, ferritin, CMP, AFP  - spironolactone 50 mg to help manage ascites in addition to home lasix 20 mg daily     RTC PRN    Thank you for this consultation.  It was a pleasure to participate in the care of this patient; please contact us with any further questions.  A total of 30 minutes, face to face, was spent with this patient, >50% of which was counseling regarding the above delineated issues.    This note was created with voice recognition software, and while reviewed for accuracy, typos may remain.     Discussed with Dr. 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  The patient underwent outpatient CT abdomen pelvis per primary care provider for abdominal pain on 10/13/22. Prior to that, the patient had abdominal pain for few weeks and completed 7 day course of ciprofloxacin. In the ED, he had paracentesis of 500 ml of ascites (albumin 0.9, total protein 1.7, 627 nucleated cells (mostly lymphocytes). He was started on anticoagulation with rivaroxaban. The patient denies history of liver disease, family history of liver disease or blood clots. He had Hx of PE in the past.   Currently, the patient feels overall okay. He has lower abdominal pain and fullness sensation. He does not think anticoagulation (only started it 5 days ago) helped yet. No prior or current Hx of alcohol abuse. No prior Hx of cirrhosis.     ROS:    10 point ROS neg other than the symptoms noted above in the HPI.    PERTINENT RELEVANT IMAGING OR LABS:  CT abdomen pelvis 10/13/2022  FINDINGS:   LOWER CHEST: Severe coronary artery calcifications and/or stents are  noted. There are extensive aortic calcifications but no evidence for  aortic aneurysm or dissection in the visualized portions of the chest.  There are small bilateral pleural fluid collections with associated  compressive atelectasis in the posterior lungs, new since the prior CT  dated 8/18/2022. Visualized portions of the lower chest are otherwise  unremarkable.     HEPATOBILIARY: Liver enhances somewhat heterogeneously likely due to  geographic areas of fatty infiltration and sparing. There is very mild  lobulation of the wall of  the liver. Cirrhosis is not excluded. Left  lobe of the liver is somewhat atrophic, similar to the prior study.  There does appear to be scarring in segments EVANS and B of the liver.  Definite focal intrahepatic lesions are otherwise not seen. No biliary  ductal dilatation is identified. There are surgical clips in the  gallbladder fossa from prior cholecystectomy. There is enlargement of  the portal vein which appears to be filled with thrombus. Initially  the superior mesenteric vein near its confluence with the portal vein  and also slightly more inferiorly appears to be occluded by thrombus.  The smaller superior mesenteric vein closer to the mesentery appears  to be patent and contains some contrast. There is enlargement and  hyperattenuation in the region of the splenic vein also likely  representing splenic vein thrombosis. No intra- or extrapancreatic  biliary ductal dilatation is identified.     PANCREAS: Hypoattenuation near the tail of the pancreas likely  represents a thrombosed enlarged splenic vein. There is slightly  decreased attenuation of the tail of the pancreas as well which is of  uncertain etiology. Cannot exclude mass lesion in this location. The  pancreas is otherwise atrophic.     SPLEEN: Slightly decreased attenuation in the central aspect of the  spleen could be due to the phase of contrast administration. This  could also be related to the splenic vein thrombosis and venous type  ischemia as can be seen.     ADRENAL GLANDS: Normal.     KIDNEYS/BLADDER: Stable nonobstructive right renal calculus measures  approximately 0.2 cm. Tiny densities in the region of the left renal  pelvis likely represent tiny nonobstructive calculi measuring less  than 0.2 cm. Nonobstructing calculus in the left intrarenal collecting  system in a calyx of the mid kidney is stable in appearance at  approximately 0.3 cm. Cysts in the kidneys are noted. One  hypoattenuating exophytic lesion extending from the mid  right kidney  (image 62 series 2) has an average density of more than 30 Hounsfield  units and likely represents a hyperdense cyst, although could  represent a solid lesion measuring up to 1.3 cm. This is unchanged in  size as compared to the prior CT. Scarring is seen in the superior  pole of the left kidney. Mild scarring in the right kidney is also  noted. No hydronephrosis, hydroureter or ureteral calculus to suggest  urinary system obstruction. Tiny densities posterior urinary bladder  likely represent bladder calculi. Similar findings were seen on the  prior study but in a slightly different location within the urinary  bladder. Urinary bladder is otherwise unremarkable. The ureters are  somewhat difficult to follow.     BOWEL: There is a Gilmore's hernia in the ventral abdominal wall  (image 83 series 2) containing a portion of the anterior aspect of the  transverse colon in this location without evidence for bowel  obstruction. The colon is otherwise mostly decompressed. There is  questionable mild thickening of the wall of the cecum. Small bowel is  grossly of normal caliber. Small bowel mucosa appears to be enhancing  more than typically seen. There is also mild thickening of the small  bowel wall. This could be associated with ischemia. Stomach is absent  or mostly absent, as expected given the patient's history of gastric  cancer. Esophageal bowel/gastric remnant anastomosis is again noted.     PELVIC ORGANS: Prostate gland is markedly enlarged, heterogeneously  enhancing and contains dystrophic calcifications. It indents the  posterior inferior aspect of the urinary bladder.     ADDITIONAL FINDINGS: There is a large amount of ascites. Extensive  atherosclerosis is noted. No definite aneurysm is identified. No  aortic dissection. Thrombosis of the portal vein, splenic vein, and  portions of the superior mesenteric vein are noted. These could  contribute to venous ischemic changes in the abdomen and  pelvis. No  definite arterial thrombosis is identified on today's study. No free  air is seen.     MUSCULOSKELETAL: Partial lumbarization of S1 is again noted. There are  degenerative changes in the lumbar spine, worst in the lower lumbar  spine. Degenerative changes in the lower thoracic spine are also  noted. No aggressive osseous lesions or acute osseous fractures are  seen.                                                                      IMPRESSION:   1.  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 There is mild thickening and  enhancement of the small bowel wall which could be associated with  venous ischemia. No free intraperitoneal air.  2.  Gilmore's hernia supraumbilical, ventral abdominal wall containing  a portion of the transverse colon wall. More of the colon wall is in  this hernia as compared to the prior study but no definite evidence  for obstruction or strangulation of the colon is seen.  3.  New large amount of ascites. New small bilateral pleural fluid  collections.  4.  Hyperdense cyst versus less likely solid lesion mid right kidney.  5.  Extensive atherosclerosis.  6.  Urinary bladder calculi. Nonobstructive intrarenal calculi.      EGD 5/25/2010   Findings:        The esophagus was normal. Evidence of a patent Billroth II        gastrojejunostomy was found. The gastrojejunal anastomosis was        characterized by congestion, edema, friable mucosa and ulceration. This        was traversed. The efferent limb was examined. The afferent limb was        examined. One oozing cratered gastric ulcer with no stigmata of bleeding        was found in the cardia. The lesion was 25 mm in largest dimension.        Biopsies were taken with a cold forceps for histology. The examined        duodenum was normal. The examined jejunum was normal. The exam was        otherwise without  abnormality.     Impression:         - Normal esophagus. EGJ at 45 cm is normal                       - Patent Billroth II gastrojejunostomy was found.                       - Small gastric pouch with friability, scant fresh blood,                       and an ulcer                       - Gastric ulcer marginally oozing blood, 25 mm, no                       stigmata but general friability, heaped up margins. This                       was biopsied.                       - Afferent and efferent limbs were normal                       - Normal examined duodenum.                       - Normal examined jejunum.                       - The examination was otherwise normal.                       - This ulcer is suspicious for malignancy and needs to be                       followed to complete resolution     Colonoscopy 5/25/2010  Findings:        A few small-mouthed diverticula were found in the sigmoid colon.     Impression:         - Diverticulosis sigmoid colon, mild, uncomplicated                       - Otherwise normal                       - No bleeding lesion, neoplasia, AV malformation, IBD seen                       - Transient vasovagal reaction responded to air                       withdrawal, fluids, and atropine without consequence       ALLERGIES:     Allergies   Allergen Reactions     Mobic [Meloxicam] Nausea and Diarrhea     Diarrhea, nausea, flu like symptoms since start of use       PERTINENT MEDICATIONS:    Current Outpatient Medications:      atorvastatin (LIPITOR) 40 MG tablet, Take 40 mg by mouth, Disp: , Rfl: 3     bisacodyl (DULCOLAX) 10 MG suppository, Place 1 suppository (10 mg) rectally daily as needed for constipation, Disp: 30 suppository, Rfl: 1     blood glucose (CONTOUR NEXT TEST) test strip, USE TO TEST BLOOD SUGAR 1 TIMES DAILY. (Patient taking differently: 1 strip by In Vitro route daily as needed (feeling different)), Disp: 100 strip, Rfl: 3     blood glucose monitoring (Luxola  MICROLET) lancets, Use to test blood sugar 1 time daily or as directed., Disp: 1 Box, Rfl: 2     clopidogrel (PLAVIX) 75 MG tablet, Take 1 tablet by mouth once daily (Patient taking differently: Take 75 mg by mouth At Bedtime), Disp: 90 tablet, Rfl: 0     cyanocobalamin (VITAMIN B12) 1000 MCG/ML injection, Inject 1 mL (1,000 mcg) into the muscle every 30 days, Disp: 1 mL, Rfl: 11     diltiazem ER COATED BEADS (CARTIA XT) 120 MG 24 hr capsule, TAKE 1  BY MOUTH ONCE DAILY (Patient taking differently: Take 120 mg by mouth At Bedtime), Disp: 90 capsule, Rfl: 0     furosemide (LASIX) 20 MG tablet, Take 1 tablet (20 mg) by mouth daily as needed (Leg swelling), Disp: 15 tablet, Rfl: 0     polyethylene glycol (MIRALAX) 17 GM/Dose powder, Take 17 g (1 capful) by mouth daily (Patient taking differently: Take 1 capful by mouth At Bedtime Plans to increase to 3 capfuls daily), Disp: 578 g, Rfl: 1     rivaroxaban ANTICOAGULANT (XARELTO) 20 MG TABS tablet, Take 1 tablet (20 mg) by mouth daily (with dinner) for 14 days, Disp: 14 tablet, Rfl: 0     simethicone (MYLICON) 80 MG chewable tablet, Take 1 tablet (80 mg) by mouth every 6 hours as needed for flatulence or cramping, Disp: 30 tablet, Rfl: 1     spironolactone (ALDACTONE) 50 MG tablet, Take 1 tablet (50 mg) by mouth daily, Disp: 30 tablet, Rfl: 1     tamsulosin (FLOMAX) 0.4 MG capsule, Take 0.4 mg by mouth daily, Disp: , Rfl:      ciprofloxacin (CIPRO) 500 MG tablet, Take 1 tablet (500 mg) by mouth 2 times daily (Patient not taking: No sig reported), Disp: 14 tablet, Rfl: 0     senna-docusate (SENOKOT-S;PERICOLACE) 8.6-50 MG per tablet, Take 1-2 tablets by mouth 2 times daily (Patient not taking: Reported on 10/18/2022), Disp: 30 tablet, Rfl: 0    Current Facility-Administered Medications:      collagenase clostrid histolyt (XIAFLEX) injection SOLR 0.58 mg, 0.58 mg, Injection, Once, MICKY Das MD     cyanocobalamin injection 1,000 mcg, 1,000 mcg, Intramuscular,  Q30 Days, Harrison Tse DO, 1,000 mcg at 10/13/22 1021    PROBLEM LIST  Patient Active Problem List    Diagnosis Date Noted     Nephrolithiasis 12/25/2012     Priority: High     Thrombocytopenia (H) 09/29/2022     Priority: Medium     ASCVD (arteriosclerotic cardiovascular disease) 09/30/2020     Priority: Medium     Ischemic cardiomyopathy 09/30/2020     Priority: Medium     Non-recurrent bilateral inguinal hernia without obstruction or gangrene 09/02/2020     Priority: Medium     Added automatically from request for surgery [id number]       History of pulmonary embolism 09/01/2020     Priority: Medium     Dupuytren's contracture of left hand 12/04/2018     Priority: Medium     S/P PTCA (percutaneous transluminal coronary angioplasty) 03/14/2018     Priority: Medium     Urinary retention with incomplete bladder emptying 08/20/2017     Priority: Medium     Orthostatic hypotension 08/19/2017     Priority: Medium     Near syncope 08/17/2017     Priority: Medium     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 Provider to review       CKD (chronic kidney disease) stage 3, GFR 30-59 ml/min (H) 05/05/2013     Priority: Medium     Pain 12/25/2012     Priority: Medium     Advance Care Planning 08/16/2012     Priority: Medium     Advance Care Planning: Receipt of ACP document:  Received: Health Care Directive which was witnessed or notarized on 3-12-13.  Document previously scanned on 3-13-13.  Validation form completed  and  scanned.  Code Status reflects choices in most recent ACP document.  Confirmed/documented designated decision maker(s). See permanent comments section of demographics in clinical tab. View document(s) and details by clicking on code status. Added by Sayra Maguire on 8/25/2014.  .Patient states has Advance Directive and will bring in a copy to clinic. 8/16/2012          HYPERLIPIDEMIA LDL GOAL <100 10/31/2010     Priority: Medium     GERD (gastroesophageal reflux disease) 03/26/2010     Priority: Medium     Mixed hyperlipidemia 03/24/2010     Priority: Medium     Closed fracture of calcaneus 01/06/2007     Priority: Medium     Calculus of gallbladder 01/09/2003     Priority: Medium     Problem list name updated by automated process. Provider to review       Chest pain      Priority: Medium     Problem list name updated by automated process. Provider to review       Pain in joint, shoulder region      Priority: Medium     Paroxysmal atrial fibrillation (H) 08/26/2010     Priority: Low     Gastric cancer (H) 08/05/2010     Priority: Low     (Problem list name updated by automated process. Provider to review and confirm.)         PERTINENT PAST MEDICAL HISTORY:  Past Medical History:   Diagnosis Date     Blood transfusion      Chronic gastric ulcer without mention of hemorrhage, perforation, without mention of obstruction     Ulcer, Gastric     Gastric cancer (H)      Hemorrhage of gastrointestinal tract, unspecified 01/13/10    [facility]     Hypoglycemia, unspecified 1/26/2015     Measles without mention of complication     Measles     Mixed hyperlipidemia     Hyperlipidemia     Mumps without mention of complication     Mumps     Pulmonary embolism (H) Sept 4, 2010     Urinary calculus, unspecified     Renal stones     Varicella without mention of complication     Chickenpox       PREVIOUS SURGERIES:  Past Surgical History:   Procedure Laterality Date     ABDOMEN SURGERY       ARTHROPLASTY KNEE Left 8/15/2017     Procedure: ARTHROPLASTY KNEE;  Left total knee replacement;  Surgeon: Jonathan Cardona DO;  Location: [facility]     CHOLECYSTECTOMY, LAPOROSCOPIC  10/6/2008    Cholecystectomy, Laparoscopic     COLONOSCOPY  1/14/10    [facility]     COLONOSCOPY  05/25/10     CYSTOSCOPY, RETROGRADES, EXTRACT STONE, INSERT STENT, COMBINED  12/25/2012    Procedure: COMBINED CYSTOSCOPY, RETROGRADES, EXTRACT STONE, INSERT STENT;  Cystoscopy, Left Stent Placement, left retrograde pylogram, uc;  Surgeon: Amador Sanchez MD;  Location: [facility]     ESOPHAGOSCOPY, GASTROSCOPY, DUODENOSCOPY (EGD), COMBINED  8/16/2011    Procedure:COMBINED ESOPHAGOSCOPY, GASTROSCOPY, DUODENOSCOPY (EGD), BIOPSY SINGLE OR MULTIPLE; Surgeon:TONY GARCIA; Location:[facility]     GENERAL SURGERY                           DATE:  07/07/10    Gastrectomy     HC REPAIR ROTATOR CUFF,ACUTE  3/21/2005    Right     HERNIORRHAPHY INGUINAL Left 9/25/2020    Procedure: Open left inguinal hernia repair with mesh;  Surgeon: Tanvir Lorenzo MD;  Location: [facility]     HERNIORRHAPHY INGUINAL Right 12/30/2021    Procedure: right open inguinal hernia repair with mesh;  Surgeon: Tanvir Lorenzo MD;  Location: [facility]     LASER HOLMIUM LITHOTRIPSY URETER(S), INSERT STENT, COMBINED  1/5/2013    Procedure: COMBINED CYSTOSCOPY, URETEROSCOPY, LASER HOLMIUM LITHOTRIPSY URETER(S), INSERT STENT;  Bilateral  Cystoscopy, Bilateral Ureteroscopy, Bilateral retrogrades and  placement of ureteral stent right side. Laser Holmium Lithotripsy  and Exchange  of stent left side;  Surgeon: Tone Morejon MD;  Location: [facility]     LASER HOLMIUM LITHOTRIPSY URETER(S), INSERT STENT, COMBINED  1/30/2013    Procedure: COMBINED CYSTOSCOPY, URETEROSCOPY, LASER HOLMIUM LITHOTRIPSY URETER(S), INSERT STENT;  Right Ureteroscopy; Stone basketing; Right Stent exchange and  removal of left stent.;  Surgeon: Tone Morejon MD;  Location: [facility]     RELEASE DUPUYTRENS CONTRACTURE Left  2018    Procedure: Release Left Hand Dupuytrens Contracture;  Surgeon: Lucio Omalley MD;  Location: [facility]     VIDEO CAPSULE ENDOSCOPY  01/15/10    [facility] EXCIS STOMACH ULCER,LESN;LOCAL       [facility] COLONOSCOPY W/WO BRUSH/WASH  2006     [facility] UGI ENDOSCOPY, SIMPLE EXAM  1/13/10    [facility] UGI ENDOSCOPY, SIMPLE EXAM  05/25/10       SOCIAL HISTORY:  Social History     Socioeconomic History     Marital status:      Spouse name: Pily     Number of children: 3     Years of education: 10     Highest education level: Not on file   Occupational History     Occupation: Construction     Comment: Retired   Tobacco Use     Smoking status: Some Days     Packs/day: 0.00     Years: 20.00     Pack years: 0.00     Types: Pipe, Cigarettes     Last attempt to quit: 1998     Years since quittin.8     Smokeless tobacco: Never     Tobacco comments:     Rare pipe   Substance and Sexual Activity     Alcohol use: No     Alcohol/week: 0.0 standard drinks     Drug use: No     Sexual activity: Not Currently   Other Topics Concern      Service No     Blood Transfusions Yes     Comment: 1960's     Caffeine Concern Yes     Comment: 3 cups a day     Occupational Exposure No     Hobby Hazards No     Sleep Concern Yes     Comment: Doesn't sleep well, has hard time falling asleep     Stress Concern No     Weight Concern Yes     Comment: Underweight     Special Diet No     Back Care No     Exercise No     Bike Helmet No     Comment: Does not ride a bike     Seat Belt Yes     Self-Exams No     Parent/sibling w/ CABG, MI or angioplasty before 65F 55M? No   Social History Narrative     Not on file     Social Determinants of Health     Financial Resource Strain: Not on file   Food Insecurity: Not on file   Transportation Needs: Not on file   Physical Activity: Not on file   Stress: Not on file   Social Connections: Not on file   Intimate Partner Violence: Not on file   Housing  Stability: Not on file       FAMILY HISTORY:  Family History   Problem Relation Age of Onset     Alzheimer Disease Father      Cardiovascular Mother      C.A.D. Mother      Heart Disease Mother      Cardiovascular Brother      Heart Disease Brother      DENISE.A.[email or web address] JAVIER. Brother        Past/family/social history reviewed and no changes    PHYSICAL EXAMINATION:  Constitutional: aaox3, cooperative, pleasant, not dyspneic/diaphoretic, no acute distress  Vitals reviewed: There were no vitals taken for this visit.  Wt:   Wt Readings from Last 2 Encounters:   10/13/22 72.6 kg (160 lb)   10/13/22 78.5 kg (173 lb)

## 2022-10-18 NOTE — PROGRESS NOTES
Art is a 88 year old who is being evaluated via a billable video visit.      How would you like to obtain your AVS? Mail a copy  If the video visit is dropped, the invitation should be resent by: Text to cell phone: [phone]  Will anyone else be joining your video visit? No        Video-Visit Details    Video Start Time: 12:40    Type of service:  Video Visit    Video End Time:1:00PM    Originating Location (pt. Location): Home    Distant Location (provider location):  On-site    Platform used for Video Visit: Lila

## 2022-10-18 NOTE — PATIENT INSTRUCTIONS
It was a pleasure taking care of you today. I've included a brief summary of our discussion and care plan from today's visit below.  Please review this information with your primary care provider.  _______________________________________________________________________    My recommendations are summarized as follows:  Make an appointment with your primary care physician in the near future to get refills of your anticoagulant (rivaroxaban).  Start Spironolactone 50 mg daily (this is a diuretic) - to help with ascites (fluid in the abdomen). Please work with your primary care physician about adjusting this medication and monitoring your electrolytes and kidney function.  Please contact us if you develop worsening abdominal pain or fluid retention.  Please contact your us and your local emergency room if you have bleeding.  I am suggesting a repeat abdominal CT scan in two months, with a Hepatology (Liver) clinic appointment after that.  We have asked for a consultation with a hematologist to evaluate your for a possible blood clotting disorder. You will be contacted about scheduling this.  We would like you to go to your local clinic for blood tests in two weeks and in two months (these have been ordered). Your primary physician may order additional blood tests.        If you need any follow-up appointments, please use the following phone numbers below.    To schedule or reschedule a follow-up GI appointment, call [phone] option 1          _______________________________________________________________________    Please be in touch if there are any further questions that arise following today's visit.  There are multiple ways to contact your gastroenterology care team.      During business hours, you may reach you hepatology care team at [phone].      You can always send a secure message through IORevolution. IORevolution messages are answered by your nurse or doctor typically within 24 hours. Please allow extra  time on weekends and holidays.     What is Kelkoohart?  IngBoo is a secure way for you to access all of your healthcare records from the [facility].  It is a web based computer program, so you can sign on to it from any location.  It also allows you to send secure messages to your care team.  I recommend signing up for [employer] access if you have not already done so and are comfortable with using a computer.     For urgent/emergent questions after business hours, you may reach the on-call GI Fellow by contacting the [facility]  at [phone].     How will I get the results of any tests ordered?    You will receive all of your results.  If you have signed up for Kelkoohart, any tests ordered at your visit will be available to you after your physician reviews them.  Typically this takes 1-2 weeks.  If there are urgent results that require a change in your care plan, your physician or nurse will call you to discuss the next steps.      Thank you for choosing [facility]!       Sincerely,    Bryan Ramos MD  Professor of Medicine  [facility]  Division of Gastroenterology, Hepatology, and Nutrition

## 2022-10-19 NOTE — TELEPHONE ENCOUNTER
Per Dr. Ramos, pt needs CMP in 2 weeks and CMP and CT of abdomen in mid December. Orders placed.     Called pt and reviewed plan of care. Pt will get labs drawn at [facility] in 2 weeks. Provided pt with scheduling number for imaging at [facility] and pt will call and schedule CT. Letter also sent.

## 2022-10-19 NOTE — LETTER
October 19, 2022       TO: [address]  [address] [address]       Dear ,    Dr. Ramos would like you to get lab work drawn in 2 weeks (approximately 11/2) at your local  clinic and the order for this lab is in your chart. Dr. Ramos would also like you to get labs and a CT of your abdomen done mid December. You can call [phone] to schedule the CT at [facility]. If you have any questions regarding this letter, please call the hepatology clinic at [phone].     Thank you,    Tri Truong RN, BSN  [facility] Medicine Specialty [facility]  Care Coordinator Hepatology/ Specialty Program

## 2022-10-24 NOTE — TELEPHONE ENCOUNTER
Attempted to reach patient about needing to change his appointment with Dr. Cogan. Holding 11/29 at 9a for him. There was no answer and no voicemail could be left

## 2022-10-26 NOTE — TELEPHONE ENCOUNTER
[facility] Call Center    Phone Message    May a detailed message be left on voicemail: yes     Reason for Call: Other: [facility] was reaching out because their radiologist, , is looking to speak with  about the mutual patient's upcoming CT scan. I attempted to call provider to provider line that transferred me to the wrong place and attempted to call clinic with no luck. Please call [phone] to speak with  to address the questions he has, thank you!     Action Taken: Message routed to:  [facility] CS Gastro    Travel Screening: Not Applicable

## 2022-10-26 NOTE — TELEPHONE ENCOUNTER
ADDENDUM:        10/28/22 at 10:20 am. Liana called requesting a call back from Dr Ramos's nurse in regards to patient's orders and appointment. Please call her back at [phone]. m [facility] Call Center    Phone Message    May a detailed message be left on voicemail: yes     Reason for Call: Other:      Liana Emerson, director [place] imaging dept,  is requesting a call back to clarify the pt's orders and appointment.     Action Taken: Message routed to:  [facility]  GI    Travel Screening: Not Applicable

## 2022-10-28 NOTE — TELEPHONE ENCOUNTER
Connected with Liana and discussed the order for the CT of the abdomen and intended timing. Liana verbalized understanding.

## 2022-11-01 NOTE — PROGRESS NOTES
Art is a 88 year old who is being evaluated via a billable telephone visit.  Audrey Maguire (daughter) was on the call.    What phone number would you like to be contacted at? [phone]  How would you like to obtain your AVS? Malachi  Phone call duration: 26 minutes      Vivien Hernandez    9:34 - 10:00 was the time of the call

## 2022-11-01 NOTE — PROGRESS NOTES
[facility]       Hepatology Clinic    Date of Service: 11/1/2022     Primary Care Provider: Harrison Tse    History of Present Illness     Mr. Holly presents for follow-up of extensive intra-abdominal venous thrombosis.  This visit was conducted by phone because he could not do a video visit today.  His daughter Audrey Maguire was also on the phone.    He had noted some increased pain over the weekend, but this is now settled to his recent baseline.  He has diffuse abdominal pain for about 1 hour after eating, and consequently has been eating less.  He continues to note some abdominal distention.    Because of a scheduling error, he had a repeat CT scan last week (see below).    He states that he is taking his rivaroxaban daily as instructed.      Past Medical History:  Past Medical History:   Diagnosis Date     Blood transfusion      Chronic gastric ulcer without mention of hemorrhage, perforation, without mention of obstruction     Ulcer, Gastric     Gastric cancer (H)      Hemorrhage of gastrointestinal tract, unspecified 01/13/10    [facility]     Hypoglycemia, unspecified 1/26/2015     Measles without mention of complication     Measles     Mixed hyperlipidemia     Hyperlipidemia     Mumps without mention of complication     Mumps     Pulmonary embolism (H) Sept 4, 2010     Urinary calculus, unspecified     Renal stones     Varicella without mention of complication     Chickenpox       Patient Active Problem List   Diagnosis     Chest pain     Pain in joint, shoulder region     Calculus of gallbladder     Closed fracture of calcaneus     Mixed hyperlipidemia     GERD (gastroesophageal reflux disease)     Gastric cancer (H)     Paroxysmal atrial fibrillation (H)     HYPERLIPIDEMIA LDL GOAL <100     Advance Care Planning     Pain     Nephrolithiasis     CKD (chronic kidney disease) stage 3, GFR 30-59 ml/min (H)     Hypoglycemia     Pernicious anemia      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     Postsurgical dumping syndrome     Primary osteoarthritis of both knees     Bilateral knee pain     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/17)     Near syncope     Orthostatic hypotension     Urinary retention with incomplete bladder emptying     Dupuytren's contracture of left hand     History of pulmonary embolism     Non-recurrent bilateral inguinal hernia without obstruction or gangrene     S/P PTCA (percutaneous transluminal coronary angioplasty)     ASCVD (arteriosclerotic cardiovascular disease)     Ischemic cardiomyopathy     Thrombocytopenia (H)       Surgical History:  Past Surgical History:   Procedure Laterality Date     ABDOMEN SURGERY       ARTHROPLASTY KNEE Left 8/15/2017    Procedure: ARTHROPLASTY KNEE;  Left total knee replacement;  Surgeon: Jonathan Cardona DO;  [facility]: [facility]     CHOLECYSTECTOMY, LAPOROSCOPIC  10/6/2008    Cholecystectomy, Laparoscopic     COLONOSCOPY  1/14/10    [facility]     COLONOSCOPY  05/25/10     CYSTOSCOPY, RETROGRADES, EXTRACT STONE, INSERT STENT, COMBINED  12/25/2012    Procedure: COMBINED CYSTOSCOPY, RETROGRADES, EXTRACT STONE, INSERT STENT;  Cystoscopy, Left Stent Placement, left retrograde pylogram, uc;  Surgeon: Amador Sanchez MD;  Location: [facility]     ESOPHAGOSCOPY, GASTROSCOPY, DUODENOSCOPY (EGD), COMBINED  8/16/2011    Procedure:COMBINED ESOPHAGOSCOPY, GASTROSCOPY, DUODENOSCOPY (EGD), BIOPSY SINGLE OR MULTIPLE; Surgeon:TONY GARCIA; Location[facility]     GENERAL SURGERY                           DATE:  07/07/10    Gastrectomy     HC REPAIR ROTATOR CUFF,ACUTE  3/21/2005    Right     HERNIORRHAPHY INGUINAL Left 9/25/2020    Procedure: Open left inguinal hernia repair with mesh;  Surgeon: Tanvir Lorenzo MD;  Location[facility]     HERNIORRHAPHY INGUINAL Right 12/30/2021    Procedure: right open inguinal hernia repair with mesh;  Surgeon: Tanvir Lorenzo  MD;  Location[facility]     LASER HOLMIUM LITHOTRIPSY URETER(S), INSERT STENT, COMBINED  2013    Procedure: COMBINED CYSTOSCOPY, URETEROSCOPY, LASER HOLMIUM LITHOTRIPSY URETER(S), INSERT STENT;  Bilateral  Cystoscopy, Bilateral Ureteroscopy, Bilateral retrogrades and  placement of ureteral stent right side. Laser Holmium Lithotripsy  and Exchange  of stent left side;  Surgeon: Tone Morejon MD;  Location[facility]     LASER HOLMIUM LITHOTRIPSY URETER(S), INSERT STENT, COMBINED  2013    Procedure: COMBINED CYSTOSCOPY, URETEROSCOPY, LASER HOLMIUM LITHOTRIPSY URETER(S), INSERT STENT;  Right Ureteroscopy; Stone basketing; Right Stent exchange and  removal of left stent.;  Surgeon: Tone Morejon MD;  Location: [facility]     RELEASE DUPUYTRENS CONTRACTURE Left 2018    Procedure: Release Left Hand Dupuytrens Contracture;  Surgeon: Lucio Omalley MD;  Location: [facility]     VIDEO CAPSULE ENDOSCOPY  01/15/10    [facility] EXCIS STOMACH ULCER,LESN;LOCAL       [email or web address] COLONOSCOPY W/WO BRUSH/WASH  2006     [email or web address] UGI ENDOSCOPY, SIMPLE EXAM  1/13/10    [facility] UGI ENDOSCOPY, SIMPLE EXAM  05/25/10       Social History:  Social History     Tobacco Use     Smoking status: Some Days     Packs/day: 0.00     Years: 20.00     Pack years: 0.00     Types: Pipe, Cigarettes     Last attempt to quit: 1998     Years since quittin.8     Smokeless tobacco: Never     Tobacco comments:     Rare pipe   Substance Use Topics     Alcohol use: No     Alcohol/week: 0.0 standard drinks     Drug use: No       Family History:  Family History   Problem Relation Age of Onset     Alzheimer Disease Father      Cardiovascular Mother      C.A.D. Mother      Heart Disease Mother      Cardiovascular Brother      Heart Disease Brother      C.A.D. Brother        Medications:  Current Outpatient Medications   Medication     atorvastatin (LIPITOR) 40 MG tablet     bisacodyl (DULCOLAX) 10 MG  suppository     blood glucose (CONTOUR NEXT TEST) test strip     blood glucose monitoring (ROBERT MICROLET) lancets     clopidogrel (PLAVIX) 75 MG tablet     cyanocobalamin (VITAMIN B12) 1000 MCG/ML injection     diltiazem ER COATED BEADS (CARTIA XT) 120 MG 24 hr capsule     furosemide (LASIX) 20 MG tablet     polyethylene glycol (MIRALAX) 17 GM/Dose powder     rivaroxaban ANTICOAGULANT (XARELTO) 20 MG TABS tablet     simethicone (MYLICON) 80 MG chewable tablet     spironolactone (ALDACTONE) 50 MG tablet     ciprofloxacin (CIPRO) 500 MG tablet     senna-docusate (SENOKOT-S;PERICOLACE) 8.6-50 MG per tablet     tamsulosin (FLOMAX) 0.4 MG capsule     Current Facility-Administered Medications   Medication     collagenase clostrid histolyt (XIAFLEX) injection SOLR 0.58 mg     cyanocobalamin injection 1,000 mcg           Objective:             Physical Exam  This was a phone visit.    Labs and Diagnostic tests:  Lab Results   Component Value Date     10/26/2022     09/15/2020    POTASSIUM 4.5 10/26/2022    POTASSIUM 4.3 09/15/2020    CHLORIDE 109 10/26/2022    CHLORIDE 105 09/15/2020    CO2 25 10/26/2022    CO2 27 09/15/2020    BUN 19 10/26/2022    BUN 24 09/15/2020    CR 1.24 10/26/2022    CR 1.19 09/15/2020     Lab Results   Component Value Date    BILITOTAL 1.0 10/26/2022    BILITOTAL 0.5 09/16/2019    ALT 35 10/26/2022    ALT 19 09/16/2019    AST 56 10/26/2022    AST 25 09/16/2019    ALKPHOS 171 10/26/2022    ALKPHOS 88 09/16/2019     Lab Results   Component Value Date    ALBUMIN 3.2 10/26/2022    ALBUMIN 3.7 09/16/2019    PROTTOTAL 7.1 10/26/2022    PROTTOTAL 6.8 09/16/2019      Lab Results   Component Value Date    WBC 5.0 10/13/2022    WBC 5.6 09/15/2020    HGB 11.9 10/13/2022    HGB 12.4 09/15/2020    MCV 88 10/13/2022    MCV 91 09/15/2020     10/13/2022     09/15/2020     Lab Results   Component Value Date    INR 1.08 08/16/2011     Repeat CT 10-26-22  IMPRESSION:   1.  Extensive portal  venous thrombosis including the main portal vein,  right portal vein, left portal vein, splenic vein, and proximal aspect  of the superior mesenteric vein. The intrahepatic main, right, and  left portal vein thrombosis appears to have extended further into the  liver as compared to the most recent prior CT dated 10/13/2022. No  other change in the portal venous thrombosis is identified.  2.  Hypoattenuating prominence in the tail of the pancreas is again  noted and could represent splenic vein thrombosis, acute pancreatitis  or neoplastic hypoattenuating pancreatic lesion measuring up to  approximately 6.1 x 2.6 cm. Attention this region on future exams is  recommended. This is a new finding since the prior CT dated 9/16/2019.  3.  Extensive atherosclerosis including severe coronary artery  calcifications and/or stents are again noted.  4.  Large amount ascites is again noted.  5.  Bilateral pleural fluid collections may have slightly increased in  size since the prior study.  6.  Hypoattenuating lesion measuring approximately 50 Hounsfield units  in density is seen in the posterolateral mid right kidney, similar in  size to the prior study. This could represent a hyperdense cyst versus  solid lesion. Attention to this lesion on future exams is recommended.  Otherwise bilateral renal cysts are stable.  7.  Stable scarring superior pole left kidney. Stable other bilateral  renal cysts are again noted.        Assessment and Plan:    1.  Extensive intra-abdominal venous thrombosis.  The patient, his daughter, and I had a long discussion today.  We again reviewed the fact that this could be a large blood clot, although the triggering factors are not clear.  He is currently on rivaroxaban to try to prevent growth of the clot.  Under the best circumstances, he would develop venous collaterals over time, and that would hopefully improve his ascites and postprandial abdominal pain.    Another possibility is that this is a  malignant thrombus.  This could be a (late) manifestation of his gastric cancer, or could reflect a new neoplasm.  Of note, his pancreas does seem trujillo than on prior scans in 2015.    We discussed several options.  One option is to forego further diagnostic tests, and refer him to palliative care for treatment of symptoms (and possibly hospice care over time).    Another option would be to try to diagnose whether this is cancer.  He clearly stated that he would like to try to make this diagnosis, although I explained that there may be no good therapeutic options if this represents malignancy.  I explained that the testing (such as EUS) does carry risks, and may not be able to make a clear diagnosis at this point in time because of his altered anatomy and other issues.  Of note, we did discuss his most recent CT at liver tumor conference last Friday, and it was thought that an MRI might provide more insight.    The patient wants to progress with diagnostic studies, although I explained several times that this may not alter his course.  I will order an MRI scan to be done in [place], and then we will tentatively order an EUS to be done at the [facility] after the MRI is done.  Again, because of his anatomy, the EUS may not be sufficient to diagnose or rule out cancer. He understands that the EUS entails risk.    When I met him the first time (on a video visit), I instructed him to make appointment with his primary care clinic regarding prescription of his anticoagulant, monitoring his electrolytes/renal function and diuretics, and other issues.  He has not made this appointment.  I strongly encouraged him to get into see his primary care clinic in the very near future to regarding these issues and to clarify his wishes regarding aggressive care (such as DNR/DNI issues) with his family members.  His daughter stated that she would help get this done.    I also offered a palliative care consult, but he was not ready  to undergo this at present.  His primary physicians may wish to discuss this with him again.    The patient and his daughter understand that there are risks of sudden deterioration from either the thrombosis, bleeding, or other issues.      Follow Up:  To be determined after testing.    About 44 minutes spent with patient, reviewing results, and coordinating care.        Bryan Ramos MD  Professor of Medicine  [facility]  Division of Gastroenterology, Hepatology, and Nutrition

## 2022-11-02 NOTE — TELEPHONE ENCOUNTER
Advanced [facility]oscopy     Referring provider: Dr. Ramos    Referred to: [facility]     Provider Requested: Dr. Judge     Referral Received: 11/1/22     Records received: Epic     Images received: PACS    Evaluation for: EUS   Epigastric pain   Portal vein thrombosis   Malignant neoplasm of stomach, unspecified location (H)      Clinical History (per RN review):     Per Dr. Ramos clinic note 11/1:  Assessment and Plan:     1.  Extensive intra-abdominal venous thrombosis.  The patient, his daughter, and I had a long discussion today.  We again reviewed the fact that this could be a large blood clot, although the triggering factors are not clear.  He is currently on rivaroxaban to try to prevent growth of the clot.  Under the best circumstances, he would develop venous collaterals over time, and that would hopefully improve his ascites and postprandial abdominal pain.     Another possibility is that this is a malignant thrombus.  This could be a (late) manifestation of his gastric cancer, or could reflect a new neoplasm.  Of note, his pancreas does seem trujillo than on prior scans in 2015.     We discussed several options.  One option is to forego further diagnostic tests, and refer him to palliative care for treatment of symptoms (and possibly hospice care over time).     Another option would be to try to diagnose whether this is cancer.  He clearly stated that he would like to try to make this diagnosis, although I explained that there may be no good therapeutic options if this represents malignancy.  I explained that the testing (such as EUS) does carry risks, and may not be able to make a clear diagnosis at this point in time because of his altered anatomy and other issues.  Of note, we did discuss his most recent CT at liver tumor conference last Friday, and it was thought that an MRI might provide more insight.     The patient wants to progress with diagnostic studies, although I  explained several times that this may not alter his course.  I will order an MRI scan to be done in [place], and then we will tentatively order an EUS to be done at the [facility] after the MRI is done.  Again, because of his anatomy, the EUS may not be sufficient to diagnose or rule out cancer. He understands that the EUS entails risk.     When I met him the first time (on a video visit), I instructed him to make appointment with his primary care clinic regarding prescription of his anticoagulant, monitoring his electrolytes/renal function and diuretics, and other issues.  He has not made this appointment.  I strongly encouraged him to get into see his primary care clinic in the very near future to regarding these issues and to clarify his wishes regarding aggressive care (such as DNR/DNI issues) with his family members.  His daughter stated that she would help get this done.     I also offered a palliative care consult, but he was not ready to undergo this at present.  His primary physicians may wish to discuss this with him again.     The patient and his daughter understand that there are risks of sudden deterioration from either the thrombosis, bleeding, or other issues.    MRI Abdomen w/wo contrast 11/1:                                                               IMPRESSION:  1.  An ill-defined 6.7 x 1.5 cm focus of hypoenhancement in the tail the pancreas. The differential diagnosis includes neoplasm and chronic pancreatitis. Recommend consideration of endoscopic ultrasound.  2.  Multiple pancreatic cysts in the body and tail measuring up to 1.1 cm.  3.  Abrupt caliber change of the pancreatic duct in the tail.  4.  Stable thrombosis of the portal, splenic and superior mesenteric veins.  5.  Moderate ascites.  6.  Benign renal cysts.      Repeat CT 10-26-22  IMPRESSION:   1.  Extensive portal venous thrombosis including the main portal vein,  right portal vein, left portal vein, splenic vein, and proximal  aspect  of the superior mesenteric vein. The intrahepatic main, right, and  left portal vein thrombosis appears to have extended further into the  liver as compared to the most recent prior CT dated 10/13/2022. No  other change in the portal venous thrombosis is identified.  2.  Hypoattenuating prominence in the tail of the pancreas is again  noted and could represent splenic vein thrombosis, acute pancreatitis  or neoplastic hypoattenuating pancreatic lesion measuring up to  approximately 6.1 x 2.6 cm. Attention this region on future exams is  recommended. This is a new finding since the prior CT dated 9/16/2019.  3.  Extensive atherosclerosis including severe coronary artery  calcifications and/or stents are again noted.  4.  Large amount ascites is again noted.  5.  Bilateral pleural fluid collections may have slightly increased in  size since the prior study.  6.  Hypoattenuating lesion measuring approximately 50 Hounsfield units  in density is seen in the posterolateral mid right kidney, similar in  size to the prior study. This could represent a hyperdense cyst versus  solid lesion. Attention to this lesion on future exams is recommended.  Otherwise bilateral renal cysts are stable.  7.  Stable scarring superior pole left kidney. Stable other bilateral  renal cysts are again noted.        MD review date:   MD Decision for clinic consultation/Orders:            Referral updates/Patient contacted:

## 2022-11-03 NOTE — PROGRESS NOTES
"   EMR reviewed including:             Complaint, History of Chief Complaint, Corresponding Review of Systems, and Complaint Specific Physical Examination.    #1   Review recent MRI,  Discussed possible diagnostic work-up and options  Discussed potential treatment options for presumed malignancy.  Discussed these in reference to his coronary artery disease and overall declining health.  Patient currently maintaining weight intermittently.  Denies significant abdominal pain.  Continues anticoagulant therapy.  Has been working with gastroenterology regarding treatment options.      #2   Follow-up on coronary artery disease  No recent chest pain.  Has not sensed any significant arrhythmia/PAF.  Continues atorvastatin, Xarelto, Plavix, diltiazem.  Maintains minimal edema with Lasix and spironolactone combination.  Denies significant shortness of breath with recumbency.        Exam:   LUNGS: clear bilaterally, airflow is brisk, no intercostal retraction or stridor is noted. No coughing is noted during visit.   HEART:  regular without rubs, clicks, gallops, or murmurs. PMI is nondisplaced. Upstrokes are brisk. S1,S2 are heard.   NEURO: Pt is alert and appropriate. No neurologic lateralization is noted. Cranial nerves 2-12 are intact. Peripheral sensory and motor function are grossly normal.         Patient has been interviewed, applicable history and applied review of systems have been performed.    Vital Signs:   /82 (BP Location: Right arm, Patient Position: Chair, Cuff Size: Adult Regular)   Pulse 72   Temp 97.3  F (36.3  C) (Temporal)   Resp 18   Ht 1.822 m (5' 11.75\")   Wt 73.5 kg (162 lb)   SpO2 98%   BMI 22.12 kg/m        Decision Making    Problem and Complexity     1. Pancreatic mass  Discussed the benefits of diagnostic work-up versus the benefits of conservative therapy and comfort measures    2. Portal vein thrombosis  Continue anticoagulation    3. Splenic vein thrombosis  As above      4. " Superior mesenteric vein thrombosis (H)  As above      5. Paroxysmal atrial fibrillation (H)  As above  .  Continue rate control/calcium channel blocker  - rivaroxaban ANTICOAGULANT (XARELTO) 20 MG TABS tablet; Take 1 tablet (20 mg) by mouth daily (with dinner)  Dispense: 30 tablet; Refill: 0    6. Malignant neoplasm of stomach, unspecified location (H)  Status post Faizan-en-Y.  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 ASCVD (arteriosclerotic cardiovascular disease)  Stable    9. S/P PTCA (percutaneous transluminal coronary angioplasty)  Stable at this time                                FOLLOW UP   I have asked the patient to make an appointment for followup with either:  1.  Me,  2.  The patient's preferred panel specialist.          Given the patient's advanced age, medical history, and potential intolerance to any treatment option for pancreatic malignancy, my personal recommendation would be to consider comfort and conservative therapy.  Patient and family will take this under consideration.      I have carefully explained the diagnosis and treatment options to the patient.  The patient has displayed an understanding of the above, and all subsequent questions were answered.      DO ELAINE Powell    Portions of this note were produced using Lalalama  Although every attempt at real-time proof reading has been made, occasional grammar/syntax errors may have been missed.        Time spent With/On Patient  Length of time   45 minutes      Chart reviewed  yes    Review of outside records yes    Review of test results yes    Interpretation of results yes     Patient visit  yes  Documentation  no  Discussion with family yes  Discussion with providers no

## 2022-11-07 NOTE — PROGRESS NOTES
Clinic Care Coordination Contact    Situation: Patient chart reviewed by care coordinator.    Background: PCP placed CC referral order.    Assessment: Per chart review, referral order placed for hospice care, therefore, patient not eligible for Care Coordination enrollment.    Plan/Recommendations: [facility] to reach out to patient/family.    Sunita Estrella, Nessa RN Care Coordinator  [facility]  (Covering for Lead RN Care Coordinator)

## 2022-11-07 NOTE — TELEPHONE ENCOUNTER
Patient needing hospice care and care coordination, family is calling in regards to this, patient is failing fast and is really unable to function at home.  Son (Sage) phone [phone] C2C on file and will need to be contacted with all upcoming appointments as well as hospice and care coordination.    Liset Jacques XRO/

## 2022-11-08 NOTE — TELEPHONE ENCOUNTER
RN spoke to pharmacy. Medication was sent to [facility] pharmacy yesterday 11/7 at 9:36am. [facility] did have this. RN tried to reach patient to inform but there is no answer.     TAZ FooteN, RN

## 2022-11-08 NOTE — TELEPHONE ENCOUNTER
Spoke with Audrey  To let her know that the referral was placed by the primary MD and they will  be getting a call to schedule

## 2022-11-08 NOTE — TELEPHONE ENCOUNTER
Prescription was sent 11/7/22 for #30 with 0 refills to [facility] in [place].  Pharmacy notified via E-Prescribe refusal.    Kathleen Duran, TAZN, RN

## 2022-11-09 NOTE — PROGRESS NOTES
Hepatology Staff     is (appropriately) concerned about performing an EUS in this patient given his age, medical problems, and prior gastrectomy (which may make biopsies not feasible).    When I spoke with the patient, I mentioned that it seems unlikely that a diagnosis of pancreatic cancer or PV invasion would lead to effective therapy, but he really wanted to proceed with testing.    We think it would be reasonable for him to see Oncology (perhaps Dr. Larose who treated his gastric CA) to review whether the risk of invasive diagnostic procedures is warranted if a cancer diagnosis would not likely result in effective therapy.    I would also appreciate it if his primary MD, Dr. Tse, would talk to the patient about goals of care, etc.    I think he should remain on the DOAC for now.

## 2022-11-09 NOTE — PROGRESS NOTES
Jose is a 89 year old who is being evaluated via a billable video visit.      How would you like to obtain your AVS? MyChart  If the video visit is dropped, the invitation should be resent by: Text to cell phone: [phone]  Will anyone else be joining your video visit?           Assessment & Plan   Problem List Items Addressed This Visit    None  Visit Diagnoses     Weight loss    -  Primary    Mesenteric venous thrombosis (H)        Pancreatic mass             Patient is an 89-year-old man who has gone downhill quickly with, mesenteric venous thrombosis and a pancreatic mass.  His son and daughter are asking me to review his medical record and see if there is anything that can be done to help him.  He has seen GI and the concern is he has a pancreatic neoplasm his labs have been stable other than some elevated LFTs he has had ascites and needed a paracentesis.    Patient is not eating much due to abdominal pain when he eats.  He is lucid at times but then gets confused.  He is not moving much.  GI saw him and did offer an EUS with biopsy after an MRCP.  The MRCP shows a significant pancreatic mass.  The question is whether any treatment would help him.    I agree with him that hospice is an appropriate discussion tomorrow morning.  If he wants to focus on quality of life I would avoid the biopsy and just stay home with hospice.  If he is wanting quantity of life and full treatment he should then possibly be hospitalized and worked up further with endoscopy and get started on chemotherapy if this is a malignancy.               No follow-ups on file.    Shiv Estrella MD  [facility]    Kianna Garcia is a 89 year old not present as he is too weak, just his son Sage MALIK and daughter , presenting for the following health issues:  No chief complaint on file.      History of Present Illness       Reason for visit:  Recheck medication and weight loss    He eats 0-1 servings of fruits and  vegetables daily.He consumes 0 sweetened beverage(s) daily.He exercises with enough effort to increase his heart rate 9 or less minutes per day.  He exercises with enough effort to increase his heart rate 3 or less days per week.   He is taking medications regularly.             Review of Systems         Objective           Vitals:  No vitals were obtained today due to virtual visit.    Physical Exam   Not available                 Video-Visit Details    Video Start Time: 4:00    Type of service:  Video Visit    Video End Time:4:15 PM    Originating Location (pt. Location): Home        Distant Location (provider location):  On-site    Platform used for Video Visit: PushToTest

## 2023-11-28 NOTE — PROGRESS NOTES
Test results were discussed with the patient in the office during his visit.  DO ELAINE Powell   [NS_DeliveryAttending1_OBGYN_ALL_OB_FT:[id number]=],[NS_DeliveryRN_OBGYN_ALL_OB_FT:[id number]],[NS_DeliveryAssist1_OBGYN_ALL_OB_FT:[id number]] [NS_DeliveryAttending1_OBGYN_ALL_OB_FT:[email or web address]=],[NS_DeliveryRN_OBGYN_ALL_OB_FT:[id number]=],[NS_DeliveryAssist1_OBGYN_ALL_OB_FT:[id number]] [NS_DeliveryAttending1_OBGYN_ALL_OB_FT:[email or web address]=],[NS_DeliveryRN_OBGYN_ALL_OB_FT:[id number]=],[NS_DeliveryAssist1_OBGYN_ALL_OB_FT:[id number]=]

## 2024-05-28 NOTE — PROGRESS NOTES
Hepatology Staff    Please see the note by Dr. Erazo, the GI fellow.    This was a video visit. The patient was accompanied by his son.    The patient has a history of a total gastrectomy for gastric cancer in 2010.  This was an aggressive-appearing tumor histologically, and was T4, N0, M0.  He subsequently saw Dr. Larose for chemotherapy.  He has not had apparent evidence of recurrence.    About 2 to 3 weeks ago, the patient developed postprandial diffuse abdominal discomfort as well as abdominal swelling.  A CT scan was obtained last week, showing extensive thrombosis in the portal vein.SMV, and splenic vein.  We reviewed his CT at liver tumor conference, and there is no clear evidence of malignancy.  He did have ascites, and paracentesis revealed that this was consistent with a transudate.  The fluid was sent for cytology, but this apparently has not been evaluated.    I spoke with Dr. Ghosh in the emergency room, and we elected to place him on rivaroxaban.  I would suggest that he remain on this for at least 6 months (based on his repeat imaging studies).  I have asked the patient to contact Dr. Tse, his primary physician, about getting refills of this medication.  I also explicitly requested that the patient stop his Plavix, since the combination with rivaroxaban would increase his risk of adverse bleeding events.    The cause of his extensive abdominal venous thrombosis is unclear to me.  It is possible that this represents malignancy, although we did not see any evidence of HCC.  There are case reports of gastric cancer causing portal venous occlusion, but it would seem quite unusual to have this present so far after his original cancer resection.  I think it is prudent to have this patient evaluated in the hematology clinic regarding work-up for potential pro thrombotic disorders.    It is possible that this patient has underlying liver disease, although I suspect that he does not have pre-existing  cirrhosis.    The patient is apparently on Lasix 20 mg daily.  We think it is reasonable for him to cautiously increase his diuretics to improve his ascites.  We have written a prescription for spironolactone 50 mg daily.  I am suggesting a repeat CMP in 2 weeks, and then his diuretics could be increased if he continues to have fluid accumulation (as long as he does not have evidence of worsening renal function or electrolyte disturbance).  I we will request that Dr. Tse help with adjusting his diuretic as needed.    We have ordered a CMP for two weeks, and a CMP and CT scan in two months - followed by a RTC with us after the CT is back. We should see him back sooner if he has worsening symptoms attributable to his PV thrombosis.    About 60 min spent today with patient, reviewing results, and coordinating care.     Yes

## 2024-07-24 NOTE — PROGRESS NOTES
"  SUBJECTIVE:   Remigio Holly is a 84 year old male who presents to clinic today for the following health issues:      Chief Complaint   Patient presents with     Finger     left ring finger. He is unable to bend it very well. He has had this for a long time but has got really bad over the summer.      Mass     left groin. Intermittent- once weekly he gets a \"hard jolt\" that lasts only about a minute                       Chief Complaint         The patient is a pleasant 85-year-old gentleman who presents today with 2 primary concerns.  First he has a significant contracture of his left ring finger which is constantly getting in the way when he is trying to work.  He has had this for years, it is minimally uncomfortable but does frequently snag when he tries to hold things.  He would like to have this fixed.  Second, he does have an occasional stabbing almost electrical-like jolt into his left groin area which last about a moment and then resolves completely.  No residual pain or masses noted.  He has no testicular pain with this, he has no gastrointestinal symptoms.  He has been having these symptoms on and off now for the last month or so.  He notes no recent trauma or injury.                       PAST, FAMILY,SOCIAL HISTORY:     Medical  History:   has a past medical history of Blood transfusion; Chronic gastric ulcer without mention of hemorrhage, perforation, without mention of obstruction; Gastric cancer (H); Hemorrhage of gastrointestinal tract, unspecified (01/13/10); Hypoglycemia, unspecified (1/26/2015); Measles without mention of complication; Mixed hyperlipidemia; Mumps without mention of complication; Pulmonary embolism (H) (Sept 4, 2010); Urinary calculus, unspecified; and Varicella without mention of complication.     Surgical History:   has a past surgical history that includes EXCIS STOMACH ULCER,LESN;LOCAL; REPAIR ROTATOR CUFF,ACUTE (3/21/2005); Colonoscopy w/wo [place] **Performed** " (01/31/2006); cholecystectomy, laporoscopic (10/6/2008); UPPER GI ENDOSCOPY,EXAM (1/13/10); colonoscopy (1/14/10); video capsule endoscopy (01/15/10); colonoscopy (05/25/10); UPPER GI ENDOSCOPY,EXAM (05/25/10); general surgery                           date: (07/07/10); Abdomen surgery; Esophagoscopy, gastroscopy, duodenoscopy (EGD), combined (8/16/2011); Cystoscopy, retrogrades, extract stone, insert stent, combined (12/25/2012); Laser holmium lithotripsy ureter(s), insert stent, combined (1/5/2013); Laser holmium lithotripsy ureter(s), insert stent, combined (1/30/2013); Arthroplasty knee (Left, 8/15/2017); and Release dupuytrens contracture (Left, 11/26/2018).     Social History:   reports that he has been smoking Pipe and Cigars.  He has been smoking about 0.00 packs per day for the past 20.00 years. He has never used smokeless tobacco. He reports that he does not drink alcohol or use illicit drugs.     Family History:  family history includes Alzheimer Disease in his father; C.A.D. in his brother and mother; Cardiovascular in his brother and mother; Heart Disease in his brother and mother.            MEDICATIONS  Current Outpatient Prescriptions   Medication Sig Dispense Refill     atorvastatin (LIPITOR) 40 MG tablet Take 40 mg by mouth  3     blood glucose monitoring (ROBERT CONTOUR NEXT) test strip Use to test blood sugar 1 times daily or as directed. 100 each 3     blood glucose monitoring (ROBERT MICROLET) lancets Use to test blood sugar 1 time daily or as directed. 1 Box 2     cyanocobalamin (VITAMIN B12) 1000 MCG/ML injection Inject 1 mL (1,000 mcg) into the muscle every 30 days 1 mL 11     MELATONIN PO Take 5 mg by mouth At Bedtime       metoprolol (LOPRESSOR) 25 MG tablet Take 1 tablet (25 mg) by mouth 2 times daily 180 tablet 3     tamsulosin (FLOMAX) 0.4 MG capsule Take 0.4 mg by mouth daily       acetaminophen (TYLENOL) 500 MG tablet Take 2 tablets (1,000 mg) by mouth every 8 hours       clopidogrel  (PLAVIX) 75 MG tablet Take 75 mg by mouth daily  3     HYDROcodone-acetaminophen (NORCO) 5-325 MG tablet Take 1-2 tablets by mouth every 4 hours as needed for moderate to severe pain 15 tablet 0     senna-docusate (SENOKOT-S;PERICOLACE) 8.6-50 MG per tablet Take 1-2 tablets by mouth 2 times daily 30 tablet 0         --------------------------------------------------------------------------------------------------------------------                              Review of Systems     LUNGS: Pt denies: cough, excess sputum, hemoptysis, or shortness of breath.   HEART: Pt denies: chest pain, arrhythmia, syncope, tachy or bradyarrhythmia.   GI: Pt denies: nausea, vomiting, diarrhea, constipation, melena, or hematochezia.   NEURO: Pt denies: seizures, strokes, diplopia, weakness, paraesthesias, or paralysis.   URINARY: Pt denies: frequency, urgency, incontinence, or hesitancy of urine. Denies hematuria, or flank pain.                                     Examination      /74  Pulse 82  Temp 97.5  F (36.4  C) (Temporal)  Resp 12  Wt 166 lb 7 oz (75.5 kg)  SpO2 97%  BMI 22.57 kg/m2   Constitutional: The patient appears to be in no acute distress. The patient appears to be adequately hydrated. No acute respiratory or hemodynamic distress is noted at this time.   LUNGS: clear bilaterally, airflow is brisk, no intercostal retraction or stridor is noted. No coughing is noted during visit.   HEART:  regular without rubs, clicks, gallops, or murmurs. PMI is nondisplaced. Upstrokes are brisk. S1,S2 are heard.   GI: Abdomen is soft, without rebound, guarding or tenderness. Bowel sounds are appropriate. No renal bruits are heard.   MS: Minimal crepitance is noted in the extremities. No deformity is present. Muscle strength is appropriate and equal bilaterally. No acute joint erythema or swelling is present.  Evidence of the tendon contracture is noted as described.  Currently, there is no mass, or irregularity noted in the  groin area.  I believe he may have had some mild abductor inflammation which can be worsened with certain motions or positioning.  It is not reproducible at this time.   GENITAL: Normal male genitalia is noted. Testicles are bilaterally present. No penile lesions are present.                                             Decision Making    1. Dupuytren's contracture of hand  We will set up orthopedics evaluation  - ORTHO  REFERRAL      2. Groin strain, left, initial encounter  Moist heat, over-the-counter anti-inflammatories as needed.                          FOLLOW UP   I have asked the patient to make an appointment for followup with me following the surgery or preoperatively if requested        I have carefully explained the diagnosis and treatment options to the patient.  The patient has displayed an understanding of the above, and all subsequent questions were answered.      DO ELAINE Powell    Portions of this note were produced using Dittit  Although every attempt at real-time proof reading has been made, occasional grammar/syntax errors may have been missed.            PACU

## 2024-10-25 NOTE — PLAN OF CARE
Problem: Goal Outcome Summary  Goal: Goal Outcome Summary  Outcome: Improving  Patient up in room with physical therapy this shift.  Pain well managed with oxycodone and Tylenol.  Tolerating PO well.  Lungs clear, HR regular.  Perez catheter removed this evening.  IV saline locked.         Followed protocol

## (undated) DEVICE — SU NUROLON 0 MO-7 CR 8X18" C541D

## (undated) DEVICE — BLADE SAW OSCIL/SAG STRK 11X90X1.19MM 4/2000 4111-119-090

## (undated) DEVICE — SU VICRYL 2-0 TIE 12X18" UND J111T

## (undated) DEVICE — GLOVE PROTEXIS W/NEU-THERA 7.5  2D73TE75

## (undated) DEVICE — SOL WATER IRRIG 1000ML BOTTLE 07139-09

## (undated) DEVICE — BLADE SAW SAGITTAL STRK 18X90X1.19MM HD SYS 6 6118-119-090

## (undated) DEVICE — DRSG XEROFORM 1X8"

## (undated) DEVICE — STOCKING SLEEVE COMPRESSION CALF MED

## (undated) DEVICE — SYR 50ML LL W/O NDL 309653

## (undated) DEVICE — SU VICRYL 2-0 CP-2 18" UND J762D

## (undated) DEVICE — CAST PADDING 2" WEBRIL UNSTERILE 1418

## (undated) DEVICE — DRAPE EXTREMITY W/ARMBOARD 29405

## (undated) DEVICE — DRAPE LAP TRANSVERSE 29421

## (undated) DEVICE — SU PROLENE 2-0 SHDA 36" 8523H

## (undated) DEVICE — SUCTION TIP YANKAUER ORTHO SUPER SUCKER EFS-111

## (undated) DEVICE — DRAPE SHEET REV FOLD 3/4 9349

## (undated) DEVICE — STPL SKIN 35W 059037

## (undated) DEVICE — SYR EAR BULB 2OZ

## (undated) DEVICE — SUCTION MANIFOLD NEPTUNE 2 SYS 4 PORT 0702-020-000

## (undated) DEVICE — BONE CEMENT MIXING BOWL W/SPATULA

## (undated) DEVICE — BNDG ELASTIC 6" DBL LENGTH UNSTERILE 6611-16

## (undated) DEVICE — TOURNIQUET HEMACLEAR 60 BLUE 30-60CM PRH-060-BL-01A

## (undated) DEVICE — GLOVE ESTEEM POWDER FREE 5.5  2D72PL55

## (undated) DEVICE — SU MONOCRYL 4-0 PS-2 18" UND Y496G

## (undated) DEVICE — PACK MINOR PROCEDURE CUSTOM

## (undated) DEVICE — GLOVE ESTEEM BLUE W/NEU-THERA 6.0  2D73PB60

## (undated) DEVICE — SYR 10ML PREFILLED 0.9% NACL INJ NOT STERILE 306500

## (undated) DEVICE — PACK HAND WRIST FOREARM CUSTOM

## (undated) DEVICE — SU VICRYL 3-0 SH 27" UND J416H

## (undated) DEVICE — SU VICRYL 2-0 TIE 12X18" J105H

## (undated) DEVICE — GLOVE PROTEXIS W/NEU-THERA 7.0  2D73TE70

## (undated) DEVICE — ADH SKIN CLOSURE PREMIERPRO EXOFIN 1.0ML 3470

## (undated) DEVICE — CAST PADDING 6" COTTON WEBRIL UNSTERILE 9086

## (undated) DEVICE — SOL NACL 0.9% IRRIG 3000ML BAG 07972-08

## (undated) DEVICE — DRAIN PENROSE 1/4X12" LATEX

## (undated) DEVICE — BLADE KNIFE SURG 15 371115

## (undated) DEVICE — NDL ECLIPSE 25GA 1.5"

## (undated) DEVICE — SOL NACL 0.9% IRRIG 1000ML BOTTLE 07138-09

## (undated) DEVICE — HOOD FLYTE 0408-800-000

## (undated) DEVICE — BNDG COBAN 6"X5YDS STERILE

## (undated) DEVICE — DRAPE CONVERTORS U-DRAPE 60X72" 8476

## (undated) DEVICE — PACK TOTAL JOINT STD LATEX

## (undated) DEVICE — GLOVE ESTEEM BLUE W/NEU-THERA 7.5  2D73PB75

## (undated) DEVICE — CAST PADDING 4" COTTON WEBRIL STERILE 9084S

## (undated) DEVICE — NDL ECLIPSE 18GA 1.5"

## (undated) DEVICE — NDL SPINAL 18GA 3.5" 405184

## (undated) DEVICE — SU MONOCRYL 3-0 PS-2 27" Y427H

## (undated) DEVICE — PEN MARKING SKIN

## (undated) DEVICE — STPL SKIN SUBCUTICULAR INSORB 2025

## (undated) DEVICE — SU VICRYL 0 OS-6 18" UND J754T

## (undated) DEVICE — GLOVE ESTEEM BLUE W/NEU-THERA 8.5  2D73PB85

## (undated) DEVICE — SU ETHILON 3-0 PS-2 18" 1669H

## (undated) DEVICE — BONE CLEANING TIP INTERPULSE  0210-010-000

## (undated) DEVICE — GLOVE PROTEXIS BLUE W/NEU-THERA 8.0  2D73EB80

## (undated) DEVICE — PREP CHLORAPREP 26ML TINTED ORANGE  260815

## (undated) DEVICE — NDL COUNTER 20CT 31142493

## (undated) DEVICE — SUCTION IRR SYSTEM W/O TIP INTERPULSE HANDPIECE 0210-100-000

## (undated) DEVICE — SOL NACL 0.9% IRRIG 1000ML BOTTLE 2F7124

## (undated) DEVICE — GLOVE PROTEXIS W/NEU-THERA 8.0  2D73TE80

## (undated) DEVICE — DRAPE U SPLIT 74X120" 29440

## (undated) DEVICE — CATH TRAY FOLEY 16FR DRAINAGE BAG STATLOCK 899916

## (undated) DEVICE — SYR 10ML FINGER CONTROL W/O NDL 309695

## (undated) DEVICE — DRAPE LAP W/ARMBOARD 29410

## (undated) DEVICE — PREP POVIDONE IODINE SWABS X3

## (undated) DEVICE — CAST PLASTER SPLINT 4X15" EXTRA FAST

## (undated) DEVICE — ESU PENCIL SMOKE EVAC W/ROCKER SWITCH 0703-047-000

## (undated) RX ORDER — DEXAMETHASONE SODIUM PHOSPHATE 10 MG/ML
INJECTION INTRAMUSCULAR; INTRAVENOUS
Status: DISPENSED
Start: 2018-11-26

## (undated) RX ORDER — PHENYLEPHRINE HCL IN 0.9% NACL 1 MG/10 ML
SYRINGE (ML) INTRAVENOUS
Status: DISPENSED
Start: 2018-11-26

## (undated) RX ORDER — PROPOFOL 10 MG/ML
INJECTION, EMULSION INTRAVENOUS
Status: DISPENSED
Start: 2020-09-25

## (undated) RX ORDER — FENTANYL CITRATE 50 UG/ML
INJECTION, SOLUTION INTRAMUSCULAR; INTRAVENOUS
Status: DISPENSED
Start: 2020-09-25

## (undated) RX ORDER — LIDOCAINE HYDROCHLORIDE 20 MG/ML
INJECTION, SOLUTION EPIDURAL; INFILTRATION; INTRACAUDAL; PERINEURAL
Status: DISPENSED
Start: 2018-11-26

## (undated) RX ORDER — LIDOCAINE HYDROCHLORIDE 20 MG/ML
INJECTION, SOLUTION EPIDURAL; INFILTRATION; INTRACAUDAL; PERINEURAL
Status: DISPENSED
Start: 2020-09-25

## (undated) RX ORDER — FENTANYL CITRATE 50 UG/ML
INJECTION, SOLUTION INTRAMUSCULAR; INTRAVENOUS
Status: DISPENSED
Start: 2017-08-15

## (undated) RX ORDER — PROPOFOL 10 MG/ML
INJECTION, EMULSION INTRAVENOUS
Status: DISPENSED
Start: 2017-08-15

## (undated) RX ORDER — ONDANSETRON 2 MG/ML
INJECTION INTRAMUSCULAR; INTRAVENOUS
Status: DISPENSED
Start: 2017-08-15

## (undated) RX ORDER — ONDANSETRON 2 MG/ML
INJECTION INTRAMUSCULAR; INTRAVENOUS
Status: DISPENSED
Start: 2018-11-26

## (undated) RX ORDER — BUPIVACAINE HYDROCHLORIDE AND EPINEPHRINE 2.5; 5 MG/ML; UG/ML
INJECTION, SOLUTION EPIDURAL; INFILTRATION; INTRACAUDAL; PERINEURAL
Status: DISPENSED
Start: 2021-01-01

## (undated) RX ORDER — DEXAMETHASONE SODIUM PHOSPHATE 10 MG/ML
INJECTION INTRAMUSCULAR; INTRAVENOUS
Status: DISPENSED
Start: 2017-08-15

## (undated) RX ORDER — LIDOCAINE HYDROCHLORIDE 10 MG/ML
INJECTION, SOLUTION EPIDURAL; INFILTRATION; INTRACAUDAL; PERINEURAL
Status: DISPENSED
Start: 2020-09-25

## (undated) RX ORDER — FENTANYL CITRATE 50 UG/ML
INJECTION, SOLUTION INTRAMUSCULAR; INTRAVENOUS
Status: DISPENSED
Start: 2021-01-01

## (undated) RX ORDER — FENTANYL CITRATE 50 UG/ML
INJECTION, SOLUTION INTRAMUSCULAR; INTRAVENOUS
Status: DISPENSED
Start: 2018-11-26

## (undated) RX ORDER — MORPHINE SULFATE 0.5 MG/ML
INJECTION, SOLUTION EPIDURAL; INTRATHECAL; INTRAVENOUS
Status: DISPENSED
Start: 2017-08-15

## (undated) RX ORDER — PROPOFOL 10 MG/ML
INJECTION, EMULSION INTRAVENOUS
Status: DISPENSED
Start: 2018-11-26

## (undated) RX ORDER — ONDANSETRON 2 MG/ML
INJECTION INTRAMUSCULAR; INTRAVENOUS
Status: DISPENSED
Start: 2020-09-25

## (undated) RX ORDER — BUPIVACAINE HYDROCHLORIDE AND EPINEPHRINE 2.5; 5 MG/ML; UG/ML
INJECTION, SOLUTION EPIDURAL; INFILTRATION; INTRACAUDAL; PERINEURAL
Status: DISPENSED
Start: 2020-09-25

## (undated) RX ORDER — GLYCOPYRROLATE 0.2 MG/ML
INJECTION INTRAMUSCULAR; INTRAVENOUS
Status: DISPENSED
Start: 2018-11-26

## (undated) RX ORDER — KETOROLAC TROMETHAMINE 30 MG/ML
INJECTION, SOLUTION INTRAMUSCULAR; INTRAVENOUS
Status: DISPENSED
Start: 2017-08-15

## (undated) RX ORDER — BUPIVACAINE HYDROCHLORIDE AND EPINEPHRINE 5; 5 MG/ML; UG/ML
INJECTION, SOLUTION EPIDURAL; INTRACAUDAL; PERINEURAL
Status: DISPENSED
Start: 2017-08-15

## (undated) RX ORDER — BACITRACIN 50000 [IU]/1
INJECTION, POWDER, FOR SOLUTION INTRAMUSCULAR
Status: DISPENSED
Start: 2017-08-15

## (undated) RX ORDER — BUPIVACAINE HYDROCHLORIDE 5 MG/ML
INJECTION, SOLUTION EPIDURAL; INTRACAUDAL
Status: DISPENSED
Start: 2020-09-25